# Patient Record
Sex: FEMALE | Race: OTHER | HISPANIC OR LATINO
[De-identification: names, ages, dates, MRNs, and addresses within clinical notes are randomized per-mention and may not be internally consistent; named-entity substitution may affect disease eponyms.]

---

## 2020-01-23 ENCOUNTER — APPOINTMENT (OUTPATIENT)
Dept: NEUROLOGY | Facility: CLINIC | Age: 65
End: 2020-01-23

## 2020-04-14 ENCOUNTER — EMERGENCY (EMERGENCY)
Facility: HOSPITAL | Age: 65
LOS: 1 days | Discharge: HOME | End: 2020-04-14
Attending: STUDENT IN AN ORGANIZED HEALTH CARE EDUCATION/TRAINING PROGRAM | Admitting: EMERGENCY MEDICINE
Payer: MEDICARE

## 2020-04-14 VITALS
TEMPERATURE: 100 F | DIASTOLIC BLOOD PRESSURE: 62 MMHG | HEART RATE: 118 BPM | RESPIRATION RATE: 16 BRPM | SYSTOLIC BLOOD PRESSURE: 126 MMHG | OXYGEN SATURATION: 98 %

## 2020-04-14 VITALS
HEART RATE: 105 BPM | SYSTOLIC BLOOD PRESSURE: 122 MMHG | TEMPERATURE: 101 F | OXYGEN SATURATION: 98 % | RESPIRATION RATE: 18 BRPM | DIASTOLIC BLOOD PRESSURE: 56 MMHG

## 2020-04-14 LAB
ALBUMIN SERPL ELPH-MCNC: 5 G/DL — SIGNIFICANT CHANGE UP (ref 3.3–5)
ALP SERPL-CCNC: 73 U/L — SIGNIFICANT CHANGE UP (ref 40–120)
ALT FLD-CCNC: 26 U/L — SIGNIFICANT CHANGE UP (ref 4–33)
ANION GAP SERPL CALC-SCNC: 15 MMO/L — HIGH (ref 7–14)
APTT BLD: 30.7 SEC — SIGNIFICANT CHANGE UP (ref 27.5–36.3)
AST SERPL-CCNC: 26 U/L — SIGNIFICANT CHANGE UP (ref 4–32)
BASE EXCESS BLDV CALC-SCNC: 2.1 MMOL/L — SIGNIFICANT CHANGE UP
BASOPHILS # BLD AUTO: 0.05 K/UL — SIGNIFICANT CHANGE UP (ref 0–0.2)
BASOPHILS NFR BLD AUTO: 0.7 % — SIGNIFICANT CHANGE UP (ref 0–2)
BILIRUB SERPL-MCNC: 0.4 MG/DL — SIGNIFICANT CHANGE UP (ref 0.2–1.2)
BLOOD GAS VENOUS - CREATININE: 0.63 MG/DL — SIGNIFICANT CHANGE UP (ref 0.5–1.3)
BUN SERPL-MCNC: 13 MG/DL — SIGNIFICANT CHANGE UP (ref 7–23)
CALCIUM SERPL-MCNC: 9.9 MG/DL — SIGNIFICANT CHANGE UP (ref 8.4–10.5)
CHLORIDE BLDV-SCNC: 103 MMOL/L — SIGNIFICANT CHANGE UP (ref 96–108)
CHLORIDE SERPL-SCNC: 101 MMOL/L — SIGNIFICANT CHANGE UP (ref 98–107)
CO2 SERPL-SCNC: 24 MMOL/L — SIGNIFICANT CHANGE UP (ref 22–31)
CREAT SERPL-MCNC: 0.57 MG/DL — SIGNIFICANT CHANGE UP (ref 0.5–1.3)
CRP SERPL-MCNC: < 4 MG/L — SIGNIFICANT CHANGE UP
D DIMER BLD IA.RAPID-MCNC: 321 NG/ML — SIGNIFICANT CHANGE UP
EOSINOPHIL # BLD AUTO: 0.12 K/UL — SIGNIFICANT CHANGE UP (ref 0–0.5)
EOSINOPHIL NFR BLD AUTO: 1.7 % — SIGNIFICANT CHANGE UP (ref 0–6)
ERYTHROCYTE [SEDIMENTATION RATE] IN BLOOD: 13 MM/HR — SIGNIFICANT CHANGE UP (ref 4–25)
FERRITIN SERPL-MCNC: 144.9 NG/ML — SIGNIFICANT CHANGE UP (ref 15–150)
GAS PNL BLDV: 141 MMOL/L — SIGNIFICANT CHANGE UP (ref 136–146)
GLUCOSE BLDV-MCNC: 104 MG/DL — HIGH (ref 70–99)
GLUCOSE SERPL-MCNC: 111 MG/DL — HIGH (ref 70–99)
HCO3 BLDV-SCNC: 25 MMOL/L — SIGNIFICANT CHANGE UP (ref 20–27)
HCT VFR BLD CALC: 38.2 % — SIGNIFICANT CHANGE UP (ref 34.5–45)
HCT VFR BLDV CALC: 39.9 % — SIGNIFICANT CHANGE UP (ref 34.5–45)
HGB BLD-MCNC: 12.6 G/DL — SIGNIFICANT CHANGE UP (ref 11.5–15.5)
HGB BLDV-MCNC: 13 G/DL — SIGNIFICANT CHANGE UP (ref 11.5–15.5)
IMM GRANULOCYTES NFR BLD AUTO: 0.3 % — SIGNIFICANT CHANGE UP (ref 0–1.5)
INR BLD: 1.03 — SIGNIFICANT CHANGE UP (ref 0.88–1.17)
LACTATE BLDV-MCNC: 2.5 MMOL/L — HIGH (ref 0.5–2)
LDH SERPL L TO P-CCNC: 235 U/L — HIGH (ref 135–225)
LYMPHOCYTES # BLD AUTO: 0.94 K/UL — LOW (ref 1–3.3)
LYMPHOCYTES # BLD AUTO: 13.2 % — SIGNIFICANT CHANGE UP (ref 13–44)
MCHC RBC-ENTMCNC: 29.9 PG — SIGNIFICANT CHANGE UP (ref 27–34)
MCHC RBC-ENTMCNC: 33 % — SIGNIFICANT CHANGE UP (ref 32–36)
MCV RBC AUTO: 90.5 FL — SIGNIFICANT CHANGE UP (ref 80–100)
MONOCYTES # BLD AUTO: 0.57 K/UL — SIGNIFICANT CHANGE UP (ref 0–0.9)
MONOCYTES NFR BLD AUTO: 8 % — SIGNIFICANT CHANGE UP (ref 2–14)
NEUTROPHILS # BLD AUTO: 5.43 K/UL — SIGNIFICANT CHANGE UP (ref 1.8–7.4)
NEUTROPHILS NFR BLD AUTO: 76.1 % — SIGNIFICANT CHANGE UP (ref 43–77)
NRBC # FLD: 0 K/UL — SIGNIFICANT CHANGE UP (ref 0–0)
PCO2 BLDV: 49 MMHG — SIGNIFICANT CHANGE UP (ref 41–51)
PH BLDV: 7.36 PH — SIGNIFICANT CHANGE UP (ref 7.32–7.43)
PLATELET # BLD AUTO: 164 K/UL — SIGNIFICANT CHANGE UP (ref 150–400)
PMV BLD: 11.7 FL — SIGNIFICANT CHANGE UP (ref 7–13)
PO2 BLDV: 30 MMHG — LOW (ref 35–40)
POTASSIUM BLDV-SCNC: 4.1 MMOL/L — SIGNIFICANT CHANGE UP (ref 3.4–4.5)
POTASSIUM SERPL-MCNC: 4 MMOL/L — SIGNIFICANT CHANGE UP (ref 3.5–5.3)
POTASSIUM SERPL-SCNC: 4 MMOL/L — SIGNIFICANT CHANGE UP (ref 3.5–5.3)
PROCALCITONIN SERPL-MCNC: 0.21 NG/ML — HIGH (ref 0.02–0.1)
PROT SERPL-MCNC: 7.8 G/DL — SIGNIFICANT CHANGE UP (ref 6–8.3)
PROTHROM AB SERPL-ACNC: 11.9 SEC — SIGNIFICANT CHANGE UP (ref 9.8–13.1)
RBC # BLD: 4.22 M/UL — SIGNIFICANT CHANGE UP (ref 3.8–5.2)
RBC # FLD: 13 % — SIGNIFICANT CHANGE UP (ref 10.3–14.5)
SAO2 % BLDV: 51.2 % — LOW (ref 60–85)
SODIUM SERPL-SCNC: 140 MMOL/L — SIGNIFICANT CHANGE UP (ref 135–145)
TROPONIN T, HIGH SENSITIVITY: < 6 NG/L — SIGNIFICANT CHANGE UP (ref ?–14)
WBC # BLD: 7.13 K/UL — SIGNIFICANT CHANGE UP (ref 3.8–10.5)
WBC # FLD AUTO: 7.13 K/UL — SIGNIFICANT CHANGE UP (ref 3.8–10.5)

## 2020-04-14 PROCEDURE — 70498 CT ANGIOGRAPHY NECK: CPT | Mod: 26

## 2020-04-14 PROCEDURE — 71045 X-RAY EXAM CHEST 1 VIEW: CPT | Mod: 26

## 2020-04-14 PROCEDURE — 93010 ELECTROCARDIOGRAM REPORT: CPT

## 2020-04-14 PROCEDURE — 70496 CT ANGIOGRAPHY HEAD: CPT | Mod: 26

## 2020-04-14 PROCEDURE — 99284 EMERGENCY DEPT VISIT MOD MDM: CPT | Mod: 25,GC

## 2020-04-14 RX ORDER — ACETAMINOPHEN 500 MG
650 TABLET ORAL ONCE
Refills: 0 | Status: COMPLETED | OUTPATIENT
Start: 2020-04-14 | End: 2020-04-14

## 2020-04-14 RX ORDER — VALACYCLOVIR 500 MG/1
1 TABLET, FILM COATED ORAL
Qty: 21 | Refills: 0
Start: 2020-04-14 | End: 2020-04-20

## 2020-04-14 RX ORDER — SODIUM CHLORIDE 9 MG/ML
500 INJECTION INTRAMUSCULAR; INTRAVENOUS; SUBCUTANEOUS ONCE
Refills: 0 | Status: COMPLETED | OUTPATIENT
Start: 2020-04-14 | End: 2020-04-14

## 2020-04-14 RX ORDER — VALACYCLOVIR 500 MG/1
1000 TABLET, FILM COATED ORAL ONCE
Refills: 0 | Status: COMPLETED | OUTPATIENT
Start: 2020-04-14 | End: 2020-04-14

## 2020-04-14 RX ADMIN — SODIUM CHLORIDE 500 MILLILITER(S): 9 INJECTION INTRAMUSCULAR; INTRAVENOUS; SUBCUTANEOUS at 16:00

## 2020-04-14 RX ADMIN — Medication 1 TABLET(S): at 16:00

## 2020-04-14 RX ADMIN — Medication 650 MILLIGRAM(S): at 14:03

## 2020-04-14 RX ADMIN — VALACYCLOVIR 1000 MILLIGRAM(S): 500 TABLET, FILM COATED ORAL at 18:54

## 2020-04-14 NOTE — ED PROVIDER NOTE - PROGRESS NOTE DETAILS
Patient seen by ophthalmology, believe sx to be zoster, recommend treating with valtrex and following up with ophtho clinic on Thursday. [facility] ED ATTENDING- accepted sign out on this patient from Dr. Nation. 64yo F h/o HTN, breast cancer, headaches, p/w R sided headache, facial rash/scabbing. No vision changes or dendritic lesions on fluorescein dye exam. ESR <15. CTA scans showing no acute findings. Concern for herpes zoster ophthalmicus per optho. will dc with optho f/u outpatient with valtrex rx and topical antibx

## 2020-04-14 NOTE — ED ADULT NURSE NOTE - CHIEF COMPLAINT QUOTE
Was seen at [facility] for right eye edema and was told she had blepharitis and was given ofloxacin and erythromycin drops with no relief.  Pt awoke this am with fever and increased edema to right eye and rash to nose

## 2020-04-14 NOTE — ED PROVIDER NOTE - OBJECTIVE STATEMENT
64 y/o female with hx of breast ca in remission, mild dementia, HTN, headaches at baseline presenting to the ED c/o right sided headache and eye pain x 5 days. Reports headache began 5 days ago and eye began to swell 3 days ago. Also reports some scabbing to the bridge of her nose at the same time period. Was seen at urgent care and given ofloxacin drops and erythromycin ointment and the eye began to worsen over the past few days, today could barely open it. Reports pain is constant in her right sided head and eye. Today began to spike fever. No nausea, vomiting, CP, SOB. Reports slight blurry vision in right eye.

## 2020-04-14 NOTE — ED PROVIDER NOTE - NSFOLLOWUPINSTRUCTIONS_ED_ALL_ED_FT
Take valtrex 1 gram three times a day. Use tylenol 650mg every 6 hours for pain. Return for worsening fever, headache, vision changes, change in mental status, nausea, vomiting, or other concerning symptoms.    Follow up in ophthalmology clinic on Thursday.

## 2020-04-14 NOTE — ED PROVIDER NOTE - NS ED ROS FT
CONST: + fevers, no chills  EYES: + pain, + vision changes  ENT: no sore throat, no ear pain, no change in hearing  CV: no chest pain, no leg swelling  RESP: no shortness of breath, no cough  ABD: no abdominal pain, no nausea, no vomiting, no diarrhea  : no dysuria, no flank pain, no hematuria  MSK: no back pain, no extremity pain  NEURO: + headache or additional neurologic complaints  HEME: no easy bleeding  SKIN:  no rash

## 2020-04-14 NOTE — ED PROVIDER NOTE - ATTENDING CONTRIBUTION TO CARE
Sherin Nation M.D: 65F hx breast ca,htn, headaches, dementia, arrives with daughter for complaints of right sided headaches since wednesday, different than her typicalal headaches, with progressive redness/swelling and pain to right eye/right nose. +scabs to nose. notes minimal blurry vision to right eye and watery drainage. spiked fever to 102 today. no cough no sob no uri symptoms. no jaw claudication. on exam pt in no resp distress, not hypoxic or tachypnic, is tachycardic and febrile at this time. +right temporal tendenress +erythema and edema to right eye and right nose without significant induration or tendenress. no pain with EOMs. PERRLA. fluoproscein stain without dendritic lesions. rash on nose does not seem c/w zoster. concern for possible prevs post septal cellulitis vs temporal arteritis vs cavernous sinus thrombosis in the setting of likely COVID (have seen covid with only conjunctival symptoms). plan for labs, covid swab, cta head and neck to assess for cavernous sinus thrombosis/temporal arteritis given inflam markers will likely be unreliable in the setting of covid. will discuss with ophtho and reassess

## 2020-04-14 NOTE — ED ADULT TRIAGE NOTE - CHIEF COMPLAINT QUOTE
Was seen at [facility] for right eye edema and was told she had blepharitis and was given ofloxacin and erythromycin drops with no relief.  Pt awoke this am with fever and increased edema to right eye and rash to nose

## 2020-04-14 NOTE — CONSULT NOTE ADULT - SUBJECTIVE AND OBJECTIVE BOX
[facility] Ophthalmology Consult Note    HPI: 66 y/o female with hx of breast ca in remission, mild dementia, HTN, headaches at baseline presenting to the ED c/o right sided headache and eye pain x 5 days. Reports headache began 5 days ago and eye began to swell 3 days ago. Also reports some scabbing to the bridge of her nose at the same time period. Was seen at urgent care and given ofloxacin drops and erythromycin ointment and the eye began to worsen over the past few days, today could barely open it. Reports pain is constant in her right sided head and eye. Today began to spike fever. No nausea, vomiting, CP, SOB. Reports slight blurry vision in right eye.      PMH: Breast Ca in remission, mild dementia, HTN, headaches   Meds: norvasc  POcHx (including surgeries/lasers/trauma):  Cataracts OU. Follows with an eye doctor, cannot remember name. Wears glasses. No surgeries.   Drops: Ofloxacin QID right eye, erythro ointment BID  FamHx: Negative for glaucoma or ARMD  Social Hx: Former smoker, quit 10 years ago. No ETOH use.   Allergies: NKDA    ROS:  General (neg), Vision (per HPI), Head and Neck (neg), Pulm (neg), CV (neg), GI (neg),  (neg), Musculoskeletal (neg), Skin/Integ (neg), Neuro (neg), Endocrine (neg), Heme (neg), All/Immuno (neg)    Mood and Affect Appropriate ( x ),  Oriented to Time, Place, and Person x 3 ( x )    Ophthalmology Exam    Visual acuity (cc - reading glasses): 20/30 OD, PH to 20/20, 20/40 OS, PH to 20/30  Pupils: PERRL OU, no APD  Ttono: 18, 19  Extraocular movements (EOMs): Full OU, no pain, no diplopia   Confrontational Visual Field (CVF):  Full OU  Color Plates: 3/12 OU    Pen Light Exam (PLE)  External:  Erythema   Lids/Lashes/Lacrimal Ducts: Flat OU    Sclera/Conjunctiva:  W+Q OU  Cornea: Cl OU  Anterior Chamber: D+F OU  Iris:  Flat OU  Lens:  Cl OU    Fundus Exam: dilated with 1% tropicamide and 2.5% phenylephrine  Approval obtained from primary team for dilation  Patient aware that pupils can remained dilated for at least 4-6 hours  Exam performed with 20D lens    Vitreous: wnl OU  Disc, cup/disc: sharp and pink, 0.4 OU  Macula:  wnl OU  Vessels:  wnl OU  Periphery: wnl OU    Diagnostic Testing:      Assessment:      Plan:        Follow-Up:  Patient should follow up his/her ophthalmologist or in the [facility] Ophthalmology Practice within 1 week of discharge.  [address] [place] [address].  [place], [place] [id number] [phone] [facility] Ophthalmology Consult Note    HPI: "64 y/o female with hx of breast ca in remission, mild dementia, HTN, headaches at baseline presenting to the ED c/o right sided headache and eye pain x 5 days. Reports headache began 5 days ago and eye began to swell 3 days ago. Also reports some scabbing to the bridge of her nose at the same time period. Was seen at urgent care and given ofloxacin drops and erythromycin ointment and the eye began to worsen over the past few days, today could barely open it. Reports pain is constant in her right sided head and eye. Today began to spike fever. No nausea, vomiting, CP, SOB. Reports slight blurry vision in right eye."    Ophthalmology consulted 2/2 right eye swelling. Patient denies changes in vision, diplopia, photophobia, trauma, flashes/floaters, recent sinus infection, and pain with eye movements. Has an eye doctor and wears reading glasses.      PMH: Breast Ca in remission, mild dementia, HTN, headaches   Meds: norvasc  POcHx (including surgeries/lasers/trauma):  Cataracts OU. Follows with an eye doctor, cannot remember name. Wears glasses. No surgeries.   Drops: Ofloxacin QID right eye, erythro ointment BID  FamHx: Negative for glaucoma or ARMD  Social Hx: Former smoker, quit 10 years ago. No ETOH use.   Allergies: NKDA    ROS:  General (neg), Vision (per HPI), Head and Neck (neg), Pulm (neg), CV (neg), GI (neg),  (neg), Musculoskeletal (neg), Skin/Integ (neg), Neuro (neg), Endocrine (neg), Heme (neg), All/Immuno (neg)    Mood and Affect Appropriate ( x ),  Oriented to Time, Place, and Person x 3 ( x )    Ophthalmology Exam    Visual acuity (cc - reading glasses): 20/30 OD, PH to 20/20, 20/40 OS, PH to 20/30  Pupils: PERRL OU, no APD  Ttono: 18, 19  Extraocular movements (EOMs): Full OU, no pain, no diplopia   Confrontational Visual Field (CVF):  Full OU  Color Plates: 3/12 OU    Pen Light Exam (PLE)  External:  No proptosis or resistance to retropulsion OU. Erythema and vesicles seen on bridge of nose.   Lids/Lashes/Lacrimal Ducts: 1+ right upper lid edema, trace right lower lid edema. WNL OS.  Sclera/Conjunctiva:  1+ bulbar injection OD, W + Q OS  Cornea: Cl OU. No dendritiform lesions seen OU  Anterior Chamber: D+F OU  Iris:  Flat OU  Lens:  NS OU    Fundus Exam: dilated with 1% tropicamide and 2.5% phenylephrine  Approval obtained from primary team for dilation  Patient aware that pupils can remained dilated for at least 4-6 hours  Exam performed with 20D lens    Vitreous: wnl OU  Disc, cup/disc: sharp and pink, 0.4 OU  Macula:  wnl OU  Vessels:  wnl OU  Periphery: wnl OU    Diagnostic Testing: CT head pending      Assessment: 64 y/o F, hx of breast CA in remission, presents with right eye swelling, redness, with facial rash x 5 days. BCVA 20/20 in affected eye, no APD, IOP WNL, EOMs full. Cornea clear, conjunctiva injected. DFE reveals unremarkable posterior segment. Clinical picture most consistent with herpes zoster ophthalmicus of the right side.      Plan:  - Valtrex 1 gram TID  - erythromycin ointment to right eye TID, and to periocular skin lesions TID  - cool compresses to affected area TID  - if discharged can follow up in [address] [place] [address] on Thursday.    d/w Dr. Murillo (attending)      Follow-Up:  Patient should follow up his/her ophthalmologist or in the [facility] Ophthalmology Practice within 3 days of discharge.  [address] [place] [address].  [place] [address]  [phone] [facility] Ophthalmology Consult Note    HPI: "64 y/o female with hx of breast ca in remission, mild dementia, HTN, headaches at baseline presenting to the ED c/o right sided headache and eye pain x 5 days. Reports headache began 5 days ago and eye began to swell 3 days ago. Also reports some scabbing to the bridge of her nose at the same time period. Was seen at urgent care and given ofloxacin drops and erythromycin ointment and the eye began to worsen over the past few days, today could barely open it. Reports pain is constant in her right sided head and eye. Today began to spike fever. No nausea, vomiting, CP, SOB. Reports slight blurry vision in right eye."    Ophthalmology consulted 2/2 right eye swelling. Patient denies changes in vision, diplopia, photophobia, trauma, flashes/floaters, recent sinus infection, and pain with eye movements. Has an eye doctor and wears reading glasses.      PMH: Breast Ca in remission, mild dementia, HTN, headaches   Meds: norvasc  POcHx (including surgeries/lasers/trauma):  Cataracts OU. Follows with an eye doctor, cannot remember name. Wears glasses. No surgeries.   Drops: Ofloxacin QID right eye, erythro ointment BID  FamHx: Negative for glaucoma or ARMD  Social Hx: Former smoker, quit 10 years ago. No ETOH use.   Allergies: NKDA    ROS:  General (neg), Vision (per HPI), Head and Neck (neg), Pulm (neg), CV (neg), GI (neg),  (neg), Musculoskeletal (neg), Skin/Integ (neg), Neuro (neg), Endocrine (neg), Heme (neg), All/Immuno (neg)    Mood and Affect Appropriate ( x ),  Oriented to Time, Place, and Person x 3 ( x )    Ophthalmology Exam    Visual acuity (cc - reading glasses): 20/30 OD, PH to 20/20, 20/40 OS, PH to 20/30  Pupils: PERRL OU, no APD  Ttono: 18, 19  Extraocular movements (EOMs): Full OU, no pain, no diplopia   Confrontational Visual Field (CVF):  Full OU  Color Plates: 3/12 OU    Pen Light Exam (PLE)  External:  No proptosis or resistance to retropulsion OU. Erythema and vesicles seen on bridge of nose.   Lids/Lashes/Lacrimal Ducts: 1+ right upper lid edema, trace right lower lid edema. WNL OS.  Sclera/Conjunctiva:  1+ bulbar injection OD, W + Q OS  Cornea: Cl OU. No dendritiform lesions seen OU  Anterior Chamber: D+F OU  Iris:  Flat OU  Lens:  NS OU    Fundus Exam: dilated with 1% tropicamide and 2.5% phenylephrine  Approval obtained from primary team for dilation  Patient aware that pupils can remained dilated for at least 4-6 hours  Exam performed with 20D lens    Vitreous: wnl OU  Disc, cup/disc: sharp and pink, 0.4 OU  Macula:  wnl OU  Vessels:  wnl OU  Periphery: wnl OU    Diagnostic Testing: CT head pending      Assessment: 64 y/o F, hx of breast CA in remission, presents with right eye swelling, redness, with facial rash x 5 days. BCVA 20/20 in affected eye, no APD, IOP WNL, EOMs full. Cornea clear, conjunctiva injected. DFE reveals unremarkable posterior segment. Clinical picture most consistent with herpes zoster ophthalmicus of the right side with conjunctivitis OD.      Plan:  - Valtrex 1 gram TID  - erythromycin ointment to right eye TID, and to periocular skin lesions TID  - cool compresses to affected area TID  - if discharged can follow up in [facility] clinic on Thursday, sooner if symptoms worsen or change    d/w Dr. Murillo (attending)      Follow-Up:  Patient should follow up his/her ophthalmologist or in the [facility] Ophthalmology Practice within 3 days of discharge, sooner if symptoms worsen or change.  [address] [place] [address].  [place] [address]  [phone]

## 2020-04-14 NOTE — ED ADULT NURSE NOTE - OBJECTIVE STATEMENT
pt coming with right eye lid and round the eye swelling, clusters like area with some scabs and erythema noted from the eye/nose bridge area rad. upper area of the right forehead.    pt with Hx. mild dementia, daughter at bed side, labs done, IV to left AC 20g angio cath. place.  Swab for COBID 19 sent, medicated as ordered.    Amber Ramirez RN (covering for BK)

## 2020-04-14 NOTE — CONSULT NOTE ADULT - ATTENDING COMMENTS
I have reviewed the residents note and photos including the history, exam, assessment, and plan.  I agree with the residents assessment and plan.    Delmi Murillo MD

## 2020-04-14 NOTE — ED PROVIDER NOTE - PATIENT PORTAL LINK FT
You can access the FollowMyHealth Patient Portal offered by [facility] by registering at the following website: [email or web address]. By joining [employer]’s FollowMyHealth portal, you will also be able to view your health information using other applications (apps) compatible with our system.

## 2020-04-14 NOTE — ED PROVIDER NOTE - PHYSICAL EXAMINATION
GENERAL: Awake, alert, NAD  HEENT: NC/AT, moist mucous membranes  EYE: PERRL, EOMI, right eye with conjunctival injection, blepharitis, pain to palpation around eye with erythema extending to base of nose, mild scabbing over bridge of nose with possible vesicles  LUNGS: CTAB, no wheezes or crackles   CARDIAC: RRR, no m/r/g  ABDOMEN: Soft, normal BS, non tender, non distended, no rebound, no guarding  BACK: No midline spinal tenderness, no CVA tenderness  EXT: No edema, no calf tenderness, 2+ DP pulses bilaterally, no deformities.  NEURO: A&Ox3. Moving all extremities.  SKIN: Warm and dry. No rash.  PSYCH: Normal affect.

## 2020-04-14 NOTE — ED PROVIDER NOTE - CLINICAL SUMMARY MEDICAL DECISION MAKING FREE TEXT BOX
64 y/o female with hx of breast ca in remission, mild dementia, HTN, headaches at baseline presenting to the ED c/o right sided headache and eye pain x 5 days. ?vesicles with scabbing but unclear, potential orbital cellulitis vs temporal arteritis given extent of pain and swelling involving right eye, also potential for underlying COVID infection. Will perform workup with CT head to r/o orbital cellulitis or signs of thrombosis, also check labs including ESR and CRP to evaluate for temporal arteritis, will send COVID swab, also stain eye and consult ophtho for further recs. Reassess, d/c vs admit pending workup.

## 2020-04-14 NOTE — ED PROVIDER NOTE - NSFOLLOWUPCLINICS_GEN_ALL_ED_FT
[facility] Ophthalmology  Ophthalmology  [address] [place] [address] [address]  [place] [address]  Phone: [phone]  Fax:   Follow Up Time: 1-3 Days

## 2020-04-15 LAB — SARS-COV-2 RNA SPEC QL NAA+PROBE: SIGNIFICANT CHANGE UP

## 2020-04-16 ENCOUNTER — NON-APPOINTMENT (OUTPATIENT)
Age: 65
End: 2020-04-16

## 2020-04-16 ENCOUNTER — APPOINTMENT (OUTPATIENT)
Dept: OPHTHALMOLOGY | Facility: CLINIC | Age: 65
End: 2020-04-16
Payer: MEDICARE

## 2020-04-16 PROCEDURE — 99202 OFFICE O/P NEW SF 15 MIN: CPT | Mod: 95

## 2020-04-19 LAB
CULTURE RESULTS: SIGNIFICANT CHANGE UP
CULTURE RESULTS: SIGNIFICANT CHANGE UP
SPECIMEN SOURCE: SIGNIFICANT CHANGE UP
SPECIMEN SOURCE: SIGNIFICANT CHANGE UP

## 2020-04-30 ENCOUNTER — APPOINTMENT (OUTPATIENT)
Dept: OPHTHALMOLOGY | Facility: CLINIC | Age: 65
End: 2020-04-30

## 2021-06-17 ENCOUNTER — NON-APPOINTMENT (OUTPATIENT)
Age: 66
End: 2021-06-17

## 2021-06-17 ENCOUNTER — APPOINTMENT (OUTPATIENT)
Dept: OPHTHALMOLOGY | Facility: CLINIC | Age: 66
End: 2021-06-17
Payer: MEDICARE

## 2021-06-17 PROCEDURE — 92014 COMPRE OPH EXAM EST PT 1/>: CPT

## 2021-07-28 ENCOUNTER — NON-APPOINTMENT (OUTPATIENT)
Age: 66
End: 2021-07-28

## 2021-07-28 ENCOUNTER — APPOINTMENT (OUTPATIENT)
Dept: OPHTHALMOLOGY | Facility: CLINIC | Age: 66
End: 2021-07-28
Payer: MEDICARE

## 2021-07-28 PROCEDURE — 76514 ECHO EXAM OF EYE THICKNESS: CPT

## 2021-07-28 PROCEDURE — 92012 INTRM OPH EXAM EST PATIENT: CPT

## 2021-07-28 PROCEDURE — 92083 EXTENDED VISUAL FIELD XM: CPT

## 2021-08-06 ENCOUNTER — NON-APPOINTMENT (OUTPATIENT)
Age: 66
End: 2021-08-06

## 2021-08-18 ENCOUNTER — APPOINTMENT (OUTPATIENT)
Dept: ORTHOPEDIC SURGERY | Facility: CLINIC | Age: 66
End: 2021-08-18
Payer: MEDICARE

## 2021-08-18 ENCOUNTER — NON-APPOINTMENT (OUTPATIENT)
Age: 66
End: 2021-08-18

## 2021-08-18 PROCEDURE — 29075 APPL CST ELBW FNGR SHORT ARM: CPT | Mod: LT

## 2021-08-18 PROCEDURE — 99204 OFFICE O/P NEW MOD 45 MIN: CPT | Mod: 25

## 2021-08-27 ENCOUNTER — APPOINTMENT (OUTPATIENT)
Dept: ORTHOPEDIC SURGERY | Facility: CLINIC | Age: 66
End: 2021-08-27
Payer: MEDICARE

## 2021-08-27 VITALS
OXYGEN SATURATION: 96 % | BODY MASS INDEX: 28.51 KG/M2 | WEIGHT: 167 LBS | SYSTOLIC BLOOD PRESSURE: 124 MMHG | DIASTOLIC BLOOD PRESSURE: 66 MMHG | HEIGHT: 64 IN | HEART RATE: 86 BPM

## 2021-08-27 PROCEDURE — 99214 OFFICE O/P EST MOD 30 MIN: CPT

## 2021-08-27 PROCEDURE — 73110 X-RAY EXAM OF WRIST: CPT | Mod: LT

## 2021-09-24 ENCOUNTER — APPOINTMENT (OUTPATIENT)
Dept: ORTHOPEDIC SURGERY | Facility: CLINIC | Age: 66
End: 2021-09-24
Payer: MEDICARE

## 2021-10-08 ENCOUNTER — APPOINTMENT (OUTPATIENT)
Dept: ORTHOPEDIC SURGERY | Facility: CLINIC | Age: 66
End: 2021-10-08
Payer: MEDICARE

## 2021-10-08 VITALS
WEIGHT: 167 LBS | OXYGEN SATURATION: 95 % | HEART RATE: 74 BPM | DIASTOLIC BLOOD PRESSURE: 71 MMHG | BODY MASS INDEX: 28.51 KG/M2 | SYSTOLIC BLOOD PRESSURE: 118 MMHG | HEIGHT: 64 IN

## 2021-10-08 DIAGNOSIS — S62.102A FRACTURE OF UNSPECIFIED CARPAL BONE, LEFT WRIST, INITIAL ENCOUNTER FOR CLOSED FRACTURE: ICD-10-CM

## 2021-10-08 PROCEDURE — 99214 OFFICE O/P EST MOD 30 MIN: CPT | Mod: 25

## 2021-10-08 PROCEDURE — 73110 X-RAY EXAM OF WRIST: CPT | Mod: LT

## 2021-12-10 ENCOUNTER — APPOINTMENT (OUTPATIENT)
Dept: ORTHOPEDIC SURGERY | Facility: CLINIC | Age: 66
End: 2021-12-10

## 2022-01-19 ENCOUNTER — APPOINTMENT (OUTPATIENT)
Dept: OPHTHALMOLOGY | Facility: CLINIC | Age: 67
End: 2022-01-19
Payer: MEDICARE

## 2022-01-19 ENCOUNTER — NON-APPOINTMENT (OUTPATIENT)
Age: 67
End: 2022-01-19

## 2022-01-19 PROCEDURE — 92285 EXTERNAL OCULAR PHOTOGRAPHY: CPT

## 2022-01-19 PROCEDURE — 92014 COMPRE OPH EXAM EST PT 1/>: CPT

## 2022-01-21 ENCOUNTER — NON-APPOINTMENT (OUTPATIENT)
Age: 67
End: 2022-01-21

## 2022-01-21 ENCOUNTER — APPOINTMENT (OUTPATIENT)
Dept: OPHTHALMOLOGY | Facility: CLINIC | Age: 67
End: 2022-01-21
Payer: MEDICARE

## 2022-01-21 PROCEDURE — 92012 INTRM OPH EXAM EST PATIENT: CPT

## 2022-02-04 ENCOUNTER — TRANSCRIPTION ENCOUNTER (OUTPATIENT)
Age: 67
End: 2022-02-04

## 2022-02-09 ENCOUNTER — APPOINTMENT (OUTPATIENT)
Dept: OPHTHALMOLOGY | Facility: CLINIC | Age: 67
End: 2022-02-09
Payer: MEDICARE

## 2022-02-09 ENCOUNTER — NON-APPOINTMENT (OUTPATIENT)
Age: 67
End: 2022-02-09

## 2022-02-09 PROCEDURE — 92012 INTRM OPH EXAM EST PATIENT: CPT

## 2022-02-17 ENCOUNTER — NON-APPOINTMENT (OUTPATIENT)
Age: 67
End: 2022-02-17

## 2022-02-17 ENCOUNTER — APPOINTMENT (OUTPATIENT)
Dept: OPHTHALMOLOGY | Facility: CLINIC | Age: 67
End: 2022-02-17
Payer: MEDICARE

## 2022-02-17 PROCEDURE — 92012 INTRM OPH EXAM EST PATIENT: CPT

## 2022-02-22 ENCOUNTER — APPOINTMENT (OUTPATIENT)
Dept: OPHTHALMOLOGY | Facility: CLINIC | Age: 67
End: 2022-02-22
Payer: MEDICARE

## 2022-02-22 ENCOUNTER — NON-APPOINTMENT (OUTPATIENT)
Age: 67
End: 2022-02-22

## 2022-02-22 PROCEDURE — 92012 INTRM OPH EXAM EST PATIENT: CPT

## 2022-03-03 ENCOUNTER — RX RENEWAL (OUTPATIENT)
Age: 67
End: 2022-03-03

## 2022-03-11 ENCOUNTER — NON-APPOINTMENT (OUTPATIENT)
Age: 67
End: 2022-03-11

## 2022-03-11 ENCOUNTER — APPOINTMENT (OUTPATIENT)
Dept: OPHTHALMOLOGY | Facility: CLINIC | Age: 67
End: 2022-03-11
Payer: MEDICARE

## 2022-03-11 PROCEDURE — 92012 INTRM OPH EXAM EST PATIENT: CPT

## 2022-05-13 ENCOUNTER — APPOINTMENT (OUTPATIENT)
Dept: OPHTHALMOLOGY | Facility: CLINIC | Age: 67
End: 2022-05-13
Payer: MEDICARE

## 2022-05-13 ENCOUNTER — NON-APPOINTMENT (OUTPATIENT)
Age: 67
End: 2022-05-13

## 2022-05-13 PROCEDURE — 92133 CPTRZD OPH DX IMG PST SGM ON: CPT

## 2022-05-13 PROCEDURE — 92012 INTRM OPH EXAM EST PATIENT: CPT

## 2022-07-18 ENCOUNTER — RX RENEWAL (OUTPATIENT)
Age: 67
End: 2022-07-18

## 2022-08-18 ENCOUNTER — APPOINTMENT (OUTPATIENT)
Dept: OPHTHALMOLOGY | Facility: CLINIC | Age: 67
End: 2022-08-18

## 2022-08-18 ENCOUNTER — NON-APPOINTMENT (OUTPATIENT)
Age: 67
End: 2022-08-18

## 2022-08-18 PROCEDURE — 92012 INTRM OPH EXAM EST PATIENT: CPT

## 2022-08-18 PROCEDURE — 92133 CPTRZD OPH DX IMG PST SGM ON: CPT

## 2022-09-29 ENCOUNTER — APPOINTMENT (OUTPATIENT)
Dept: OPHTHALMOLOGY | Facility: CLINIC | Age: 67
End: 2022-09-29

## 2022-09-29 ENCOUNTER — NON-APPOINTMENT (OUTPATIENT)
Age: 67
End: 2022-09-29

## 2022-09-29 PROCEDURE — 92014 COMPRE OPH EXAM EST PT 1/>: CPT

## 2022-09-29 PROCEDURE — 92025 CPTRIZED CORNEAL TOPOGRAPHY: CPT

## 2022-09-29 PROCEDURE — 92136 OPHTHALMIC BIOMETRY: CPT

## 2022-11-19 ENCOUNTER — APPOINTMENT (OUTPATIENT)
Dept: OPHTHALMOLOGY | Facility: CLINIC | Age: 67
End: 2022-11-19

## 2022-12-13 DIAGNOSIS — Z20.822 CONTACT WITH AND (SUSPECTED) EXPOSURE TO COVID-19: ICD-10-CM

## 2022-12-14 LAB — SARS-COV-2 N GENE NPH QL NAA+PROBE: NOT DETECTED

## 2022-12-16 ENCOUNTER — APPOINTMENT (OUTPATIENT)
Dept: OPHTHALMOLOGY | Facility: AMBULATORY MEDICAL SERVICES | Age: 67
End: 2022-12-16

## 2022-12-16 PROCEDURE — 6698F: CPT

## 2022-12-16 PROCEDURE — 66984 XCAPSL CTRC RMVL W/O ECP: CPT | Mod: LT

## 2022-12-17 ENCOUNTER — NON-APPOINTMENT (OUTPATIENT)
Age: 67
End: 2022-12-17

## 2022-12-17 ENCOUNTER — APPOINTMENT (OUTPATIENT)
Dept: OPHTHALMOLOGY | Facility: CLINIC | Age: 67
End: 2022-12-17

## 2022-12-17 PROCEDURE — 99024 POSTOP FOLLOW-UP VISIT: CPT

## 2022-12-23 ENCOUNTER — NON-APPOINTMENT (OUTPATIENT)
Age: 67
End: 2022-12-23

## 2022-12-23 ENCOUNTER — APPOINTMENT (OUTPATIENT)
Dept: OPHTHALMOLOGY | Facility: CLINIC | Age: 67
End: 2022-12-23

## 2022-12-23 PROCEDURE — 99024 POSTOP FOLLOW-UP VISIT: CPT

## 2023-01-06 ENCOUNTER — NON-APPOINTMENT (OUTPATIENT)
Age: 68
End: 2023-01-06

## 2023-01-06 ENCOUNTER — APPOINTMENT (OUTPATIENT)
Dept: OPHTHALMOLOGY | Facility: CLINIC | Age: 68
End: 2023-01-06
Payer: MEDICARE

## 2023-01-06 PROCEDURE — 99024 POSTOP FOLLOW-UP VISIT: CPT

## 2023-01-27 ENCOUNTER — APPOINTMENT (OUTPATIENT)
Dept: OPHTHALMOLOGY | Facility: CLINIC | Age: 68
End: 2023-01-27
Payer: MEDICARE

## 2023-01-27 ENCOUNTER — NON-APPOINTMENT (OUTPATIENT)
Age: 68
End: 2023-01-27

## 2023-01-27 PROCEDURE — 99024 POSTOP FOLLOW-UP VISIT: CPT

## 2023-01-27 PROCEDURE — 92133 CPTRZD OPH DX IMG PST SGM ON: CPT

## 2023-02-23 ENCOUNTER — APPOINTMENT (OUTPATIENT)
Dept: OPHTHALMOLOGY | Facility: CLINIC | Age: 68
End: 2023-02-23

## 2023-03-17 ENCOUNTER — APPOINTMENT (OUTPATIENT)
Dept: GERIATRICS | Facility: CLINIC | Age: 68
End: 2023-03-17
Payer: MEDICARE

## 2023-03-17 VITALS
HEIGHT: 64 IN | DIASTOLIC BLOOD PRESSURE: 76 MMHG | OXYGEN SATURATION: 97 % | TEMPERATURE: 97.1 F | BODY MASS INDEX: 24.65 KG/M2 | SYSTOLIC BLOOD PRESSURE: 143 MMHG | HEART RATE: 67 BPM | WEIGHT: 144.38 LBS | RESPIRATION RATE: 16 BRPM

## 2023-03-17 DIAGNOSIS — Z13.820 ENCOUNTER FOR SCREENING FOR OSTEOPOROSIS: ICD-10-CM

## 2023-03-17 DIAGNOSIS — Z78.9 OTHER SPECIFIED HEALTH STATUS: ICD-10-CM

## 2023-03-17 DIAGNOSIS — E78.5 HYPERLIPIDEMIA, UNSPECIFIED: ICD-10-CM

## 2023-03-17 DIAGNOSIS — Z12.39 ENCOUNTER FOR OTHER SCREENING FOR MALIGNANT NEOPLASM OF BREAST: ICD-10-CM

## 2023-03-17 DIAGNOSIS — Z12.83 ENCOUNTER FOR SCREENING FOR MALIGNANT NEOPLASM OF SKIN: ICD-10-CM

## 2023-03-17 DIAGNOSIS — Z87.891 PERSONAL HISTORY OF NICOTINE DEPENDENCE: ICD-10-CM

## 2023-03-17 DIAGNOSIS — Z13.29 ENCOUNTER FOR SCREENING FOR OTHER SUSPECTED ENDOCRINE DISORDER: ICD-10-CM

## 2023-03-17 DIAGNOSIS — E55.9 VITAMIN D DEFICIENCY, UNSPECIFIED: ICD-10-CM

## 2023-03-17 DIAGNOSIS — Z86.19 PERSONAL HISTORY OF OTHER INFECTIOUS AND PARASITIC DISEASES: ICD-10-CM

## 2023-03-17 DIAGNOSIS — Z85.3 PERSONAL HISTORY OF MALIGNANT NEOPLASM OF BREAST: ICD-10-CM

## 2023-03-17 DIAGNOSIS — Z13.21 ENCOUNTER FOR SCREENING FOR NUTRITIONAL DISORDER: ICD-10-CM

## 2023-03-17 DIAGNOSIS — Z12.12 ENCOUNTER FOR SCREENING FOR MALIGNANT NEOPLASM OF COLON: ICD-10-CM

## 2023-03-17 DIAGNOSIS — Z12.4 ENCOUNTER FOR SCREENING FOR MALIGNANT NEOPLASM OF CERVIX: ICD-10-CM

## 2023-03-17 DIAGNOSIS — Z63.4 DISAPPEARANCE AND DEATH OF FAMILY MEMBER: ICD-10-CM

## 2023-03-17 DIAGNOSIS — Z81.8 FAMILY HISTORY OF OTHER MENTAL AND BEHAVIORAL DISORDERS: ICD-10-CM

## 2023-03-17 DIAGNOSIS — Z11.59 ENCOUNTER FOR SCREENING FOR OTHER VIRAL DISEASES: ICD-10-CM

## 2023-03-17 DIAGNOSIS — Z12.11 ENCOUNTER FOR SCREENING FOR MALIGNANT NEOPLASM OF COLON: ICD-10-CM

## 2023-03-17 PROCEDURE — 99205 OFFICE O/P NEW HI 60 MIN: CPT | Mod: 25

## 2023-03-17 PROCEDURE — G0444 DEPRESSION SCREEN ANNUAL: CPT

## 2023-03-17 RX ORDER — MELOXICAM 15 MG/1
15 TABLET ORAL
Qty: 30 | Refills: 2 | Status: DISCONTINUED | COMMUNITY
Start: 2021-10-08 | End: 2023-03-17

## 2023-03-17 RX ORDER — CARBOXYMETHYLCELLULOSE SODIUM 2.5 MG/ML
0.25 SOLUTION/ DROPS OPHTHALMIC
Refills: 0 | Status: ACTIVE | COMMUNITY
Start: 2023-03-17

## 2023-03-17 RX ORDER — MELOXICAM 15 MG/1
15 TABLET ORAL
Qty: 30 | Refills: 2 | Status: DISCONTINUED | COMMUNITY
Start: 2021-08-27 | End: 2023-03-17

## 2023-03-17 RX ORDER — METFORMIN HYDROCHLORIDE 500 MG/1
500 TABLET, COATED ORAL
Qty: 180 | Refills: 1 | Status: ACTIVE | COMMUNITY
Start: 2023-03-17

## 2023-03-17 RX ORDER — IBUPROFEN 800 MG/1
800 TABLET, FILM COATED ORAL EVERY 8 HOURS
Qty: 42 | Refills: 0 | Status: DISCONTINUED | COMMUNITY
Start: 2021-08-18 | End: 2023-03-17

## 2023-03-17 RX ORDER — ESCITALOPRAM OXALATE 10 MG/1
10 TABLET ORAL DAILY
Qty: 30 | Refills: 3 | Status: ACTIVE | COMMUNITY
Start: 2023-03-17

## 2023-03-17 RX ORDER — ROSUVASTATIN CALCIUM 10 MG/1
10 TABLET, FILM COATED ORAL
Qty: 30 | Refills: 3 | Status: ACTIVE | COMMUNITY
Start: 2023-03-17 | End: 1900-01-01

## 2023-03-17 RX ORDER — RIVASTIGMINE TARTRATE 3 MG/1
3 CAPSULE ORAL AT BEDTIME
Refills: 0 | Status: ACTIVE | COMMUNITY
Start: 2023-03-17

## 2023-03-17 SDOH — SOCIAL STABILITY - SOCIAL INSECURITY: DISSAPEARANCE AND DEATH OF FAMILY MEMBER: Z63.4

## 2023-03-17 NOTE — ASSESSMENT
[FreeTextEntry1] : Dtr will request med records from PMD and neuro\par - last 2 visit notes, immuniz, brain imaging, last cognitive assessment, labs, ekg \par \par labs ordered today - f/u \par \par f/u HCP form - dtr to bring\par \par Plan for further cognitive/mood/behavior assessment at next visit\par \par ADC program discussed and reading material provided\par \par May benefit from formal help at home\par ?Day program\par \par f/u in 2-4wks, unless earlier PRN\par \par rest as per PMD \par

## 2023-03-17 NOTE — REASON FOR VISIT
[Initial Evaluation] : an initial evaluation [FreeTextEntry1] : dementia , mood swings [FreeTextEntry3] : jessica Garcia [FreeTextEntry2] : who assists with history due to patient with baseline cognitive impairment

## 2023-03-17 NOTE — REVIEW OF SYSTEMS
[As Noted in HPI] : as noted in HPI [Negative] : Heme/Lymph [de-identified] : eczema, picking skin for past 2-3 yrs

## 2023-03-17 NOTE — PHYSICAL EXAM
[EOMI] : extraocular movements were intact [Normal Gait] : abnormal gait [Normal] : normal skin color and pigmentation [Normal Insight/Judgment] : insight and judgment were not intact [de-identified] : shuffling gait  [de-identified] : confused, unsteady/shuffling gait  [de-identified] : calm, cooperative,confused

## 2023-03-17 NOTE — HISTORY OF PRESENT ILLNESS
[Two or more falls in past year] : Patient reported two or more falls in the past year [Independent] : transferring/mobility [Full assistance needed] : Assistance needed managing medications [] : Assistance needed managing finances. [Smoke Detector] : smoke detector [Carbon Monoxide Detector] : carbon monoxide detector [3] : 1) Little interest or pleasure doing things for nearly every day (3) [1] : 2) Feeling down, depressed, or hopeless for several days (1) [I have developed a follow-up plan documented below in the note.] : I have developed a follow-up plan documented below in the note. [BoneDensityDate] : 1/23  [TextBox_37] : Optho Dr. 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

## 2023-03-31 ENCOUNTER — APPOINTMENT (OUTPATIENT)
Dept: GERIATRICS | Facility: CLINIC | Age: 68
End: 2023-03-31
Payer: MEDICARE

## 2023-03-31 VITALS
WEIGHT: 144.13 LBS | OXYGEN SATURATION: 96 % | DIASTOLIC BLOOD PRESSURE: 62 MMHG | SYSTOLIC BLOOD PRESSURE: 118 MMHG | BODY MASS INDEX: 24.61 KG/M2 | HEART RATE: 87 BPM | TEMPERATURE: 97.2 F | RESPIRATION RATE: 16 BRPM | HEIGHT: 64 IN

## 2023-03-31 DIAGNOSIS — F42.4 EXCORIATION (SKIN-PICKING) DISORDER: ICD-10-CM

## 2023-03-31 DIAGNOSIS — E11.9 TYPE 2 DIABETES MELLITUS W/OUT COMPLICATIONS: ICD-10-CM

## 2023-03-31 PROCEDURE — 99215 OFFICE O/P EST HI 40 MIN: CPT

## 2023-04-06 PROBLEM — E11.9 T2DM (TYPE 2 DIABETES MELLITUS): Status: ACTIVE | Noted: 2023-03-17

## 2023-04-06 PROBLEM — F42.4 SKIN PICKING HABIT: Status: ACTIVE | Noted: 2023-04-06

## 2023-04-06 NOTE — HISTORY OF PRESENT ILLNESS
[Two or more falls in past year] : Patient reported two or more falls in the past year [Independent] : transferring/mobility [Full assistance needed] : Assistance needed managing medications [Smoke Detector] : smoke detector [Carbon Monoxide Detector] : carbon monoxide detector [FAST Score: ____] : Functional Assessment Scale (FAST) Score: [unfilled] [Other reason not done] : Other reason not done [Moderate] : Stage: Moderate [Stable] : Status: Stable [Memory Lapses Or Loss] : stable memory impairment [Patient Observed To Be Agitated] : stable agitation [Hostility Toward Caregivers] : stable aggression [Sleep Disturbances] : stable sleep disturbances [] : denies wandering [BoneDensityDate] : 1/23  [TextBox_37] : Optho Dr. 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

## 2023-04-06 NOTE — PHYSICAL EXAM
[EOMI] : extraocular movements were intact [Normal] : normal skin color and pigmentation [Normal Gait] : abnormal gait [Normal Insight/Judgment] : insight and judgment were not intact [de-identified] : shuffling gait  [de-identified] : confused, unsteady/shuffling gait  [de-identified] : calm, cooperative,confused [[employer]oca[employer]] : 2 [FreeTextEntry1] : 3/31/23

## 2023-04-06 NOTE — REASON FOR VISIT
[Follow-Up] : a follow-up visit [FreeTextEntry1] : dementia , mood swings [FreeTextEntry3] : jessica Garcia [FreeTextEntry2] : who assists with history due to patient with baseline cognitive impairment

## 2023-04-06 NOTE — REVIEW OF SYSTEMS
[As Noted in HPI] : as noted in HPI [Negative] : Heme/Lymph [de-identified] : eczema, picking skin for past 2-3 yrs

## 2023-04-06 NOTE — ASSESSMENT
[FreeTextEntry1] : Available med records from PMD Dr. Lewis reviewed. \par \par Available med records from neuro  reviewed: \par \par labs pending completion - f/u \par \par f/u HCP form - dtr to bring\par \par f/u for further mood/behavior assessment/discussion at next visit\par Will consider adjusting meds for mood/anxiety/aggression\par \par ADC program discussed and reading material previously provided\par -Referred to  for additional counseling, education, support, resources\par \par May benefit from formal help at home\par ?Day program\par Fall precautions\par \par f/u in 1 mo with me or NP Chantell if able, unless earlier PRN\par \par rest as per PMD \par

## 2023-04-14 ENCOUNTER — APPOINTMENT (OUTPATIENT)
Dept: GERIATRICS | Facility: CLINIC | Age: 68
End: 2023-04-14
Payer: MEDICARE

## 2023-04-14 VITALS
DIASTOLIC BLOOD PRESSURE: 62 MMHG | TEMPERATURE: 98.2 F | WEIGHT: 141 LBS | OXYGEN SATURATION: 96 % | SYSTOLIC BLOOD PRESSURE: 110 MMHG | RESPIRATION RATE: 20 BRPM | HEART RATE: 80 BPM | BODY MASS INDEX: 24.2 KG/M2

## 2023-04-14 DIAGNOSIS — Z74.1 NEED FOR ASSISTANCE WITH PERSONAL CARE: ICD-10-CM

## 2023-04-14 DIAGNOSIS — H54.7 UNSPECIFIED VISUAL LOSS: ICD-10-CM

## 2023-04-14 PROCEDURE — 99483 ASSMT & CARE PLN PT COG IMP: CPT

## 2023-04-21 ENCOUNTER — EMERGENCY (EMERGENCY)
Facility: HOSPITAL | Age: 68
LOS: 1 days | Discharge: HOME | End: 2023-04-21
Attending: PERSONAL EMERGENCY RESPONSE ATTENDANT | Admitting: PERSONAL EMERGENCY RESPONSE ATTENDANT
Payer: MEDICARE

## 2023-04-21 VITALS
DIASTOLIC BLOOD PRESSURE: 85 MMHG | TEMPERATURE: 99 F | SYSTOLIC BLOOD PRESSURE: 150 MMHG | RESPIRATION RATE: 17 BRPM | OXYGEN SATURATION: 100 % | HEART RATE: 82 BPM

## 2023-04-21 VITALS — RESPIRATION RATE: 19 BRPM | OXYGEN SATURATION: 100 %

## 2023-04-21 LAB
ALBUMIN SERPL ELPH-MCNC: 4.3 G/DL — SIGNIFICANT CHANGE UP (ref 3.3–5)
ALP SERPL-CCNC: 64 U/L — SIGNIFICANT CHANGE UP (ref 40–120)
ALT FLD-CCNC: 13 U/L — SIGNIFICANT CHANGE UP (ref 4–33)
ANION GAP SERPL CALC-SCNC: 14 MMOL/L — SIGNIFICANT CHANGE UP (ref 7–14)
AST SERPL-CCNC: 11 U/L — SIGNIFICANT CHANGE UP (ref 4–32)
B PERT DNA SPEC QL NAA+PROBE: SIGNIFICANT CHANGE UP
B PERT+PARAPERT DNA PNL SPEC NAA+PROBE: SIGNIFICANT CHANGE UP
BASOPHILS # BLD AUTO: 0.05 K/UL — SIGNIFICANT CHANGE UP (ref 0–0.2)
BASOPHILS NFR BLD AUTO: 0.6 % — SIGNIFICANT CHANGE UP (ref 0–2)
BILIRUB SERPL-MCNC: 0.3 MG/DL — SIGNIFICANT CHANGE UP (ref 0.2–1.2)
BORDETELLA PARAPERTUSSIS (RAPRVP): SIGNIFICANT CHANGE UP
BUN SERPL-MCNC: 13 MG/DL — SIGNIFICANT CHANGE UP (ref 7–23)
C PNEUM DNA SPEC QL NAA+PROBE: SIGNIFICANT CHANGE UP
CALCIUM SERPL-MCNC: 9.5 MG/DL — SIGNIFICANT CHANGE UP (ref 8.4–10.5)
CHLORIDE SERPL-SCNC: 104 MMOL/L — SIGNIFICANT CHANGE UP (ref 98–107)
CO2 SERPL-SCNC: 24 MMOL/L — SIGNIFICANT CHANGE UP (ref 22–31)
CREAT SERPL-MCNC: 0.51 MG/DL — SIGNIFICANT CHANGE UP (ref 0.5–1.3)
EGFR: 102 ML/MIN/1.73M2 — SIGNIFICANT CHANGE UP
EOSINOPHIL # BLD AUTO: 0.22 K/UL — SIGNIFICANT CHANGE UP (ref 0–0.5)
EOSINOPHIL NFR BLD AUTO: 2.5 % — SIGNIFICANT CHANGE UP (ref 0–6)
FLUAV SUBTYP SPEC NAA+PROBE: SIGNIFICANT CHANGE UP
FLUBV RNA SPEC QL NAA+PROBE: SIGNIFICANT CHANGE UP
GLUCOSE SERPL-MCNC: 81 MG/DL — SIGNIFICANT CHANGE UP (ref 70–99)
HADV DNA SPEC QL NAA+PROBE: SIGNIFICANT CHANGE UP
HCOV 229E RNA SPEC QL NAA+PROBE: SIGNIFICANT CHANGE UP
HCOV HKU1 RNA SPEC QL NAA+PROBE: SIGNIFICANT CHANGE UP
HCOV NL63 RNA SPEC QL NAA+PROBE: SIGNIFICANT CHANGE UP
HCOV OC43 RNA SPEC QL NAA+PROBE: SIGNIFICANT CHANGE UP
HCT VFR BLD CALC: 34.3 % — LOW (ref 34.5–45)
HGB BLD-MCNC: 11.6 G/DL — SIGNIFICANT CHANGE UP (ref 11.5–15.5)
HMPV RNA SPEC QL NAA+PROBE: SIGNIFICANT CHANGE UP
HPIV1 RNA SPEC QL NAA+PROBE: SIGNIFICANT CHANGE UP
HPIV2 RNA SPEC QL NAA+PROBE: SIGNIFICANT CHANGE UP
HPIV3 RNA SPEC QL NAA+PROBE: SIGNIFICANT CHANGE UP
HPIV4 RNA SPEC QL NAA+PROBE: SIGNIFICANT CHANGE UP
IANC: 5.57 K/UL — SIGNIFICANT CHANGE UP (ref 1.8–7.4)
IMM GRANULOCYTES NFR BLD AUTO: 0.2 % — SIGNIFICANT CHANGE UP (ref 0–0.9)
LYMPHOCYTES # BLD AUTO: 1.86 K/UL — SIGNIFICANT CHANGE UP (ref 1–3.3)
LYMPHOCYTES # BLD AUTO: 21.2 % — SIGNIFICANT CHANGE UP (ref 13–44)
M PNEUMO DNA SPEC QL NAA+PROBE: SIGNIFICANT CHANGE UP
MCHC RBC-ENTMCNC: 30.9 PG — SIGNIFICANT CHANGE UP (ref 27–34)
MCHC RBC-ENTMCNC: 33.8 GM/DL — SIGNIFICANT CHANGE UP (ref 32–36)
MCV RBC AUTO: 91.2 FL — SIGNIFICANT CHANGE UP (ref 80–100)
MONOCYTES # BLD AUTO: 1.07 K/UL — HIGH (ref 0–0.9)
MONOCYTES NFR BLD AUTO: 12.2 % — SIGNIFICANT CHANGE UP (ref 2–14)
NEUTROPHILS # BLD AUTO: 5.57 K/UL — SIGNIFICANT CHANGE UP (ref 1.8–7.4)
NEUTROPHILS NFR BLD AUTO: 63.3 % — SIGNIFICANT CHANGE UP (ref 43–77)
NRBC # BLD: 0 /100 WBCS — SIGNIFICANT CHANGE UP (ref 0–0)
NRBC # FLD: 0 K/UL — SIGNIFICANT CHANGE UP (ref 0–0)
PLATELET # BLD AUTO: 196 K/UL — SIGNIFICANT CHANGE UP (ref 150–400)
POTASSIUM SERPL-MCNC: 3.6 MMOL/L — SIGNIFICANT CHANGE UP (ref 3.5–5.3)
POTASSIUM SERPL-SCNC: 3.6 MMOL/L — SIGNIFICANT CHANGE UP (ref 3.5–5.3)
PROT SERPL-MCNC: 7 G/DL — SIGNIFICANT CHANGE UP (ref 6–8.3)
RAPID RVP RESULT: SIGNIFICANT CHANGE UP
RBC # BLD: 3.76 M/UL — LOW (ref 3.8–5.2)
RBC # FLD: 12.6 % — SIGNIFICANT CHANGE UP (ref 10.3–14.5)
RSV RNA SPEC QL NAA+PROBE: SIGNIFICANT CHANGE UP
RV+EV RNA SPEC QL NAA+PROBE: SIGNIFICANT CHANGE UP
SARS-COV-2 RNA SPEC QL NAA+PROBE: SIGNIFICANT CHANGE UP
SODIUM SERPL-SCNC: 142 MMOL/L — SIGNIFICANT CHANGE UP (ref 135–145)
TROPONIN T, HIGH SENSITIVITY RESULT: <6 NG/L — SIGNIFICANT CHANGE UP
WBC # BLD: 8.79 K/UL — SIGNIFICANT CHANGE UP (ref 3.8–10.5)
WBC # FLD AUTO: 8.79 K/UL — SIGNIFICANT CHANGE UP (ref 3.8–10.5)

## 2023-04-21 PROCEDURE — 99284 EMERGENCY DEPT VISIT MOD MDM: CPT | Mod: GC

## 2023-04-21 PROCEDURE — 93010 ELECTROCARDIOGRAM REPORT: CPT

## 2023-04-21 PROCEDURE — 71046 X-RAY EXAM CHEST 2 VIEWS: CPT | Mod: 26

## 2023-04-21 RX ORDER — ACETAMINOPHEN 500 MG
650 TABLET ORAL ONCE
Refills: 0 | Status: COMPLETED | OUTPATIENT
Start: 2023-04-21 | End: 2023-04-21

## 2023-04-21 RX ADMIN — Medication 100 MILLIGRAM(S): at 15:26

## 2023-04-21 RX ADMIN — Medication 650 MILLIGRAM(S): at 17:39

## 2023-04-21 NOTE — ED PROVIDER NOTE - PATIENT PORTAL LINK FT
You can access the FollowMyHealth Patient Portal offered by [facility] by registering at the following website: [email or web address]. By joining [employer]’s FollowMyHealth portal, you will also be able to view your health information using other applications (apps) compatible with our system.

## 2023-04-21 NOTE — ED PROVIDER NOTE - NSICDXPASTMEDICALHX_GEN_ALL_CORE_FT
PAST MEDICAL HISTORY:  Breast cancer s.p chemotherapy    HLD (hyperlipidemia)     Prediabetes     Zoster ophthalmicus

## 2023-04-21 NOTE — ED ADULT NURSE NOTE - OBJECTIVE STATEMENT
Break RN: Pt received to room 13 at baseline mental status. Pt is a 67 y/o F with PMH of breast ca, dementia presenting to ED for c/o non-productive cough. Pt daughter at bedside as historian. Historian daughter reports pt coughing since yesterday, worsened today. Denies SOB, fever, abdominal pain, nausea. Respirations even and unlabored, skin intact, VSS. 20g IV placed in left AC. Labs drawn and sent. Medicated per MAR.

## 2023-04-21 NOTE — ED PROVIDER NOTE - ATTENDING CONTRIBUTION TO CARE
Attending MD Kiran:  I performed a history and physical exam of the patient and discussed their management with the resident. I reviewed the resident's note and agree with the documented findings and plan of care. My medical decision making and observations are found above.

## 2023-04-21 NOTE — ED ADULT TRIAGE NOTE - CHIEF COMPLAINT QUOTE
Pt with hx of dementia brought in by family for weakness. cough, and eye discharge and watering starting Wednesday, As per family pt is at baseline mental status

## 2023-04-21 NOTE — ED PROVIDER NOTE - PROGRESS NOTE DETAILS
Patient remains well, has not been coughing since taking benzoate here in the emergency department.  Lab work is unremarkable with no significant anemia or electrolyte abnormalities.  Chest x-ray is clear and RVP is negative.  Patient is ambulatory sat is 99%. Discussed findings with patient's daughter who is the main caretaker, understands and will take her to see PCP next week.  Will discharge at this time

## 2023-04-21 NOTE — ED PROVIDER NOTE - CLINICAL SUMMARY MEDICAL DECISION MAKING FREE TEXT BOX
Generally well-appearing 68-year-old patient with advanced dementia history of breast cancer now with coughing over the past 2 to 3 days, noticed tachypneic however  not hypoxic.  Chest pain is only when coughing, otherwise has been in normal state of health, no lower extremity swelling or edema.  Physical exam otherwise unremarkable.  Concern for URI versus pneumonia.  Viral syndrome will explain the left thigh redness as well.  History of zoster ophthalmologist however no active lesions currently, left eye likely conjunctivitis secondary to viral illness.  Will reassess after imaging and labs Generally well-appearing 68-year-old patient with advanced dementia history of breast cancer now with coughing over the past 2 to 3 days, noticed tachypneic however  not hypoxic.  Chest pain is only when coughing, otherwise has been in normal state of health, no lower extremity swelling or edema.  Physical exam otherwise unremarkable.  Concern for URI versus pneumonia.  Viral syndrome will explain the left thigh redness as well.  History of zoster ophthalmologist however no active lesions currently, left eye likely conjunctivitis secondary to viral illness.  Will reassess after imaging and labs    Attending MD Kiran.  Agree with above.  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  No assoc fevers/chills, has been receiving theraflu at home.  Pt 99% ORA.  No tachypnea.  Well perfused distally.  Pt well appearing without acute distress or physical exam evidence of concern.  R eye with erythema, mild clear drainage.  No surrounding erythema.

## 2023-04-21 NOTE — ED PROVIDER NOTE - OBJECTIVE STATEMENT
67yo F pmh Breast ca (s/p chemotherapy), Zoster ophthalmicus R eye, HLD, advanced dementia, prediabetes - presenting to ED for several days of painful non-productive cough brought in by daughter for apparent discomfort. Pt unable to provide history 2/2 dementia. Daughter reports pt previously in normal health, has not complained of fever/sore throat/dysuria. ROS + for new L eye redness and tearing. R eye blindness 2/2 zoster ophthalmicus, currently at baseline.

## 2023-04-21 NOTE — ED PROVIDER NOTE - NSFOLLOWUPINSTRUCTIONS_ED_ALL_ED_FT
Your evaluated today for a painful cough likely caused by viral illness    Your work-up today included blood work EKG and chest x-ray, the results of which are included within this documentation    Please follow-up with your primary care doctor within 1 to 2 weeks and bring this documentation with you    A prescription for benzonatate was sent to your pharmacy to help with cough, please take as prescribed and only as needed    Please return to the Emergency Department should you experience any of the following:  - Chest pain, Palpitations, Painful breathing  - Worsening or persistent shortness of breath  - Fever, unexplained weight loss, night sweats  - Blood in stool or in urine  - New weakness, fatigue, or fainting  - Any new concerning symptoms

## 2023-05-18 ENCOUNTER — NON-APPOINTMENT (OUTPATIENT)
Age: 68
End: 2023-05-18

## 2023-05-23 PROBLEM — C50.919 MALIGNANT NEOPLASM OF UNSPECIFIED SITE OF UNSPECIFIED FEMALE BREAST: Chronic | Status: ACTIVE | Noted: 2023-04-21

## 2023-05-23 PROBLEM — R73.03 PREDIABETES: Chronic | Status: ACTIVE | Noted: 2023-04-21

## 2023-05-23 PROBLEM — B02.30 ZOSTER OCULAR DISEASE, UNSPECIFIED: Chronic | Status: ACTIVE | Noted: 2023-04-21

## 2023-05-23 PROBLEM — E78.5 HYPERLIPIDEMIA, UNSPECIFIED: Chronic | Status: ACTIVE | Noted: 2023-04-21

## 2023-07-03 ENCOUNTER — APPOINTMENT (OUTPATIENT)
Dept: GERIATRICS | Facility: CLINIC | Age: 68
End: 2023-07-03
Payer: MEDICARE

## 2023-07-03 ENCOUNTER — NON-APPOINTMENT (OUTPATIENT)
Age: 68
End: 2023-07-03

## 2023-07-03 VITALS
SYSTOLIC BLOOD PRESSURE: 129 MMHG | TEMPERATURE: 98 F | WEIGHT: 137 LBS | OXYGEN SATURATION: 97 % | DIASTOLIC BLOOD PRESSURE: 72 MMHG | RESPIRATION RATE: 16 BRPM | HEART RATE: 87 BPM | BODY MASS INDEX: 23.39 KG/M2 | HEIGHT: 64 IN

## 2023-07-03 DIAGNOSIS — Z23 ENCOUNTER FOR IMMUNIZATION: ICD-10-CM

## 2023-07-03 DIAGNOSIS — Z85.3 PERSONAL HISTORY OF MALIGNANT NEOPLASM OF BREAST: ICD-10-CM

## 2023-07-03 PROCEDURE — 99215 OFFICE O/P EST HI 40 MIN: CPT

## 2023-07-03 PROCEDURE — G2212 PROLONG OUTPT/OFFICE VIS: CPT

## 2023-07-03 PROCEDURE — 99497 ADVNCD CARE PLAN 30 MIN: CPT

## 2023-07-03 RX ORDER — LETROZOLE TABLETS 2.5 MG/1
2.5 TABLET, FILM COATED ORAL DAILY
Refills: 0 | Status: ACTIVE | COMMUNITY
Start: 2023-03-17

## 2023-07-03 RX ORDER — BENZONATATE 100 MG/1
100 CAPSULE ORAL
Qty: 7 | Refills: 0 | Status: COMPLETED | COMMUNITY
Start: 2023-04-21

## 2023-07-03 RX ORDER — PREDNISOLONE ACETATE 10 MG/ML
1 SUSPENSION/ DROPS OPHTHALMIC
Refills: 0 | Status: DISCONTINUED | COMMUNITY
Start: 2023-03-17 | End: 2023-07-03

## 2023-07-03 RX ORDER — AMMONIUM LACTATE 12 %
12 CREAM (GRAM) TOPICAL
Qty: 280 | Refills: 0 | Status: ACTIVE | COMMUNITY
Start: 2023-01-12

## 2023-07-05 ENCOUNTER — NON-APPOINTMENT (OUTPATIENT)
Age: 68
End: 2023-07-05

## 2023-07-05 ENCOUNTER — APPOINTMENT (OUTPATIENT)
Dept: OPHTHALMOLOGY | Facility: CLINIC | Age: 68
End: 2023-07-05
Payer: MEDICARE

## 2023-07-05 LAB
ALBUMIN SERPL ELPH-MCNC: 4.9 G/DL
ALP BLD-CCNC: 67 U/L
ALT SERPL-CCNC: 10 U/L
ANION GAP SERPL CALC-SCNC: 13 MMOL/L
AST SERPL-CCNC: 16 U/L
BILIRUB SERPL-MCNC: 0.3 MG/DL
BUN SERPL-MCNC: 15 MG/DL
CALCIUM SERPL-MCNC: 9.6 MG/DL
CHLORIDE SERPL-SCNC: 105 MMOL/L
CO2 SERPL-SCNC: 28 MMOL/L
CREAT SERPL-MCNC: 0.63 MG/DL
EGFR: 97 ML/MIN/1.73M2
FOLATE SERPL-MCNC: 8.6 NG/ML
GLUCOSE SERPL-MCNC: 59 MG/DL
POTASSIUM SERPL-SCNC: 4 MMOL/L
PROT SERPL-MCNC: 7.4 G/DL
SODIUM SERPL-SCNC: 145 MMOL/L
VIT B12 SERPL-MCNC: 424 PG/ML

## 2023-07-05 PROCEDURE — 92012 INTRM OPH EXAM EST PATIENT: CPT

## 2023-07-17 ENCOUNTER — NON-APPOINTMENT (OUTPATIENT)
Age: 68
End: 2023-07-17

## 2023-07-24 ENCOUNTER — APPOINTMENT (OUTPATIENT)
Dept: OPHTHALMOLOGY | Facility: CLINIC | Age: 68
End: 2023-07-24
Payer: MEDICARE

## 2023-07-24 ENCOUNTER — NON-APPOINTMENT (OUTPATIENT)
Age: 68
End: 2023-07-24

## 2023-07-24 PROCEDURE — 92012 INTRM OPH EXAM EST PATIENT: CPT

## 2023-08-15 ENCOUNTER — APPOINTMENT (OUTPATIENT)
Dept: GERIATRICS | Facility: CLINIC | Age: 68
End: 2023-08-15
Payer: MEDICARE

## 2023-08-15 ENCOUNTER — NON-APPOINTMENT (OUTPATIENT)
Age: 68
End: 2023-08-15

## 2023-08-15 DIAGNOSIS — Z78.9 OTHER REDUCED MOBILITY: ICD-10-CM

## 2023-08-15 DIAGNOSIS — Z74.09 OTHER REDUCED MOBILITY: ICD-10-CM

## 2023-08-15 PROCEDURE — 99215 OFFICE O/P EST HI 40 MIN: CPT | Mod: 95

## 2023-08-15 NOTE — ASSESSMENT
[FreeTextEntry1] : Social Care Plan 1. Respite:   -Provided hotline contact to Alzheimer's Association [phone]. -provided [facility] agency  -contact to MEHDI Galvan.   2. Caregiver Education: Sent e-mail to -  I wanted to share useful tools to help manage your family member's dementia behaviors. I have included caregiver training videos from [facility] Alzheimer's and Dementia Care Program to help guide you and caretakers, as well as a daily care plan from the Alzheimer's Association. Maintaining a schedule and a structured day helps patients with dementia. I am available if you need any further assistance or have any questions. See below: 1.Agitation & Anxiety:  https://[employer].Arcarios/hahvUXwTXE4 2. Paranoid Thoughts:  https://[employer].Arcarios/AwLsT07LZl9 3. Hallucinations: https://www.youtube.com/watch/wnI63MNmA4C 4. Alzheimer's Association Daily Care Plan:  https://www.alz.org/help-support/caregiving/daily-care/daily-care-plan 5. Educated family when behaviors occur to acknowledge patient's emotions and redirect behavior with distractions, address physical needs. Provided examples.  6.Educated family when agitation/hallucinations/ paranoid behaviors occur to acknowledge patient's emotions and redirect behavior with distractions, remain calm.   7. Counseled to log behaviors, include date an time. Explained the importance of logging behaviors would be possibly find a pattern that we can later determine if there is a triggers to pt.'s behaviors.  8. Provided Alzheimer's website. Educated family. about support groups in [place], including the moderate/severe caregiver support groups.  Look through books, picture albums, coloring books, painting, puzzles, play with ball/balloon, listen to music, stress ball, fold small items, planting, fake pet/doll.   3. Alzheimer's Association Community Resource Finder     www.alz.org/nca/helping you/community-resource-finder   4. Caregiver support: -SW contact  5. Safety:  - pt. is accompanied 24/7 and reports safety concerns are not an issue at the moment. Counseled spouse that pt. is a high fall risk. Continue will fall safety precautions.  -medical alert bracelet  6. Caregiver depression: -Explained to daughter .PHQ-9 score was neg depression.  Counseled on non-pharmacological interventions for depression which include meditation paula (Calm), daily physical activity.     7. [facility] education folder attached PFD.   8. Alz Association Support Groups PDF   9. Brain HQ exercises   10. MIND diet  11. Reminisce/gratitude journal   [facility] acuity YELLOW, caregiver stress.     SUSAN Parry-BC

## 2023-08-15 NOTE — REVIEW OF SYSTEMS
[Chest Pain] : no chest pain [Palpitations] : no palpitations [Cough] : no cough [SOB on Exertion] : no shortness of breath during exertion [Constipation] : no constipation [FreeTextEntry3] : hx of right eye shingles, blurry on right eye  [FreeTextEntry1] : limited ROSpt. w/ moderate/severe dementia

## 2023-08-15 NOTE — HISTORY OF PRESENT ILLNESS
[No falls in past year] : Patient reported no falls in the past year [Independent] : transferring/mobility [Full assistance needed] : Assistance needed managing medications [] : Assistance needed managing finances. [FAST Score: ____] : Functional Assessment Scale (FAST) Score: [unfilled] [Smoke Detector] : smoke detector [Carbon Monoxide Detector] : carbon monoxide detector [Night Light] : night light [I have developed a follow-up plan documented below in the note.] : I have developed a follow-up plan documented below in the note. [Moderate] : Stage: Moderate [Worse] : Status: Worse [Reviewed updated] : Reviewed, updated [I will adhere to the patient's wishes.] : I will adhere to the patient's wishes. [FreeTextEntry1] : ADC Program ID:[id number]   Ms. DANNY STEVEN is a 69yo M with PMHx of dementia, breast ca (s/p chemo and radiation 2016)was referred to the Alzheimer's and Dementia Program by MD Medina for comanagement of dementia-related issues and coordination of dementia care.  Pt. presents with daughter (Radha),who assisted in hx intake due to pt.'s dementia.  Stanislav (daughter), has 3 daughters.  -telehealth today  #dementia  -pt. denies any pain -reports she is eating and drinking better  -denies falls/hospitalization -haven't had to use seroquel PRN -visited sister for a week, reports improved behvaiors since then "kept her very busy"  -sleeping better, one day work up a few times but resolved since then -"thinking of adult day care " -granddaughter helps to care for pt. and family friend (pay out of pocket)  -neurologist appt today w/ Dr. Cruz  -deferred PT and OT today   #caregiver burden -some stress but doing OK - "pulling teeth to get family to help"   #HCM -Dr. Milli Wilson   Plan  -  MEHDI Kaye discuss medicaid with daughter; call after 2pm  -e-mail day programs  -needs medical alert bracelet    ADC acuity YELLOW, caregiver stress.    (7/3/23) #dementia  -appreciated Dr. Medina cognitive eval note 4/14/2023  MoCA 0/30, FAST 6B,CSDD 23 -MRI Brain 11/16/19;mild-mod involution change, small vessel ischemic change Right frontal developmental venous anomaly  -tried Namenda, SE dizziness -on lexapro and rivastigmine    Today -reports pt. was diagnosed with dementia ~dx 2018 -lives with Radha, daughter -granddaughter helps to care for her  -lexapro increased to 10mg ~2021 -anxiety behavior -if the great granndadaughter (baby) around, anxiety better, and dogs  -becomes verbally aggressive at times "it can be at any time -sometimes waking up during the night "if she hears the baby, she will wake" -reports weight loss  -refusal to take her medicine sometimes  -verbal agitation is what causes family most distress  -"she gets very emotional, want to cry all the time"  -not incontinent of urine, forgetful with flushing toilet  -reports wandering behavior when she gets agitated, about 1-2 weeks ago  -"report she says she wants to kill herself, never tries" says it occasionally when she is really upset  -we have to put the tooth paste on the tooth brush   #caregiver burden -significant    #HCM -Dr. Milli Wilson 4/2023  Eating and drinking OK. When she gets upset she won't eat. Denies pain. Denies acute visits/falls. Denies HA/dizziness, SOB/CP, abdominal pain, dysuria, reports daily BMs regular.    Goals  -HCP  please email  -needs increased supervision -agitation behavior, trial seroquel  -labs, weight loss   -PT and OT at f/u   ADC acuity RED behaviors, trial seroquel, caregiver burden, f/u in 1m.    Primary Care Physician:Dr. Emile Wilson  Previous Physicians: Neurologists:Dr. Cruz at [facility] f/u 8/2023   Suspect Medications (associated with mental status changes): no Recent hospitalization/ ED Visits/ Nursing Home Stays:   CAREGIVER 1:  Name/Relationship: Radha Holguinperto Involvement in care: daughter Mailing Address: Same as patient Phone Number: [phone] E-mail: [email or web address] Best time to reach this person: Anytime  CAREGIVER 2:  Name/Relationship: Stanislav Becky Involvement in care: daughter Mailing Address: [address] [place] [address]  Phone Number: [phone]  E-mail: [email or web address] Best time to reach this person: Anytime  Recent hospitalization/ ED Visits/ Nursing Home Stays: No  Social History Primary Language: English Marital Status:   Children: 3 children, 2 involved in care (one lives out of state) Grandchildren: 7, 1 great grandchild Education: High school  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 Daughter's expressed she says " I am going to kill myself", " I don't want to live". Only after dementia, not after  passing  -no intent or plan, pt. with moderate/severe stage dementia -very rate situations she has said these comment -daughters then attend to her needs, and pt. is redirectable   Living Situation Housing Type (stairs/levels): Apartment on 14th floor, takes elevator  How long have you lived there: 30 plus years  Who do you live with: Patient, daughter, 2 children and daughters granddaughter Caregivers (non-paid and paid): No, granddaughter stays with patient while daughter works. Does not have medicaid  Driving Are you still driving:no  Does your driving worry your loved one: Alternate transportation needs requested.  Driving safety concerns discussed with patient and family. Education on driving resources provided. Alternative driving options provided. ______  Wandering: Education on supervision and safety provided. Safe Return Program information through Alzheimer's Association and other safe return (REACH) program education and information provided. SW encouraged family to reach out with questions or concerns.   Falls:  Recent falls:no Fear of Falling: no Home modifications education provided and resources discussed, such as grab bars, throw rug safety, etc. SW offered rapid referral through [facility] for home safety assessment. Referral placed with permission of ______. SW encouraged family to reach out with questions or concerns.  Weapons:  Guns: No  Weapons in home: No SW educated patient and family on weapon safety in the home, concerns with any type of weapon. Family agreed to _____. SW encouraged family to reach out with any questions or concerns.  Any other safety concerns:  Financial  Financial Concerns:  Long Term Insurance: May have it, daughter will look into it  Financial Assistance Needs: Legal Advise/Elder Care : Will consider, resources provided  Medicaid/Other Insurance Assistance: Was told is not eligible for Medicaid due to SS and penitentiary income  Food Who shops for groceries and cooks: Eating/hydration concerns: Cooking safety education provided, education on nutrition/hydration provided, resources provided.  Food Insecurity concerns:  Food Insecurity Resources provided: Education on meal access resources such as [employer], Meals on Wheels, etc. provided, resources provided. Rapid referral to [facility]WD provided with permission of _____. Form started, task sent to ILYA Abdul to complete form. SW encouraged family to reach out with questions or concerns.   Other Durable Medical Equipment (Hearing aids, walkers): DME Needs:  [employer] Benefits:  Home Health Aides  Home Health Care (HHA): Hours:  CDPAP information and education provided:  Supervision discussed and encouraged. Education on home health aides, expectation setting and Information on resources provide. Encouraged family to reach out with any questions or concerns.  Day Programs Have you hear of an Adult Day Program: Interested in day programs:  Education on adult day programs provided, SW provided comprehensive list of adult day programs in ____[place]___ [place] ____ [employer] ___ [facility].  Caregiver Support Are you interested in support groups:  Information and education on support groups provided. Comprehensive list of support groups in surrounding area provided. Highlighted ___ group for family specifically. Encouraged family to reach out to  with any questions or concerns.  Therapist/Counseling: Interested in a therapist: SW provided education and resources for mental health services including_______. SW encouraged patient or family to reach out with any questions or concerns.   Recreation Information and education on alternative therapies provided, including art therapy, music therapy, etc.  Favorite Activities: What are some things you enjoy doing? What else should we know about you?  Advance Directives: HCP/Advance Directives/Living will:Radha, pending copy  HCP/Alternate Decision Maker: MOLST: Advance Care Planning VIdeos provided via ACPdecisions.org. Code provided: ________ Encouraged patient and family to discuss with NP/MD at next appointment, reach out to  with questions or concerns.  What matters most if you are unable to make decisions for yourself?Pt/ stated" I do not know"   -educated on MOLST form  [Guns at Home] : no guns at home [Grab Bars] : no grab bars [Driving Concerns] : not driving or driving without noted concerns [de-identified] : 3 [FreeTextEntry7] : Radha [de-identified] : 0 [FreeTextEntry] : 11 [de-identified] : 6 [NPI-QTotalScore] : 17 [de-identified] : anxiety, agitation  [AdvancecareDate] : 8/15/23 [FreeTextEntry4] : Advance Directives:\par  HCP/Advance Directives/Living will:Radha, pending copy \par  HCP/Alternate Decision Maker:\par  MOLST:\par  Advance Care Planning VIdeos provided via ACPdecisions.org. Code provided: ________\par  Encouraged patient and family to discuss with NP/MD at next appointment, reach out to SW with questions or concerns. \par  What matters most if you are unable to make decisions for yourself?Pt/ stated" I do not know" \par  \par  -educated on MOLST form

## 2023-08-15 NOTE — REASON FOR VISIT
[Follow-Up] : a follow-up visit [FreeTextEntry1] : was referred to the Alzheimer's and Dementia Program by MD Medina for comanagement of dementia-related issues and coordination of dementia care.\par  \par

## 2023-08-15 NOTE — PHYSICAL EXAM
[Alert] : alert [Normal Oral Mucosa] : normal oral mucosa [No Oral Pallor] : no oral pallor [No Oral Cyanosis] : no oral cyanosis [Normal Appearance] : the appearance of the neck was normal [No Respiratory Distress] : no respiratory distress [Edema] : edema was not present [Normal Color / Pigmentation] : normal skin color and pigmentation [Normal Turgor] : normal skin turgor [No Focal Deficits] : no focal deficits [No Acc Muscle Use] : no accessory muscle use [Respiration, Rhythm And Depth] : normal respiratory rhythm and effort [de-identified] : young  older adult female, poor comprehension, difficulty following commands. A&Ox0 [de-identified] : unable to assess  [de-identified] : unable to assess  [de-identified] : shuffled gait  [de-identified] : flat affect, occasional smile

## 2023-08-15 NOTE — SOCIAL HISTORY
[With family] : lives with family [Female] : Female [FreeTextEntry1] : Radha [FreeTextEntry4] : daughter  [CaregiverPHQ-9Score] : 3 [CaregiverMSCIScore] : 9 [CaregiverDBSScore] : 18

## 2023-08-23 ENCOUNTER — NON-APPOINTMENT (OUTPATIENT)
Age: 68
End: 2023-08-23

## 2023-09-06 ENCOUNTER — RX RENEWAL (OUTPATIENT)
Age: 68
End: 2023-09-06

## 2023-09-28 ENCOUNTER — NON-APPOINTMENT (OUTPATIENT)
Age: 68
End: 2023-09-28

## 2023-09-28 ENCOUNTER — APPOINTMENT (OUTPATIENT)
Dept: OPHTHALMOLOGY | Facility: CLINIC | Age: 68
End: 2023-09-28
Payer: MEDICARE

## 2023-09-28 PROCEDURE — 92012 INTRM OPH EXAM EST PATIENT: CPT

## 2023-10-16 ENCOUNTER — APPOINTMENT (OUTPATIENT)
Dept: GERIATRICS | Facility: CLINIC | Age: 68
End: 2023-10-16

## 2023-10-24 ENCOUNTER — RX RENEWAL (OUTPATIENT)
Age: 68
End: 2023-10-24

## 2023-10-31 ENCOUNTER — NON-APPOINTMENT (OUTPATIENT)
Age: 68
End: 2023-10-31

## 2023-11-06 ENCOUNTER — NON-APPOINTMENT (OUTPATIENT)
Age: 68
End: 2023-11-06

## 2023-11-06 ENCOUNTER — APPOINTMENT (OUTPATIENT)
Dept: GERIATRICS | Facility: CLINIC | Age: 68
End: 2023-11-06
Payer: MEDICARE

## 2023-11-06 VITALS
SYSTOLIC BLOOD PRESSURE: 132 MMHG | TEMPERATURE: 98.2 F | OXYGEN SATURATION: 97 % | BODY MASS INDEX: 24.03 KG/M2 | DIASTOLIC BLOOD PRESSURE: 76 MMHG | RESPIRATION RATE: 15 BRPM | HEART RATE: 72 BPM | WEIGHT: 140 LBS

## 2023-11-06 DIAGNOSIS — R26.9 UNSPECIFIED ABNORMALITIES OF GAIT AND MOBILITY: ICD-10-CM

## 2023-11-06 DIAGNOSIS — R63.4 ABNORMAL WEIGHT LOSS: ICD-10-CM

## 2023-11-06 DIAGNOSIS — Z23 ENCOUNTER FOR IMMUNIZATION: ICD-10-CM

## 2023-11-06 DIAGNOSIS — Z71.89 OTHER SPECIFIED COUNSELING: ICD-10-CM

## 2023-11-06 PROCEDURE — 99215 OFFICE O/P EST HI 40 MIN: CPT | Mod: 25

## 2023-11-06 PROCEDURE — G0008: CPT

## 2023-11-06 PROCEDURE — 90662 IIV NO PRSV INCREASED AG IM: CPT

## 2023-11-06 RX ORDER — AMLODIPINE BESYLATE AND BENAZEPRIL HYDROCHLORIDE 10; 40 MG/1; MG/1
10-40 CAPSULE ORAL
Qty: 90 | Refills: 0 | Status: ACTIVE | COMMUNITY
Start: 2023-10-27

## 2023-11-13 ENCOUNTER — NON-APPOINTMENT (OUTPATIENT)
Age: 68
End: 2023-11-13

## 2023-11-13 ENCOUNTER — APPOINTMENT (OUTPATIENT)
Dept: OPHTHALMOLOGY | Facility: CLINIC | Age: 68
End: 2023-11-13
Payer: MEDICARE

## 2023-11-13 PROCEDURE — 92014 COMPRE OPH EXAM EST PT 1/>: CPT

## 2024-01-11 ENCOUNTER — APPOINTMENT (OUTPATIENT)
Dept: OPHTHALMOLOGY | Facility: CLINIC | Age: 69
End: 2024-01-11

## 2024-01-29 ENCOUNTER — RX RENEWAL (OUTPATIENT)
Age: 69
End: 2024-01-29

## 2024-01-31 ENCOUNTER — APPOINTMENT (OUTPATIENT)
Dept: GERIATRICS | Facility: CLINIC | Age: 69
End: 2024-01-31
Payer: MEDICARE

## 2024-01-31 DIAGNOSIS — N39.0 URINARY TRACT INFECTION, SITE NOT SPECIFIED: ICD-10-CM

## 2024-01-31 PROCEDURE — 99443: CPT | Mod: 93

## 2024-02-02 ENCOUNTER — APPOINTMENT (OUTPATIENT)
Dept: GERIATRICS | Facility: ASSISTED LIVING FACILITY | Age: 69
End: 2024-02-02
Payer: MEDICARE

## 2024-02-02 PROCEDURE — 99448 NTRPROF PH1/NTRNET/EHR 21-30: CPT

## 2024-02-03 ENCOUNTER — LABORATORY RESULT (OUTPATIENT)
Age: 69
End: 2024-02-03

## 2024-02-05 LAB
ALBUMIN SERPL ELPH-MCNC: 4.6 G/DL
ALP BLD-CCNC: 62 U/L
ALT SERPL-CCNC: 9 U/L
ANION GAP SERPL CALC-SCNC: 14 MMOL/L
APPEARANCE: CLEAR
AST SERPL-CCNC: 14 U/L
BASOPHILS # BLD AUTO: 0.06 K/UL
BASOPHILS NFR BLD AUTO: 0.9 %
BILIRUB SERPL-MCNC: 0.3 MG/DL
BILIRUBIN URINE: NEGATIVE
BLOOD URINE: ABNORMAL
BUN SERPL-MCNC: 16 MG/DL
CALCIUM SERPL-MCNC: 9.2 MG/DL
CHLORIDE SERPL-SCNC: 107 MMOL/L
CO2 SERPL-SCNC: 24 MMOL/L
COLOR: YELLOW
CREAT SERPL-MCNC: 0.62 MG/DL
EGFR: 96 ML/MIN/1.73M2
EOSINOPHIL # BLD AUTO: 0.4 K/UL
EOSINOPHIL NFR BLD AUTO: 5.8 %
GLUCOSE QUALITATIVE U: NEGATIVE MG/DL
GLUCOSE SERPL-MCNC: 100 MG/DL
HCT VFR BLD CALC: 37.1 %
HGB BLD-MCNC: 12 G/DL
IMM GRANULOCYTES NFR BLD AUTO: 0.3 %
KETONES URINE: NEGATIVE MG/DL
LEUKOCYTE ESTERASE URINE: ABNORMAL
LYMPHOCYTES # BLD AUTO: 1.64 K/UL
LYMPHOCYTES NFR BLD AUTO: 23.8 %
MAN DIFF?: NORMAL
MCHC RBC-ENTMCNC: 31.1 PG
MCHC RBC-ENTMCNC: 32.3 GM/DL
MCV RBC AUTO: 96.1 FL
MONOCYTES # BLD AUTO: 0.45 K/UL
MONOCYTES NFR BLD AUTO: 6.5 %
NEUTROPHILS # BLD AUTO: 4.32 K/UL
NEUTROPHILS NFR BLD AUTO: 62.7 %
NITRITE URINE: NEGATIVE
PH URINE: 5.5
PLATELET # BLD AUTO: 196 K/UL
POTASSIUM SERPL-SCNC: 3.9 MMOL/L
PROT SERPL-MCNC: 7.1 G/DL
PROTEIN URINE: NEGATIVE MG/DL
RBC # BLD: 3.86 M/UL
RBC # FLD: 12.9 %
SODIUM SERPL-SCNC: 146 MMOL/L
SPECIFIC GRAVITY URINE: 1.02
UROBILINOGEN URINE: 0.2 MG/DL
WBC # FLD AUTO: 6.89 K/UL

## 2024-02-06 ENCOUNTER — NON-APPOINTMENT (OUTPATIENT)
Age: 69
End: 2024-02-06

## 2024-02-12 ENCOUNTER — INPATIENT (INPATIENT)
Facility: HOSPITAL | Age: 69
LOS: 3 days | Discharge: HOME | End: 2024-02-16
Attending: HOSPITALIST | Admitting: HOSPITALIST
Payer: MEDICARE

## 2024-02-12 VITALS
TEMPERATURE: 98 F | HEART RATE: 105 BPM | SYSTOLIC BLOOD PRESSURE: 168 MMHG | OXYGEN SATURATION: 100 % | RESPIRATION RATE: 18 BRPM | DIASTOLIC BLOOD PRESSURE: 97 MMHG

## 2024-02-12 PROCEDURE — 99285 EMERGENCY DEPT VISIT HI MDM: CPT | Mod: GC

## 2024-02-12 NOTE — ED ADULT TRIAGE NOTE - CHIEF COMPLAINT QUOTE
brought in by family for multiple episodes of "passing out" starting this afternoon. during triage, pt noted to have multiple episodes of "passing out." pt throws head forward, closes eyes and refuses to respond to verbal stimuli then returns to baseline. hx dementia, DM, HLD.

## 2024-02-13 DIAGNOSIS — R55 SYNCOPE AND COLLAPSE: ICD-10-CM

## 2024-02-13 LAB
ALBUMIN SERPL ELPH-MCNC: 4.2 G/DL — SIGNIFICANT CHANGE UP (ref 3.3–5)
ALP SERPL-CCNC: 57 U/L — SIGNIFICANT CHANGE UP (ref 40–120)
ALT FLD-CCNC: 12 U/L — SIGNIFICANT CHANGE UP (ref 4–33)
ANION GAP SERPL CALC-SCNC: 14 MMOL/L — SIGNIFICANT CHANGE UP (ref 7–14)
APPEARANCE UR: CLEAR — SIGNIFICANT CHANGE UP
APTT BLD: 34.3 SEC — SIGNIFICANT CHANGE UP (ref 24.5–35.6)
AST SERPL-CCNC: 22 U/L — SIGNIFICANT CHANGE UP (ref 4–32)
BACTERIA # UR AUTO: NEGATIVE /HPF — SIGNIFICANT CHANGE UP
BASE EXCESS BLDV CALC-SCNC: 3 MMOL/L — SIGNIFICANT CHANGE UP (ref -2–3)
BASOPHILS # BLD AUTO: 0.04 K/UL — SIGNIFICANT CHANGE UP (ref 0–0.2)
BASOPHILS NFR BLD AUTO: 0.5 % — SIGNIFICANT CHANGE UP (ref 0–2)
BILIRUB SERPL-MCNC: <0.2 MG/DL — SIGNIFICANT CHANGE UP (ref 0.2–1.2)
BILIRUB UR-MCNC: NEGATIVE — SIGNIFICANT CHANGE UP
BLOOD GAS VENOUS COMPREHENSIVE RESULT: SIGNIFICANT CHANGE UP
BUN SERPL-MCNC: 15 MG/DL — SIGNIFICANT CHANGE UP (ref 7–23)
CALCIUM SERPL-MCNC: 9.1 MG/DL — SIGNIFICANT CHANGE UP (ref 8.4–10.5)
CAST: 0 /LPF — SIGNIFICANT CHANGE UP (ref 0–4)
CHLORIDE BLDV-SCNC: 105 MMOL/L — SIGNIFICANT CHANGE UP (ref 96–108)
CHLORIDE SERPL-SCNC: 104 MMOL/L — SIGNIFICANT CHANGE UP (ref 98–107)
CO2 BLDV-SCNC: 31 MMOL/L — HIGH (ref 22–26)
CO2 SERPL-SCNC: 23 MMOL/L — SIGNIFICANT CHANGE UP (ref 22–31)
COLOR SPEC: SIGNIFICANT CHANGE UP
CREAT SERPL-MCNC: 0.61 MG/DL — SIGNIFICANT CHANGE UP (ref 0.5–1.3)
DIFF PNL FLD: NEGATIVE — SIGNIFICANT CHANGE UP
EGFR: 97 ML/MIN/1.73M2 — SIGNIFICANT CHANGE UP
EOSINOPHIL # BLD AUTO: 0.43 K/UL — SIGNIFICANT CHANGE UP (ref 0–0.5)
EOSINOPHIL NFR BLD AUTO: 5.5 % — SIGNIFICANT CHANGE UP (ref 0–6)
FLUAV AG NPH QL: SIGNIFICANT CHANGE UP
FLUBV AG NPH QL: SIGNIFICANT CHANGE UP
GAS PNL BLDV: 140 MMOL/L — SIGNIFICANT CHANGE UP (ref 136–145)
GAS PNL BLDV: SIGNIFICANT CHANGE UP
GLUCOSE BLDV-MCNC: 156 MG/DL — HIGH (ref 70–99)
GLUCOSE SERPL-MCNC: 160 MG/DL — HIGH (ref 70–99)
GLUCOSE UR QL: NEGATIVE MG/DL — SIGNIFICANT CHANGE UP
HCO3 BLDV-SCNC: 29 MMOL/L — SIGNIFICANT CHANGE UP (ref 22–29)
HCT VFR BLD CALC: 33.9 % — LOW (ref 34.5–45)
HCT VFR BLDA CALC: 36 % — SIGNIFICANT CHANGE UP (ref 34.5–46.5)
HGB BLD CALC-MCNC: 12.1 G/DL — SIGNIFICANT CHANGE UP (ref 11.7–16.1)
HGB BLD-MCNC: 11.5 G/DL — SIGNIFICANT CHANGE UP (ref 11.5–15.5)
IANC: 4.06 K/UL — SIGNIFICANT CHANGE UP (ref 1.8–7.4)
IMM GRANULOCYTES NFR BLD AUTO: 0.1 % — SIGNIFICANT CHANGE UP (ref 0–0.9)
INR BLD: 0.91 RATIO — SIGNIFICANT CHANGE UP (ref 0.85–1.18)
KETONES UR-MCNC: 40 MG/DL
LACTATE BLDV-MCNC: 1.7 MMOL/L — SIGNIFICANT CHANGE UP (ref 0.5–2)
LEUKOCYTE ESTERASE UR-ACNC: NEGATIVE — SIGNIFICANT CHANGE UP
LYMPHOCYTES # BLD AUTO: 2.74 K/UL — SIGNIFICANT CHANGE UP (ref 1–3.3)
LYMPHOCYTES # BLD AUTO: 35 % — SIGNIFICANT CHANGE UP (ref 13–44)
MCHC RBC-ENTMCNC: 31.6 PG — SIGNIFICANT CHANGE UP (ref 27–34)
MCHC RBC-ENTMCNC: 33.9 GM/DL — SIGNIFICANT CHANGE UP (ref 32–36)
MCV RBC AUTO: 93.1 FL — SIGNIFICANT CHANGE UP (ref 80–100)
MONOCYTES # BLD AUTO: 0.54 K/UL — SIGNIFICANT CHANGE UP (ref 0–0.9)
MONOCYTES NFR BLD AUTO: 6.9 % — SIGNIFICANT CHANGE UP (ref 2–14)
NEUTROPHILS # BLD AUTO: 4.06 K/UL — SIGNIFICANT CHANGE UP (ref 1.8–7.4)
NEUTROPHILS NFR BLD AUTO: 52 % — SIGNIFICANT CHANGE UP (ref 43–77)
NITRITE UR-MCNC: NEGATIVE — SIGNIFICANT CHANGE UP
NRBC # BLD: 0 /100 WBCS — SIGNIFICANT CHANGE UP (ref 0–0)
NRBC # FLD: 0 K/UL — SIGNIFICANT CHANGE UP (ref 0–0)
PCO2 BLDV: 52 MMHG — SIGNIFICANT CHANGE UP (ref 39–52)
PH BLDV: 7.36 — SIGNIFICANT CHANGE UP (ref 7.32–7.43)
PH UR: 6 — SIGNIFICANT CHANGE UP (ref 5–8)
PLATELET # BLD AUTO: 198 K/UL — SIGNIFICANT CHANGE UP (ref 150–400)
PO2 BLDV: 56 MMHG — HIGH (ref 25–45)
POTASSIUM BLDV-SCNC: 3.3 MMOL/L — LOW (ref 3.5–5.1)
POTASSIUM SERPL-MCNC: 4 MMOL/L — SIGNIFICANT CHANGE UP (ref 3.5–5.3)
POTASSIUM SERPL-SCNC: 4 MMOL/L — SIGNIFICANT CHANGE UP (ref 3.5–5.3)
PROT SERPL-MCNC: 7.2 G/DL — SIGNIFICANT CHANGE UP (ref 6–8.3)
PROT UR-MCNC: 30 MG/DL
PROTHROM AB SERPL-ACNC: 10.2 SEC — SIGNIFICANT CHANGE UP (ref 9.5–13)
RBC # BLD: 3.64 M/UL — LOW (ref 3.8–5.2)
RBC # FLD: 12.4 % — SIGNIFICANT CHANGE UP (ref 10.3–14.5)
RBC CASTS # UR COMP ASSIST: 1 /HPF — SIGNIFICANT CHANGE UP (ref 0–4)
RSV RNA NPH QL NAA+NON-PROBE: SIGNIFICANT CHANGE UP
SAO2 % BLDV: 89.2 % — HIGH (ref 67–88)
SARS-COV-2 RNA SPEC QL NAA+PROBE: SIGNIFICANT CHANGE UP
SODIUM SERPL-SCNC: 141 MMOL/L — SIGNIFICANT CHANGE UP (ref 135–145)
SP GR SPEC: 1.04 — HIGH (ref 1–1.03)
SQUAMOUS # UR AUTO: 0 /HPF — SIGNIFICANT CHANGE UP (ref 0–5)
UROBILINOGEN FLD QL: 1 MG/DL — SIGNIFICANT CHANGE UP (ref 0.2–1)
WBC # BLD: 7.82 K/UL — SIGNIFICANT CHANGE UP (ref 3.8–10.5)
WBC # FLD AUTO: 7.82 K/UL — SIGNIFICANT CHANGE UP (ref 3.8–10.5)
WBC UR QL: 0 /HPF — SIGNIFICANT CHANGE UP (ref 0–5)

## 2024-02-13 PROCEDURE — 71046 X-RAY EXAM CHEST 2 VIEWS: CPT | Mod: 26

## 2024-02-13 PROCEDURE — 70450 CT HEAD/BRAIN W/O DYE: CPT | Mod: 26,MA

## 2024-02-13 RX ORDER — DEXTROSE 50 % IN WATER 50 %
25 SYRINGE (ML) INTRAVENOUS ONCE
Refills: 0 | Status: DISCONTINUED | OUTPATIENT
Start: 2024-02-13 | End: 2024-02-16

## 2024-02-13 RX ORDER — AMLODIPINE BESYLATE 2.5 MG/1
10 TABLET ORAL DAILY
Refills: 0 | Status: DISCONTINUED | OUTPATIENT
Start: 2024-02-13 | End: 2024-02-16

## 2024-02-13 RX ORDER — GLUCAGON INJECTION, SOLUTION 0.5 MG/.1ML
1 INJECTION, SOLUTION SUBCUTANEOUS ONCE
Refills: 0 | Status: DISCONTINUED | OUTPATIENT
Start: 2024-02-13 | End: 2024-02-16

## 2024-02-13 RX ORDER — INSULIN LISPRO 100/ML
VIAL (ML) SUBCUTANEOUS
Refills: 0 | Status: DISCONTINUED | OUTPATIENT
Start: 2024-02-13 | End: 2024-02-16

## 2024-02-13 RX ORDER — HEPARIN SODIUM 5000 [USP'U]/ML
5000 INJECTION INTRAVENOUS; SUBCUTANEOUS EVERY 8 HOURS
Refills: 0 | Status: DISCONTINUED | OUTPATIENT
Start: 2024-02-13 | End: 2024-02-16

## 2024-02-13 RX ORDER — LETROZOLE 2.5 MG/1
2.5 TABLET, FILM COATED ORAL DAILY
Refills: 0 | Status: DISCONTINUED | OUTPATIENT
Start: 2024-02-13 | End: 2024-02-16

## 2024-02-13 RX ORDER — RIVASTIGMINE 4.6 MG/24H
3 PATCH, EXTENDED RELEASE TRANSDERMAL
Refills: 0 | Status: DISCONTINUED | OUTPATIENT
Start: 2024-02-13 | End: 2024-02-16

## 2024-02-13 RX ORDER — SODIUM CHLORIDE 9 MG/ML
1000 INJECTION, SOLUTION INTRAVENOUS
Refills: 0 | Status: DISCONTINUED | OUTPATIENT
Start: 2024-02-13 | End: 2024-02-16

## 2024-02-13 RX ORDER — DEXTROSE 50 % IN WATER 50 %
15 SYRINGE (ML) INTRAVENOUS ONCE
Refills: 0 | Status: DISCONTINUED | OUTPATIENT
Start: 2024-02-13 | End: 2024-02-16

## 2024-02-13 RX ORDER — LISINOPRIL 2.5 MG/1
40 TABLET ORAL DAILY
Refills: 0 | Status: DISCONTINUED | OUTPATIENT
Start: 2024-02-13 | End: 2024-02-16

## 2024-02-13 RX ORDER — ESCITALOPRAM OXALATE 10 MG/1
10 TABLET, FILM COATED ORAL DAILY
Refills: 0 | Status: DISCONTINUED | OUTPATIENT
Start: 2024-02-13 | End: 2024-02-16

## 2024-02-13 RX ORDER — DEXTROSE 50 % IN WATER 50 %
12.5 SYRINGE (ML) INTRAVENOUS ONCE
Refills: 0 | Status: DISCONTINUED | OUTPATIENT
Start: 2024-02-13 | End: 2024-02-16

## 2024-02-13 RX ORDER — TRAZODONE HCL 50 MG
25 TABLET ORAL AT BEDTIME
Refills: 0 | Status: DISCONTINUED | OUTPATIENT
Start: 2024-02-13 | End: 2024-02-16

## 2024-02-13 RX ORDER — ASPIRIN/CALCIUM CARB/MAGNESIUM 324 MG
81 TABLET ORAL DAILY
Refills: 0 | Status: DISCONTINUED | OUTPATIENT
Start: 2024-02-13 | End: 2024-02-16

## 2024-02-13 RX ORDER — INSULIN LISPRO 100/ML
VIAL (ML) SUBCUTANEOUS AT BEDTIME
Refills: 0 | Status: DISCONTINUED | OUTPATIENT
Start: 2024-02-13 | End: 2024-02-16

## 2024-02-13 RX ORDER — ATORVASTATIN CALCIUM 80 MG/1
40 TABLET, FILM COATED ORAL AT BEDTIME
Refills: 0 | Status: DISCONTINUED | OUTPATIENT
Start: 2024-02-13 | End: 2024-02-16

## 2024-02-13 RX ORDER — SODIUM CHLORIDE 9 MG/ML
1000 INJECTION INTRAMUSCULAR; INTRAVENOUS; SUBCUTANEOUS ONCE
Refills: 0 | Status: COMPLETED | OUTPATIENT
Start: 2024-02-13 | End: 2024-02-13

## 2024-02-13 RX ORDER — PREDNISOLONE SODIUM PHOSPHATE 1 %
1 DROPS OPHTHALMIC (EYE)
Refills: 0 | Status: DISCONTINUED | OUTPATIENT
Start: 2024-02-13 | End: 2024-02-16

## 2024-02-13 RX ORDER — OLANZAPINE 15 MG/1
2.5 TABLET, FILM COATED ORAL EVERY 6 HOURS
Refills: 0 | Status: DISCONTINUED | OUTPATIENT
Start: 2024-02-13 | End: 2024-02-14

## 2024-02-13 RX ORDER — QUETIAPINE FUMARATE 200 MG/1
50 TABLET, FILM COATED ORAL
Refills: 0 | Status: DISCONTINUED | OUTPATIENT
Start: 2024-02-13 | End: 2024-02-16

## 2024-02-13 RX ADMIN — ATORVASTATIN CALCIUM 40 MILLIGRAM(S): 80 TABLET, FILM COATED ORAL at 22:25

## 2024-02-13 RX ADMIN — LETROZOLE 2.5 MILLIGRAM(S): 2.5 TABLET, FILM COATED ORAL at 11:06

## 2024-02-13 RX ADMIN — RIVASTIGMINE 3 MILLIGRAM(S): 4.6 PATCH, EXTENDED RELEASE TRANSDERMAL at 18:54

## 2024-02-13 RX ADMIN — LISINOPRIL 40 MILLIGRAM(S): 2.5 TABLET ORAL at 10:06

## 2024-02-13 RX ADMIN — QUETIAPINE FUMARATE 50 MILLIGRAM(S): 200 TABLET, FILM COATED ORAL at 18:54

## 2024-02-13 RX ADMIN — OLANZAPINE 2.5 MILLIGRAM(S): 15 TABLET, FILM COATED ORAL at 17:53

## 2024-02-13 RX ADMIN — SODIUM CHLORIDE 1000 MILLILITER(S): 9 INJECTION INTRAMUSCULAR; INTRAVENOUS; SUBCUTANEOUS at 01:13

## 2024-02-13 RX ADMIN — HEPARIN SODIUM 5000 UNIT(S): 5000 INJECTION INTRAVENOUS; SUBCUTANEOUS at 13:52

## 2024-02-13 RX ADMIN — ESCITALOPRAM OXALATE 10 MILLIGRAM(S): 10 TABLET, FILM COATED ORAL at 11:07

## 2024-02-13 RX ADMIN — Medication 1 DROP(S): at 18:55

## 2024-02-13 RX ADMIN — AMLODIPINE BESYLATE 10 MILLIGRAM(S): 2.5 TABLET ORAL at 10:06

## 2024-02-13 RX ADMIN — Medication 25 MILLIGRAM(S): at 22:24

## 2024-02-13 RX ADMIN — Medication 1 DROP(S): at 11:06

## 2024-02-13 RX ADMIN — HEPARIN SODIUM 5000 UNIT(S): 5000 INJECTION INTRAVENOUS; SUBCUTANEOUS at 22:25

## 2024-02-13 RX ADMIN — QUETIAPINE FUMARATE 50 MILLIGRAM(S): 200 TABLET, FILM COATED ORAL at 11:07

## 2024-02-13 RX ADMIN — Medication 81 MILLIGRAM(S): at 11:07

## 2024-02-13 NOTE — ED PROVIDER NOTE - WET READ LAUNCH FT
Pt called back with Surgeon name Number.   Dr Eduardo with [facility] [phone].   I will fax clearance letter to Dr Eduardo.   Copy of letter and fax confirmation will be scanned into pt's chart.   Michael THAPA    There are no Wet Read(s) to document.

## 2024-02-13 NOTE — ED ADULT NURSE REASSESSMENT NOTE - NS ED NURSE REASSESS COMMENT FT1
patient laying in semi fowlers position on the stretcher. patient denies shortness of breath, chest pain, nausea, vomiting, chill, fever. Patient normal sinus on the monitor. Respirations equal and adequate. Patients IV patent, no signs of infiltration. Safety measures in place, call bell within reach. Patient stable upon assessment.

## 2024-02-13 NOTE — ED ADULT NURSE NOTE - OBJECTIVE STATEMENT
69 year old female brought to room 7A. Patient alert and oriented times two at baseline. brought in by family for multiple episodes of "passing out" starting this afternoon. during triage, pt noted to have multiple episodes of "passing out." pt throws head forward, closes eyes and refuses to respond to verbal stimuli then returns to baseline. hx dementia, DM, HLD.   patient laying in semi fowlers position on the stretcher. patient alert and oriented times four. patient denies shortness of breath, chest pain, nausea, vomiting, chill, fever. Patient normal sinus on the monitor. Respirations equal and adequate. Patients IV patent, no signs of infiltration. Safety measures in place, call bell within reach. Patient stable upon assessment

## 2024-02-13 NOTE — ED ADULT NURSE REASSESSMENT NOTE - NS ED NURSE REASSESS COMMENT FT1
PT is resting in stretcher, easily arousable to verbal stimuli. no apparent distress noted at this time.  pt requesting to not be catheterized and wants to go to the bathroom.  hand off will be given to primary nurse when return to area. PT is resting in stretcher, easily arousable to verbal stimuli. no apparent distress noted at this time.  pt requesting to not be catheterized and wants to go to the bathroom MD Griffin banuelos .  hand off will be given to primary nurse when return to area.

## 2024-02-13 NOTE — ED PROVIDER NOTE - PHYSICAL EXAMINATION
GENERAL: NAD, lying in bed comfortably  HEAD:  Atraumatic, normocephalic  EYES: EOMI, PERRLA, conjunctiva and sclera clear  NECK: Supple, trachea midline, no JVD  HEART: Regular rate and rhythm, no murmurs, rubs, or gallops  LUNGS: Unlabored respirations.  Clear to auscultation bilaterally, no crackles, wheezing, or rhonchi  ABDOMEN: Soft, nontender, nondistended, +BS  EXTREMITIES: 2+ peripheral pulses bilaterally. No clubbing, cyanosis, or edema  NERVOUS SYSTEM:  A&Ox1, moving all extremities, no focal deficits   SKIN: No rashes or lesions

## 2024-02-13 NOTE — H&P ADULT - NSHPREVIEWOFSYSTEMS_GEN_ALL_CORE
REVIEW OF SYSTEMS:    CONSTITUTIONAL: (+) dizziness (-) weakness (-) fevers (-) chills (-) weight loss (-) weight gain (-) sweats (-) poor appetite (+) falls (+) syncope  HEENT: (-) visual changes (-) HA (-) vertigo (-) rhinorrhea (-) epistaxis (-) swelling (-) hearing changes (-) sore throat (-) hoarseness  NECK: (-) stiffness (-) pain  RESPIRATORY: (-) cough (-) SOB (-) MEJIA (-) hemoptysis   CARDIOVASCULAR: (-) chest pain (-) palpitations (-) PND (-) orthopnea  GASTROINTESTINAL: (-) abd pain (-) nausea (-) vomiting (-) diarrhea (-) constipation (-) hematemesis (-) melena (-) BRBPR (-) dysphagia (-) odynophagia (-) incontinence (-) bloatedness  GENITOURINARY: (-) dysuria  (-) frequency (-) hematuria (-) incontinence (-) abnormal discharge (-) incomplete emptying (-) flank pain  NEUROLOGICAL: (-) confusion (-) slurred speech (-) focal weakness (-) tingling/numbness (-) difficulty walking (-) tremors (+)memory loss   MUSCULOSKELETAL: (-) back pain (-) joint pain (-) joint swelling (-) reduced ROM (-) extremity pain (-) extremity swelling  PSYCH: (-) anxious (-) depressed (-)agitation (-) aural hallucinations (-) visual hallucinations  SKIN: (-) itching (-) burning (-) rashes (-) swelling (-) bruising (-) pain  All other review of systems is negative unless indicated above.

## 2024-02-13 NOTE — ED ADULT NURSE REASSESSMENT NOTE - NS ED NURSE REASSESS COMMENT FT1
PT is resting in stretcher, easily arousable to verbal stimuli. no apparent distress noted at this time.

## 2024-02-13 NOTE — ED PROVIDER NOTE - CLINICAL SUMMARY MEDICAL DECISION MAKING FREE TEXT BOX
Patient concerning for infectious processes stroke metabolic dysfunction adverse effects to medications.  Will obtain CT of the head, chest x-ray, UA UC, CBC CMP, VBG, blood cultures.  Likely disposition will be inpatient stay.

## 2024-02-13 NOTE — ED PROVIDER NOTE - ATTENDING CONTRIBUTION TO CARE
I have personally performed a face to face medical and diagnostic evaluation of the patient. I have discussed with and reviewed the Resident's note and agree with the History, ROS, Physical Exam and MDM unless otherwise indicated. A brief summary of my personal evaluation and impression can be found below.    Griffin CASTILLO: 69-year-old female history of dementia hyperlipidemia diabetes zoster ophthalmicus, presents with a chief complaint of multiple episodes of "unresponsiveness "earlier today, per family at bedside patient had multiple episodes of possible syncopal without head strike no LOC but patient is episodes where she briefly is no longer responsive.  With her eyes closed is returned multiple times today while eating also while in car and while in triage/waiting room area in the ER.  Daughter reports that he has never had episode like this before, no urinary or bowel incontinence during episodes, no history of seizures, no fever no nausea no vomiting no chest pain no trouble breathing though daughter reports patient's is limited historian secondary to dementia complaints.  There is no trauma or falls.  Of note daughter reports that she recently had her medicines changed increasing increasing dose of her Seroquel as well as trazodone.  Patient is a limited historian is unable to provide full history.    All other ROS negative, except as above and as per HPI and ROS section.  VITALS: Initial triage and subsequent vitals have been reviewed by me.  GEN APPEARANCE: Alert, non-toxic, well-appearing, NAD.  HEAD: Atraumatic.  EYES: PERRLa, EOMI, vision grossly intact.   NECK: Supple  CV: RRR, S1S2, no c/r/m/g. Cap refill <2 seconds. No bruits.   LUNGS: CTAB. No abnormal breath sounds.  ABDOMEN: Soft, NTND. No guarding or rebound.   MSK/EXT: No spinal or extremity point tenderness. No CVA ttp. Pelvis stable. No peripheral edema.  NEURO: Alert, follows commands. Speech normal. Sensation and motor normal x4 extremities.   SKIN: Warm, dry and intact. No rash.  PSYCH: dementia     Plan/MDM: Exam vitals are stable nontoxic-appearing physical exam as above, DDx concern for possible medication oversedation versus toxic metabolic etiology presentation not appear consistent with that of seizures, would also consider possible cardiogenic issues, patient is limited story is unable to endorse like chest pain or trouble breathing.  Will check basic labs EKG cardiac enzymes will get CT head, will give meds as needed and reassess anticipate admission for further evaluation will place on monitor on telemetry for possible cardiogenic episodes.

## 2024-02-13 NOTE — ED PROVIDER NOTE - OBJECTIVE STATEMENT
69-year-old female [facility] dementia diabetes presenting with LOC  Patient with advanced dementia history obtained from daughter at bedside.  Daughter states the patient has had multiple falls since today starting at 2 PM which last 1 to 2 minutes patient is unresponsive during that time and unarousable to pain or to auditory stimulation.  Family denies any recent trauma any recent illness.  The patient's medication has been recently changed patient was recently put on quetiapine which has been titrated up.  The original dose was 3 weeks ago at 12.5 mg and 2 weeks ago was increased to 25 and then last week was increased to 50 twice daily.  The patient was placed in his medications due to increased agitation and was also placed on trazodone 50 mg 2 weeks ago.  The family has not noticed any signs of infection cough wheezing or shortness of breath foul-smelling urine diarrhea.  Patient unreliable but denies chest pain shortness of breath abdominal pain nausea vomiting headache vision changes.

## 2024-02-13 NOTE — H&P ADULT - ASSESSMENT
69-year-old female PMHx of advanced dementia, HTN, DM2, hx of invasive ductal carcinoma of rt breast, who presents with LOC.    #Loss of consciousness  - DDx includes orthostatic hypotension (dehydration vs autonomic dysfunction), neurocardiogenic syncope, medication side effects, seizure  - CTH in ED without any acute findings  - telemetry  - check orthostatic vital signs  - sleep/wake EEG  - TTE    #DM2 with unspecified complications  - accuchecks qachs  - consistent carbohydrate diet  - hold metformin while in-hospital  - moderate ISS  - hold off basal/pre-prandial insulin for now  - HgbA1c -->    #HTN  - cont amlodipine  - cont     #Hyperlipidemia  - cont    #Hx of dementia with behavioral disturbance  - cont seroquel  - cont rivastigmine    #Major depressive disorder, recurrent, moderate  - cont escitalopram  - cont trazodone    #Need for prophylaxis  - heparin 5000 units SC tid    #Discharge planning  - PT consult 69-year-old female PMHx of advanced dementia, HTN, DM2, hx of invasive ductal carcinoma of rt breast, who presents with LOC.    #Loss of consciousness  - DDx includes orthostatic hypotension (dehydration vs autonomic dysfunction), neurocardiogenic syncope, medication side effects, seizure  - CTH in ED without any acute findings  - telemetry  - check orthostatic vital signs  - continuous EEG  - TTE    #DM2 with unspecified complications  - accuchecks qachs  - consistent carbohydrate diet  - hold metformin while in-hospital  - moderate ISS  - hold off basal/pre-prandial insulin for now  - HgbA1c -->    #HTN  - cont amlodipine  - cont     #Hyperlipidemia  - cont    #Hx of dementia with behavioral disturbance  - cont seroquel  - cont rivastigmine    #Major depressive disorder, recurrent, moderate  - cont escitalopram  - cont trazodone    #Need for prophylaxis  - heparin 5000 units SC tid    #Discharge planning  - PT consult 69-year-old female PMHx of advanced dementia, hx of chronic lt BG lacunar infarct, HTN, DM2, hx of invasive ductal carcinoma of rt breast, who presents with LOC.    #Loss of consciousness  - DDx includes orthostatic hypotension (dehydration vs autonomic dysfunction), neurocardiogenic syncope, medication side effects, seizure  - CTH in ED without any acute findings  - telemetry  - check orthostatic vital signs  - continuous EEG  - TTE    #DM2 with unspecified complications  - accuchecks qachs  - consistent carbohydrate diet  - hold metformin while in-hospital  - moderate ISS  - hold off basal/pre-prandial insulin for now  - HgbA1c -->    #HTN  - cont amlodipine  - cont lisinopril substitution    #Hyperlipidemia  - cont atorvastatin substitution    #Hx of dementia with behavioral disturbance  - cont seroquel  - cont rivastigmine  - cont trazodone qhs prn insomnia    #Major depressive disorder, recurrent, moderate  - cont escitalopram    #Hx of chronic lt BG lacunar infarct  - initiated baby ASA    #Need for prophylaxis  - heparin 5000 units SC tid    #Discharge planning  - PT consult

## 2024-02-13 NOTE — H&P ADULT - NSHPPHYSICALEXAM_GEN_ALL_CORE
Vital Signs Last 24 Hrs  T(C): 36.6 (13 Feb 2024 06:11), Max: 37.1 (12 Feb 2024 23:42)  T(F): 97.9 (13 Feb 2024 06:11), Max: 98.8 (12 Feb 2024 23:42)  HR: 81 (13 Feb 2024 06:11) (80 - 105)  BP: 148/81 (13 Feb 2024 06:11) (148/80 - 168/97)  BP(mean): --  RR: 18 (13 Feb 2024 01:10) (18 - 18)  SpO2: 98% (13 Feb 2024 01:10) (98% - 100%)    I&O's Summary    GENERAL: NAD  HEAD:  Atraumatic, Normocephalic  EYES: EOMI, PERRLA, conjunctiva and sclera clear  MOUTH/THROAT: No swelling, no erythema, no lesions, no exudates  NECK: Supple, No JVD, No LAD, No carotid bruits, No thyromegaly  CHEST/LUNG: Clear to auscultation bilaterally; Normal respiratory effort w/o intercostal retractions  HEART: Regular rate and rhythm; nl S1/S2; No murmurs, rubs, or gallops  ABDOMEN: Soft, Nontender, Nondistended; Bowel sounds present; No hepatosplenomegaly  EXTREMITIES:  2+ Peripheral Pulses; No clubbing, cyanosis, or edema b/l; Nl ROM  SKIN: No rashes or lesions; No jaundice; Normal turgor, texture  NEURO: A+O x 1; nonfocal CN/motor/sensory/reflexes; speech + comprehension are intact  PSYCH: Nl affect; no delirium or agitation; no suicidal/homicidal ideation

## 2024-02-13 NOTE — H&P ADULT - TIME BILLING
No
Reviewing past chart, lab data, images, placing orders, arranging consultations, updating PCP, signout out to PA

## 2024-02-13 NOTE — H&P ADULT - NSHPSOCIALHISTORY_GEN_ALL_CORE
splits her time between living with two daughters  no home aide; grandniece assists with taking care of her during daytime

## 2024-02-13 NOTE — H&P ADULT - NSHPLABSRESULTS_GEN_ALL_CORE
LABS:                        11.5   7.82  )-----------( 198      ( 2024 00:25 )             33.9     02-13    141  |  104  |  15  ----------------------------<  160<H>  4.0   |  23  |  0.61    Ca    9.1      2024 00:25    TPro  7.2  /  Alb  4.2  /  TBili  <0.2  /  DBili  x   /  AST  22  /  ALT  12  /  AlkPhos  57  02-13    PT/INR - ( 2024 00:25 )   PT: 10.2 sec;   INR: 0.91 ratio         PTT - ( 2024 00:25 )  PTT:34.3 sec  CAPILLARY BLOOD GLUCOSE      POCT Blood Glucose.: 106 mg/dL (2024 21:01)        Urinalysis Basic - ( 2024 04:00 )    Color: Dark Yellow / Appearance: Clear / S.037 / pH: x  Gluc: x / Ketone: 40 mg/dL  / Bili: Negative / Urobili: 1.0 mg/dL   Blood: x / Protein: 30 mg/dL / Nitrite: Negative   Leuk Esterase: Negative / RBC: 1 /HPF / WBC 0 /HPF   Sq Epi: x / Non Sq Epi: 0 /HPF / Bacteria: Negative /HPF        RADIOLOGY & ADDITIONAL TESTS:    Imaging Personally Reviewed:  [x] YES  [ ] NO    Case discussed with NPP:  [X] YES  [ ] NO

## 2024-02-13 NOTE — H&P ADULT - HISTORY OF PRESENT ILLNESS
69-year-old female PMHx of advanced dementia, HTN, DM2, hx of chronic lt BG lacunar infarct, hx of invasive ductal carcinoma of rt breast, who presents with LOC.  Patient with advanced dementia history obtained from daughter at bedside.  Daughter states the patient has had multiple falls since 2/12/24 starting at 2 PM.  Pt's episodes of LOC last 1 to 2 minutes during which patient is unresponsive during that time and unarousable to pain or to auditory stimulation.  Family denies any recent trauma any recent illness.  The patient's medication has been recently changed; patient was recently put on quetiapine which has been titrated up.  The original dose was 3 weeks ago at 12.5 mg and 2 weeks ago was increased to 25 and then last week was increased to 50 twice daily.  The patient was placed in his medications due to increased agitation and was also placed on trazodone 50 mg 2 weeks ago.  The family has not noticed any signs of infection cough wheezing or shortness of breath foul-smelling urine diarrhea.  Patient unreliable but denies chest pain shortness of breath abdominal pain nausea vomiting headache vision changes.    The patient's family reports worsening memory loss for approximately the last 3 to 4 years.  She will frequently misplace objects, and she will quickly forget recent events.  She will also rapidly forget conversations and ask the same questions several times a short timeframe.  She can help with household chores to limited extent, such as cleaning her room, but she is unable to cook, manage medications, or performed finances.  She does not drive.  The patient is able to bathe and dress without assistance.  The patient her family denies hallucinations, gait changes, or tremor.  MRI brain in March 2021 showed small-vessel ischemic and involutional changes as well as a right frontal developmental venous anomaly, stable compared to MRI in 2019.  The patient also has a history of dizzy spells, described as a feeling of dizziness when she stands up that last for 5 to 10 minutes.  She had tried memantine in the past, which had worsened her dizziness.     Since the patient's last appointment, the patient's family reports a continued gradual decline.  They report that her mood and sleep have been stable.  They denies significant recent headaches.  They deny any other new symptoms or concerns including falls or other safety concerns.  Repeat MRI brain in August 2023 was stable compared to prior.   69-year-old female PMHx of advanced dementia, HTN, DM2, hx of chronic lt BG lacunar infarct, hx of invasive ductal carcinoma of rt breast, who presents with LOC.  Patient with advanced dementia history obtained from daughter at bedside.  Daughter states the patient has had multiple falls since 2/12/24 starting at 2 PM.  Pt's episodes of LOC last 1 to 2 minutes during which patient is unresponsive during that time and unarousable to pain or to auditory stimulation.  Family denies any recent trauma any recent illness.  The patient's medication has been recently changed; patient was recently put on quetiapine which has been titrated up.  The original dose was 3 weeks ago at 12.5 mg and 2 weeks ago was increased to 25 and then last week was increased to 50 twice daily.  The patient was placed in his medications due to increased agitation and was also placed on trazodone 50 mg 2 weeks ago.  The family has not noticed any signs of infection cough wheezing or shortness of breath foul-smelling urine diarrhea.  Patient unreliable but denies chest pain shortness of breath abdominal pain nausea vomiting headache vision changes.    The patient's family reports worsening memory loss for approximately the last 3 to 4 years.  She will frequently misplace objects, and she will quickly forget recent events.  She will also rapidly forget conversations and ask the same questions several times a short timeframe.  She can help with household chores to limited extent, such as cleaning her room, but she is unable to cook, manage medications, or performed finances.  She does not drive.  The patient is able to bathe and dress without assistance.  The patient her family denies hallucinations, gait changes, or tremor.  MRI brain in March 2021 showed small-vessel ischemic and involutional changes as well as a right frontal developmental venous anomaly, stable compared to MRI in 2019.  The patient also has a history of dizzy spells, described as a feeling of dizziness when she stands up that last for 5 to 10 minutes.  She had tried memantine in the past, which had worsened her dizziness.     The patient's family reports a continued gradual decline.  They report that her mood and sleep have been stable.  They denies significant recent headaches.  They deny any other new symptoms or concerns including falls or other safety concerns.  Repeat MRI brain in August 2023 was stable compared to prior.

## 2024-02-14 ENCOUNTER — RESULT REVIEW (OUTPATIENT)
Age: 69
End: 2024-02-14

## 2024-02-14 LAB
A1C WITH ESTIMATED AVERAGE GLUCOSE RESULT: 5.6 % — SIGNIFICANT CHANGE UP (ref 4–5.6)
ANION GAP SERPL CALC-SCNC: 12 MMOL/L — SIGNIFICANT CHANGE UP (ref 7–14)
BASOPHILS # BLD AUTO: 0.05 K/UL — SIGNIFICANT CHANGE UP (ref 0–0.2)
BASOPHILS NFR BLD AUTO: 0.7 % — SIGNIFICANT CHANGE UP (ref 0–2)
BUN SERPL-MCNC: 18 MG/DL — SIGNIFICANT CHANGE UP (ref 7–23)
CALCIUM SERPL-MCNC: 9.5 MG/DL — SIGNIFICANT CHANGE UP (ref 8.4–10.5)
CHLORIDE SERPL-SCNC: 105 MMOL/L — SIGNIFICANT CHANGE UP (ref 98–107)
CO2 SERPL-SCNC: 26 MMOL/L — SIGNIFICANT CHANGE UP (ref 22–31)
CREAT SERPL-MCNC: 0.63 MG/DL — SIGNIFICANT CHANGE UP (ref 0.5–1.3)
CULTURE RESULTS: SIGNIFICANT CHANGE UP
EGFR: 96 ML/MIN/1.73M2 — SIGNIFICANT CHANGE UP
EOSINOPHIL # BLD AUTO: 0.35 K/UL — SIGNIFICANT CHANGE UP (ref 0–0.5)
EOSINOPHIL NFR BLD AUTO: 5.1 % — SIGNIFICANT CHANGE UP (ref 0–6)
ESTIMATED AVERAGE GLUCOSE: 114 — SIGNIFICANT CHANGE UP
GLUCOSE SERPL-MCNC: 102 MG/DL — HIGH (ref 70–99)
HCT VFR BLD CALC: 35.4 % — SIGNIFICANT CHANGE UP (ref 34.5–45)
HCV AB S/CO SERPL IA: 0.08 S/CO — SIGNIFICANT CHANGE UP (ref 0–0.99)
HCV AB SERPL-IMP: SIGNIFICANT CHANGE UP
HGB BLD-MCNC: 12 G/DL — SIGNIFICANT CHANGE UP (ref 11.5–15.5)
IANC: 3.96 K/UL — SIGNIFICANT CHANGE UP (ref 1.8–7.4)
IMM GRANULOCYTES NFR BLD AUTO: 0.3 % — SIGNIFICANT CHANGE UP (ref 0–0.9)
LYMPHOCYTES # BLD AUTO: 1.99 K/UL — SIGNIFICANT CHANGE UP (ref 1–3.3)
LYMPHOCYTES # BLD AUTO: 29.2 % — SIGNIFICANT CHANGE UP (ref 13–44)
MCHC RBC-ENTMCNC: 31.5 PG — SIGNIFICANT CHANGE UP (ref 27–34)
MCHC RBC-ENTMCNC: 33.9 GM/DL — SIGNIFICANT CHANGE UP (ref 32–36)
MCV RBC AUTO: 92.9 FL — SIGNIFICANT CHANGE UP (ref 80–100)
MONOCYTES # BLD AUTO: 0.44 K/UL — SIGNIFICANT CHANGE UP (ref 0–0.9)
MONOCYTES NFR BLD AUTO: 6.5 % — SIGNIFICANT CHANGE UP (ref 2–14)
NEUTROPHILS # BLD AUTO: 3.96 K/UL — SIGNIFICANT CHANGE UP (ref 1.8–7.4)
NEUTROPHILS NFR BLD AUTO: 58.2 % — SIGNIFICANT CHANGE UP (ref 43–77)
NRBC # BLD: 0 /100 WBCS — SIGNIFICANT CHANGE UP (ref 0–0)
NRBC # FLD: 0 K/UL — SIGNIFICANT CHANGE UP (ref 0–0)
PLATELET # BLD AUTO: 208 K/UL — SIGNIFICANT CHANGE UP (ref 150–400)
POTASSIUM SERPL-MCNC: 3.7 MMOL/L — SIGNIFICANT CHANGE UP (ref 3.5–5.3)
POTASSIUM SERPL-SCNC: 3.7 MMOL/L — SIGNIFICANT CHANGE UP (ref 3.5–5.3)
RBC # BLD: 3.81 M/UL — SIGNIFICANT CHANGE UP (ref 3.8–5.2)
RBC # FLD: 12.6 % — SIGNIFICANT CHANGE UP (ref 10.3–14.5)
SODIUM SERPL-SCNC: 143 MMOL/L — SIGNIFICANT CHANGE UP (ref 135–145)
SPECIMEN SOURCE: SIGNIFICANT CHANGE UP
WBC # BLD: 6.81 K/UL — SIGNIFICANT CHANGE UP (ref 3.8–10.5)
WBC # FLD AUTO: 6.81 K/UL — SIGNIFICANT CHANGE UP (ref 3.8–10.5)

## 2024-02-14 PROCEDURE — 93306 TTE W/DOPPLER COMPLETE: CPT | Mod: 26

## 2024-02-14 RX ORDER — OLANZAPINE 15 MG/1
1.25 TABLET, FILM COATED ORAL EVERY 6 HOURS
Refills: 0 | Status: DISCONTINUED | OUTPATIENT
Start: 2024-02-14 | End: 2024-02-16

## 2024-02-14 RX ADMIN — ESCITALOPRAM OXALATE 10 MILLIGRAM(S): 10 TABLET, FILM COATED ORAL at 11:39

## 2024-02-14 RX ADMIN — HEPARIN SODIUM 5000 UNIT(S): 5000 INJECTION INTRAVENOUS; SUBCUTANEOUS at 06:09

## 2024-02-14 RX ADMIN — QUETIAPINE FUMARATE 50 MILLIGRAM(S): 200 TABLET, FILM COATED ORAL at 19:02

## 2024-02-14 RX ADMIN — QUETIAPINE FUMARATE 50 MILLIGRAM(S): 200 TABLET, FILM COATED ORAL at 06:09

## 2024-02-14 RX ADMIN — Medication 1 DROP(S): at 18:07

## 2024-02-14 RX ADMIN — ATORVASTATIN CALCIUM 40 MILLIGRAM(S): 80 TABLET, FILM COATED ORAL at 21:58

## 2024-02-14 RX ADMIN — RIVASTIGMINE 3 MILLIGRAM(S): 4.6 PATCH, EXTENDED RELEASE TRANSDERMAL at 18:59

## 2024-02-14 RX ADMIN — HEPARIN SODIUM 5000 UNIT(S): 5000 INJECTION INTRAVENOUS; SUBCUTANEOUS at 21:57

## 2024-02-14 RX ADMIN — LISINOPRIL 40 MILLIGRAM(S): 2.5 TABLET ORAL at 06:10

## 2024-02-14 RX ADMIN — Medication 2: at 17:38

## 2024-02-14 RX ADMIN — Medication 81 MILLIGRAM(S): at 11:38

## 2024-02-14 RX ADMIN — Medication 1 DROP(S): at 00:16

## 2024-02-14 RX ADMIN — Medication 1 DROP(S): at 06:09

## 2024-02-14 RX ADMIN — AMLODIPINE BESYLATE 10 MILLIGRAM(S): 2.5 TABLET ORAL at 06:10

## 2024-02-14 RX ADMIN — RIVASTIGMINE 3 MILLIGRAM(S): 4.6 PATCH, EXTENDED RELEASE TRANSDERMAL at 06:09

## 2024-02-14 RX ADMIN — Medication 1 DROP(S): at 11:41

## 2024-02-14 RX ADMIN — LETROZOLE 2.5 MILLIGRAM(S): 2.5 TABLET, FILM COATED ORAL at 11:41

## 2024-02-14 NOTE — CONSULT NOTE ADULT - SUBJECTIVE AND OBJECTIVE BOX
Neurology Consult    Reason for Consult: Patient is a 69y old  Female who presents with a chief complaint of     HPI:  69-year-old female PMHx of advanced dementia, HTN, DM2, hx of chronic lt BG lacunar infarct, hx of invasive ductal carcinoma of rt breast, who presents with LOC.  Patient with advanced dementia history obtained from daughter at bedside.  Daughter states the patient has had multiple falls since 2/12/24 starting at 2 PM.  Pt's episodes of LOC last 1 to 2 minutes during which patient is unresponsive during that time and unarousable to pain or to auditory stimulation.  Family denies any recent trauma any recent illness.  The patient's medication has been recently changed; patient was recently put on quetiapine which has been titrated up.  The original dose was 3 weeks ago at 12.5 mg and 2 weeks ago was increased to 25 and then last week was increased to 50 twice daily.  The patient was placed in his medications due to increased agitation and was also placed on trazodone 50 mg 2 weeks ago.  The family has not noticed any signs of infection cough wheezing or shortness of breath foul-smelling urine diarrhea.  Patient unreliable but denies chest pain shortness of breath abdominal pain nausea vomiting headache vision changes.    The patient's family reports worsening memory loss for approximately the last 3 to 4 years.  She will frequently misplace objects, and she will quickly forget recent events.  She will also rapidly forget conversations and ask the same questions several times a short timeframe.  She can help with household chores to limited extent, such as cleaning her room, but she is unable to cook, manage medications, or performed finances.  She does not drive.  The patient is able to bathe and dress without assistance.  The patient her family denies hallucinations, gait changes, or tremor.  MRI brain in March 2021 showed small-vessel ischemic and involutional changes as well as a right frontal developmental venous anomaly, stable compared to MRI in 2019.  The patient also has a history of dizzy spells, described as a feeling of dizziness when she stands up that last for 5 to 10 minutes.  She had tried memantine in the past, which had worsened her dizziness.     The patient's family reports a continued gradual decline.  They report that her mood and sleep have been stable.  They denies significant recent headaches.  They deny any other new symptoms or concerns including falls or other safety concerns.  Repeat MRI brain in August 2023 was stable compared to prior.   (13 Feb 2024 09:27)       PAST MEDICAL & SURGICAL HISTORY:  Breast cancer  s.p chemotherapy      Zoster ophthalmicus      HLD (hyperlipidemia)      Prediabetes          Allergies: Allergies    No Known Allergies    Intolerances        Social History: Denies toxic habits including tobacco, ETOH or illicit drugs.    Family History: FAMILY HISTORY:  FH: dementia (Mother)    . No family history of strokes    Medications: MEDICATIONS  (STANDING):  amLODIPine   Tablet 10 milliGRAM(s) Oral daily  aspirin enteric coated 81 milliGRAM(s) Oral daily  atorvastatin 40 milliGRAM(s) Oral at bedtime  dextrose 5%. 1000 milliLiter(s) (100 mL/Hr) IV Continuous <Continuous>  dextrose 5%. 1000 milliLiter(s) (50 mL/Hr) IV Continuous <Continuous>  dextrose 50% Injectable 12.5 Gram(s) IV Push once  dextrose 50% Injectable 25 Gram(s) IV Push once  dextrose 50% Injectable 25 Gram(s) IV Push once  escitalopram 10 milliGRAM(s) Oral daily  glucagon  Injectable 1 milliGRAM(s) IntraMuscular once  heparin   Injectable 5000 Unit(s) SubCutaneous every 8 hours  insulin lispro (ADMELOG) corrective regimen sliding scale   SubCutaneous three times a day before meals  insulin lispro (ADMELOG) corrective regimen sliding scale   SubCutaneous at bedtime  letrozole 2.5 milliGRAM(s) Oral daily  lisinopril 40 milliGRAM(s) Oral daily  prednisoLONE acetate 1% Suspension 1 Drop(s) Right EYE four times a day  QUEtiapine 50 milliGRAM(s) Oral two times a day  rivastigmine 3 milliGRAM(s) Oral two times a day    MEDICATIONS  (PRN):  dextrose Oral Gel 15 Gram(s) Oral once PRN Blood Glucose LESS THAN 70 milliGRAM(s)/deciliter  OLANZapine Injectable 2.5 milliGRAM(s) IntraMuscular every 6 hours PRN for agitation  traZODone 25 milliGRAM(s) Oral at bedtime PRN insomnia      Review of Systems:  CONSTITUTIONAL:  No weight loss, fever, chills, weakness or fatigue.  HEENT:  Eyes:  No visual loss, blurred vision, double vision or yellow sclera. Ears, Nose, Throat:  No hearing loss, sneezing, congestion, runny nose or sore throat.  SKIN:  No rash or itching.  CARDIOVASCULAR:  No chest pain, chest pressure or chest discomfort. No palpitations or edema.  RESPIRATORY:  No shortness of breath, cough or sputum.  GASTROINTESTINAL:  No anorexia, nausea, vomiting or diarrhea. No abdominal pain or blood.  GENITOURINARY:  No burning on urination or incontinence   NEUROLOGICAL:  No headache, dizziness, syncope, paralysis, ataxia, numbness or tingling in the extremities. No change in bowel or bladder control. no limb weakness. no vision changes.   MUSCULOSKELETAL:  No muscle, back pain, joint pain or stiffness.  HEMATOLOGIC:  No anemia, bleeding or bruising.  LYMPHATICS:  No enlarged nodes. No history of splenectomy.  PSYCHIATRIC:  No history of depression or anxiety.  ENDOCRINOLOGIC:  No reports of sweating, cold or heat intolerance. No polyuria or polydipsia.      Vitals:  Vital Signs Last 24 Hrs  T(C): 36.9 (14 Feb 2024 06:01), Max: 37.3 (13 Feb 2024 11:49)  T(F): 98.5 (14 Feb 2024 06:01), Max: 99.1 (13 Feb 2024 11:49)  HR: 77 (14 Feb 2024 06:01) (75 - 82)  BP: 125/72 (14 Feb 2024 06:01) (125/72 - 141/75)  BP(mean): --  RR: 17 (14 Feb 2024 06:01) (16 - 18)  SpO2: 98% (14 Feb 2024 06:01) (98% - 100%)    Parameters below as of 14 Feb 2024 06:01  Patient On (Oxygen Delivery Method): room air        General Exam:   General Appearance: Appropriately dressed and in no acute distress       Head: Normocephalic, atraumatic and no dysmorphic features  Ear, Nose, and Throat: Moist mucous membranes  CVS: S1S2+  Resp: No SOB, no wheeze or rhonchi  GI: soft NT/ND  Extremities: No edema or cyanosis  Skin: No bruises or rashes     Neurological Exam:  Mental Status: Awake, alert and oriented x 3.  Able to follow simple and complex verbal commands. Able to name and repeat. fluent speech. No obvious aphasia or dysarthria noted.   Cranial Nerves: PERRL, EOMI, VFFC, sensation V1-V3 intact,  no obvious facial asymmetry, equal elevation of palate, scm/trap 5/5, tongue is midline on protrusion. no obvious papilledema on fundoscopic exam. hearing is grossly intact.   Motor: Normal bulk, tone and strength throughout. Fine finger movements were intact and symmetric. no tremors or drift noted.    Sensation: Intact to light touch and pinprick throughout. no right/left confusion. no extinction to tactile on DSS. Romberg was negative.   Reflexes: 1+ throughout at biceps, brachioradialis, triceps, patellars and ankles bilaterally and equal. No clonus. R toe and L toe were both downgoing.  Coordination: No dysmetria on FNF or HKS  Gait: Narrow based and steady. Able to tandem. no limitations in gait.     Data/Labs/Imaging which I personally reviewed.     Labs:     CBC Full  -  ( 14 Feb 2024 06:33 )  WBC Count : 6.81 K/uL  RBC Count : 3.81 M/uL  Hemoglobin : 12.0 g/dL  Hematocrit : 35.4 %  Platelet Count - Automated : 208 K/uL  Mean Cell Volume : 92.9 fL  Mean Cell Hemoglobin : 31.5 pg  Mean Cell Hemoglobin Concentration : 33.9 gm/dL  Auto Neutrophil # : 3.96 K/uL  Auto Lymphocyte # : 1.99 K/uL  Auto Monocyte # : 0.44 K/uL  Auto Eosinophil # : 0.35 K/uL  Auto Basophil # : 0.05 K/uL  Auto Neutrophil % : 58.2 %  Auto Lymphocyte % : 29.2 %  Auto Monocyte % : 6.5 %  Auto Eosinophil % : 5.1 %  Auto Basophil % : 0.7 %    02-14    143  |  105  |  18  ----------------------------<  102<H>  3.7   |  26  |  0.63    Ca    9.5      14 Feb 2024 06:33    TPro  7.2  /  Alb  4.2  /  TBili  <0.2  /  DBili  x   /  AST  22  /  ALT  12  /  AlkPhos  57  02-13    LIVER FUNCTIONS - ( 13 Feb 2024 00:25 )  Alb: 4.2 g/dL / Pro: 7.2 g/dL / ALK PHOS: 57 U/L / ALT: 12 U/L / AST: 22 U/L / GGT: x           PT/INR - ( 13 Feb 2024 00:25 )   PT: 10.2 sec;   INR: 0.91 ratio         PTT - ( 13 Feb 2024 00:25 )  PTT:34.3 sec  Urinalysis Basic - ( 14 Feb 2024 06:33 )    Color: x / Appearance: x / SG: x / pH: x  Gluc: 102 mg/dL / Ketone: x  / Bili: x / Urobili: x   Blood: x / Protein: x / Nitrite: x   Leuk Esterase: x / RBC: x / WBC x   Sq Epi: x / Non Sq Epi: x / Bacteria: x          < from: CT Head No Cont (02.13.24 @ 01:40) >    ACC: [id number] EXAM:  CT BRAIN   ORDERED BY: ARABELLA DRAKE     PROCEDURE DATE:  02/13/2024          INTERPRETATION:  CLINICAL INFORMATION: ams? syncope?    COMPARISON: CT head from 4/14/2020.    CONTRAST:  IV Contrast: None.  Complications: None.    TECHNIQUE:  Serial axial images were obtained from the skull base to the   vertex using multi-slice helical technique. Sagittal and coronal   reformats were obtained.    FINDINGS:    VENTRICLES AND SULCI: Age appropriate involutional changes.  INTRA-AXIAL: No intracranial mass, acute hemorrhage, or midline shift.    There are periventricular and subcortical white matter hypodensities,   consistent with microvascular type changes. Left basal ganglia chronic   lacunar infarct.. Focus of hyperattenuation in the anterior inferior   right basal ganglia, stable from 2020, consistent with previously seen   DVA.  EXTRA-AXIAL: No mass or fluid collection. Basal cisterns are normal in   appearance.    VISUALIZED SINUSES:  Pansinus mucosal thickening. Mild frothy secretions   in the left sphenoid sinus..  TYMPANOMASTOID CAVITIES:  Clear.  VISUALIZED ORBITS: Status post left lens replacement.  CALVARIUM: Intact.    MISCELLANEOUS: None.      IMPRESSION:  No acute hemorrhage, mass effect, or midline shift.    < end of copied text >   Neurology Consult    Reason for Consult: Patient is a 69y old  Female who presents with a chief complaint of     HPI:  69-year-old female PMHx of advanced dementia, HTN, DM2, hx of chronic lt BG lacunar infarct, hx of invasive ductal carcinoma of rt breast, who presents with LOC.  Patient with advanced dementia history obtained from daughter at bedside.  Daughter states the patient has had multiple falls since 2/12/24 starting at 2 PM.  Pt's episodes of LOC last 1 to 2 minutes during which patient is unresponsive during that time and unarousable to pain or to auditory stimulation.  Family denies any recent trauma any recent illness.  The patient's medication has been recently changed; patient was recently put on quetiapine which has been titrated up.  The original dose was 3 weeks ago at 12.5 mg and 2 weeks ago was increased to 25 and then last week was increased to 50 twice daily.  The patient was placed in his medications due to increased agitation and was also placed on trazodone 50 mg 2 weeks ago.  The family has not noticed any signs of infection cough wheezing or shortness of breath foul-smelling urine diarrhea.  Patient unreliable but denies chest pain shortness of breath abdominal pain nausea vomiting headache vision changes.    The patient's family reports worsening memory loss for approximately the last 3 to 4 years.  She will frequently misplace objects, and she will quickly forget recent events.  She will also rapidly forget conversations and ask the same questions several times a short timeframe.  She can help with household chores to limited extent, such as cleaning her room, but she is unable to cook, manage medications, or performed finances.  She does not drive.  The patient is able to bathe and dress without assistance.  The patient her family denies hallucinations, gait changes, or tremor.  MRI brain in March 2021 showed small-vessel ischemic and involutional changes as well as a right frontal developmental venous anomaly, stable compared to MRI in 2019.  The patient also has a history of dizzy spells, described as a feeling of dizziness when she stands up that last for 5 to 10 minutes.  She had tried memantine in the past, which had worsened her dizziness.     The patient's family reports a continued gradual decline.  They report that her mood and sleep have been stable.  They denies significant recent headaches.  They deny any other new symptoms or concerns including falls or other safety concerns.  Repeat MRI brain in August 2023 was stable compared to prior.   (13 Feb 2024 09:27)       PAST MEDICAL & SURGICAL HISTORY:  Breast cancer  s.p chemotherapy      Zoster ophthalmicus      HLD (hyperlipidemia)      Prediabetes          Allergies: Allergies    No Known Allergies    Intolerances        Social History: Denies toxic habits including tobacco, ETOH or illicit drugs.    Family History: FAMILY HISTORY:  FH: dementia (Mother)    . No family history of strokes    Medications: MEDICATIONS  (STANDING):  amLODIPine   Tablet 10 milliGRAM(s) Oral daily  aspirin enteric coated 81 milliGRAM(s) Oral daily  atorvastatin 40 milliGRAM(s) Oral at bedtime  dextrose 5%. 1000 milliLiter(s) (100 mL/Hr) IV Continuous <Continuous>  dextrose 5%. 1000 milliLiter(s) (50 mL/Hr) IV Continuous <Continuous>  dextrose 50% Injectable 12.5 Gram(s) IV Push once  dextrose 50% Injectable 25 Gram(s) IV Push once  dextrose 50% Injectable 25 Gram(s) IV Push once  escitalopram 10 milliGRAM(s) Oral daily  glucagon  Injectable 1 milliGRAM(s) IntraMuscular once  heparin   Injectable 5000 Unit(s) SubCutaneous every 8 hours  insulin lispro (ADMELOG) corrective regimen sliding scale   SubCutaneous three times a day before meals  insulin lispro (ADMELOG) corrective regimen sliding scale   SubCutaneous at bedtime  letrozole 2.5 milliGRAM(s) Oral daily  lisinopril 40 milliGRAM(s) Oral daily  prednisoLONE acetate 1% Suspension 1 Drop(s) Right EYE four times a day  QUEtiapine 50 milliGRAM(s) Oral two times a day  rivastigmine 3 milliGRAM(s) Oral two times a day    MEDICATIONS  (PRN):  dextrose Oral Gel 15 Gram(s) Oral once PRN Blood Glucose LESS THAN 70 milliGRAM(s)/deciliter  OLANZapine Injectable 2.5 milliGRAM(s) IntraMuscular every 6 hours PRN for agitation  traZODone 25 milliGRAM(s) Oral at bedtime PRN insomnia      Review of Systems:  Unable      Vitals:  Vital Signs Last 24 Hrs  T(C): 36.9 (14 Feb 2024 06:01), Max: 37.3 (13 Feb 2024 11:49)  T(F): 98.5 (14 Feb 2024 06:01), Max: 99.1 (13 Feb 2024 11:49)  HR: 77 (14 Feb 2024 06:01) (75 - 82)  BP: 125/72 (14 Feb 2024 06:01) (125/72 - 141/75)  BP(mean): --  RR: 17 (14 Feb 2024 06:01) (16 - 18)  SpO2: 98% (14 Feb 2024 06:01) (98% - 100%)    Parameters below as of 14 Feb 2024 06:01  Patient On (Oxygen Delivery Method): room air        General Exam:   General Appearance: Appropriately dressed and in no acute distress       Head: Normocephalic, atraumatic and no dysmorphic features  Ear, Nose, and Throat: Moist mucous membranes  CVS: S1S2+  Resp: No SOB, no wheeze or rhonchi  GI: soft NT/ND  Extremities: No edema or cyanosis  Skin: No bruises or rashes     Neurological Exam:  Mental Status: Lethargic difficulty maintaining eyes open, alert and oriented x 1.  Not following commands. Minimal verbal output  Cranial Nerves: PERRL, BTT bilaterally, sensation V1-V3 intact,  no obvious facial asymmetry, equal elevation of palate, scm/trap 5/5, tongue is midline on protrusion.hearing is grossly intact.   Motor: JOHNSON Uppers > Lowers  Sensation: Intact to light touch and pinprick throughout.   Reflexes: 1+ throughout at biceps, brachioradialis, triceps, patellars and ankles bilaterally and equal. No clonus. R toe and L toe were both downgoing.  Coordination: unable  Gait: deferred    Data/Labs/Imaging which I personally reviewed.     Labs:     CBC Full  -  ( 14 Feb 2024 06:33 )  WBC Count : 6.81 K/uL  RBC Count : 3.81 M/uL  Hemoglobin : 12.0 g/dL  Hematocrit : 35.4 %  Platelet Count - Automated : 208 K/uL  Mean Cell Volume : 92.9 fL  Mean Cell Hemoglobin : 31.5 pg  Mean Cell Hemoglobin Concentration : 33.9 gm/dL  Auto Neutrophil # : 3.96 K/uL  Auto Lymphocyte # : 1.99 K/uL  Auto Monocyte # : 0.44 K/uL  Auto Eosinophil # : 0.35 K/uL  Auto Basophil # : 0.05 K/uL  Auto Neutrophil % : 58.2 %  Auto Lymphocyte % : 29.2 %  Auto Monocyte % : 6.5 %  Auto Eosinophil % : 5.1 %  Auto Basophil % : 0.7 %    02-14    143  |  105  |  18  ----------------------------<  102<H>  3.7   |  26  |  0.63    Ca    9.5      14 Feb 2024 06:33    TPro  7.2  /  Alb  4.2  /  TBili  <0.2  /  DBili  x   /  AST  22  /  ALT  12  /  AlkPhos  57  02-13    LIVER FUNCTIONS - ( 13 Feb 2024 00:25 )  Alb: 4.2 g/dL / Pro: 7.2 g/dL / ALK PHOS: 57 U/L / ALT: 12 U/L / AST: 22 U/L / GGT: x           PT/INR - ( 13 Feb 2024 00:25 )   PT: 10.2 sec;   INR: 0.91 ratio         PTT - ( 13 Feb 2024 00:25 )  PTT:34.3 sec  Urinalysis Basic - ( 14 Feb 2024 06:33 )    Color: x / Appearance: x / SG: x / pH: x  Gluc: 102 mg/dL / Ketone: x  / Bili: x / Urobili: x   Blood: x / Protein: x / Nitrite: x   Leuk Esterase: x / RBC: x / WBC x   Sq Epi: x / Non Sq Epi: x / Bacteria: x          < from: CT Head No Cont (02.13.24 @ 01:40) >    ACC: [id number] EXAM:  CT BRAIN   ORDERED BY: ARABELLA DRAKE     PROCEDURE DATE:  02/13/2024          INTERPRETATION:  CLINICAL INFORMATION: ams? syncope?    COMPARISON: CT head from 4/14/2020.    CONTRAST:  IV Contrast: None.  Complications: None.    TECHNIQUE:  Serial axial images were obtained from the skull base to the   vertex using multi-slice helical technique. Sagittal and coronal   reformats were obtained.    FINDINGS:    VENTRICLES AND SULCI: Age appropriate involutional changes.  INTRA-AXIAL: No intracranial mass, acute hemorrhage, or midline shift.    There are periventricular and subcortical white matter hypodensities,   consistent with microvascular type changes. Left basal ganglia chronic   lacunar infarct.. Focus of hyperattenuation in the anterior inferior   right basal ganglia, stable from 2020, consistent with previously seen   DVA.  EXTRA-AXIAL: No mass or fluid collection. Basal cisterns are normal in   appearance.    VISUALIZED SINUSES:  Pansinus mucosal thickening. Mild frothy secretions   in the left sphenoid sinus..  TYMPANOMASTOID CAVITIES:  Clear.  VISUALIZED ORBITS: Status post left lens replacement.  CALVARIUM: Intact.    MISCELLANEOUS: None.      IMPRESSION:  No acute hemorrhage, mass effect, or midline shift.    < end of copied text >

## 2024-02-14 NOTE — PROVIDER CONTACT NOTE (OTHER) - SITUATION
Patient's daughter concerned that patient is very sedated; has not gotten up to use the bathroom in hours. Would like a provider to come to the bedside to speak about POC & assess patient.
Patient became verbally and physically aggressive. Became disoriented to situation and place, trying to get out of stretcher.

## 2024-02-14 NOTE — CONSULT NOTE ADULT - ASSESSMENT
69F with dementia, HTN, DM2, prior stroke (L BG), breast cancer here with multiple falls w/ LOC. Seroquel was recently increased outpatient, trazadone was also recently added.  a1c 5.6, no leukocytosis or elevated lactate  CTH with chronic L BG infarct, R BG hyperattentuation likely consistent with DVA previously seen on MRI in 2019 - stable    Syncope with LOC - possibly medication related vs vasovagal, doubt seizure  Chronic stroke - appears small vessel  dementia     Plan:  - continue aspirin and statin for secondary stroke prevention  - check LDL  - orthostatic VS  - TTE pending  - telemetry  - routine eeg  - consider decreasing seroquel if leading to oversedation   - cont rivastigmine 3mg BID  - PT/OT  - DVT ppx    Elenita Ibarra DO  Vascular Neurology  Office [phone]  Available via [employer] Teams  69F with dementia, HTN, DM2, prior stroke (L BG), breast cancer here with multiple falls w/ LOC. Seroquel was recently increased outpatient, trazadone was also recently added.  a1c 5.6, no leukocytosis or elevated lactate  CTH with chronic L BG infarct, R BG hyperattentuation likely consistent with DVA previously seen on MRI in 2019 - stable  on exam is AAOx1, baseline but minimally verbal and lethargic- unclear if due to postictal state vs alt etiology     Syncope with LOC - possibly medication related vs vasovagal, doubt seizure  Chronic stroke - appears small vessel  dementia     Plan:  - continue aspirin and statin for secondary stroke prevention  - check LDL  - orthostatic VS  - TTE pending  - telemetry  - routine eeg  - consider decreasing seroquel if leading to oversedation   - cont rivastigmine 3mg BID  - PT/OT  - DVT ppx    Elenita Ibarra DO  Vascular Neurology  Office [phone]  Available via [employer] Teams

## 2024-02-15 LAB
ANION GAP SERPL CALC-SCNC: 11 MMOL/L — SIGNIFICANT CHANGE UP (ref 7–14)
BUN SERPL-MCNC: 19 MG/DL — SIGNIFICANT CHANGE UP (ref 7–23)
CALCIUM SERPL-MCNC: 9.6 MG/DL — SIGNIFICANT CHANGE UP (ref 8.4–10.5)
CHLORIDE SERPL-SCNC: 107 MMOL/L — SIGNIFICANT CHANGE UP (ref 98–107)
CHOLEST SERPL-MCNC: 151 MG/DL — SIGNIFICANT CHANGE UP
CO2 SERPL-SCNC: 28 MMOL/L — SIGNIFICANT CHANGE UP (ref 22–31)
CREAT SERPL-MCNC: 0.61 MG/DL — SIGNIFICANT CHANGE UP (ref 0.5–1.3)
EGFR: 97 ML/MIN/1.73M2 — SIGNIFICANT CHANGE UP
GLUCOSE SERPL-MCNC: 94 MG/DL — SIGNIFICANT CHANGE UP (ref 70–99)
HCT VFR BLD CALC: 35.9 % — SIGNIFICANT CHANGE UP (ref 34.5–45)
HDLC SERPL-MCNC: 56 MG/DL — SIGNIFICANT CHANGE UP
HGB BLD-MCNC: 12.1 G/DL — SIGNIFICANT CHANGE UP (ref 11.5–15.5)
LIPID PNL WITH DIRECT LDL SERPL: 62 MG/DL — SIGNIFICANT CHANGE UP
MAGNESIUM SERPL-MCNC: 2 MG/DL — SIGNIFICANT CHANGE UP (ref 1.6–2.6)
MCHC RBC-ENTMCNC: 31.4 PG — SIGNIFICANT CHANGE UP (ref 27–34)
MCHC RBC-ENTMCNC: 33.7 GM/DL — SIGNIFICANT CHANGE UP (ref 32–36)
MCV RBC AUTO: 93.2 FL — SIGNIFICANT CHANGE UP (ref 80–100)
NON HDL CHOLESTEROL: 95 MG/DL — SIGNIFICANT CHANGE UP
NRBC # BLD: 0 /100 WBCS — SIGNIFICANT CHANGE UP (ref 0–0)
NRBC # FLD: 0 K/UL — SIGNIFICANT CHANGE UP (ref 0–0)
PHOSPHATE SERPL-MCNC: 4.1 MG/DL — SIGNIFICANT CHANGE UP (ref 2.5–4.5)
PLATELET # BLD AUTO: 208 K/UL — SIGNIFICANT CHANGE UP (ref 150–400)
POTASSIUM SERPL-MCNC: 3.5 MMOL/L — SIGNIFICANT CHANGE UP (ref 3.5–5.3)
POTASSIUM SERPL-SCNC: 3.5 MMOL/L — SIGNIFICANT CHANGE UP (ref 3.5–5.3)
RBC # BLD: 3.85 M/UL — SIGNIFICANT CHANGE UP (ref 3.8–5.2)
RBC # FLD: 12.8 % — SIGNIFICANT CHANGE UP (ref 10.3–14.5)
SODIUM SERPL-SCNC: 146 MMOL/L — HIGH (ref 135–145)
TRIGL SERPL-MCNC: 163 MG/DL — HIGH
WBC # BLD: 7.15 K/UL — SIGNIFICANT CHANGE UP (ref 3.8–10.5)
WBC # FLD AUTO: 7.15 K/UL — SIGNIFICANT CHANGE UP (ref 3.8–10.5)

## 2024-02-15 RX ORDER — ACETAMINOPHEN 500 MG
650 TABLET ORAL EVERY 6 HOURS
Refills: 0 | Status: DISCONTINUED | OUTPATIENT
Start: 2024-02-15 | End: 2024-02-16

## 2024-02-15 RX ADMIN — QUETIAPINE FUMARATE 50 MILLIGRAM(S): 200 TABLET, FILM COATED ORAL at 17:20

## 2024-02-15 RX ADMIN — Medication 1 DROP(S): at 11:19

## 2024-02-15 RX ADMIN — ESCITALOPRAM OXALATE 10 MILLIGRAM(S): 10 TABLET, FILM COATED ORAL at 11:19

## 2024-02-15 RX ADMIN — HEPARIN SODIUM 5000 UNIT(S): 5000 INJECTION INTRAVENOUS; SUBCUTANEOUS at 22:21

## 2024-02-15 RX ADMIN — HEPARIN SODIUM 5000 UNIT(S): 5000 INJECTION INTRAVENOUS; SUBCUTANEOUS at 14:13

## 2024-02-15 RX ADMIN — Medication 25 MILLIGRAM(S): at 22:20

## 2024-02-15 RX ADMIN — Medication 650 MILLIGRAM(S): at 15:15

## 2024-02-15 RX ADMIN — Medication 81 MILLIGRAM(S): at 11:19

## 2024-02-15 RX ADMIN — Medication 650 MILLIGRAM(S): at 14:15

## 2024-02-15 RX ADMIN — HEPARIN SODIUM 5000 UNIT(S): 5000 INJECTION INTRAVENOUS; SUBCUTANEOUS at 06:00

## 2024-02-15 RX ADMIN — RIVASTIGMINE 3 MILLIGRAM(S): 4.6 PATCH, EXTENDED RELEASE TRANSDERMAL at 07:12

## 2024-02-15 RX ADMIN — Medication 1 DROP(S): at 23:37

## 2024-02-15 RX ADMIN — Medication 1 DROP(S): at 17:20

## 2024-02-15 RX ADMIN — Medication 1 DROP(S): at 07:12

## 2024-02-15 RX ADMIN — AMLODIPINE BESYLATE 10 MILLIGRAM(S): 2.5 TABLET ORAL at 06:17

## 2024-02-15 RX ADMIN — ATORVASTATIN CALCIUM 40 MILLIGRAM(S): 80 TABLET, FILM COATED ORAL at 22:20

## 2024-02-15 RX ADMIN — Medication 1 DROP(S): at 01:49

## 2024-02-15 RX ADMIN — RIVASTIGMINE 3 MILLIGRAM(S): 4.6 PATCH, EXTENDED RELEASE TRANSDERMAL at 17:19

## 2024-02-15 RX ADMIN — LETROZOLE 2.5 MILLIGRAM(S): 2.5 TABLET, FILM COATED ORAL at 11:20

## 2024-02-15 RX ADMIN — LISINOPRIL 40 MILLIGRAM(S): 2.5 TABLET ORAL at 06:18

## 2024-02-15 NOTE — PHYSICAL THERAPY INITIAL EVALUATION ADULT - PERTINENT HX OF CURRENT PROBLEM, REHAB EVAL
Patient is a 69 year old Female, PMH stated below, presents with loss of consciousness; CTH in ED without acute findings.

## 2024-02-15 NOTE — PHYSICAL THERAPY INITIAL EVALUATION ADULT - ACTIVE RANGE OF MOTION EXAMINATION, REHAB EVAL
lizz. upper extremity Active ROM was WNL (within normal limits)/bilateral lower extremity Active ROM was WNL (within normal limits)

## 2024-02-15 NOTE — PHYSICAL THERAPY INITIAL EVALUATION ADULT - GENERAL OBSERVATIONS, REHAB EVAL
Pt encountered in seated at edge of bed in NAD, all lines intact, a&ox1, SPO2 99%, and RN Umm banuelos. Pt encountered in seated at edge of bed in NAD, all lines intact, a&ox1, SPO2 99%, daughter at bedside, and RN Umm banuelos.

## 2024-02-15 NOTE — PHYSICAL THERAPY INITIAL EVALUATION ADULT - ADDITIONAL COMMENTS
Pt left semisupine in bed in NAD, all lines intact, call bell in reach, SPO2 99%, and RN Umm banuelos. Pt left semisupine in bed in NAD, all lines intact, call bell in reach, SPO2 99%, daughter at bedside, bed alarm on, and RN Umm banuelos.

## 2024-02-16 ENCOUNTER — TRANSCRIPTION ENCOUNTER (OUTPATIENT)
Age: 69
End: 2024-02-16

## 2024-02-16 VITALS
TEMPERATURE: 99 F | OXYGEN SATURATION: 97 % | RESPIRATION RATE: 17 BRPM | SYSTOLIC BLOOD PRESSURE: 141 MMHG | HEART RATE: 97 BPM | DIASTOLIC BLOOD PRESSURE: 68 MMHG

## 2024-02-16 DIAGNOSIS — F03.918 UNSPECIFIED DEMENTIA, UNSPECIFIED SEVERITY, WITH OTHER BEHAVIORAL DISTURBANCE: ICD-10-CM

## 2024-02-16 LAB
ANION GAP SERPL CALC-SCNC: 11 MMOL/L — SIGNIFICANT CHANGE UP (ref 7–14)
BUN SERPL-MCNC: 16 MG/DL — SIGNIFICANT CHANGE UP (ref 7–23)
CALCIUM SERPL-MCNC: 9.2 MG/DL — SIGNIFICANT CHANGE UP (ref 8.4–10.5)
CHLORIDE SERPL-SCNC: 106 MMOL/L — SIGNIFICANT CHANGE UP (ref 98–107)
CO2 SERPL-SCNC: 26 MMOL/L — SIGNIFICANT CHANGE UP (ref 22–31)
CREAT SERPL-MCNC: 0.58 MG/DL — SIGNIFICANT CHANGE UP (ref 0.5–1.3)
EGFR: 98 ML/MIN/1.73M2 — SIGNIFICANT CHANGE UP
GLUCOSE SERPL-MCNC: 101 MG/DL — HIGH (ref 70–99)
HCT VFR BLD CALC: 33.8 % — LOW (ref 34.5–45)
HGB BLD-MCNC: 11.4 G/DL — LOW (ref 11.5–15.5)
MAGNESIUM SERPL-MCNC: 2 MG/DL — SIGNIFICANT CHANGE UP (ref 1.6–2.6)
MCHC RBC-ENTMCNC: 31.3 PG — SIGNIFICANT CHANGE UP (ref 27–34)
MCHC RBC-ENTMCNC: 33.7 GM/DL — SIGNIFICANT CHANGE UP (ref 32–36)
MCV RBC AUTO: 92.9 FL — SIGNIFICANT CHANGE UP (ref 80–100)
NRBC # BLD: 0 /100 WBCS — SIGNIFICANT CHANGE UP (ref 0–0)
NRBC # FLD: 0 K/UL — SIGNIFICANT CHANGE UP (ref 0–0)
PHOSPHATE SERPL-MCNC: 4 MG/DL — SIGNIFICANT CHANGE UP (ref 2.5–4.5)
PLATELET # BLD AUTO: 194 K/UL — SIGNIFICANT CHANGE UP (ref 150–400)
POTASSIUM SERPL-MCNC: 3.9 MMOL/L — SIGNIFICANT CHANGE UP (ref 3.5–5.3)
POTASSIUM SERPL-SCNC: 3.9 MMOL/L — SIGNIFICANT CHANGE UP (ref 3.5–5.3)
RBC # BLD: 3.64 M/UL — LOW (ref 3.8–5.2)
RBC # FLD: 12.5 % — SIGNIFICANT CHANGE UP (ref 10.3–14.5)
SODIUM SERPL-SCNC: 143 MMOL/L — SIGNIFICANT CHANGE UP (ref 135–145)
TSH SERPL-MCNC: 3.16 UIU/ML — SIGNIFICANT CHANGE UP (ref 0.27–4.2)
WBC # BLD: 6.29 K/UL — SIGNIFICANT CHANGE UP (ref 3.8–10.5)
WBC # FLD AUTO: 6.29 K/UL — SIGNIFICANT CHANGE UP (ref 3.8–10.5)

## 2024-02-16 PROCEDURE — 90792 PSYCH DIAG EVAL W/MED SRVCS: CPT

## 2024-02-16 RX ORDER — ASPIRIN/CALCIUM CARB/MAGNESIUM 324 MG
1 TABLET ORAL
Qty: 30 | Refills: 0
Start: 2024-02-16 | End: 2024-03-16

## 2024-02-16 RX ORDER — QUETIAPINE FUMARATE 200 MG/1
1 TABLET, FILM COATED ORAL
Refills: 0 | DISCHARGE

## 2024-02-16 RX ORDER — QUETIAPINE FUMARATE 200 MG/1
1 TABLET, FILM COATED ORAL
Qty: 60 | Refills: 0
Start: 2024-02-16 | End: 2024-03-16

## 2024-02-16 RX ADMIN — HEPARIN SODIUM 5000 UNIT(S): 5000 INJECTION INTRAVENOUS; SUBCUTANEOUS at 06:10

## 2024-02-16 RX ADMIN — QUETIAPINE FUMARATE 50 MILLIGRAM(S): 200 TABLET, FILM COATED ORAL at 17:18

## 2024-02-16 RX ADMIN — ESCITALOPRAM OXALATE 10 MILLIGRAM(S): 10 TABLET, FILM COATED ORAL at 11:59

## 2024-02-16 RX ADMIN — Medication 1 DROP(S): at 12:00

## 2024-02-16 RX ADMIN — RIVASTIGMINE 3 MILLIGRAM(S): 4.6 PATCH, EXTENDED RELEASE TRANSDERMAL at 06:10

## 2024-02-16 RX ADMIN — Medication 1 DROP(S): at 06:10

## 2024-02-16 RX ADMIN — AMLODIPINE BESYLATE 10 MILLIGRAM(S): 2.5 TABLET ORAL at 06:10

## 2024-02-16 RX ADMIN — LISINOPRIL 40 MILLIGRAM(S): 2.5 TABLET ORAL at 06:11

## 2024-02-16 RX ADMIN — Medication 1 DROP(S): at 17:21

## 2024-02-16 RX ADMIN — RIVASTIGMINE 3 MILLIGRAM(S): 4.6 PATCH, EXTENDED RELEASE TRANSDERMAL at 17:18

## 2024-02-16 RX ADMIN — QUETIAPINE FUMARATE 50 MILLIGRAM(S): 200 TABLET, FILM COATED ORAL at 06:10

## 2024-02-16 RX ADMIN — Medication 81 MILLIGRAM(S): at 11:59

## 2024-02-16 RX ADMIN — LETROZOLE 2.5 MILLIGRAM(S): 2.5 TABLET, FILM COATED ORAL at 11:59

## 2024-02-16 RX ADMIN — HEPARIN SODIUM 5000 UNIT(S): 5000 INJECTION INTRAVENOUS; SUBCUTANEOUS at 13:44

## 2024-02-16 NOTE — BH CONSULTATION LIAISON ASSESSMENT NOTE - HPI (INCLUDE ILLNESS QUALITY, SEVERITY, DURATION, TIMING, CONTEXT, MODIFYING FACTORS, ASSOCIATED SIGNS AND SYMPTOMS)
Patient is a 69F  Patient is a 69F , domiciled with daughters, with a past psychiatric history of advanced dementia, no psychiatric hospitalizations, PMH of DM2, HTN, and invasive ductal carcinoma of the breast, presenting to [facility] following a fall with LOC at home. Admitted for workup of LOC. Psychiatry consulted for medication management.   Patient is a 69F , domiciled with daughters, with a past psychiatric history of advanced dementia, no psychiatric hospitalizations, PMH of DM2, HTN, and invasive ductal carcinoma of the breast, presenting to [facility] following a fall with LOC at home. Admitted for workup of LOC. Psychiatry consulted for medication management.    According to collateral provided by daughter, patient has had a number of episodes of "passing out" at home over the last few days. Daughter notes symptoms of dementia have been stable, such as worsened memory and mood lability. Patient is on lexapro 10mg PO qd, rivastigmine 3mg PO bid. Dose of seroquel was recently increased from 12.5mg PO bid to 25mg PO bid to 50mg PO bid, as patient was not responding to lower dose. Patient has no history of active SI or suicide attempts. Daughter notes history of passive SI as related to frustration with memory and mood symptoms, but notes her mother has never acted on passive SI. No family psychiatric history aside from dementia. Notes no current alcohol use, cigarette use, illicit drug use.    Patient evaluated at bedside, sedated with low volume speech, AOx1 (disoriented to place, time). No PRNs or overnight events. Patient notes mood is "alright." Denies SI/HI, AVH.  Patient is a 69F , domiciled with daughters, with a past psychiatric history of advanced dementia, no psychiatric hospitalizations, PMH of DM2, HTN, and invasive ductal carcinoma of the breast, presenting to [facility] following a fall with LOC at home. Admitted for workup of LOC. Psychiatry consulted for medication management.    According to collateral provided by daughter, patient has had a number of episodes of "passing out" at home over the last few days. Daughter notes symptoms of dementia have been stable, such as worsened memory and mood lability. Patient is on lexapro 10mg PO qd, rivastigmine 3mg PO bid. Dose of seroquel was recently increased from 12.5mg PO bid to 25mg PO bid to 50mg PO bid, as patient was not responding to lower dose. Patient has no history of active SI or suicide attempts. Daughter notes history of passive SI as related to frustration with memory and mood symptoms, but notes her mother has never acted on passive SI. No family psychiatric history aside from dementia. Notes no current alcohol use, cigarette use, illicit drug use.    Patient evaluated at bedside, sedated with low volume speech, AOx1 (disoriented to place, time). No PRNs or overnight events. Patient notes mood is "alright." Denies SI/HI, AVH.   update: when evaluated at 11:30am: patient was fully awake, alert, engaged in conversation. Confused disoriented, not agitated.

## 2024-02-16 NOTE — BH CONSULTATION LIAISON ASSESSMENT NOTE - NSBHATTESTCOMMENTATTENDFT_PSY_A_CORE
Met with the patient. Awake, alert, confused, disoriented but not aggressive or agitated.   Discussed with daughter at bedside. Feels that patient is much improved. Dementia education provided.   Discussed above plan at length with daughter, and medicine team

## 2024-02-16 NOTE — DISCHARGE NOTE NURSING/CASE MANAGEMENT/SOCIAL WORK - PATIENT PORTAL LINK FT
You can access the FollowMyHealth Patient Portal offered by [facility] by registering at the following website: [email or web address]. By joining [facility]’s FollowMyHealth portal, you will also be able to view your health information using other applications (apps) compatible with our system.

## 2024-02-16 NOTE — DISCHARGE NOTE PROVIDER - HOSPITAL COURSE
69-year-old female PMHx of advanced dementia, hx of chronic left basal ganglia lacunar infarct, HTN, DM2, hx of invasive ductal carcinoma of rt breast, who presented with LOC.    #Loss of consciousness  - CTH in ED without any acute findings  - telemetry so far without any significant events  - orthostatic vital signs supine to sitting (-) on 2/13/24, repeat neg 2/16   - TTE 2/14/24 --> technically difficult but LV systolic function + wall thickness + wall motion appear nl; there is trace MR  - continuous EEG to be pursued as outpatient (discussed with neurology)     #DM2  - hold metformin while in-hospital  - hold off basal/pre-prandial insulin for now  - HgbA1c --> 5.6    #Hx of dementia with behavioral disturbance  - cont seroquel  - cont rivastigmine  - cont trazodone qhs prn insomnia  - zyprexa 1.25 mg IV/IM q6h prn  - behavioral health consulted regarding need to downtitrate antipsychotic medication given repeated episodes of LOC @ home -- plan to discharge on current doses     #Hx of chronic lt BG lacunar infarct  - initiated baby ASA    PT - Outpatient PT  Medically cleared/stable for discharge as per Dr. ERVIN with close outpatient follow up within 1-2 weeks of discharge. Patient understands and agrees with plan of care. 69-year-old female PMHx of advanced dementia, hx of chronic left basal ganglia lacunar infarct, HTN, DM2, hx of invasive ductal carcinoma of rt breast, who presented with LOC.    #Loss of consciousness  - CTH in ED without any acute findings  - telemetry so far without any significant events  - orthostatic vital signs supine to sitting (-) on 2/13/24, repeat neg 2/16   - TTE 2/14/24 --> technically difficult but LV systolic function + wall thickness + wall motion appear nl; there is trace MR  - continuous EEG to be pursued as outpatient (discussed with neurology)     #DM2  - hold metformin while in-hospital  - hold off basal/pre-prandial insulin for now  - HgbA1c --> 5.6    #Hx of dementia with behavioral disturbance  - cont seroquel  - cont rivastigmine  - cont trazodone qhs prn insomnia  - zyprexa 1.25 mg IV/IM q6h prn  - behavioral health consulted regarding need to downtitrate antipsychotic medication given repeated episodes of LOC @ home -- plan to discharge on current doses     #Hx of chronic lt BG lacunar infarct  - initiated baby ASA    PT - Outpatient PT  Medically cleared/stable for discharge as per Dr. ERVIN with close outpatient follow up within 1-2 weeks of discharge. Patient understands and agrees with plan of care.     Attending Addendum:  Patient seen and examined by me on the discharge day. Medications reviewed.   Feeling much better. No cp, no sob. no abd pain. no n/v/d. no HA, no Dizziness.  All questions answered in details. Follow up plan explained. d/w NP/PA.  More than 30 mins were spent evaluating patient and coordinating care for discharge.  Discharge summary sent to pt's primary care physician at [facility].

## 2024-02-16 NOTE — PROGRESS NOTE ADULT - SUBJECTIVE AND OBJECTIVE BOX
Required one dose of zyprexa 2.5 mg IM x 1 last evening for agitation  Daughter @ bedside, stayed all night with pt     Vital Signs Last 24 Hrs  T(C): 36.9 (14 Feb 2024 06:01), Max: 37.3 (13 Feb 2024 11:49)  T(F): 98.5 (14 Feb 2024 06:01), Max: 99.1 (13 Feb 2024 11:49)  HR: 77 (14 Feb 2024 06:01) (75 - 82)  BP: 125/72 (14 Feb 2024 06:01) (125/72 - 141/75)  BP(mean): --  RR: 17 (14 Feb 2024 06:01) (16 - 18)  SpO2: 98% (14 Feb 2024 06:01) (98% - 100%)    I&O's Summary      GENERAL: NAD  HEAD:  HEENT, EOMI  NECK: Supple, No JVD  CHEST/LUNG: Clear to auscultation bilaterally; Normal respiratory effort w/o intercostal retractions  HEART: Regular rate and rhythm; nl S1/S2; No murmurs, rubs, or gallops  ABDOMEN: Soft, Nontender, Nondistended; Bowel sounds present; No hepatosplenomegaly  EXTREMITIES:  2+ Peripheral Pulses; No clubbing, cyanosis, or edema b/l; Nl ROM  NEURO: A+O x 1; nonfocal CN/motor/sensory/reflexes; speech + comprehension are intact  PSYCH: Flat affect; no delirium or agitation; no suicidal/homicidal ideation    LABS:                        12.0   6.81  )-----------( 208      ( 14 Feb 2024 06:33 )             35.4     02-14    143  |  105  |  18  ----------------------------<  102<H>  3.7   |  26  |  0.63    Ca    9.5      14 Feb 2024 06:33    TPro  7.2  /  Alb  4.2  /  TBili  <0.2  /  DBili  x   /  AST  22  /  ALT  12  /  AlkPhos  57  02-13    PT/INR - ( 13 Feb 2024 00:25 )   PT: 10.2 sec;   INR: 0.91 ratio         PTT - ( 13 Feb 2024 00:25 )  PTT:34.3 sec  CAPILLARY BLOOD GLUCOSE      POCT Blood Glucose.: 101 mg/dL (14 Feb 2024 09:08)  POCT Blood Glucose.: 100 mg/dL (13 Feb 2024 21:28)  POCT Blood Glucose.: 86 mg/dL (13 Feb 2024 18:27)  POCT Blood Glucose.: 118 mg/dL (13 Feb 2024 13:24)  POCT Blood Glucose.: 86 mg/dL (13 Feb 2024 09:51)        Urinalysis Basic - ( 14 Feb 2024 06:33 )    Color: x / Appearance: x / SG: x / pH: x  Gluc: 102 mg/dL / Ketone: x  / Bili: x / Urobili: x   Blood: x / Protein: x / Nitrite: x   Leuk Esterase: x / RBC: x / WBC x   Sq Epi: x / Non Sq Epi: x / Bacteria: x        RADIOLOGY & ADDITIONAL TESTS:    Imaging Personally Reviewed:  [x] YES  [ ] NO    Case discussed with NPP:  [X] YES  [ ] NO
SUBJECTIVE/ OVERNIGHT EVENTS:  feels well  no cp, no sob, no n/v/d. no abdominal pain.  no headache, no dizziness.   Pt denied SI/HI ideations, denied visual and auditory hallucinations.   the daughter Radha at bedside  remain calm.  EEG pending but not mandatory.  d/w neuro. low suspicion.   dc planning home      --------------------------------------------------------------------------------------------  LABS:                        11.4   6.29  )-----------( 194      ( 16 Feb 2024 05:55 )             33.8     02-16    143  |  106  |  16  ----------------------------<  101<H>  3.9   |  26  |  0.58    Ca    9.2      16 Feb 2024 05:55  Phos  4.0     02-16  Mg     2.00     02-16        CAPILLARY BLOOD GLUCOSE      POCT Blood Glucose.: 120 mg/dL (16 Feb 2024 11:52)  POCT Blood Glucose.: 105 mg/dL (16 Feb 2024 07:23)  POCT Blood Glucose.: 124 mg/dL (15 Feb 2024 22:26)  POCT Blood Glucose.: 102 mg/dL (15 Feb 2024 17:09)        Urinalysis Basic - ( 16 Feb 2024 05:55 )    Color: x / Appearance: x / SG: x / pH: x  Gluc: 101 mg/dL / Ketone: x  / Bili: x / Urobili: x   Blood: x / Protein: x / Nitrite: x   Leuk Esterase: x / RBC: x / WBC x   Sq Epi: x / Non Sq Epi: x / Bacteria: x        RADIOLOGY & ADDITIONAL TESTS:    Imaging Personally Reviewed:  [x] YES  [ ] NO    Consultant(s) Notes Reviewed:  [x] YES  [ ] NO    MEDICATIONS  (STANDING):  amLODIPine   Tablet 10 milliGRAM(s) Oral daily  aspirin enteric coated 81 milliGRAM(s) Oral daily  atorvastatin 40 milliGRAM(s) Oral at bedtime  dextrose 5%. 1000 milliLiter(s) (50 mL/Hr) IV Continuous <Continuous>  dextrose 5%. 1000 milliLiter(s) (100 mL/Hr) IV Continuous <Continuous>  dextrose 50% Injectable 25 Gram(s) IV Push once  dextrose 50% Injectable 12.5 Gram(s) IV Push once  dextrose 50% Injectable 25 Gram(s) IV Push once  escitalopram 10 milliGRAM(s) Oral daily  glucagon  Injectable 1 milliGRAM(s) IntraMuscular once  heparin   Injectable 5000 Unit(s) SubCutaneous every 8 hours  insulin lispro (ADMELOG) corrective regimen sliding scale   SubCutaneous three times a day before meals  insulin lispro (ADMELOG) corrective regimen sliding scale   SubCutaneous at bedtime  letrozole 2.5 milliGRAM(s) Oral daily  lisinopril 40 milliGRAM(s) Oral daily  prednisoLONE acetate 1% Suspension 1 Drop(s) Right EYE four times a day  QUEtiapine 50 milliGRAM(s) Oral two times a day  rivastigmine 3 milliGRAM(s) Oral two times a day    MEDICATIONS  (PRN):  acetaminophen     Tablet .. 650 milliGRAM(s) Oral every 6 hours PRN Temp greater or equal to 38C (100.4F), Mild Pain (1 - 3), Moderate Pain (4 - 6), Severe Pain (7 - 10)  dextrose Oral Gel 15 Gram(s) Oral once PRN Blood Glucose LESS THAN 70 milliGRAM(s)/deciliter  OLANZapine Injectable 1.25 milliGRAM(s) IntraMuscular every 6 hours PRN agitation  traZODone 25 milliGRAM(s) Oral at bedtime PRN insomnia      Care Discussed with Consultants/Other Providers [x] YES  [ ] NO    Vital Signs Last 24 Hrs  T(C): 37.2 (16 Feb 2024 06:11), Max: 37.3 (15 Feb 2024 22:15)  T(F): 99 (16 Feb 2024 06:11), Max: 99.1 (15 Feb 2024 22:15)  HR: 70 (16 Feb 2024 06:11) (70 - 88)  BP: 115/63 (16 Feb 2024 06:11) (109/56 - 117/58)  BP(mean): --  RR: 18 (16 Feb 2024 06:11) (17 - 18)  SpO2: 95% (16 Feb 2024 06:11) (95% - 99%)    Parameters below as of 16 Feb 2024 06:11  Patient On (Oxygen Delivery Method): room air      I&O's Summary      PHYSICAL EXAM:  GENERAL: NAD, thin-elderly, comfortable on room air  HEAD:  Atraumatic, Normocephalic  EYES: EOMI, PERRLA, conjunctiva and sclera clear  NECK: Supple, No JVD  CHEST/LUNG: mild decrease breath sounds bilaterally; No wheeze   HEART: Regular rate and rhythm; No murmurs, rubs, or gallops  ABDOMEN: Soft, Nontender, Nondistended; Bowel sounds present  Neuro: AAOx1, forgetful, no focal weakness   EXTREMITIES:  2+ Peripheral Pulses, No clubbing, cyanosis, or edema  SKIN: No rashes or lesions 
As per daughter, pt awoke yesterday around 3:30 PM  Awake this morning  Daughter also expresses consternation that pt was initially admitted to [facility] without any notification given to her    Vital Signs Last 24 Hrs  T(C): 37.2 (15 Feb 2024 06:00), Max: 37.2 (15 Feb 2024 06:00)  T(F): 98.9 (15 Feb 2024 06:00), Max: 98.9 (15 Feb 2024 06:00)  HR: 71 (15 Feb 2024 06:00) (71 - 88)  BP: 118/51 (15 Feb 2024 06:00) (104/50 - 124/62)  BP(mean): --  RR: 18 (15 Feb 2024 06:00) (17 - 18)  SpO2: 99% (15 Feb 2024 06:00) (99% - 100%)    I&O's Summary      GENERAL: NAD  HEAD:  HEENT, EOMI  NECK: Supple, No JVD  CHEST/LUNG: Clear to auscultation bilaterally; Normal respiratory effort w/o intercostal retractions  HEART: Regular rate and rhythm; nl S1/S2; No murmurs, rubs, or gallops  ABDOMEN: Soft, Nontender, Nondistended; Bowel sounds present; No hepatosplenomegaly  EXTREMITIES:  2+ Peripheral Pulses; No clubbing, cyanosis, or edema b/l; Nl ROM  NEURO: A+O x 1; nonfocal CN/motor/sensory/reflexes; speech + comprehension are intact  PSYCH: Flat affect; no delirium or agitation; no suicidal/homicidal ideation    LABS:                        12.1   7.15  )-----------( 208      ( 15 Feb 2024 06:30 )             35.9     02-15    146<H>  |  107  |  19  ----------------------------<  94  3.5   |  28  |  0.61    Ca    9.6      15 Feb 2024 06:30  Phos  4.1     02-15  Mg     2.00     02-15        CAPILLARY BLOOD GLUCOSE      POCT Blood Glucose.: 88 mg/dL (15 Feb 2024 07:30)  POCT Blood Glucose.: 109 mg/dL (14 Feb 2024 21:12)  POCT Blood Glucose.: 168 mg/dL (14 Feb 2024 16:40)  POCT Blood Glucose.: 94 mg/dL (14 Feb 2024 12:18)        Urinalysis Basic - ( 15 Feb 2024 06:30 )    Color: x / Appearance: x / SG: x / pH: x  Gluc: 94 mg/dL / Ketone: x  / Bili: x / Urobili: x   Blood: x / Protein: x / Nitrite: x   Leuk Esterase: x / RBC: x / WBC x   Sq Epi: x / Non Sq Epi: x / Bacteria: x        RADIOLOGY & ADDITIONAL TESTS:    Imaging Personally Reviewed:  [x] YES  [ ] NO    Case discussed with NPP:  [X] YES  [ ] NO
Neurology Progress Note    S: Patient seen and examined. No new events overnight. More awake today    Medications: MEDICATIONS  (STANDING):  amLODIPine   Tablet 10 milliGRAM(s) Oral daily  aspirin enteric coated 81 milliGRAM(s) Oral daily  atorvastatin 40 milliGRAM(s) Oral at bedtime  dextrose 5%. 1000 milliLiter(s) (100 mL/Hr) IV Continuous <Continuous>  dextrose 5%. 1000 milliLiter(s) (50 mL/Hr) IV Continuous <Continuous>  dextrose 50% Injectable 12.5 Gram(s) IV Push once  dextrose 50% Injectable 25 Gram(s) IV Push once  dextrose 50% Injectable 25 Gram(s) IV Push once  escitalopram 10 milliGRAM(s) Oral daily  glucagon  Injectable 1 milliGRAM(s) IntraMuscular once  heparin   Injectable 5000 Unit(s) SubCutaneous every 8 hours  insulin lispro (ADMELOG) corrective regimen sliding scale   SubCutaneous three times a day before meals  insulin lispro (ADMELOG) corrective regimen sliding scale   SubCutaneous at bedtime  letrozole 2.5 milliGRAM(s) Oral daily  lisinopril 40 milliGRAM(s) Oral daily  prednisoLONE acetate 1% Suspension 1 Drop(s) Right EYE four times a day  QUEtiapine 50 milliGRAM(s) Oral two times a day  rivastigmine 3 milliGRAM(s) Oral two times a day    MEDICATIONS  (PRN):  acetaminophen     Tablet .. 650 milliGRAM(s) Oral every 6 hours PRN Temp greater or equal to 38C (100.4F), Mild Pain (1 - 3), Moderate Pain (4 - 6), Severe Pain (7 - 10)  dextrose Oral Gel 15 Gram(s) Oral once PRN Blood Glucose LESS THAN 70 milliGRAM(s)/deciliter  OLANZapine Injectable 1.25 milliGRAM(s) IntraMuscular every 6 hours PRN agitation  traZODone 25 milliGRAM(s) Oral at bedtime PRN insomnia       Vitals:  Vital Signs Last 24 Hrs  T(C): 37.5 (16 Feb 2024 15:16), Max: 37.5 (16 Feb 2024 15:16)  T(F): 99.5 (16 Feb 2024 15:16), Max: 99.5 (16 Feb 2024 15:16)  HR: 95 (16 Feb 2024 15:16) (70 - 95)  BP: 105/48 (16 Feb 2024 15:16) (105/48 - 117/58)  BP(mean): --  RR: 18 (16 Feb 2024 15:16) (17 - 18)  SpO2: 94% (16 Feb 2024 15:16) (94% - 99%)    Parameters below as of 16 Feb 2024 15:16  Patient On (Oxygen Delivery Method): room air            General Exam:   General Appearance: Appropriately dressed and in no acute distress       Head: Normocephalic, atraumatic and no dysmorphic features  Ear, Nose, and Throat: Moist mucous membranes  CVS: S1S2+  Resp: No SOB, no wheeze or rhonchi  Abd: soft NTND  Extremities: No edema, no cyanosis  Skin: No bruises, no rashes    Neurological Exam:  Mental Status: Awake, alert, and oriented x 1.  Follows simple commands Minimal verbal output  Cranial Nerves: PERRL, BTT bilaterally, sensation V1-V3 intact,  no obvious facial asymmetry, equal elevation of palate, scm/trap 5/5, tongue is midline on protrusion.hearing is grossly intact.   Motor: JOHNSON Uppers > Lowers  Sensation: Intact to light touch and pinprick throughout.   Reflexes: 1+ throughout at biceps, brachioradialis, triceps, patellars and ankles bilaterally and equal. No clonus. R toe and L toe were both downgoing.  Coordination: unable  Gait: deferred      I personally reviewed the below data/images/labs:      LABS:                          11.4   6.29  )-----------( 194      ( 16 Feb 2024 05:55 )             33.8     02-16    143  |  106  |  16  ----------------------------<  101<H>  3.9   |  26  |  0.58    Ca    9.2      16 Feb 2024 05:55  Phos  4.0     02-16  Mg     2.00     02-16          Urinalysis Basic - ( 16 Feb 2024 05:55 )    Color: x / Appearance: x / SG: x / pH: x  Gluc: 101 mg/dL / Ketone: x  / Bili: x / Urobili: x   Blood: x / Protein: x / Nitrite: x   Leuk Esterase: x / RBC: x / WBC x   Sq Epi: x / Non Sq Epi: x / Bacteria: x        < from: CT Head No Cont (02.13.24 @ 01:40) >    ACC: [id number] EXAM:  CT BRAIN   ORDERED BY: ARABELLA DRAKE     PROCEDURE DATE:  02/13/2024          INTERPRETATION:  CLINICAL INFORMATION: ams? syncope?    COMPARISON: CT head from 4/14/2020.    CONTRAST:  IV Contrast: None.  Complications: None.    TECHNIQUE:  Serial axial images were obtained from the skull base to the   vertex using multi-slice helical technique. Sagittal and coronal   reformats were obtained.    FINDINGS:    VENTRICLES AND SULCI: Age appropriate involutional changes.  INTRA-AXIAL: No intracranial mass, acute hemorrhage, or midline shift.    There are periventricular and subcortical white matter hypodensities,   consistent with microvascular type changes. Left basal ganglia chronic   lacunar infarct.. Focus of hyperattenuation in the anterior inferior   right basal ganglia, stable from 2020, consistent with previously seen   DVA.  EXTRA-AXIAL: No mass or fluid collection. Basal cisterns are normal in   appearance.    VISUALIZED SINUSES:  Pansinus mucosal thickening. Mild frothy secretions   in the left sphenoid sinus..  TYMPANOMASTOID CAVITIES:  Clear.  VISUALIZED ORBITS: Status post left lens replacement.  CALVARIUM: Intact.    MISCELLANEOUS: None.      IMPRESSION:  No acute hemorrhage, mass effect, or midline shift.    < end of copied text >        
Neurology Progress Note    S: Patient seen and examined. No new events overnight. More awake today    Medication:  acetaminophen     Tablet .. 650 milliGRAM(s) Oral every 6 hours PRN  amLODIPine   Tablet 10 milliGRAM(s) Oral daily  aspirin enteric coated 81 milliGRAM(s) Oral daily  atorvastatin 40 milliGRAM(s) Oral at bedtime  dextrose 5%. 1000 milliLiter(s) IV Continuous <Continuous>  dextrose 5%. 1000 milliLiter(s) IV Continuous <Continuous>  dextrose 50% Injectable 25 Gram(s) IV Push once  dextrose 50% Injectable 12.5 Gram(s) IV Push once  dextrose 50% Injectable 25 Gram(s) IV Push once  dextrose Oral Gel 15 Gram(s) Oral once PRN  escitalopram 10 milliGRAM(s) Oral daily  glucagon  Injectable 1 milliGRAM(s) IntraMuscular once  heparin   Injectable 5000 Unit(s) SubCutaneous every 8 hours  insulin lispro (ADMELOG) corrective regimen sliding scale   SubCutaneous three times a day before meals  insulin lispro (ADMELOG) corrective regimen sliding scale   SubCutaneous at bedtime  letrozole 2.5 milliGRAM(s) Oral daily  lisinopril 40 milliGRAM(s) Oral daily  OLANZapine Injectable 1.25 milliGRAM(s) IntraMuscular every 6 hours PRN  prednisoLONE acetate 1% Suspension 1 Drop(s) Right EYE four times a day  QUEtiapine 50 milliGRAM(s) Oral two times a day  rivastigmine 3 milliGRAM(s) Oral two times a day  traZODone 25 milliGRAM(s) Oral at bedtime PRN      Vitals:  Vital Signs Last 24 Hrs  T(C): 37.2 (15 Feb 2024 17:15), Max: 37.3 (15 Feb 2024 11:15)  T(F): 99 (15 Feb 2024 17:15), Max: 99.2 (15 Feb 2024 11:15)  HR: 83 (15 Feb 2024 17:15) (71 - 83)  BP: 117/58 (15 Feb 2024 17:15) (104/50 - 118/51)  BP(mean): --  RR: 18 (15 Feb 2024 17:15) (18 - 18)  SpO2: 99% (15 Feb 2024 17:15) (96% - 100%)    Parameters below as of 15 Feb 2024 17:15  Patient On (Oxygen Delivery Method): room air        General Exam:   General Appearance: Appropriately dressed and in no acute distress       Head: Normocephalic, atraumatic and no dysmorphic features  Ear, Nose, and Throat: Moist mucous membranes  CVS: S1S2+  Resp: No SOB, no wheeze or rhonchi  Abd: soft NTND  Extremities: No edema, no cyanosis  Skin: No bruises, no rashes    Neurological Exam:  Mental Status: Awake, alert, and oriented x 1.  Follows simple commands Minimal verbal output  Cranial Nerves: PERRL, BTT bilaterally, sensation V1-V3 intact,  no obvious facial asymmetry, equal elevation of palate, scm/trap 5/5, tongue is midline on protrusion.hearing is grossly intact.   Motor: JOHNSON Uppers > Lowers  Sensation: Intact to light touch and pinprick throughout.   Reflexes: 1+ throughout at biceps, brachioradialis, triceps, patellars and ankles bilaterally and equal. No clonus. R toe and L toe were both downgoing.  Coordination: unable  Gait: deferred      I personally reviewed the below data/images/labs:      CBC Full  -  ( 15 Feb 2024 06:30 )  WBC Count : 7.15 K/uL  RBC Count : 3.85 M/uL  Hemoglobin : 12.1 g/dL  Hematocrit : 35.9 %  Platelet Count - Automated : 208 K/uL  Mean Cell Volume : 93.2 fL  Mean Cell Hemoglobin : 31.4 pg  Mean Cell Hemoglobin Concentration : 33.7 gm/dL  Auto Neutrophil # : x  Auto Lymphocyte # : x  Auto Monocyte # : x  Auto Eosinophil # : x  Auto Basophil # : x  Auto Neutrophil % : x  Auto Lymphocyte % : x  Auto Monocyte % : x  Auto Eosinophil % : x  Auto Basophil % : x  n  02-15    146<H>  |  107  |  19  ----------------------------<  94  3.5   |  28  |  0.61    Ca    9.6      15 Feb 2024 06:30  Phos  4.1     02-15  Mg     2.00     02-15          Urinalysis Basic - ( 15 Feb 2024 06:30 )    Color: x / Appearance: x / SG: x / pH: x  Gluc: 94 mg/dL / Ketone: x  / Bili: x / Urobili: x   Blood: x / Protein: x / Nitrite: x   Leuk Esterase: x / RBC: x / WBC x   Sq Epi: x / Non Sq Epi: x / Bacteria: x      < from: CT Head No Cont (02.13.24 @ 01:40) >    ACC: [id number] EXAM:  CT BRAIN   ORDERED BY: ARABELLA DRAKE     PROCEDURE DATE:  02/13/2024          INTERPRETATION:  CLINICAL INFORMATION: ams? syncope?    COMPARISON: CT head from 4/14/2020.    CONTRAST:  IV Contrast: None.  Complications: None.    TECHNIQUE:  Serial axial images were obtained from the skull base to the   vertex using multi-slice helical technique. Sagittal and coronal   reformats were obtained.    FINDINGS:    VENTRICLES AND SULCI: Age appropriate involutional changes.  INTRA-AXIAL: No intracranial mass, acute hemorrhage, or midline shift.    There are periventricular and subcortical white matter hypodensities,   consistent with microvascular type changes. Left basal ganglia chronic   lacunar infarct.. Focus of hyperattenuation in the anterior inferior   right basal ganglia, stable from 2020, consistent with previously seen   DVA.  EXTRA-AXIAL: No mass or fluid collection. Basal cisterns are normal in   appearance.    VISUALIZED SINUSES:  Pansinus mucosal thickening. Mild frothy secretions   in the left sphenoid sinus..  TYMPANOMASTOID CAVITIES:  Clear.  VISUALIZED ORBITS: Status post left lens replacement.  CALVARIUM: Intact.    MISCELLANEOUS: None.      IMPRESSION:  No acute hemorrhage, mass effect, or midline shift.    < end of copied text >

## 2024-02-16 NOTE — BH CONSULTATION LIAISON ASSESSMENT NOTE - SUMMARY
Patient is a 69F , domiciled with daughters, with a past psychiatric history of advanced dementia, no psychiatric hospitalizations, PMH of DM2, HTN, and invasive ductal carcinoma of the breast, presenting to [facility] following a fall with LOC at home. Admitted for workup of LOC. Psychiatry consulted for medication management.     2/16: Patient seen at bedside. AOx1 (disoriented to place, time). TSH wnl.    Recommendations:  - No need for psychiatric 1:1 CO given absence of SI/HI  - Order B12, folate  - Continue with home rivastigmine 3mg PO bid, lexapro 10mg PO qd   Patient is a 69F , domiciled with daughters, with a past psychiatric history of advanced dementia, no psychiatric hospitalizations, PMH of DM2, HTN, and invasive ductal carcinoma of the breast, presenting to [facility] following a fall with LOC at home. Admitted for workup of LOC. Psychiatry consulted for medication management.     2/16: Patient evaluated at bedside, sedated with low volume speech, AOx1 (disoriented to place, time). No PRNs or overnight events. Patient notes mood is "alright." Denies SI/HI, AVH. TSH wnl. Daughter Radha called for collateral.    Recommendations:  - No need for psychiatric 1:1 CO given absence of SI/HI  - Order B12, folate  - Continue with home rivastigmine 3mg PO bid, lexapro 10mg PO qd     Patient is a 69F , domiciled with daughters, with a past psychiatric history of advanced dementia, no psychiatric hospitalizations, PMH of DM2, HTN, and invasive ductal carcinoma of the breast, presenting to [facility] following a fall with LOC at home. Admitted for workup of LOC. Psychiatry consulted for medication management.     Based on evaluation done today and collateral obtained from daughter, patient has been declining cognitive for the last several years, has intermittent agitation at home which is mostly managed with Seroquel and family support+ supervision. Patient was not sedated during this evaluation, and has been on Seroquel since admission with no change in dosing and no further LOC. Please note that it is likely that patient is on a lower dose at home (25mg bid po) but daughter feels that patient is much calmer on the current dose. LOC described by daughter, appears to be episodes that are discreet with patient coming back to baseline between episodes. Less likely is Seroquel contributory, but essential to check orthostatics.     Recommendations:  - No need for psychiatric 1:1 CO given absence of SI/HI  - Order B12, folate  - Frequent orientation  - Avoid bzd, anticholinergics, antihistamines  - Continue with home rivastigmine 3mg PO bid, lexapro 10mg PO qd  - Check orthostatics.   - Continue Seroquel 50mg BID PO---7am+ 5pm.   - PRN FOR AGGRESSION===Zyprexa 1.25mg q 8hrs PRN IM/PO

## 2024-02-16 NOTE — DISCHARGE NOTE PROVIDER - NSDCMRMEDTOKEN_GEN_ALL_CORE_FT
amlodipine-benazepril 10 mg-40 mg oral capsule: 1 cap(s) orally once a day  aspirin 81 mg oral delayed release tablet: 1 tab(s) orally once a day  escitalopram 10 mg oral tablet: 1 tab(s) orally once a day  letrozole 2.5 mg oral tablet: 1 tab(s) orally once a day  metFORMIN 500 mg oral tablet: 1 tab(s) orally twice a day with breakfast and dinner  prednisoLONE acetate 1% ophthalmic suspension: 1 drop(s) in each affected eye 4 times a day right eye  QUEtiapine 50 mg oral tablet: 1 tab(s) orally 2 times a day  rivastigmine 3 mg oral capsule: 1 cap(s) orally 2 times a day  rosuvastatin 10 mg oral tablet: 1 tab(s) orally once a day  traZODone 50 mg oral tablet: 0.5 tablet orally once a day (at bedtime) as needed for  insomnia   amlodipine-benazepril 10 mg-40 mg oral capsule: 1 cap(s) orally once a day  aspirin 81 mg oral delayed release tablet: 1 tab(s) orally once a day  escitalopram 10 mg oral tablet: 1 tab(s) orally once a day  letrozole 2.5 mg oral tablet: 1 tab(s) orally once a day  metFORMIN 500 mg oral tablet: 1 tab(s) orally twice a day with breakfast and dinner  Outpatient PT 2-3x/week: for balance training; bed mobility training; gait training; postural re-education; ROM; strengthening; transfer training  prednisoLONE acetate 1% ophthalmic suspension: 1 drop(s) in each affected eye 4 times a day right eye  QUEtiapine 50 mg oral tablet: 1 tab(s) orally 2 times a day  rivastigmine 3 mg oral capsule: 1 cap(s) orally 2 times a day  rosuvastatin 10 mg oral tablet: 1 tab(s) orally once a day  traZODone 50 mg oral tablet: 0.5 tablet orally once a day (at bedtime) as needed for  insomnia

## 2024-02-16 NOTE — BH CONSULTATION LIAISON ASSESSMENT NOTE - CURRENT MEDICATION
MEDICATIONS  (STANDING):  amLODIPine   Tablet 10 milliGRAM(s) Oral daily  aspirin enteric coated 81 milliGRAM(s) Oral daily  atorvastatin 40 milliGRAM(s) Oral at bedtime  dextrose 5%. 1000 milliLiter(s) (100 mL/Hr) IV Continuous <Continuous>  dextrose 5%. 1000 milliLiter(s) (50 mL/Hr) IV Continuous <Continuous>  dextrose 50% Injectable 25 Gram(s) IV Push once  dextrose 50% Injectable 12.5 Gram(s) IV Push once  dextrose 50% Injectable 25 Gram(s) IV Push once  escitalopram 10 milliGRAM(s) Oral daily  glucagon  Injectable 1 milliGRAM(s) IntraMuscular once  heparin   Injectable 5000 Unit(s) SubCutaneous every 8 hours  insulin lispro (ADMELOG) corrective regimen sliding scale   SubCutaneous three times a day before meals  insulin lispro (ADMELOG) corrective regimen sliding scale   SubCutaneous at bedtime  letrozole 2.5 milliGRAM(s) Oral daily  lisinopril 40 milliGRAM(s) Oral daily  prednisoLONE acetate 1% Suspension 1 Drop(s) Right EYE four times a day  QUEtiapine 50 milliGRAM(s) Oral two times a day  rivastigmine 3 milliGRAM(s) Oral two times a day    MEDICATIONS  (PRN):  acetaminophen     Tablet .. 650 milliGRAM(s) Oral every 6 hours PRN Temp greater or equal to 38C (100.4F), Mild Pain (1 - 3), Moderate Pain (4 - 6), Severe Pain (7 - 10)  dextrose Oral Gel 15 Gram(s) Oral once PRN Blood Glucose LESS THAN 70 milliGRAM(s)/deciliter  OLANZapine Injectable 1.25 milliGRAM(s) IntraMuscular every 6 hours PRN agitation  traZODone 25 milliGRAM(s) Oral at bedtime PRN insomnia

## 2024-02-16 NOTE — DISCHARGE NOTE PROVIDER - CARE PROVIDER_API CALL
[facility]   Phone: (   )    -  Fax: (   )    -  Follow Up Time:

## 2024-02-16 NOTE — BH CONSULTATION LIAISON ASSESSMENT NOTE - NSBHCHARTREVIEWVS_PSY_A_CORE FT
Vital Signs Last 24 Hrs  T(C): 37.2 (16 Feb 2024 06:11), Max: 37.3 (15 Feb 2024 11:15)  T(F): 99 (16 Feb 2024 06:11), Max: 99.2 (15 Feb 2024 11:15)  HR: 70 (16 Feb 2024 06:11) (70 - 88)  BP: 115/63 (16 Feb 2024 06:11) (109/56 - 117/58)  BP(mean): --  RR: 18 (16 Feb 2024 06:11) (17 - 18)  SpO2: 95% (16 Feb 2024 06:11) (95% - 99%)    Parameters below as of 16 Feb 2024 06:11  Patient On (Oxygen Delivery Method): room air

## 2024-02-16 NOTE — PROGRESS NOTE ADULT - NSPROGADDITIONALINFOA_GEN_ALL_CORE
d/w pt and the daughter Radha at bedside.  Remain calm. does not need vEEG, only spot EEG, still pending but not mandatory. d/w neuro. low suspicion for seizures.   her episodes at home likely due to recent increased on psyc meds.   now improved.     dc planning home.  psyc f/u for final medication regimen.     - Dr. JACKSON Htet ([facility])  - [phone]

## 2024-02-16 NOTE — BH CONSULTATION LIAISON ASSESSMENT NOTE - RISK ASSESSMENT
Patient is at low risk of harm to herself or others based on absence of SI/HI, agitation, aggression, history of SI/HI. Patient is domiciled, supported by daughters.

## 2024-02-16 NOTE — BH CONSULTATION LIAISON ASSESSMENT NOTE - NSBHCONSULTFOLLOWAFTERCARE_PSY_A_CORE FT
F/U with geriatric medicine provider.   If daughter is interested she can call [phone], ext [phone] to establish care with [facility] geriatric psychiatry

## 2024-02-16 NOTE — DISCHARGE NOTE PROVIDER - NSDCFUADDAPPT_GEN_ALL_CORE_FT
Follow up with PCP within 1-2 weeks of discharge. If you are in need of a general medicine physician and post-discharge medical follow-up for further care/recommendations you may contact the [facility] for an appointment at [phone].     Follow up with neurology within 1-2 weeks of discharge. You may follow up at [address] [place] [address]. Please call [phone] for an appointment.     Follow up with psychiatry within 1-2 weeks of discharge.

## 2024-02-16 NOTE — DISCHARGE NOTE PROVIDER - NSDCFUSCHEDAPPT_GEN_ALL_CORE_FT
[facility] Physician [facility]  GERIATRICS [address] [place] [address]   Scheduled Appointment: 03/05/2024     [facility] [address] [place] [address]   Scheduled Appointment: 02/26/2024    [address]   Scheduled Appointment: 03/05/2024

## 2024-02-16 NOTE — PROGRESS NOTE ADULT - ASSESSMENT
69-year-old female PMHx of advanced dementia, hx of chronic lt BG lacunar infarct, HTN, DM2, hx of invasive ductal carcinoma of rt breast, who presents with LOC.    #Loss of consciousness  - DDx includes orthostatic hypotension (dehydration vs autonomic dysfunction), neurocardiogenic syncope, medication side effects, seizure  - CTH in ED without any acute findings  - telemetry  - orthostatic vital signs supine to sitting (-)  - continuous EEG -->  - TTE -->    #DM2 with unspecified complications  - accuchecks qachs  - consistent carbohydrate diet  - hold metformin while in-hospital  - moderate ISS  - hold off basal/pre-prandial insulin for now  - HgbA1c --> 5.6    #HTN  - cont amlodipine  - cont lisinopril substitution    #Hyperlipidemia  - cont atorvastatin substitution    #Hx of dementia with behavioral disturbance  - cont seroquel  - cont rivastigmine  - cont trazodone qhs prn insomnia  - zyprexa 2.5 mg IV/IM q6h prn  - consider behavioral health consultation if refractory to current regimen    #Major depressive disorder, recurrent, moderate  - cont escitalopram    #Hx of chronic lt BG lacunar infarct  - initiated baby ASA    #Need for prophylaxis  - heparin 5000 units SC tid    #Discharge planning  - PT consult
69F with dementia, HTN, DM2, prior stroke (L BG), breast cancer here with multiple falls w/ LOC. Seroquel was recently increased outpatient, trazadone was also recently added.  a1c 5.6, no leukocytosis or elevated lactate  CTH with chronic L BG infarct, R BG hyperattentuation likely consistent with DVA previously seen on MRI in 2019 - stable  on exam is AAOx1, baseline but minimally verbal and lethargic- unclear if due to postictal state vs alt etiology -> mental status better today, back to baseline per daughter    Syncope with LOC - possibly medication related vs vasovagal, doubt seizure  Chronic stroke - appears small vessel  dementia     Plan:  - continue aspirin and statin for secondary stroke prevention  - check LDL  - orthostatic VS  - TTE pending  - telemetry  - routine eeg  - consider decreasing seroquel if leading to oversedation   - cont rivastigmine 3mg BID  - PT/OT  - DVT ppx    Elenita Ibarra DO  Vascular Neurology  Office [phone]  Available via Microsoft Teams       
69-year-old female PMHx of advanced dementia, hx of chronic lt BG lacunar infarct, HTN, DM2, hx of invasive ductal carcinoma of rt breast, who presents with LOC.    #Loss of consciousness  - DDx includes orthostatic hypotension (dehydration vs autonomic dysfunction), neurocardiogenic syncope, medication side effects, seizure  - CTH in ED without any acute findings  - telemetry so far without any significant events  - orthostatic vital signs supine to sitting (-) on 2/13/24  - TTE 2/14/24 --> technically difficult but LV systolic function + wall thickness + wall motion appear nl; there is trace MR  - continuous EEG -->    #DM2 with unspecified complications  - accuchecks qachs  - consistent carbohydrate diet  - hold metformin while in-hospital  - moderate ISS  - hold off basal/pre-prandial insulin for now  - HgbA1c --> 5.6    #HTN  - cont amlodipine  - cont lisinopril substitution    #Hyperlipidemia  - cont atorvastatin substitution    #Hx of dementia with behavioral disturbance  - cont seroquel  - cont rivastigmine  - cont trazodone qhs prn insomnia  - zyprexa 1.25 mg IV/IM q6h prn  - behavioral health consulted regarding need to downtitrate antipsychotic medication given repeated episodes of LOC @ home    #Major depressive disorder, recurrent, moderate  - cont escitalopram    #Hx of chronic lt BG lacunar infarct  - initiated baby ASA    #Need for prophylaxis  - heparin 5000 units SC tid    #Discharge planning  - PT consult
69-year-old female PMHx of advanced dementia, hx of chronic lt BG lacunar infarct, HTN, DM2, hx of invasive ductal carcinoma of rt breast, who presents with LOC.    #Loss of consciousness  - DDx includes orthostatic hypotension (dehydration vs autonomic dysfunction), neurocardiogenic syncope, medication side effects, seizure  - CTH in ED without any acute findings  - telemetry so far without any significant events  - orthostatic vital signs supine to sitting (-) on 2/13/24  - TTE 2/14/24 --> technically difficult but LV systolic function + wall thickness + wall motion appear nl; there is trace MR  - continuous EEG --> remain calm. EEG pending but not mandatory. d/w neuro. low suspicion for seizures.     #DM2 with unspecified complications  - accuchecks qachs  - consistent carbohydrate diet  - hold metformin while in-hospital  - moderate ISS  - hold off basal/pre-prandial insulin for now  - HgbA1c --> 5.6    #HTN  - cont amlodipine  - cont lisinopril substitution    #Hyperlipidemia  - cont atorvastatin substitution    #Hx of dementia with behavioral disturbance  - cont seroquel  - cont rivastigmine  - cont trazodone qhs prn insomnia  - zyprexa 1.25 mg IV/IM q6h prn  - behavioral health consulted regarding need to downtitrate antipsychotic medication given repeated episodes of LOC @ home    #Major depressive disorder, recurrent, moderate  - cont escitalopram    #Hx of chronic lt BG lacunar infarct  - initiated baby ASA    #Need for prophylaxis  - heparin 5000 units SC tid    #Discharge planning  - PT consult
69F with dementia, HTN, DM2, prior stroke (L BG), breast cancer here with multiple falls w/ LOC. Seroquel was recently increased outpatient, trazadone was also recently added.  a1c 5.6, no leukocytosis or elevated lactate  CTH with chronic L BG infarct, R BG hyperattentuation likely consistent with DVA previously seen on MRI in 2019 - stable  on exam is AAOx1, baseline but minimally verbal and lethargic- unclear if due to postictal state vs alt etiology -> mental status better today, back to baseline per daughter    Syncope with LOC - possibly medication related vs vasovagal, doubt seizure  Chronic stroke - appears small vessel  dementia     Plan:  - continue aspirin and statin for secondary stroke prevention  - check LDL  - orthostatic VS  - TTE pending  - telemetry  - routine eeg- ca be done as outpatient and should not hold up discharge  - consider decreasing seroquel if leading to oversedation   - cont rivastigmine 3mg BID  - PT/OT  - DVT ppx    d/w dr rebecca Ibarra, DO  Vascular Neurology  Office [phone]  Available via Microsoft Teams

## 2024-02-16 NOTE — DISCHARGE NOTE NURSING/CASE MANAGEMENT/SOCIAL WORK - NSDCFUADDAPPT_GEN_ALL_CORE_FT
Follow up with PCP within 1-2 weeks of discharge. If you are in need of a general medicine physician and post-discharge medical follow-up for further care/recommendations you may contact the [facility] for an appointment at [phone].     Follow up with neurology within 1-2 weeks of discharge. You may follow up at [address] [place] [address]. Please call [phone] for an appointment.     Follow up with psychiatry within 1-2 weeks of discharge.

## 2024-02-16 NOTE — DISCHARGE NOTE PROVIDER - NSDCCPCAREPLAN_GEN_ALL_CORE_FT
PRINCIPAL DISCHARGE DIAGNOSIS  Diagnosis: Consciousness loss, transient  Assessment and Plan of Treatment: You presented with loss of consciousness. Your CT head was negative. Your echocardiogram showed good heart pumping function. You are to pursue an outpatient EEG. You are to continue your medications as prescribed. Continue to follow with geriatric psych. We started you on baby aspirin for your stroke history.

## 2024-02-19 ENCOUNTER — INPATIENT (INPATIENT)
Facility: HOSPITAL | Age: 69
LOS: 8 days | Discharge: HOME | End: 2024-02-28
Attending: HOSPITALIST | Admitting: HOSPITALIST
Payer: MEDICARE

## 2024-02-19 VITALS
DIASTOLIC BLOOD PRESSURE: 68 MMHG | OXYGEN SATURATION: 97 % | TEMPERATURE: 99 F | HEIGHT: 72 IN | HEART RATE: 101 BPM | RESPIRATION RATE: 16 BRPM | SYSTOLIC BLOOD PRESSURE: 118 MMHG

## 2024-02-19 PROCEDURE — 99285 EMERGENCY DEPT VISIT HI MDM: CPT | Mod: FS

## 2024-02-19 NOTE — ED ADULT NURSE NOTE - OBJECTIVE STATEMENT
pt received to rm 24  , a&ox4 , ambulatory , phx of dementia and Dm2  , p/w worsening hallucinations  . Pt breathing even and unlabored on room air.  IV placed. Labs collected and sent. pt educated on fall precautions and confirms understanding via teach back method. Stretcher locked in lowest position with siderails up x2. Call bell and personal items with daughter

## 2024-02-19 NOTE — ED PROVIDER NOTE - CLINICAL SUMMARY MEDICAL DECISION MAKING FREE TEXT BOX
Patient is a 70 y/o female with a pmhx of advanced dementia, DM2, HLD is presenting to the ED with insomnia x 2 days. Patient is alert and oriented x1 only to person. Daughter is the historian. Patient was admitted to [facility] last week for 5 days due to what the daughter says was "syncopal episodes". Daughter says the Patient was discharged home on a baby aspirin after CT found " a small stroke". Daughter states the patient is making suicidal threats as well as homicidal threats towards the daughter and granddaughter. Daughter states that this has never happened before.    Ordered CBC, CMP, UA and urine culture, urine toxicology to evaluate UTI, Electrolyte imbalances, anemia, and infection.

## 2024-02-19 NOTE — ED PROVIDER NOTE - OBJECTIVE STATEMENT
Patient is a 68 y/o female with a pmhx of advanced dementia, DM2, HLD is presenting to the ED with insomnia x 2 days. Patient is alert and oriented x1 only to person.  Patient was admitted to [facility] last week for 5 days due to what the daughter says was "syncopal episodes". Daughter says the Patient was discharged home on a baby aspirin after CT found " a small stroke". Daughter states the patient is making suicidal threats as well as homicidal threats towards the daughter and granddaughter. Daughter states that this has never happened before. Patient is a 68 y/o female with a pmhx of advanced dementia, DM2, HLD is presenting to the ED with insomnia x 2 days. Patient is alert and oriented x1 only to person. Daughter is the historian. Patient was admitted to [facility] last week for 5 days due to what the daughter says was "syncopal episodes". Daughter says the Patient was discharged home on a baby aspirin after CT found " a small stroke". Daughter states the patient is making suicidal threats as well as homicidal threats towards the daughter and granddaughter. Daughter states that this has never happened before. Patient is a 70 y/o female with a pmhx of advanced dementia, DM2, HLD is presenting to the ED with insomnia x 2 days. Patient is alert and oriented x1 only to person. Daughter is the historian. Patient was admitted to [facility] last week for 5 days due to what the daughter says was "syncopal episodes". Daughter says the Patient was discharged home on a baby aspirin after CT found " a small stroke". Daughter states the patient is making suicidal threats as well as homicidal threats towards the daughter and granddaughter. Daughter states that this has never happened before. Family is interested in nursing home placement, states pt has not slept for the past 2 days and state they are having difficulty caring for her at home.

## 2024-02-19 NOTE — ED PROVIDER NOTE - ATTENDING APP SHARED VISIT CONTRIBUTION OF CARE
70 y/o F with h/o advanced dementia, chronic left basal ganglia lacunar infarct, HTN, DM, recent hospitalization 2/13-16 for episodes of LOC (normal syncope w/u, pursuing outpatient seizure eval with neuro) BIB daughter for agitation.  Hx obtained from daughter at bedside - pt is A&Ox1 (self) at baseline and completely dependent on ADLs.  Seroquel dose increased from 25mg BID to 50mg BID after this recent hospitalization.  Pt with increasing agitaiton at home, not sleeping, combative and threatening towards family.  No recent fever, chills, apparent pain, n/v/d.  Pt is otherwise in usual state of health, normal PO intake, taking meds as prescribed.    Well appearing, lying comfortably in stretcher, awake and alert, nontoxic.  AF/VSS.  NCAT.  Lungs cta bl.  Cards nl S1/S2, RRR, no MRG.  Abd soft ntnd.  No pedal edema or calf tenderness.    Sxs likely due to known dementia, will eval for underlying provoking infection, metabolic disturbance, primary cns lesion.  Plan for labs, ekg, ct head, ua, reassess.  Will plan to further discuss dispo with family, as they are now interested in increased care and poss placement as they are finding themselves unable to care for patient at home.

## 2024-02-19 NOTE — ED ADULT TRIAGE NOTE - CHIEF COMPLAINT QUOTE
pt coming in with daughter c/o insomnia x2 days. Daughter states pt has endorsing suicidal thoughts and worsening hallucinations. Hx. Advance Dementia. DM2. pt at baseline at this time. pt offers no complaints at this time.

## 2024-02-20 ENCOUNTER — NON-APPOINTMENT (OUTPATIENT)
Age: 69
End: 2024-02-20

## 2024-02-20 DIAGNOSIS — F03.918 UNSPECIFIED DEMENTIA, UNSPECIFIED SEVERITY, WITH OTHER BEHAVIORAL DISTURBANCE: ICD-10-CM

## 2024-02-20 LAB
ALBUMIN SERPL ELPH-MCNC: 4.3 G/DL — SIGNIFICANT CHANGE UP (ref 3.3–5)
ALP SERPL-CCNC: 67 U/L — SIGNIFICANT CHANGE UP (ref 40–120)
ALT FLD-CCNC: 20 U/L — SIGNIFICANT CHANGE UP (ref 4–33)
ANION GAP SERPL CALC-SCNC: 15 MMOL/L — HIGH (ref 7–14)
APPEARANCE UR: CLEAR — SIGNIFICANT CHANGE UP
AST SERPL-CCNC: 27 U/L — SIGNIFICANT CHANGE UP (ref 4–32)
BACTERIA # UR AUTO: NEGATIVE /HPF — SIGNIFICANT CHANGE UP
BASOPHILS # BLD AUTO: 0.05 K/UL — SIGNIFICANT CHANGE UP (ref 0–0.2)
BASOPHILS NFR BLD AUTO: 0.6 % — SIGNIFICANT CHANGE UP (ref 0–2)
BILIRUB SERPL-MCNC: <0.2 MG/DL — SIGNIFICANT CHANGE UP (ref 0.2–1.2)
BILIRUB UR-MCNC: NEGATIVE — SIGNIFICANT CHANGE UP
BUN SERPL-MCNC: 17 MG/DL — SIGNIFICANT CHANGE UP (ref 7–23)
CALCIUM SERPL-MCNC: 9.4 MG/DL — SIGNIFICANT CHANGE UP (ref 8.4–10.5)
CAST: 0 /LPF — SIGNIFICANT CHANGE UP (ref 0–4)
CHLORIDE SERPL-SCNC: 105 MMOL/L — SIGNIFICANT CHANGE UP (ref 98–107)
CO2 SERPL-SCNC: 20 MMOL/L — LOW (ref 22–31)
COLOR SPEC: YELLOW — SIGNIFICANT CHANGE UP
CREAT SERPL-MCNC: 0.55 MG/DL — SIGNIFICANT CHANGE UP (ref 0.5–1.3)
DIFF PNL FLD: ABNORMAL
EGFR: 99 ML/MIN/1.73M2 — SIGNIFICANT CHANGE UP
EOSINOPHIL # BLD AUTO: 0.32 K/UL — SIGNIFICANT CHANGE UP (ref 0–0.5)
EOSINOPHIL NFR BLD AUTO: 3.9 % — SIGNIFICANT CHANGE UP (ref 0–6)
GLUCOSE SERPL-MCNC: 94 MG/DL — SIGNIFICANT CHANGE UP (ref 70–99)
GLUCOSE UR QL: NEGATIVE MG/DL — SIGNIFICANT CHANGE UP
HCT VFR BLD CALC: 35.1 % — SIGNIFICANT CHANGE UP (ref 34.5–45)
HGB BLD-MCNC: 11.5 G/DL — SIGNIFICANT CHANGE UP (ref 11.5–15.5)
IANC: 4.55 K/UL — SIGNIFICANT CHANGE UP (ref 1.8–7.4)
IMM GRANULOCYTES NFR BLD AUTO: 0.2 % — SIGNIFICANT CHANGE UP (ref 0–0.9)
KETONES UR-MCNC: NEGATIVE MG/DL — SIGNIFICANT CHANGE UP
LEUKOCYTE ESTERASE UR-ACNC: ABNORMAL
LYMPHOCYTES # BLD AUTO: 2.2 K/UL — SIGNIFICANT CHANGE UP (ref 1–3.3)
LYMPHOCYTES # BLD AUTO: 27 % — SIGNIFICANT CHANGE UP (ref 13–44)
MCHC RBC-ENTMCNC: 31.9 PG — SIGNIFICANT CHANGE UP (ref 27–34)
MCHC RBC-ENTMCNC: 32.8 GM/DL — SIGNIFICANT CHANGE UP (ref 32–36)
MCV RBC AUTO: 97.5 FL — SIGNIFICANT CHANGE UP (ref 80–100)
MONOCYTES # BLD AUTO: 1.02 K/UL — HIGH (ref 0–0.9)
MONOCYTES NFR BLD AUTO: 12.5 % — SIGNIFICANT CHANGE UP (ref 2–14)
NEUTROPHILS # BLD AUTO: 4.55 K/UL — SIGNIFICANT CHANGE UP (ref 1.8–7.4)
NEUTROPHILS NFR BLD AUTO: 55.8 % — SIGNIFICANT CHANGE UP (ref 43–77)
NITRITE UR-MCNC: NEGATIVE — SIGNIFICANT CHANGE UP
NRBC # BLD: 0 /100 WBCS — SIGNIFICANT CHANGE UP (ref 0–0)
NRBC # FLD: 0 K/UL — SIGNIFICANT CHANGE UP (ref 0–0)
PCP SPEC-MCNC: SIGNIFICANT CHANGE UP
PH UR: 7 — SIGNIFICANT CHANGE UP (ref 5–8)
PLATELET # BLD AUTO: 211 K/UL — SIGNIFICANT CHANGE UP (ref 150–400)
POTASSIUM SERPL-MCNC: 4.4 MMOL/L — SIGNIFICANT CHANGE UP (ref 3.5–5.3)
POTASSIUM SERPL-SCNC: 4.4 MMOL/L — SIGNIFICANT CHANGE UP (ref 3.5–5.3)
PROT SERPL-MCNC: 7.1 G/DL — SIGNIFICANT CHANGE UP (ref 6–8.3)
PROT UR-MCNC: NEGATIVE MG/DL — SIGNIFICANT CHANGE UP
RBC # BLD: 3.6 M/UL — LOW (ref 3.8–5.2)
RBC # FLD: 13 % — SIGNIFICANT CHANGE UP (ref 10.3–14.5)
RBC CASTS # UR COMP ASSIST: 3 /HPF — SIGNIFICANT CHANGE UP (ref 0–4)
SODIUM SERPL-SCNC: 140 MMOL/L — SIGNIFICANT CHANGE UP (ref 135–145)
SP GR SPEC: 1.01 — SIGNIFICANT CHANGE UP (ref 1–1.03)
SQUAMOUS # UR AUTO: 0 /HPF — SIGNIFICANT CHANGE UP (ref 0–5)
TOXICOLOGY SCREEN, DRUGS OF ABUSE, SERUM RESULT: SIGNIFICANT CHANGE UP
TSH SERPL-MCNC: 2.75 UIU/ML — SIGNIFICANT CHANGE UP (ref 0.27–4.2)
UROBILINOGEN FLD QL: 1 MG/DL — SIGNIFICANT CHANGE UP (ref 0.2–1)
WBC # BLD: 8.16 K/UL — SIGNIFICANT CHANGE UP (ref 3.8–10.5)
WBC # FLD AUTO: 8.16 K/UL — SIGNIFICANT CHANGE UP (ref 3.8–10.5)
WBC UR QL: 3 /HPF — SIGNIFICANT CHANGE UP (ref 0–5)

## 2024-02-20 PROCEDURE — 70450 CT HEAD/BRAIN W/O DYE: CPT | Mod: 26,MA

## 2024-02-20 PROCEDURE — 93010 ELECTROCARDIOGRAM REPORT: CPT

## 2024-02-20 PROCEDURE — 90792 PSYCH DIAG EVAL W/MED SRVCS: CPT

## 2024-02-20 RX ORDER — QUETIAPINE FUMARATE 200 MG/1
50 TABLET, FILM COATED ORAL
Refills: 0 | Status: DISCONTINUED | OUTPATIENT
Start: 2024-02-20 | End: 2024-02-23

## 2024-02-20 RX ORDER — QUETIAPINE FUMARATE 200 MG/1
50 TABLET, FILM COATED ORAL
Refills: 0 | Status: DISCONTINUED | OUTPATIENT
Start: 2024-02-20 | End: 2024-02-20

## 2024-02-20 RX ORDER — PREDNISOLONE SODIUM PHOSPHATE 1 %
1 DROPS OPHTHALMIC (EYE)
Refills: 0 | Status: DISCONTINUED | OUTPATIENT
Start: 2024-02-20 | End: 2024-02-20

## 2024-02-20 RX ORDER — TRAZODONE HCL 50 MG
25 TABLET ORAL AT BEDTIME
Refills: 0 | Status: DISCONTINUED | OUTPATIENT
Start: 2024-02-20 | End: 2024-02-28

## 2024-02-20 RX ORDER — SODIUM CHLORIDE 9 MG/ML
1000 INJECTION, SOLUTION INTRAVENOUS
Refills: 0 | Status: DISCONTINUED | OUTPATIENT
Start: 2024-02-20 | End: 2024-02-20

## 2024-02-20 RX ORDER — ACETAMINOPHEN 500 MG
650 TABLET ORAL EVERY 6 HOURS
Refills: 0 | Status: DISCONTINUED | OUTPATIENT
Start: 2024-02-20 | End: 2024-02-28

## 2024-02-20 RX ORDER — HEPARIN SODIUM 5000 [USP'U]/ML
5000 INJECTION INTRAVENOUS; SUBCUTANEOUS EVERY 8 HOURS
Refills: 0 | Status: DISCONTINUED | OUTPATIENT
Start: 2024-02-20 | End: 2024-02-28

## 2024-02-20 RX ORDER — ASPIRIN/CALCIUM CARB/MAGNESIUM 324 MG
81 TABLET ORAL DAILY
Refills: 0 | Status: DISCONTINUED | OUTPATIENT
Start: 2024-02-20 | End: 2024-02-28

## 2024-02-20 RX ORDER — RIVASTIGMINE 4.6 MG/24H
3 PATCH, EXTENDED RELEASE TRANSDERMAL
Refills: 0 | Status: DISCONTINUED | OUTPATIENT
Start: 2024-02-20 | End: 2024-02-28

## 2024-02-20 RX ORDER — ATORVASTATIN CALCIUM 80 MG/1
20 TABLET, FILM COATED ORAL AT BEDTIME
Refills: 0 | Status: DISCONTINUED | OUTPATIENT
Start: 2024-02-20 | End: 2024-02-28

## 2024-02-20 RX ORDER — ONDANSETRON 8 MG/1
4 TABLET, FILM COATED ORAL EVERY 8 HOURS
Refills: 0 | Status: DISCONTINUED | OUTPATIENT
Start: 2024-02-20 | End: 2024-02-28

## 2024-02-20 RX ORDER — INFLUENZA VIRUS VACCINE 15; 15; 15; 15 UG/.5ML; UG/.5ML; UG/.5ML; UG/.5ML
0.7 SUSPENSION INTRAMUSCULAR ONCE
Refills: 0 | Status: DISCONTINUED | OUTPATIENT
Start: 2024-02-20 | End: 2024-02-28

## 2024-02-20 RX ORDER — SODIUM CHLORIDE 9 MG/ML
1000 INJECTION, SOLUTION INTRAVENOUS
Refills: 0 | Status: COMPLETED | OUTPATIENT
Start: 2024-02-20 | End: 2024-02-20

## 2024-02-20 RX ORDER — PREDNISOLONE SODIUM PHOSPHATE 1 %
1 DROPS OPHTHALMIC (EYE)
Refills: 0 | Status: DISCONTINUED | OUTPATIENT
Start: 2024-02-20 | End: 2024-02-28

## 2024-02-20 RX ORDER — OLANZAPINE 15 MG/1
1.25 TABLET, FILM COATED ORAL ONCE
Refills: 0 | Status: COMPLETED | OUTPATIENT
Start: 2024-02-20 | End: 2024-02-20

## 2024-02-20 RX ORDER — QUETIAPINE FUMARATE 200 MG/1
75 TABLET, FILM COATED ORAL
Refills: 0 | Status: DISCONTINUED | OUTPATIENT
Start: 2024-02-20 | End: 2024-02-20

## 2024-02-20 RX ORDER — AMLODIPINE BESYLATE 2.5 MG/1
10 TABLET ORAL DAILY
Refills: 0 | Status: DISCONTINUED | OUTPATIENT
Start: 2024-02-20 | End: 2024-02-28

## 2024-02-20 RX ORDER — ESCITALOPRAM OXALATE 10 MG/1
10 TABLET, FILM COATED ORAL DAILY
Refills: 0 | Status: DISCONTINUED | OUTPATIENT
Start: 2024-02-20 | End: 2024-02-28

## 2024-02-20 RX ORDER — LETROZOLE 2.5 MG/1
2.5 TABLET, FILM COATED ORAL DAILY
Refills: 0 | Status: DISCONTINUED | OUTPATIENT
Start: 2024-02-20 | End: 2024-02-28

## 2024-02-20 RX ORDER — LANOLIN ALCOHOL/MO/W.PET/CERES
3 CREAM (GRAM) TOPICAL AT BEDTIME
Refills: 0 | Status: DISCONTINUED | OUTPATIENT
Start: 2024-02-20 | End: 2024-02-28

## 2024-02-20 RX ORDER — QUETIAPINE FUMARATE 200 MG/1
75 TABLET, FILM COATED ORAL
Refills: 0 | Status: DISCONTINUED | OUTPATIENT
Start: 2024-02-20 | End: 2024-02-23

## 2024-02-20 RX ORDER — OLANZAPINE 15 MG/1
1.25 TABLET, FILM COATED ORAL EVERY 6 HOURS
Refills: 0 | Status: DISCONTINUED | OUTPATIENT
Start: 2024-02-20 | End: 2024-02-28

## 2024-02-20 RX ADMIN — Medication 3 MILLIGRAM(S): at 22:18

## 2024-02-20 RX ADMIN — ESCITALOPRAM OXALATE 10 MILLIGRAM(S): 10 TABLET, FILM COATED ORAL at 13:27

## 2024-02-20 RX ADMIN — OLANZAPINE 1.25 MILLIGRAM(S): 15 TABLET, FILM COATED ORAL at 17:58

## 2024-02-20 RX ADMIN — HEPARIN SODIUM 5000 UNIT(S): 5000 INJECTION INTRAVENOUS; SUBCUTANEOUS at 13:27

## 2024-02-20 RX ADMIN — SODIUM CHLORIDE 500 MILLILITER(S): 9 INJECTION, SOLUTION INTRAVENOUS at 08:56

## 2024-02-20 RX ADMIN — LETROZOLE 2.5 MILLIGRAM(S): 2.5 TABLET, FILM COATED ORAL at 14:47

## 2024-02-20 RX ADMIN — OLANZAPINE 1.25 MILLIGRAM(S): 15 TABLET, FILM COATED ORAL at 19:57

## 2024-02-20 RX ADMIN — QUETIAPINE FUMARATE 50 MILLIGRAM(S): 200 TABLET, FILM COATED ORAL at 13:27

## 2024-02-20 RX ADMIN — ATORVASTATIN CALCIUM 20 MILLIGRAM(S): 80 TABLET, FILM COATED ORAL at 22:16

## 2024-02-20 RX ADMIN — Medication 25 MILLIGRAM(S): at 22:16

## 2024-02-20 RX ADMIN — SODIUM CHLORIDE 500 MILLILITER(S): 9 INJECTION, SOLUTION INTRAVENOUS at 07:18

## 2024-02-20 RX ADMIN — HEPARIN SODIUM 5000 UNIT(S): 5000 INJECTION INTRAVENOUS; SUBCUTANEOUS at 22:16

## 2024-02-20 RX ADMIN — Medication 81 MILLIGRAM(S): at 13:27

## 2024-02-20 NOTE — PATIENT PROFILE ADULT - FALL HARM RISK - FALLEN IN PAST
Pt called back and discussed and again reviewed instructions for IGOR and cath with pt   On warfarin please send to managing provider when scheduled   Hold metformin and lisinopril      pt prefers to have covid test done on fri and procedure mon - if this is not possible anything at beginning or end of week so he does not have to take a lot of time off of work   covid order in     Please call pt to arrange    No

## 2024-02-20 NOTE — BH CONSULTATION LIAISON ASSESSMENT NOTE - SUMMARY
Patient is a 70 y/o woman with a pmhx of dementia, DM2, HLD,  who was BIB daughter for worsening agitation.  Psychiatry consulted for agitation/medication management. Care coordinated with pt.'s daughter at bedside, pt. presenting with worsening agitation, endorsing suicidal thought and was trying to choke herself with her own hands, family is concerned about recent behaviors and open to medication titration/changes. Plan as below      PLAN:  - START CO 1:1 due to recent suicidal statements/ self injurious behavior/impulsivity, will reassess tomorrow  - Continue Lexapro 10mg PO qd.   - Continue Seroquel 50mg PO QAM (7AM) and INCREASE Seroquel 75mg at 5PM.   - PRN FOR AGGRESSION: Zyprexa 1.25mg q6h PRN IM/PO, may give additional Zyprexa 1.25mg for refractory agitation  - Hold antipsychotics if qtc >500  --Supportive treatment of delirium: 1) Minimize use of benzodiazepines, opioids, anticholinergics, and other deliriogenic agents whenever possible.  2) Maintain sleep wake cycle.  3) Provide frequent reorientation and redirection.  4) Family member at bedside if possible. 5) Provide corrective lenses/hearing aids if required    Case d/w Dr. Monreal in person

## 2024-02-20 NOTE — BH CONSULTATION LIAISON ASSESSMENT NOTE - NSBHCHARTREVIEWVS_PSY_A_CORE FT
Vital Signs Last 24 Hrs  T(C): 36.7 (20 Feb 2024 09:05), Max: 37.1 (19 Feb 2024 21:39)  T(F): 98 (20 Feb 2024 09:05), Max: 98.7 (19 Feb 2024 21:39)  HR: 75 (20 Feb 2024 09:05) (75 - 101)  BP: 131/82 (20 Feb 2024 09:05) (111/72 - 133/76)  BP(mean): 105 (20 Feb 2024 04:43) (105 - 105)  RR: 16 (20 Feb 2024 09:05) (16 - 17)  SpO2: 100% (20 Feb 2024 09:05) (97% - 100%)    Parameters below as of 20 Feb 2024 09:05  Patient On (Oxygen Delivery Method): room air

## 2024-02-20 NOTE — H&P ADULT - NSHPLABSRESULTS_GEN_ALL_CORE
LABS:                        11.5   8.16  )-----------( 211      ( 2024 23:36 )             35.1     -    140  |  105  |  17  ----------------------------<  94  4.4   |  20<L>  |  0.55    Ca    9.4      2024 23:36    TPro  7.1  /  Alb  4.3  /  TBili  <0.2  /  DBili  x   /  AST  27  /  ALT  20  /  AlkPhos  67        CAPILLARY BLOOD GLUCOSE      POCT Blood Glucose.: 96 mg/dL (2024 12:28)  POCT Blood Glucose.: 175 mg/dL (2024 21:38)        Urinalysis Basic - ( 2024 00:48 )    Color: Yellow / Appearance: Clear / S.015 / pH: x  Gluc: x / Ketone: Negative mg/dL  / Bili: Negative / Urobili: 1.0 mg/dL   Blood: x / Protein: Negative mg/dL / Nitrite: Negative   Leuk Esterase: Small / RBC: 3 /HPF / WBC 3 /HPF   Sq Epi: x / Non Sq Epi: 0 /HPF / Bacteria: Negative /HPF        RADIOLOGY & ADDITIONAL TESTS:    Imaging Personally Reviewed:  [x] YES  [ ] NO    Consultant(s) Notes Reviewed:  [x] YES  [ ] NO    Care Discussed with Consultants/Other Providers [x] YES  [ ] NO

## 2024-02-20 NOTE — H&P ADULT - HISTORY OF PRESENT ILLNESS
70 y/o female with a PMHx of advanced dementia, DM2, HLD is presenting to the ED with insomnia x 2 days. Patient is alert and oriented x1 only to person baseline. Daughter is the historian. Patient was admitted to [facility] last week for 5 days due to what the daughter says was "syncopal episodes". Daughter says the patient was discharged home on a baby aspirin. Daughter states the patient is making suicidal threats as well as homicidal threats towards the daughter and granddaughter. Daughter states that this has never happened before. Family is interested in nursing home placement, states pt has not slept for the past 2 days and state they are having difficulty caring for her at home. Neuro and infectious work up essentially negative in the ED. Psyc consulted. will adjust psyc meds.

## 2024-02-20 NOTE — H&P ADULT - NSHPREVIEWOFSYSTEMS_GEN_ALL_CORE
REVIEW OF SYSTEMS:  General: no weakness, no fever/chills, no weight loss/gain  Skin/Breast: no rash, no jaundice  Ophthalmologic: no vision changes, no dry eyes   Respiratory and Thorax: no cough, no wheezing, no hemoptysis, no dyspnea  Cardiovascular: no chest pain, no shortness of breath, no orthopnea  Gastrointestinal: no n/v/d, no abdominal pain, no dysphagia   Genitourinary: no dysuria, no frequency, no nocturia, no hematuria  Musculoskeletal: no trauma, no sprain/strain, no myalgias, no arthralgias, no fracture  Neurological: no HA, no dizziness, no weakness, no numbness  Psychiatric: no depression, + SI/HI, + agitations  Hematology/Lymphatics: no easy bruising  Endocrine: no heat or cold intolerance. no weight gain or loss  Allergic/Immunologic: no allergy or recent reaction

## 2024-02-20 NOTE — ED ADULT NURSE REASSESSMENT NOTE - NS ED NURSE REASSESS COMMENT FT1
report received from night RN Michael. pt awake and alert, A+Ox1, resting comfortably. respirations even and unlabored. pt appears comfortable and offers no complaint. VSS. skin warm, dry and intact. family at bedside. pt awaiting bed assignment. will monitor.

## 2024-02-20 NOTE — H&P ADULT - NSHPADDITIONALINFOADULT_GEN_ALL_CORE
d/w pt and the two daughters at bedside.  d/w psyc at bedside.    Tai Banks will be covering for the pt starting 2/21/24. He can be reached at  if needed.     - Dr. RENETTA Monreal ([facility])  - [phone]

## 2024-02-20 NOTE — H&P ADULT - ASSESSMENT
68 y/o female with a PMHx of advanced dementia, DM2, HLD is presenting to the ED with insomnia x 2 days. Patient is alert and oriented x1 only to person baseline. Daughter is the historian. Patient was admitted to [facility] last week for 5 days due to what the daughter says was "syncopal episodes". Daughter says the patient was discharged home on a baby aspirin. Daughter states the patient is making suicidal threats as well as homicidal threats towards the daughter and granddaughter.     #Agitations  - likely due to worsening dementia requiring medication adjustement.  - Neg work up for CVA/infection ,etc  - CT head neg, UA neg. labs/vitals stable.   - psyc consulted. c/w Seroquel 50 mg am, increased pm dose to 75 mg qhs.  - c/w Trazadone 25 mg. c/w Lexapro.  - zyprexa 1.25 mg IV/IM q6h prn  - 1:1 observation for 24 hrs per psyc given suicidal/ homicidal threats   - Physical therapy. Out of bed to chair with assistance.  - monitor for clinical improvement.     #Hx of dementia with behavioral disturbance  - cont rivastigmine  - cont trazodone qhs prn insomnia  - psyc meds as above    #DM2   - accuchecks qachs  - consistent carbohydrate diet  - hold metformin while in-hospital  - moderate ISS  - hold off basal/pre-prandial insulin for now  - HgbA1c --> 5.6    #HTN  - cont amlodipine  - cont lisinopril substitution    #Hyperlipidemia  - cont atorvastatin substitution    #Major depressive disorder, recurrent, moderate  - cont escitalopram    #Hx of chronic lt BG lacunar infarct  - initiated baby ASA    #Need for prophylaxis  - heparin 5000 units SC tid    SW/CM follow up for support.

## 2024-02-20 NOTE — BH CONSULTATION LIAISON ASSESSMENT NOTE - MEDICAL RECORD REVIEWED
How Severe Are Your Spot(S)?: mild
Have Your Spot(S) Been Treated In The Past?: has been treated
Hpi Title: Evaluation of Skin Lesions
When Was It Treated?: 08/2016
Yes

## 2024-02-20 NOTE — PATIENT PROFILE ADULT - FALL HARM RISK - HARM RISK INTERVENTIONS
Yes Assistance OOB with selected safe patient handling equipment/Communicate Risk of Fall with Harm to all staff/Monitor for mental status changes/Move patient closer to nurses' station/Reinforce activity limits and safety measures with patient and family/Reorient to person, place and time as needed/Tailored Fall Risk Interventions/Toileting schedule using arm’s reach rule for commode and bathroom/Use of alarms - bed, chair and/or voice tab/Visual Cue: Yellow wristband and red socks/Bed in lowest position, wheels locked, appropriate side rails in place/Call bell, personal items and telephone in reach/Instruct patient to call for assistance before getting out of bed or chair/Non-slip footwear when patient is out of bed/[place] to call system/Physically safe environment - no spills, clutter or unnecessary equipment/Purposeful Proactive Rounding/Room/bathroom lighting operational, light cord in reach

## 2024-02-20 NOTE — BH CONSULTATION LIAISON ASSESSMENT NOTE - HPI (INCLUDE ILLNESS QUALITY, SEVERITY, DURATION, TIMING, CONTEXT, MODIFYING FACTORS, ASSOCIATED SIGNS AND SYMPTOMS)
Patient is a 70 y/o woman with a pmhx of dementia, DM2, HLD,  who was BIB daughter for worsening agitation.  Psychiatry consulted for agitation/medication management.     Chart reviewed, pt. was seen by [facility] psychiatry on 2/16/24,  case d/w with previous psychiatrist Dr. Samson.  Patient seen, was sleeping on exam, did not wake up the patient to prevent agitation and to let patient rest as she has not been sleeping at home.  Care coordinated with pt.'s daughters Radha and Stanislav, they reports that patient has been compliant with her medications at home (Lexapro 10mg and  Seroquel 50mg BID), however since last few days patient has been increasingly agitated, hallucinating, not sleeping, verbally and at times physically aggressive towards family, also on Sunday night/Monday morning, pt. became extremely agitated/aggressive, was endorsing suicidal thoughts and tried to choke herself. Family is very concerned as this has never happened before. Discussed about increasing the night time Seroquel dose to improve sleep, family is in agreement and is open to medication titration vs changing Seroquel to other antipsychotic if it's not effective

## 2024-02-21 LAB
ANION GAP SERPL CALC-SCNC: 10 MMOL/L — SIGNIFICANT CHANGE UP (ref 7–14)
BUN SERPL-MCNC: 16 MG/DL — SIGNIFICANT CHANGE UP (ref 7–23)
CALCIUM SERPL-MCNC: 9.6 MG/DL — SIGNIFICANT CHANGE UP (ref 8.4–10.5)
CHLORIDE SERPL-SCNC: 106 MMOL/L — SIGNIFICANT CHANGE UP (ref 98–107)
CO2 SERPL-SCNC: 27 MMOL/L — SIGNIFICANT CHANGE UP (ref 22–31)
CREAT SERPL-MCNC: 0.56 MG/DL — SIGNIFICANT CHANGE UP (ref 0.5–1.3)
CULTURE RESULTS: SIGNIFICANT CHANGE UP
EGFR: 99 ML/MIN/1.73M2 — SIGNIFICANT CHANGE UP
GLUCOSE SERPL-MCNC: 99 MG/DL — SIGNIFICANT CHANGE UP (ref 70–99)
HCT VFR BLD CALC: 35.3 % — SIGNIFICANT CHANGE UP (ref 34.5–45)
HGB BLD-MCNC: 11.7 G/DL — SIGNIFICANT CHANGE UP (ref 11.5–15.5)
MAGNESIUM SERPL-MCNC: 1.9 MG/DL — SIGNIFICANT CHANGE UP (ref 1.6–2.6)
MCHC RBC-ENTMCNC: 31.3 PG — SIGNIFICANT CHANGE UP (ref 27–34)
MCHC RBC-ENTMCNC: 33.1 GM/DL — SIGNIFICANT CHANGE UP (ref 32–36)
MCV RBC AUTO: 94.4 FL — SIGNIFICANT CHANGE UP (ref 80–100)
NRBC # BLD: 0 /100 WBCS — SIGNIFICANT CHANGE UP (ref 0–0)
NRBC # FLD: 0 K/UL — SIGNIFICANT CHANGE UP (ref 0–0)
PHOSPHATE SERPL-MCNC: 5 MG/DL — HIGH (ref 2.5–4.5)
PLATELET # BLD AUTO: 196 K/UL — SIGNIFICANT CHANGE UP (ref 150–400)
POTASSIUM SERPL-MCNC: 3.9 MMOL/L — SIGNIFICANT CHANGE UP (ref 3.5–5.3)
POTASSIUM SERPL-SCNC: 3.9 MMOL/L — SIGNIFICANT CHANGE UP (ref 3.5–5.3)
RBC # BLD: 3.74 M/UL — LOW (ref 3.8–5.2)
RBC # FLD: 12.8 % — SIGNIFICANT CHANGE UP (ref 10.3–14.5)
SODIUM SERPL-SCNC: 143 MMOL/L — SIGNIFICANT CHANGE UP (ref 135–145)
SPECIMEN SOURCE: SIGNIFICANT CHANGE UP
WBC # BLD: 6.59 K/UL — SIGNIFICANT CHANGE UP (ref 3.8–10.5)
WBC # FLD AUTO: 6.59 K/UL — SIGNIFICANT CHANGE UP (ref 3.8–10.5)

## 2024-02-21 PROCEDURE — 99232 SBSQ HOSP IP/OBS MODERATE 35: CPT

## 2024-02-21 RX ORDER — OLANZAPINE 15 MG/1
1.25 TABLET, FILM COATED ORAL ONCE
Refills: 0 | Status: DISCONTINUED | OUTPATIENT
Start: 2024-02-21 | End: 2024-02-21

## 2024-02-21 RX ADMIN — QUETIAPINE FUMARATE 75 MILLIGRAM(S): 200 TABLET, FILM COATED ORAL at 20:00

## 2024-02-21 RX ADMIN — OLANZAPINE 1.25 MILLIGRAM(S): 15 TABLET, FILM COATED ORAL at 23:57

## 2024-02-21 RX ADMIN — HEPARIN SODIUM 5000 UNIT(S): 5000 INJECTION INTRAVENOUS; SUBCUTANEOUS at 21:08

## 2024-02-21 RX ADMIN — Medication 1 DROP(S): at 17:43

## 2024-02-21 RX ADMIN — RIVASTIGMINE 3 MILLIGRAM(S): 4.6 PATCH, EXTENDED RELEASE TRANSDERMAL at 17:43

## 2024-02-21 RX ADMIN — Medication 25 MILLIGRAM(S): at 21:08

## 2024-02-21 RX ADMIN — Medication 81 MILLIGRAM(S): at 12:08

## 2024-02-21 RX ADMIN — QUETIAPINE FUMARATE 50 MILLIGRAM(S): 200 TABLET, FILM COATED ORAL at 07:14

## 2024-02-21 RX ADMIN — ESCITALOPRAM OXALATE 10 MILLIGRAM(S): 10 TABLET, FILM COATED ORAL at 12:09

## 2024-02-21 RX ADMIN — RIVASTIGMINE 3 MILLIGRAM(S): 4.6 PATCH, EXTENDED RELEASE TRANSDERMAL at 05:21

## 2024-02-21 RX ADMIN — Medication 1 DROP(S): at 05:20

## 2024-02-21 RX ADMIN — HEPARIN SODIUM 5000 UNIT(S): 5000 INJECTION INTRAVENOUS; SUBCUTANEOUS at 05:20

## 2024-02-21 RX ADMIN — LETROZOLE 2.5 MILLIGRAM(S): 2.5 TABLET, FILM COATED ORAL at 12:09

## 2024-02-21 RX ADMIN — AMLODIPINE BESYLATE 10 MILLIGRAM(S): 2.5 TABLET ORAL at 05:21

## 2024-02-21 RX ADMIN — HEPARIN SODIUM 5000 UNIT(S): 5000 INJECTION INTRAVENOUS; SUBCUTANEOUS at 13:08

## 2024-02-21 RX ADMIN — ATORVASTATIN CALCIUM 20 MILLIGRAM(S): 80 TABLET, FILM COATED ORAL at 21:08

## 2024-02-21 NOTE — PHYSICAL THERAPY INITIAL EVALUATION ADULT - ADDITIONAL COMMENTS
Pt A&Ox1, unreliable historian. As per chart review pt lives with her daughter in an apartment with elevator access. Prior to admission pt was independent in ADLs and ambulation without device.     Following evaluation, pt was left semireclined in bed in no distress, call bell in reach.

## 2024-02-21 NOTE — BH CONSULTATION LIAISON PROGRESS NOTE - NSBHFUPINTERVALHXFT_PSY_A_CORE
Chart reviewed, received Zyprexa PRN x2, pt. seen/evaluated, oriented to self only, thinks she is at home, calm/cooperative, pleasantly confused/inattentive, reports mood is fine, firmly denies SIIP, denies HIIP. Interview overall limited due to cognitive limitations.  Chart reviewed, received Zyprexa PRN x2, pt. seen/evaluated, oriented to self only, thinks she is at home, calm/cooperative, pleasantly confused/inattentive, reports mood is fine, firmly denies SIIP, denies HIIP. Pt. does not remember making any suicidal statements at home. Interview overall limited due to cognitive limitations.

## 2024-02-21 NOTE — PHYSICAL THERAPY INITIAL EVALUATION ADULT - PREDICTED DURATION OF THERAPY (DAYS/WKS), PT EVAL
CARDIOLOGY PROGRESS NOTE         SUBJECTIVE   Omar Sabillon is a 43 year old male who has a history of a history of acute congestive heart failure, likely systolic, and non-Q wave myocardial infarction who presents to the office for follow up.  He was admitted to the hospital from 09/08/2017 to 09/15/2017 with shortness of breath and elevated troponin and underwent a cardiac catheterization on 09/11/2017.  Since then, the patient has no chest pain or shortness of breath.  He has had no palpitations.  He states he does have swelling in his feet.    Cardiac catheterization 09/11/2017:  Key Findings/Plan     Assessment and Plan     1) NICMP, NSTEMI  43 year old male admitted with decompensated heart failure and non-ST elevation myocardial infraction.  Cardiac catheterization revealed normal left dominant coronary anatomy. No obstructive CAD. LV gram showed severely reduced LV systolic function, LVEF 15%. LVEDP 31 mmHg. Right heart cath was not fully completed due to small arm vein and lack of pushability. However, we were able to obtain RA and RV pressure RA 20/20/70 mmHg, RV 49/4/22 mmHg.     Continue medical therapy with aggressive diuresis, beta blocker, Lasix, Aldactone.  Observation in ICU.   Coronary Findings     Dominance: Left   Left Main   The vessel is free of disease.   Left Anterior Descending   The vessel is free of disease.   Left Circumflex   The vessel is free of disease.   Right Coronary Artery   The vessel is free of disease.     Echocardiogram 09/09/2017:  Moderate tricuspid valve regurgitation.  Right ventricular systolic pressure 32 mmHg.  Mild-to-moderate mitral valve regurgitation.  Severely decreased left ventricular systolic function.    CT angiogram chest 09/08/2017:  IMPRESSION:  1.  Negative for acute PE.  2.  Cardiac enlargement and pulmonary vascular congestion.  Bibasilar  opacities which could be due to atelectasis, or given fever, inflammatory  infiltrate..  Right middle lobe pulmonary  2-4 weeks nodule.  Trace bilateral pleural  effusions.  3.  Ill-defined stranding within the extreme superior right perirenal space  which is not completely imaged on this exam.  Could potentially be due to  renal pathology.  Further evaluation with CT the abdomen and pelvis is  recommended.       PAST MEDICAL HISTORY      Past Medical History:   Diagnosis Date   • Pneumonia          SOCIAL  HISTORY      Social History     Social History   • Marital status: Single     Spouse name: N/A   • Number of children: N/A   • Years of education: N/A     Occupational History   •       [employer]     Social History Main Topics   • Smoking status: Never Smoker   • Smokeless tobacco: Never Used   • Alcohol use No   • Drug use: No   • Sexual activity: Not on file     Other Topics Concern   • Not on file     Social History Narrative   • No narrative on file         FAMILY   HISTORY      Family History   Problem Relation Age of Onset   • Diabetes Mother    • High blood pressure Mother    • High blood pressure Father          MEDICATIONS / INFUSIONS     Current Outpatient Prescriptions   Medication Sig   • ondansetron (ZOFRAN ODT) 4 MG disintegrating tablet Take 1 tablet by mouth every 8 hours as needed for Nausea.   • furosemide (LASIX) 40 MG tablet Take 1 tablet by mouth daily.   • aspirin 81 MG chewable tablet Chew 1 tablet by mouth daily.   • carvedilol (COREG) 3.125 MG tablet Take 1 tablet by mouth 2 times daily (with meals).   • spironolactone (ALDACTONE) 25 MG tablet Take 1 tablet by mouth daily.   • levofloxacin (LEVAQUIN) 500 MG tablet Take 1 tablet by mouth daily for 10 days.   • traMADol (ULTRAM) 50 MG tablet Take 1-2 tablets every 6-8 hours as needed for pain, not to exceed 6 tablets a day   • lisinopril (ZESTRIL) 5 MG tablet Take 1 tablet by mouth daily.   • potassium chloride 10 MEQ CR tablet Take 1 tablet by mouth daily. (Patient taking differently: Take 10 mEq by mouth as needed. )     No current  facility-administered medications for this visit.            ALLERGIES   ALLERGIES:  No Known Allergies      PHYSICAL EXAM     Vitals:    09/20/17 1552   BP: 94/68   Pulse: 92   Resp: 16   Weight: 82.9 kg   Height: 5' 10\" (1.778 m)     General: NAD (no acute distress), pleasant, alert and oriented X 3  Neck: No carotid bruits, no JVD (jugular venous distension). Normal carotid upstroke  Pulmonary: No retractions or increased work of breathing, clear to auscultation bilaterally. No crackles or wheezing  Cardiovascular: PMI in 5th intercostal place. RRR (regular rate and rhythm), normal S1 S2, no S3 or S4 appreciated. There are no murmurs.,  Abdomen:  Bowel sounds normoactive, soft, nontender, nondistended, no hepatosplenomegaly  Extremities:  Trace edema up to the ankles bilaterally  ASSESSMENT     1.  Acute systolic congestive heart failure   2.  Non-Q wave myocardial infarction, status post cardiac catheterization 09/11/2017.   3.  Pneumonia.    PLAN   Omar Sabillon is a 43 year old male who has a history of acute congestive heart failure, likely systolic, and non-Q wave myocardial infarction, status post cardiac catheterization 09/11/2017.  Will refer the patient to Dr. Herberth Gerardo [facility].  The patient's lungs were clear today.  He was instructed to watch his salt intake   He was also told that he can wear compression stockings for his mild lower extremity edema..  The patient requested a return to work letter for Monday, 09/25/2017, which was completed.  The patient will follow up with me in 3 months.  He is to weigh himself every day and if he gains more than 3 pounds in 1 day or 5 pounds in 1 week he is to contact me sooner. The patient will need an echocardiogram in 3 months. If his ejection fraction continues to be less than 35% he will need an AICD. The patient will continue with the LifeVest for now. Thank you for allowing me to participate in the care of this patient.    On  9/20/2017, IShaina scribed the services personally performed by Hever Hull MD     The documentation recorded by the scribe accurately and completely reflects the service(s) I personally performed and the decisions made by me.        Sincerely,      Hever Hull M.D.-Ph.D. [facility]

## 2024-02-21 NOTE — BH CONSULTATION LIAISON PROGRESS NOTE - NSBHASSESSMENTFT_PSY_ALL_CORE
Patient is a 70 y/o woman with a pmhx of dementia, DM2, HLD,  who was BIB daughter for worsening agitation.  Psychiatry consulted for agitation/medication management. Care coordinated with pt.'s daughter at bedside, pt. presenting with worsening agitation, endorsing suicidal thought and was trying to choke herself with her own hands, family is concerned about recent behaviors and open to medication titration/changes. Plan as below    2/21: Receiving PRNs for intermittent agitation, pleasantly confused/inattentive, oriented to self, firmly denies si and hi.       PLAN:  - May discontinue CO 1:1 from psychiatric perspective, defer observation status to primary team for agitation due to dementia, will benefit from enhanced supervision  - Continue Lexapro 10mg PO qd.   - Continue Seroquel 50mg PO QAM  and Seroquel 75mg at 5PM. (dose increased yesterday)  - PRN FOR AGGRESSION: Zyprexa 1.25mg q6h PRN IM/PO, may give additional Zyprexa 1.25mg for refractory agitation  - Hold antipsychotics if qtc >500  --Supportive treatment of delirium: 1) Minimize use of benzodiazepines, opioids, anticholinergics, and other deliriogenic agents whenever possible.  2) Maintain sleep wake cycle.  3) Provide frequent reorientation and redirection.  4) Family member at bedside if possible. 5) Provide corrective lenses/hearing aids if required Patient is a 70 y/o woman with a pmhx of dementia, DM2, HLD,  who was BIB daughter for worsening agitation.  Psychiatry consulted for agitation/medication management. Care coordinated with pt.'s daughter at bedside, pt. presenting with worsening agitation, endorsing suicidal thought and was trying to choke herself with her own hands, family is concerned about recent behaviors and open to medication titration/changes. Plan as below    2/21: Receiving PRNs for intermittent agitation, pleasantly confused/inattentive, oriented to self, firmly denies si and hi.       PLAN:  - May discontinue CO 1:1 from psychiatric perspective, defer observation status to primary team for agitation due to dementia, will benefit from enhanced supervision, have low threshold to restart 1:1 if needed  - Continue Lexapro 10mg PO qd.   - Continue Seroquel 50mg PO QAM  and Seroquel 75mg at 5PM. (dose increased yesterday)  - PRN FOR AGGRESSION: Zyprexa 1.25mg q6h PRN IM/PO, may give additional Zyprexa 1.25mg for refractory agitation  - Hold antipsychotics if qtc >500  --Supportive treatment of delirium: 1) Minimize use of benzodiazepines, opioids, anticholinergics, and other deliriogenic agents whenever possible.  2) Maintain sleep wake cycle.  3) Provide frequent reorientation and redirection.  4) Family member at bedside if possible. 5) Provide corrective lenses/hearing aids if required    Case d/w Dr. Guerin   Patient is a 70 y/o woman with a pmhx of dementia, DM2, HLD,  who was BIB daughter for worsening agitation.  Psychiatry consulted for agitation/medication management. Care coordinated with pt.'s daughter at bedside, pt. presenting with worsening agitation, endorsing suicidal thought and was trying to choke herself with her own hands, family is concerned about recent behaviors and open to medication titration/changes. Plan as below    2/21: Receiving PRNs for intermittent agitation, pleasantly confused/inattentive, oriented to self, firmly denies si and hi.       PLAN:  - May discontinue CO 1:1 from psychiatric perspective, defer observation status to primary team for agitation due to dementia, will benefit from enhanced supervision, have low threshold to restart 1:1 if needed  - Continue Lexapro 10mg PO qd.   - Continue Seroquel 50mg PO QAM  and Seroquel 75mg at 5PM. (dose increased yesterday)  - PRN FOR AGGRESSION: Zyprexa 1.25mg q6h PRN IM/PO, may give additional Zyprexa 1.25mg for refractory agitation  - Hold antipsychotics if qtc >500  --Supportive treatment of delirium: 1) Minimize use of benzodiazepines, opioids, anticholinergics, and other deliriogenic agents whenever possible.  2) Maintain sleep wake cycle.  3) Provide frequent reorientation and redirection.  4) Family member at bedside if possible. 5) Provide corrective lenses/hearing aids if required    Case d/w Dr. Guerin and ACP Kristin

## 2024-02-21 NOTE — PHYSICAL THERAPY INITIAL EVALUATION ADULT - PATIENT PROFILE REVIEW, REHAB EVAL
Activity - Ambulate as tolerated. Pt cleared for PT evaluation prior to session by DEVON Darden./yes

## 2024-02-21 NOTE — PHYSICAL THERAPY INITIAL EVALUATION ADULT - PERTINENT HX OF CURRENT PROBLEM, REHAB EVAL
69 year old female presents with agitation and insomnia. CT Head - No intracranial hemorrhage, mass effect, or midline shift.

## 2024-02-22 LAB
ANION GAP SERPL CALC-SCNC: 16 MMOL/L — HIGH (ref 7–14)
BASOPHILS # BLD AUTO: 0.06 K/UL — SIGNIFICANT CHANGE UP (ref 0–0.2)
BASOPHILS NFR BLD AUTO: 0.7 % — SIGNIFICANT CHANGE UP (ref 0–2)
BUN SERPL-MCNC: 24 MG/DL — HIGH (ref 7–23)
CALCIUM SERPL-MCNC: 10.1 MG/DL — SIGNIFICANT CHANGE UP (ref 8.4–10.5)
CHLORIDE SERPL-SCNC: 102 MMOL/L — SIGNIFICANT CHANGE UP (ref 98–107)
CO2 SERPL-SCNC: 25 MMOL/L — SIGNIFICANT CHANGE UP (ref 22–31)
CREAT SERPL-MCNC: 0.59 MG/DL — SIGNIFICANT CHANGE UP (ref 0.5–1.3)
EGFR: 98 ML/MIN/1.73M2 — SIGNIFICANT CHANGE UP
EOSINOPHIL # BLD AUTO: 0.41 K/UL — SIGNIFICANT CHANGE UP (ref 0–0.5)
EOSINOPHIL NFR BLD AUTO: 4.8 % — SIGNIFICANT CHANGE UP (ref 0–6)
GLUCOSE SERPL-MCNC: 103 MG/DL — HIGH (ref 70–99)
HCT VFR BLD CALC: 36.7 % — SIGNIFICANT CHANGE UP (ref 34.5–45)
HGB BLD-MCNC: 12.6 G/DL — SIGNIFICANT CHANGE UP (ref 11.5–15.5)
IANC: 4.88 K/UL — SIGNIFICANT CHANGE UP (ref 1.8–7.4)
IMM GRANULOCYTES NFR BLD AUTO: 0.2 % — SIGNIFICANT CHANGE UP (ref 0–0.9)
LYMPHOCYTES # BLD AUTO: 2.31 K/UL — SIGNIFICANT CHANGE UP (ref 1–3.3)
LYMPHOCYTES # BLD AUTO: 27.2 % — SIGNIFICANT CHANGE UP (ref 13–44)
MAGNESIUM SERPL-MCNC: 2.1 MG/DL — SIGNIFICANT CHANGE UP (ref 1.6–2.6)
MCHC RBC-ENTMCNC: 32.3 PG — SIGNIFICANT CHANGE UP (ref 27–34)
MCHC RBC-ENTMCNC: 34.3 GM/DL — SIGNIFICANT CHANGE UP (ref 32–36)
MCV RBC AUTO: 94.1 FL — SIGNIFICANT CHANGE UP (ref 80–100)
MONOCYTES # BLD AUTO: 0.8 K/UL — SIGNIFICANT CHANGE UP (ref 0–0.9)
MONOCYTES NFR BLD AUTO: 9.4 % — SIGNIFICANT CHANGE UP (ref 2–14)
NEUTROPHILS # BLD AUTO: 4.88 K/UL — SIGNIFICANT CHANGE UP (ref 1.8–7.4)
NEUTROPHILS NFR BLD AUTO: 57.7 % — SIGNIFICANT CHANGE UP (ref 43–77)
NRBC # BLD: 0 /100 WBCS — SIGNIFICANT CHANGE UP (ref 0–0)
NRBC # FLD: 0 K/UL — SIGNIFICANT CHANGE UP (ref 0–0)
PHOSPHATE SERPL-MCNC: 4.7 MG/DL — HIGH (ref 2.5–4.5)
PLATELET # BLD AUTO: 228 K/UL — SIGNIFICANT CHANGE UP (ref 150–400)
POTASSIUM SERPL-MCNC: 3.8 MMOL/L — SIGNIFICANT CHANGE UP (ref 3.5–5.3)
POTASSIUM SERPL-SCNC: 3.8 MMOL/L — SIGNIFICANT CHANGE UP (ref 3.5–5.3)
RBC # BLD: 3.9 M/UL — SIGNIFICANT CHANGE UP (ref 3.8–5.2)
RBC # FLD: 12.8 % — SIGNIFICANT CHANGE UP (ref 10.3–14.5)
SODIUM SERPL-SCNC: 143 MMOL/L — SIGNIFICANT CHANGE UP (ref 135–145)
WBC # BLD: 8.48 K/UL — SIGNIFICANT CHANGE UP (ref 3.8–10.5)
WBC # FLD AUTO: 8.48 K/UL — SIGNIFICANT CHANGE UP (ref 3.8–10.5)

## 2024-02-22 PROCEDURE — 99233 SBSQ HOSP IP/OBS HIGH 50: CPT

## 2024-02-22 RX ORDER — QUETIAPINE FUMARATE 200 MG/1
12.5 TABLET, FILM COATED ORAL
Refills: 0 | Status: DISCONTINUED | OUTPATIENT
Start: 2024-02-22 | End: 2024-02-23

## 2024-02-22 RX ORDER — OLANZAPINE 15 MG/1
1.25 TABLET, FILM COATED ORAL ONCE
Refills: 0 | Status: COMPLETED | OUTPATIENT
Start: 2024-02-22 | End: 2024-02-22

## 2024-02-22 RX ORDER — QUETIAPINE FUMARATE 200 MG/1
12.5 TABLET, FILM COATED ORAL
Refills: 0 | Status: DISCONTINUED | OUTPATIENT
Start: 2024-02-22 | End: 2024-02-22

## 2024-02-22 RX ADMIN — Medication 81 MILLIGRAM(S): at 13:40

## 2024-02-22 RX ADMIN — HEPARIN SODIUM 5000 UNIT(S): 5000 INJECTION INTRAVENOUS; SUBCUTANEOUS at 14:10

## 2024-02-22 RX ADMIN — HEPARIN SODIUM 5000 UNIT(S): 5000 INJECTION INTRAVENOUS; SUBCUTANEOUS at 06:15

## 2024-02-22 RX ADMIN — ESCITALOPRAM OXALATE 10 MILLIGRAM(S): 10 TABLET, FILM COATED ORAL at 13:40

## 2024-02-22 RX ADMIN — RIVASTIGMINE 3 MILLIGRAM(S): 4.6 PATCH, EXTENDED RELEASE TRANSDERMAL at 06:16

## 2024-02-22 RX ADMIN — LETROZOLE 2.5 MILLIGRAM(S): 2.5 TABLET, FILM COATED ORAL at 13:42

## 2024-02-22 RX ADMIN — QUETIAPINE FUMARATE 12.5 MILLIGRAM(S): 200 TABLET, FILM COATED ORAL at 16:26

## 2024-02-22 RX ADMIN — OLANZAPINE 1.25 MILLIGRAM(S): 15 TABLET, FILM COATED ORAL at 22:00

## 2024-02-22 RX ADMIN — OLANZAPINE 1.25 MILLIGRAM(S): 15 TABLET, FILM COATED ORAL at 16:42

## 2024-02-22 RX ADMIN — AMLODIPINE BESYLATE 10 MILLIGRAM(S): 2.5 TABLET ORAL at 06:15

## 2024-02-22 RX ADMIN — OLANZAPINE 1.25 MILLIGRAM(S): 15 TABLET, FILM COATED ORAL at 15:09

## 2024-02-22 RX ADMIN — Medication 1 DROP(S): at 06:16

## 2024-02-22 NOTE — BH CONSULTATION LIAISON PROGRESS NOTE - NSBHASSESSMENTFT_PSY_ALL_CORE
Patient is a 68 y/o woman with a pmhx of dementia, DM2, HLD,  who was BIB daughter for worsening agitation.  Psychiatry consulted for agitation/medication management. Care coordinated with pt.'s daughter at bedside, pt. presenting with worsening agitation, endorsing suicidal thought and was trying to choke herself with her own hands, family is concerned about recent behaviors and open to medication titration/changes. Plan as below    2/21: Receiving PRNs for intermittent agitation, pleasantly confused/inattentive, oriented to self, firmly denies si and hi.     2/22: Patient oriented to self only, remains confused/inattentive, no si or hi. Required one PRN yesterday.     PLAN:  - May discontinue CO 1:1 from psychiatric perspective, defer observation status to primary team for agitation due to dementia, will benefit from enhanced supervision, have low threshold to restart 1:1 if needed  - Continue Lexapro 10mg PO qd.   - Continue Seroquel 50mg PO QAM  and Seroquel 75mg qhs. START Seroquel 12.5mg po at 4PM. (HOLD for sedation)  - PRN FOR AGGRESSION: Zyprexa 1.25mg q6h PRN IM/PO, may give additional Zyprexa 1.25mg for refractory agitation  - Hold antipsychotics if qtc >500  --Supportive treatment of delirium: 1) Minimize use of benzodiazepines, opioids, anticholinergics, and other deliriogenic agents whenever possible.  2) Maintain sleep wake cycle.  3) Provide frequent reorientation and redirection.  4) Family member at bedside if possible. 5) Provide corrective lenses/hearing aids if required    Case d/w patient's both daughters today Radha and Stanislav, they are in agreement with the above plan.     Care coordinated with Dr. Guerin

## 2024-02-22 NOTE — BH CONSULTATION LIAISON PROGRESS NOTE - NSBHFUPINTERVALHXFT_PSY_A_CORE
Chart reviewed, received Zyprexa PRN x1 last night,  pt. seen/evaluated, oriented to self only, somewhat drowsy but woke up briefly to answer some questions, not agitated, but inattentive, denies si and hi.

## 2024-02-23 LAB
ANION GAP SERPL CALC-SCNC: 14 MMOL/L — SIGNIFICANT CHANGE UP (ref 7–14)
BASOPHILS # BLD AUTO: 0.05 K/UL — SIGNIFICANT CHANGE UP (ref 0–0.2)
BASOPHILS NFR BLD AUTO: 0.7 % — SIGNIFICANT CHANGE UP (ref 0–2)
BUN SERPL-MCNC: 24 MG/DL — HIGH (ref 7–23)
CALCIUM SERPL-MCNC: 10 MG/DL — SIGNIFICANT CHANGE UP (ref 8.4–10.5)
CHLORIDE SERPL-SCNC: 103 MMOL/L — SIGNIFICANT CHANGE UP (ref 98–107)
CO2 SERPL-SCNC: 26 MMOL/L — SIGNIFICANT CHANGE UP (ref 22–31)
CREAT SERPL-MCNC: 0.62 MG/DL — SIGNIFICANT CHANGE UP (ref 0.5–1.3)
EGFR: 96 ML/MIN/1.73M2 — SIGNIFICANT CHANGE UP
EOSINOPHIL # BLD AUTO: 0.23 K/UL — SIGNIFICANT CHANGE UP (ref 0–0.5)
EOSINOPHIL NFR BLD AUTO: 3.1 % — SIGNIFICANT CHANGE UP (ref 0–6)
GLUCOSE SERPL-MCNC: 91 MG/DL — SIGNIFICANT CHANGE UP (ref 70–99)
HCT VFR BLD CALC: 39 % — SIGNIFICANT CHANGE UP (ref 34.5–45)
HGB BLD-MCNC: 13.3 G/DL — SIGNIFICANT CHANGE UP (ref 11.5–15.5)
IANC: 4.78 K/UL — SIGNIFICANT CHANGE UP (ref 1.8–7.4)
IMM GRANULOCYTES NFR BLD AUTO: 0.5 % — SIGNIFICANT CHANGE UP (ref 0–0.9)
LYMPHOCYTES # BLD AUTO: 1.61 K/UL — SIGNIFICANT CHANGE UP (ref 1–3.3)
LYMPHOCYTES # BLD AUTO: 21.6 % — SIGNIFICANT CHANGE UP (ref 13–44)
MAGNESIUM SERPL-MCNC: 2.3 MG/DL — SIGNIFICANT CHANGE UP (ref 1.6–2.6)
MCHC RBC-ENTMCNC: 32.2 PG — SIGNIFICANT CHANGE UP (ref 27–34)
MCHC RBC-ENTMCNC: 34.1 GM/DL — SIGNIFICANT CHANGE UP (ref 32–36)
MCV RBC AUTO: 94.4 FL — SIGNIFICANT CHANGE UP (ref 80–100)
MONOCYTES # BLD AUTO: 0.75 K/UL — SIGNIFICANT CHANGE UP (ref 0–0.9)
MONOCYTES NFR BLD AUTO: 10.1 % — SIGNIFICANT CHANGE UP (ref 2–14)
NEUTROPHILS # BLD AUTO: 4.78 K/UL — SIGNIFICANT CHANGE UP (ref 1.8–7.4)
NEUTROPHILS NFR BLD AUTO: 64 % — SIGNIFICANT CHANGE UP (ref 43–77)
NRBC # BLD: 0 /100 WBCS — SIGNIFICANT CHANGE UP (ref 0–0)
NRBC # FLD: 0 K/UL — SIGNIFICANT CHANGE UP (ref 0–0)
PHOSPHATE SERPL-MCNC: 4.5 MG/DL — SIGNIFICANT CHANGE UP (ref 2.5–4.5)
PLATELET # BLD AUTO: 217 K/UL — SIGNIFICANT CHANGE UP (ref 150–400)
POTASSIUM SERPL-MCNC: 3.9 MMOL/L — SIGNIFICANT CHANGE UP (ref 3.5–5.3)
POTASSIUM SERPL-SCNC: 3.9 MMOL/L — SIGNIFICANT CHANGE UP (ref 3.5–5.3)
RBC # BLD: 4.13 M/UL — SIGNIFICANT CHANGE UP (ref 3.8–5.2)
RBC # FLD: 12.6 % — SIGNIFICANT CHANGE UP (ref 10.3–14.5)
SODIUM SERPL-SCNC: 143 MMOL/L — SIGNIFICANT CHANGE UP (ref 135–145)
WBC # BLD: 7.46 K/UL — SIGNIFICANT CHANGE UP (ref 3.8–10.5)
WBC # FLD AUTO: 7.46 K/UL — SIGNIFICANT CHANGE UP (ref 3.8–10.5)

## 2024-02-23 PROCEDURE — 99231 SBSQ HOSP IP/OBS SF/LOW 25: CPT

## 2024-02-23 RX ORDER — LANOLIN ALCOHOL/MO/W.PET/CERES
3 CREAM (GRAM) TOPICAL ONCE
Refills: 0 | Status: COMPLETED | OUTPATIENT
Start: 2024-02-23 | End: 2024-02-23

## 2024-02-23 RX ORDER — OLANZAPINE 15 MG/1
1.12 TABLET, FILM COATED ORAL ONCE
Refills: 0 | Status: DISCONTINUED | OUTPATIENT
Start: 2024-02-23 | End: 2024-02-23

## 2024-02-23 RX ORDER — QUETIAPINE FUMARATE 200 MG/1
75 TABLET, FILM COATED ORAL
Refills: 0 | Status: COMPLETED | OUTPATIENT
Start: 2024-02-23 | End: 2024-02-23

## 2024-02-23 RX ORDER — QUETIAPINE FUMARATE 200 MG/1
12.5 TABLET, FILM COATED ORAL ONCE
Refills: 0 | Status: DISCONTINUED | OUTPATIENT
Start: 2024-02-23 | End: 2024-02-23

## 2024-02-23 RX ORDER — OLANZAPINE 15 MG/1
1.25 TABLET, FILM COATED ORAL ONCE
Refills: 0 | Status: COMPLETED | OUTPATIENT
Start: 2024-02-23 | End: 2024-02-23

## 2024-02-23 RX ORDER — QUETIAPINE FUMARATE 200 MG/1
25 TABLET, FILM COATED ORAL
Refills: 0 | Status: DISCONTINUED | OUTPATIENT
Start: 2024-02-23 | End: 2024-02-23

## 2024-02-23 RX ORDER — RISPERIDONE 4 MG/1
0.5 TABLET ORAL
Refills: 0 | Status: DISCONTINUED | OUTPATIENT
Start: 2024-02-24 | End: 2024-02-28

## 2024-02-23 RX ORDER — RISPERIDONE 4 MG/1
0.5 TABLET ORAL
Refills: 0 | Status: DISCONTINUED | OUTPATIENT
Start: 2024-02-23 | End: 2024-02-23

## 2024-02-23 RX ORDER — QUETIAPINE FUMARATE 200 MG/1
75 TABLET, FILM COATED ORAL
Refills: 0 | Status: DISCONTINUED | OUTPATIENT
Start: 2024-02-23 | End: 2024-02-23

## 2024-02-23 RX ADMIN — QUETIAPINE FUMARATE 25 MILLIGRAM(S): 200 TABLET, FILM COATED ORAL at 16:00

## 2024-02-23 RX ADMIN — HEPARIN SODIUM 5000 UNIT(S): 5000 INJECTION INTRAVENOUS; SUBCUTANEOUS at 00:28

## 2024-02-23 RX ADMIN — Medication 25 MILLIGRAM(S): at 20:01

## 2024-02-23 RX ADMIN — HEPARIN SODIUM 5000 UNIT(S): 5000 INJECTION INTRAVENOUS; SUBCUTANEOUS at 13:00

## 2024-02-23 RX ADMIN — RIVASTIGMINE 3 MILLIGRAM(S): 4.6 PATCH, EXTENDED RELEASE TRANSDERMAL at 17:58

## 2024-02-23 RX ADMIN — Medication 3 MILLIGRAM(S): at 19:32

## 2024-02-23 RX ADMIN — OLANZAPINE 1.25 MILLIGRAM(S): 15 TABLET, FILM COATED ORAL at 16:37

## 2024-02-23 RX ADMIN — QUETIAPINE FUMARATE 50 MILLIGRAM(S): 200 TABLET, FILM COATED ORAL at 10:21

## 2024-02-23 RX ADMIN — QUETIAPINE FUMARATE 75 MILLIGRAM(S): 200 TABLET, FILM COATED ORAL at 00:32

## 2024-02-23 RX ADMIN — OLANZAPINE 1.25 MILLIGRAM(S): 15 TABLET, FILM COATED ORAL at 14:25

## 2024-02-23 RX ADMIN — OLANZAPINE 1.25 MILLIGRAM(S): 15 TABLET, FILM COATED ORAL at 23:43

## 2024-02-23 RX ADMIN — Medication 81 MILLIGRAM(S): at 13:00

## 2024-02-23 RX ADMIN — RIVASTIGMINE 3 MILLIGRAM(S): 4.6 PATCH, EXTENDED RELEASE TRANSDERMAL at 00:27

## 2024-02-23 RX ADMIN — ATORVASTATIN CALCIUM 20 MILLIGRAM(S): 80 TABLET, FILM COATED ORAL at 20:00

## 2024-02-23 RX ADMIN — QUETIAPINE FUMARATE 75 MILLIGRAM(S): 200 TABLET, FILM COATED ORAL at 22:21

## 2024-02-23 RX ADMIN — Medication 1 DROP(S): at 07:18

## 2024-02-23 RX ADMIN — ATORVASTATIN CALCIUM 20 MILLIGRAM(S): 80 TABLET, FILM COATED ORAL at 00:28

## 2024-02-23 RX ADMIN — ESCITALOPRAM OXALATE 10 MILLIGRAM(S): 10 TABLET, FILM COATED ORAL at 13:00

## 2024-02-23 RX ADMIN — HEPARIN SODIUM 5000 UNIT(S): 5000 INJECTION INTRAVENOUS; SUBCUTANEOUS at 20:00

## 2024-02-23 RX ADMIN — Medication 25 MILLIGRAM(S): at 00:28

## 2024-02-23 RX ADMIN — OLANZAPINE 1.25 MILLIGRAM(S): 15 TABLET, FILM COATED ORAL at 15:50

## 2024-02-23 RX ADMIN — RIVASTIGMINE 3 MILLIGRAM(S): 4.6 PATCH, EXTENDED RELEASE TRANSDERMAL at 10:20

## 2024-02-23 RX ADMIN — AMLODIPINE BESYLATE 10 MILLIGRAM(S): 2.5 TABLET ORAL at 06:26

## 2024-02-23 RX ADMIN — LETROZOLE 2.5 MILLIGRAM(S): 2.5 TABLET, FILM COATED ORAL at 13:00

## 2024-02-23 RX ADMIN — Medication 3 MILLIGRAM(S): at 00:28

## 2024-02-23 RX ADMIN — HEPARIN SODIUM 5000 UNIT(S): 5000 INJECTION INTRAVENOUS; SUBCUTANEOUS at 06:26

## 2024-02-23 NOTE — CHART NOTE - NSCHARTNOTEFT_GEN_A_CORE
[facility] called for patient attempting to leave the floor, and aggression. Pt escorted back to room and PRN administer. Discussed w/ Psych, will increase pt standing 4pm Seroquel tonight and monitor over weekend. Dr. Guerin made aware.    Deandre Griffin, PA-C  pager [phone] [facility] called for patient attempting to leave the floor, and aggression. Pt escorted back to room and PRN administer. Discussed w/ Psych, will increase pt standing 4pm Seroquel tonight and monitor over weekend. Dr. Guerin made aware.    Addendum 2/23/23 4:42pm  - Additional [facility] called for agitation against self and staff outside of room yelling in the hallway, Pt redirected to room, 1.25mg Zyprexa ordered.     TIBURCIO HarringtonC  pager [phone] [facility] called for patient attempting to leave the floor, and aggression. Pt escorted back to room and PRN administer. Discussed w/ Psych, will increase pt standing 4pm Seroquel tonight and monitor over weekend. Dr. Guerin made aware.    Addendum 2/23/23 4:42pm  - Additional [facility] called for agitation against self and staff outside of room yelling in the hallway, Pt redirected to room, 1.25mg Zyprexa ordered for refractory agitation.    TIBURCIO HarringtonC  pager [phone] [facility] called for patient attempting to leave the floor, and aggression. Pt escorted back to room and PRN administer. Discussed w/ Psych, will increase pt standing 4pm Seroquel tonight and monitor over weekend. Dr. Guerin made aware.    Addendum 2/23/23 4:42pm  - Additional [facility] called for agitation against self and staff outside of room yelling in the hallway, Pt redirected to room, 1.25mg Zyprexa ordered for refractory agitation. Discussed w/ psych, will DC Seroquel 2/2 ineffectiveness and begin Risperidone 0.5mg 7am and 5pm    Deandre Griffin PA-C  pager [phone]

## 2024-02-23 NOTE — BH CONSULTATION LIAISON PROGRESS NOTE - NSBHASSESSMENTFT_PSY_ALL_CORE
Patient is a 68 y/o woman with a pmhx of dementia, DM2, HLD,  who was BIB daughter for worsening agitation.  Psychiatry consulted for agitation/medication management. Care coordinated with pt.'s daughter at bedside, pt. presenting with worsening agitation, endorsing suicidal thought and was trying to choke herself with her own hands, family is concerned about recent behaviors and open to medication titration/changes. Plan as below    2/21: Receiving PRNs for intermittent agitation, pleasantly confused/inattentive, oriented to self, firmly denies si and hi.     2/22: Patient oriented to self only, remains confused/inattentive, no si or hi. Required one PRN yesterday.   2/23: asleep, unable to tolerate exam at this time. Frequent PRNs, will adjust medication to try and decrease use of PRN medications.     PLAN:  - May discontinue CO 1:1 from psychiatric perspective, defer observation status to primary team for agitation due to dementia, will benefit from enhanced supervision, have low threshold to restart 1:1 if needed  - Continue Lexapro 10mg PO qd.   - INCREASE Seroquel 50mg PO QAM  and Seroquel 75mg qhs. *Seroquel 25mg po at 4PM. (HOLD for sedation)  - PRN FOR AGGRESSION: Zyprexa 1.25mg q6h PRN IM/PO, may give additional Zyprexa 1.25mg for refractory agitation  - Hold antipsychotics if qtc >500  --Supportive treatment of delirium: 1) Minimize use of benzodiazepines, opioids, anticholinergics, and other deliriogenic agents whenever possible.  2) Maintain sleep wake cycle.  3) Provide frequent reorientation and redirection.  4) Family member at bedside if possible. 5) Provide corrective lenses/hearing aids if required         Patient is a 68 y/o woman with a pmhx of dementia, DM2, HLD,  who was BIB daughter for worsening agitation.  Psychiatry consulted for agitation/medication management. Care coordinated with pt.'s daughter at bedside, pt. presenting with worsening agitation, endorsing suicidal thought and was trying to choke herself with her own hands, family is concerned about recent behaviors and open to medication titration/changes. Plan as below    2/21: Receiving PRNs for intermittent agitation, pleasantly confused/inattentive, oriented to self, firmly denies si and hi.     2/22: Patient oriented to self only, remains confused/inattentive, no si or hi. Required one PRN yesterday.   2/23: asleep, unable to tolerate exam at this time. Frequent PRNs, will adjust medication to try and decrease use of PRN medications.     PLAN:  - May discontinue CO 1:1 from psychiatric perspective, defer observation status to primary team for agitation due to dementia, will benefit from enhanced supervision, have low threshold to restart 1:1 if needed  - Continue Lexapro 10mg PO qd.   - changed seroquel to risperidone 0.5mg 7am and 5pm ( see append)  - PRN FOR AGGRESSION: Zyprexa 1.25mg q6h PRN IM/PO, may give additional Zyprexa 1.25mg for refractory agitation  - Hold antipsychotics if qtc >500  --Supportive treatment of delirium: 1) Minimize use of benzodiazepines, opioids, anticholinergics, and other deliriogenic agents whenever possible.  2) Maintain sleep wake cycle.  3) Provide frequent reorientation and redirection.  4) Family member at bedside if possible. 5) Provide corrective lenses/hearing aids if required

## 2024-02-23 NOTE — BH CONSULTATION LIAISON PROGRESS NOTE - NSBHFUPINTERVALHXFT_PSY_A_CORE
Patient was seen and assessed at bedside. Patient is asleep, unable to maintain awakening to have discussion or evaluation.  Zyprexa PRN x 3 given  Melatonin given

## 2024-02-24 LAB
ANION GAP SERPL CALC-SCNC: 11 MMOL/L — SIGNIFICANT CHANGE UP (ref 7–14)
BUN SERPL-MCNC: 28 MG/DL — HIGH (ref 7–23)
CALCIUM SERPL-MCNC: 9.4 MG/DL — SIGNIFICANT CHANGE UP (ref 8.4–10.5)
CHLORIDE SERPL-SCNC: 104 MMOL/L — SIGNIFICANT CHANGE UP (ref 98–107)
CO2 SERPL-SCNC: 26 MMOL/L — SIGNIFICANT CHANGE UP (ref 22–31)
CREAT SERPL-MCNC: 0.69 MG/DL — SIGNIFICANT CHANGE UP (ref 0.5–1.3)
EGFR: 94 ML/MIN/1.73M2 — SIGNIFICANT CHANGE UP
GLUCOSE SERPL-MCNC: 105 MG/DL — HIGH (ref 70–99)
HCT VFR BLD CALC: 35.4 % — SIGNIFICANT CHANGE UP (ref 34.5–45)
HGB BLD-MCNC: 11.7 G/DL — SIGNIFICANT CHANGE UP (ref 11.5–15.5)
MAGNESIUM SERPL-MCNC: 2.1 MG/DL — SIGNIFICANT CHANGE UP (ref 1.6–2.6)
MCHC RBC-ENTMCNC: 30.9 PG — SIGNIFICANT CHANGE UP (ref 27–34)
MCHC RBC-ENTMCNC: 33.1 GM/DL — SIGNIFICANT CHANGE UP (ref 32–36)
MCV RBC AUTO: 93.4 FL — SIGNIFICANT CHANGE UP (ref 80–100)
NRBC # BLD: 0 /100 WBCS — SIGNIFICANT CHANGE UP (ref 0–0)
NRBC # FLD: 0 K/UL — SIGNIFICANT CHANGE UP (ref 0–0)
PHOSPHATE SERPL-MCNC: 4.7 MG/DL — HIGH (ref 2.5–4.5)
PLATELET # BLD AUTO: 204 K/UL — SIGNIFICANT CHANGE UP (ref 150–400)
POTASSIUM SERPL-MCNC: 3.7 MMOL/L — SIGNIFICANT CHANGE UP (ref 3.5–5.3)
POTASSIUM SERPL-SCNC: 3.7 MMOL/L — SIGNIFICANT CHANGE UP (ref 3.5–5.3)
RBC # BLD: 3.79 M/UL — LOW (ref 3.8–5.2)
RBC # FLD: 12.7 % — SIGNIFICANT CHANGE UP (ref 10.3–14.5)
SODIUM SERPL-SCNC: 141 MMOL/L — SIGNIFICANT CHANGE UP (ref 135–145)
WBC # BLD: 7.46 K/UL — SIGNIFICANT CHANGE UP (ref 3.8–10.5)
WBC # FLD AUTO: 7.46 K/UL — SIGNIFICANT CHANGE UP (ref 3.8–10.5)

## 2024-02-24 RX ADMIN — Medication 25 MILLIGRAM(S): at 21:06

## 2024-02-24 RX ADMIN — HEPARIN SODIUM 5000 UNIT(S): 5000 INJECTION INTRAVENOUS; SUBCUTANEOUS at 21:06

## 2024-02-24 RX ADMIN — RIVASTIGMINE 3 MILLIGRAM(S): 4.6 PATCH, EXTENDED RELEASE TRANSDERMAL at 16:35

## 2024-02-24 RX ADMIN — RIVASTIGMINE 3 MILLIGRAM(S): 4.6 PATCH, EXTENDED RELEASE TRANSDERMAL at 06:04

## 2024-02-24 RX ADMIN — ATORVASTATIN CALCIUM 20 MILLIGRAM(S): 80 TABLET, FILM COATED ORAL at 21:06

## 2024-02-24 RX ADMIN — HEPARIN SODIUM 5000 UNIT(S): 5000 INJECTION INTRAVENOUS; SUBCUTANEOUS at 06:03

## 2024-02-24 RX ADMIN — AMLODIPINE BESYLATE 10 MILLIGRAM(S): 2.5 TABLET ORAL at 06:04

## 2024-02-24 RX ADMIN — RISPERIDONE 0.5 MILLIGRAM(S): 4 TABLET ORAL at 16:35

## 2024-02-24 RX ADMIN — HEPARIN SODIUM 5000 UNIT(S): 5000 INJECTION INTRAVENOUS; SUBCUTANEOUS at 12:29

## 2024-02-24 RX ADMIN — Medication 1 DROP(S): at 06:05

## 2024-02-24 RX ADMIN — ESCITALOPRAM OXALATE 10 MILLIGRAM(S): 10 TABLET, FILM COATED ORAL at 12:30

## 2024-02-24 RX ADMIN — RISPERIDONE 0.5 MILLIGRAM(S): 4 TABLET ORAL at 06:04

## 2024-02-24 RX ADMIN — Medication 81 MILLIGRAM(S): at 12:30

## 2024-02-24 RX ADMIN — Medication 1 DROP(S): at 16:35

## 2024-02-24 RX ADMIN — LETROZOLE 2.5 MILLIGRAM(S): 2.5 TABLET, FILM COATED ORAL at 12:30

## 2024-02-25 LAB
ANION GAP SERPL CALC-SCNC: 17 MMOL/L — HIGH (ref 7–14)
BUN SERPL-MCNC: 17 MG/DL — SIGNIFICANT CHANGE UP (ref 7–23)
CALCIUM SERPL-MCNC: 9.9 MG/DL — SIGNIFICANT CHANGE UP (ref 8.4–10.5)
CHLORIDE SERPL-SCNC: 102 MMOL/L — SIGNIFICANT CHANGE UP (ref 98–107)
CO2 SERPL-SCNC: 20 MMOL/L — LOW (ref 22–31)
CREAT SERPL-MCNC: 0.55 MG/DL — SIGNIFICANT CHANGE UP (ref 0.5–1.3)
EGFR: 99 ML/MIN/1.73M2 — SIGNIFICANT CHANGE UP
GLUCOSE SERPL-MCNC: 124 MG/DL — HIGH (ref 70–99)
HCT VFR BLD CALC: 40.2 % — SIGNIFICANT CHANGE UP (ref 34.5–45)
HGB BLD-MCNC: 13.5 G/DL — SIGNIFICANT CHANGE UP (ref 11.5–15.5)
MAGNESIUM SERPL-MCNC: 2.1 MG/DL — SIGNIFICANT CHANGE UP (ref 1.6–2.6)
MCHC RBC-ENTMCNC: 31.5 PG — SIGNIFICANT CHANGE UP (ref 27–34)
MCHC RBC-ENTMCNC: 33.6 GM/DL — SIGNIFICANT CHANGE UP (ref 32–36)
MCV RBC AUTO: 93.9 FL — SIGNIFICANT CHANGE UP (ref 80–100)
NRBC # BLD: 0 /100 WBCS — SIGNIFICANT CHANGE UP (ref 0–0)
NRBC # FLD: 0 K/UL — SIGNIFICANT CHANGE UP (ref 0–0)
PHOSPHATE SERPL-MCNC: 3.8 MG/DL — SIGNIFICANT CHANGE UP (ref 2.5–4.5)
PLATELET # BLD AUTO: 218 K/UL — SIGNIFICANT CHANGE UP (ref 150–400)
POTASSIUM SERPL-MCNC: 4.2 MMOL/L — SIGNIFICANT CHANGE UP (ref 3.5–5.3)
POTASSIUM SERPL-SCNC: 4.2 MMOL/L — SIGNIFICANT CHANGE UP (ref 3.5–5.3)
RBC # BLD: 4.28 M/UL — SIGNIFICANT CHANGE UP (ref 3.8–5.2)
RBC # FLD: 12.5 % — SIGNIFICANT CHANGE UP (ref 10.3–14.5)
SODIUM SERPL-SCNC: 139 MMOL/L — SIGNIFICANT CHANGE UP (ref 135–145)
WBC # BLD: 9.17 K/UL — SIGNIFICANT CHANGE UP (ref 3.8–10.5)
WBC # FLD AUTO: 9.17 K/UL — SIGNIFICANT CHANGE UP (ref 3.8–10.5)

## 2024-02-25 RX ADMIN — HEPARIN SODIUM 5000 UNIT(S): 5000 INJECTION INTRAVENOUS; SUBCUTANEOUS at 15:44

## 2024-02-25 RX ADMIN — Medication 1 DROP(S): at 16:48

## 2024-02-25 RX ADMIN — RISPERIDONE 0.5 MILLIGRAM(S): 4 TABLET ORAL at 16:16

## 2024-02-25 RX ADMIN — ATORVASTATIN CALCIUM 20 MILLIGRAM(S): 80 TABLET, FILM COATED ORAL at 21:52

## 2024-02-25 RX ADMIN — Medication 25 MILLIGRAM(S): at 21:53

## 2024-02-25 RX ADMIN — AMLODIPINE BESYLATE 10 MILLIGRAM(S): 2.5 TABLET ORAL at 05:38

## 2024-02-25 RX ADMIN — Medication 81 MILLIGRAM(S): at 15:44

## 2024-02-25 RX ADMIN — RIVASTIGMINE 3 MILLIGRAM(S): 4.6 PATCH, EXTENDED RELEASE TRANSDERMAL at 16:16

## 2024-02-25 RX ADMIN — ESCITALOPRAM OXALATE 10 MILLIGRAM(S): 10 TABLET, FILM COATED ORAL at 15:44

## 2024-02-25 RX ADMIN — Medication 3 MILLIGRAM(S): at 00:42

## 2024-02-25 RX ADMIN — HEPARIN SODIUM 5000 UNIT(S): 5000 INJECTION INTRAVENOUS; SUBCUTANEOUS at 05:38

## 2024-02-25 RX ADMIN — RIVASTIGMINE 3 MILLIGRAM(S): 4.6 PATCH, EXTENDED RELEASE TRANSDERMAL at 05:38

## 2024-02-25 RX ADMIN — RISPERIDONE 0.5 MILLIGRAM(S): 4 TABLET ORAL at 05:58

## 2024-02-25 RX ADMIN — HEPARIN SODIUM 5000 UNIT(S): 5000 INJECTION INTRAVENOUS; SUBCUTANEOUS at 21:53

## 2024-02-25 RX ADMIN — Medication 1 DROP(S): at 05:39

## 2024-02-25 RX ADMIN — LETROZOLE 2.5 MILLIGRAM(S): 2.5 TABLET, FILM COATED ORAL at 15:44

## 2024-02-25 NOTE — BH CONSULTATION LIAISON PROGRESS NOTE - ATTENDING COMMENTS
Chart reviewed. Pt discussed with resident. Agree with above assessment/recs. Will continue to follow

## 2024-02-25 NOTE — BH CONSULTATION LIAISON PROGRESS NOTE - NSBHASSESSMENTFT_PSY_ALL_CORE
Patient is a 68 y/o woman with a pmhx of dementia, DM2, HLD,  who was BIB daughter for worsening agitation.  Psychiatry consulted for agitation/medication management. Care coordinated with pt.'s daughter at bedside, pt. presenting with worsening agitation, endorsing suicidal thought and was trying to choke herself with her own hands, family is concerned about recent behaviors and open to medication titration/changes. Plan as below    2/21: Receiving PRNs for intermittent agitation, pleasantly confused/inattentive, oriented to self, firmly denies si and hi.     2/22: Patient oriented to self only, remains confused/inattentive, no si or hi. Required one PRN yesterday.   2/23: asleep, unable to tolerate exam at this time. Frequent PRNs, will adjust medication to try and decrease use of PRN medications.   2/25: pt not receiving PRN medications, noted to be calm, pleasant, cooperative. c/w current plan     PLAN:  - May discontinue CO 1:1 from psychiatric perspective, defer observation status to primary team for agitation due to dementia, will benefit from enhanced supervision, have low threshold to restart 1:1 if needed  - Continue Lexapro 10mg PO qd.   - continue risperidone 0.5mg 7am and 5pm ( see append)  - PRN FOR AGGRESSION: Zyprexa 1.25mg q6h PRN IM/PO, may give additional Zyprexa 1.25mg for refractory agitation  - Hold antipsychotics if qtc >500  --Supportive treatment of delirium: 1) Minimize use of benzodiazepines, opioids, anticholinergics, and other deliriogenic agents whenever possible.  2) Maintain sleep wake cycle.  3) Provide frequent reorientation and redirection.  4) Family member at bedside if possible. 5) Provide corrective lenses/hearing aids if required

## 2024-02-25 NOTE — BH CONSULTATION LIAISON PROGRESS NOTE - NSBHFUPINTERVALHXFT_PSY_A_CORE
Patient was seen and assessed at bedside. No PRN medications overnight. Calm and cooperative on approach, sitting on a chair. A&O to self. Does not know where she is. Thinks that it is 1958, laughs when told it is 2024 and says "time goes by quickly doesn't it?" Does not know why she is here. Says her appetite is "always good." Denies any acute concerns. Denies AVH/SI.

## 2024-02-25 NOTE — BH CONSULTATION LIAISON PROGRESS NOTE - NSBHMSESPEECH_PSY_A_CORE
Normal volume, rate, productivity, spontaneity and articulation
Abnormal as indicated, otherwise normal...
Non-verbal: unable to assess speech further
Abnormal as indicated, otherwise normal...

## 2024-02-26 ENCOUNTER — APPOINTMENT (OUTPATIENT)
Dept: GERIATRICS | Facility: CLINIC | Age: 69
End: 2024-02-26

## 2024-02-26 LAB
ANION GAP SERPL CALC-SCNC: 14 MMOL/L — SIGNIFICANT CHANGE UP (ref 7–14)
BUN SERPL-MCNC: 21 MG/DL — SIGNIFICANT CHANGE UP (ref 7–23)
CALCIUM SERPL-MCNC: 9.7 MG/DL — SIGNIFICANT CHANGE UP (ref 8.4–10.5)
CHLORIDE SERPL-SCNC: 104 MMOL/L — SIGNIFICANT CHANGE UP (ref 98–107)
CO2 SERPL-SCNC: 24 MMOL/L — SIGNIFICANT CHANGE UP (ref 22–31)
CREAT SERPL-MCNC: 0.56 MG/DL — SIGNIFICANT CHANGE UP (ref 0.5–1.3)
EGFR: 99 ML/MIN/1.73M2 — SIGNIFICANT CHANGE UP
GLUCOSE SERPL-MCNC: 93 MG/DL — SIGNIFICANT CHANGE UP (ref 70–99)
HCT VFR BLD CALC: 37.4 % — SIGNIFICANT CHANGE UP (ref 34.5–45)
HGB BLD-MCNC: 12.3 G/DL — SIGNIFICANT CHANGE UP (ref 11.5–15.5)
MAGNESIUM SERPL-MCNC: 2.3 MG/DL — SIGNIFICANT CHANGE UP (ref 1.6–2.6)
MCHC RBC-ENTMCNC: 30.9 PG — SIGNIFICANT CHANGE UP (ref 27–34)
MCHC RBC-ENTMCNC: 32.9 GM/DL — SIGNIFICANT CHANGE UP (ref 32–36)
MCV RBC AUTO: 94 FL — SIGNIFICANT CHANGE UP (ref 80–100)
NRBC # BLD: 0 /100 WBCS — SIGNIFICANT CHANGE UP (ref 0–0)
NRBC # FLD: 0 K/UL — SIGNIFICANT CHANGE UP (ref 0–0)
PHOSPHATE SERPL-MCNC: 4.3 MG/DL — SIGNIFICANT CHANGE UP (ref 2.5–4.5)
PLATELET # BLD AUTO: 240 K/UL — SIGNIFICANT CHANGE UP (ref 150–400)
POTASSIUM SERPL-MCNC: 3.6 MMOL/L — SIGNIFICANT CHANGE UP (ref 3.5–5.3)
POTASSIUM SERPL-SCNC: 3.6 MMOL/L — SIGNIFICANT CHANGE UP (ref 3.5–5.3)
RBC # BLD: 3.98 M/UL — SIGNIFICANT CHANGE UP (ref 3.8–5.2)
RBC # FLD: 12.8 % — SIGNIFICANT CHANGE UP (ref 10.3–14.5)
SODIUM SERPL-SCNC: 142 MMOL/L — SIGNIFICANT CHANGE UP (ref 135–145)
WBC # BLD: 8.29 K/UL — SIGNIFICANT CHANGE UP (ref 3.8–10.5)
WBC # FLD AUTO: 8.29 K/UL — SIGNIFICANT CHANGE UP (ref 3.8–10.5)

## 2024-02-26 PROCEDURE — 99232 SBSQ HOSP IP/OBS MODERATE 35: CPT

## 2024-02-26 RX ADMIN — HEPARIN SODIUM 5000 UNIT(S): 5000 INJECTION INTRAVENOUS; SUBCUTANEOUS at 12:10

## 2024-02-26 RX ADMIN — HEPARIN SODIUM 5000 UNIT(S): 5000 INJECTION INTRAVENOUS; SUBCUTANEOUS at 05:16

## 2024-02-26 RX ADMIN — AMLODIPINE BESYLATE 10 MILLIGRAM(S): 2.5 TABLET ORAL at 05:16

## 2024-02-26 RX ADMIN — RIVASTIGMINE 3 MILLIGRAM(S): 4.6 PATCH, EXTENDED RELEASE TRANSDERMAL at 17:28

## 2024-02-26 RX ADMIN — RIVASTIGMINE 3 MILLIGRAM(S): 4.6 PATCH, EXTENDED RELEASE TRANSDERMAL at 05:16

## 2024-02-26 RX ADMIN — RISPERIDONE 0.5 MILLIGRAM(S): 4 TABLET ORAL at 05:57

## 2024-02-26 RX ADMIN — Medication 25 MILLIGRAM(S): at 21:45

## 2024-02-26 RX ADMIN — HEPARIN SODIUM 5000 UNIT(S): 5000 INJECTION INTRAVENOUS; SUBCUTANEOUS at 21:44

## 2024-02-26 RX ADMIN — Medication 1 DROP(S): at 17:28

## 2024-02-26 RX ADMIN — Medication 1 DROP(S): at 05:58

## 2024-02-26 RX ADMIN — RISPERIDONE 0.5 MILLIGRAM(S): 4 TABLET ORAL at 17:29

## 2024-02-26 RX ADMIN — LETROZOLE 2.5 MILLIGRAM(S): 2.5 TABLET, FILM COATED ORAL at 12:10

## 2024-02-26 RX ADMIN — Medication 81 MILLIGRAM(S): at 12:10

## 2024-02-26 RX ADMIN — ESCITALOPRAM OXALATE 10 MILLIGRAM(S): 10 TABLET, FILM COATED ORAL at 12:10

## 2024-02-26 RX ADMIN — ATORVASTATIN CALCIUM 20 MILLIGRAM(S): 80 TABLET, FILM COATED ORAL at 21:45

## 2024-02-26 NOTE — BH CONSULTATION LIAISON PROGRESS NOTE - NSBHCHARTREVIEWLAB_PSY_A_CORE FT
12.3   8.29  )-----------( 240      ( 26 Feb 2024 06:00 )             37.4     02-26    142  |  104  |  21  ----------------------------<  93  3.6   |  24  |  0.56    Ca    9.7      26 Feb 2024 06:00  Phos  4.3     02-26  Mg     2.30     02-26

## 2024-02-26 NOTE — BH CONSULTATION LIAISON PROGRESS NOTE - NSBHASSESSMENTFT_PSY_ALL_CORE
Patient is a 70 y/o woman with a pmhx of dementia, DM2, HLD,  who was BIB daughter for worsening agitation.  Psychiatry consulted for agitation/medication management. Care coordinated with pt.'s daughter at bedside, pt. presenting with worsening agitation, endorsing suicidal thought and was trying to choke herself with her own hands, family is concerned about recent behaviors and open to medication titration/changes. Plan as below    2/21: Receiving PRNs for intermittent agitation, pleasantly confused/inattentive, oriented to self, firmly denies si and hi.   2/22: Patient oriented to self only, remains confused/inattentive, no si or hi. Required one PRN yesterday.   2/23: asleep, unable to tolerate exam at this time. Frequent PRNs, will adjust medication to try and decrease use of PRN medications.   2/25: pt not receiving PRN medications, noted to be calm, pleasant, cooperative. c/w current plan     2/26: seems to be improving. will not change mediction plan at this time.     PLAN:  - May discontinue CO 1:1 from psychiatric perspective, defer observation status to primary team for agitation due to dementia, will benefit from enhanced supervision, have low threshold to restart 1:1 if needed  - Continue Lexapro 10mg PO qd.   - continue risperidone 0.5mg 7am and 5pm ( see append)  - PRN FOR AGGRESSION: Zyprexa 1.25mg q6h PRN IM/PO, may give additional Zyprexa 1.25mg for refractory agitation  - Hold antipsychotics if qtc >500  --Supportive treatment of delirium: 1) Minimize use of benzodiazepines, opioids, anticholinergics, and other deliriogenic agents whenever possible.  2) Maintain sleep wake cycle.  3) Provide frequent reorientation and redirection.  4) Family member at bedside if possible. 5) Provide corrective lenses/hearing aids if required        Dispo- unlikely will need or meet criteria for inpatient psychiatry

## 2024-02-26 NOTE — BH CONSULTATION LIAISON PROGRESS NOTE - NSBHFUPINTERVALHXFT_PSY_A_CORE
Discussed during IDR rounds. No PRNs in 2 days.  Seen this AM. She is sitting in hallway calmly in a silvestre-chair playing with a towel. She does not seem to know where she is. Denies SI. No overt signs of paranoia. Believes that people are taking care of her. No overt AH/VH.    I spoke with daughter Radha who does believe that patient is starting to improve from an agitation standpoint. She is curious about possible assisted living options. I discussed that if patient continues to do well, I personally do not see benefit of inpatient psychiatric admission. She is in agreement.

## 2024-02-27 ENCOUNTER — NON-APPOINTMENT (OUTPATIENT)
Age: 69
End: 2024-02-27

## 2024-02-27 LAB
ANION GAP SERPL CALC-SCNC: 11 MMOL/L — SIGNIFICANT CHANGE UP (ref 7–14)
BUN SERPL-MCNC: 24 MG/DL — HIGH (ref 7–23)
CALCIUM SERPL-MCNC: 9.1 MG/DL — SIGNIFICANT CHANGE UP (ref 8.4–10.5)
CHLORIDE SERPL-SCNC: 108 MMOL/L — HIGH (ref 98–107)
CO2 SERPL-SCNC: 25 MMOL/L — SIGNIFICANT CHANGE UP (ref 22–31)
CREAT SERPL-MCNC: 0.61 MG/DL — SIGNIFICANT CHANGE UP (ref 0.5–1.3)
EGFR: 97 ML/MIN/1.73M2 — SIGNIFICANT CHANGE UP
GLUCOSE SERPL-MCNC: 105 MG/DL — HIGH (ref 70–99)
HCT VFR BLD CALC: 35.9 % — SIGNIFICANT CHANGE UP (ref 34.5–45)
HGB BLD-MCNC: 11.9 G/DL — SIGNIFICANT CHANGE UP (ref 11.5–15.5)
MAGNESIUM SERPL-MCNC: 2 MG/DL — SIGNIFICANT CHANGE UP (ref 1.6–2.6)
MCHC RBC-ENTMCNC: 31.3 PG — SIGNIFICANT CHANGE UP (ref 27–34)
MCHC RBC-ENTMCNC: 33.1 GM/DL — SIGNIFICANT CHANGE UP (ref 32–36)
MCV RBC AUTO: 94.5 FL — SIGNIFICANT CHANGE UP (ref 80–100)
NRBC # BLD: 0 /100 WBCS — SIGNIFICANT CHANGE UP (ref 0–0)
NRBC # FLD: 0 K/UL — SIGNIFICANT CHANGE UP (ref 0–0)
PHOSPHATE SERPL-MCNC: 4 MG/DL — SIGNIFICANT CHANGE UP (ref 2.5–4.5)
PLATELET # BLD AUTO: 200 K/UL — SIGNIFICANT CHANGE UP (ref 150–400)
POTASSIUM SERPL-MCNC: 4 MMOL/L — SIGNIFICANT CHANGE UP (ref 3.5–5.3)
POTASSIUM SERPL-SCNC: 4 MMOL/L — SIGNIFICANT CHANGE UP (ref 3.5–5.3)
RBC # BLD: 3.8 M/UL — SIGNIFICANT CHANGE UP (ref 3.8–5.2)
RBC # FLD: 12.7 % — SIGNIFICANT CHANGE UP (ref 10.3–14.5)
SODIUM SERPL-SCNC: 144 MMOL/L — SIGNIFICANT CHANGE UP (ref 135–145)
WBC # BLD: 9.6 K/UL — SIGNIFICANT CHANGE UP (ref 3.8–10.5)
WBC # FLD AUTO: 9.6 K/UL — SIGNIFICANT CHANGE UP (ref 3.8–10.5)

## 2024-02-27 PROCEDURE — 99232 SBSQ HOSP IP/OBS MODERATE 35: CPT

## 2024-02-27 RX ADMIN — Medication 1 DROP(S): at 17:34

## 2024-02-27 RX ADMIN — AMLODIPINE BESYLATE 10 MILLIGRAM(S): 2.5 TABLET ORAL at 05:33

## 2024-02-27 RX ADMIN — Medication 3 MILLIGRAM(S): at 21:17

## 2024-02-27 RX ADMIN — HEPARIN SODIUM 5000 UNIT(S): 5000 INJECTION INTRAVENOUS; SUBCUTANEOUS at 14:03

## 2024-02-27 RX ADMIN — RISPERIDONE 0.5 MILLIGRAM(S): 4 TABLET ORAL at 17:35

## 2024-02-27 RX ADMIN — HEPARIN SODIUM 5000 UNIT(S): 5000 INJECTION INTRAVENOUS; SUBCUTANEOUS at 21:17

## 2024-02-27 RX ADMIN — ATORVASTATIN CALCIUM 20 MILLIGRAM(S): 80 TABLET, FILM COATED ORAL at 21:17

## 2024-02-27 RX ADMIN — LETROZOLE 2.5 MILLIGRAM(S): 2.5 TABLET, FILM COATED ORAL at 14:03

## 2024-02-27 RX ADMIN — RISPERIDONE 0.5 MILLIGRAM(S): 4 TABLET ORAL at 06:09

## 2024-02-27 RX ADMIN — ESCITALOPRAM OXALATE 10 MILLIGRAM(S): 10 TABLET, FILM COATED ORAL at 14:03

## 2024-02-27 RX ADMIN — Medication 1 DROP(S): at 05:34

## 2024-02-27 RX ADMIN — RIVASTIGMINE 3 MILLIGRAM(S): 4.6 PATCH, EXTENDED RELEASE TRANSDERMAL at 17:35

## 2024-02-27 RX ADMIN — HEPARIN SODIUM 5000 UNIT(S): 5000 INJECTION INTRAVENOUS; SUBCUTANEOUS at 05:33

## 2024-02-27 RX ADMIN — RIVASTIGMINE 3 MILLIGRAM(S): 4.6 PATCH, EXTENDED RELEASE TRANSDERMAL at 05:33

## 2024-02-27 RX ADMIN — Medication 25 MILLIGRAM(S): at 21:16

## 2024-02-27 RX ADMIN — Medication 81 MILLIGRAM(S): at 14:03

## 2024-02-27 NOTE — PROGRESS NOTE ADULT - SUBJECTIVE AND OBJECTIVE BOX
KERA yesterday x 2  Seroquel d/belle  Risperidone 3x a day started    Vital Signs Last 24 Hrs  T(C): 36.9 (24 Feb 2024 04:45), Max: 36.9 (24 Feb 2024 04:45)  T(F): 98.4 (24 Feb 2024 04:45), Max: 98.4 (24 Feb 2024 04:45)  HR: 76 (24 Feb 2024 04:45) (76 - 100)  BP: 120/65 (24 Feb 2024 04:45) (120/65 - 135/89)  BP(mean): --  RR: 17 (24 Feb 2024 04:45) (17 - 18)  SpO2: 98% (24 Feb 2024 04:45) (98% - 100%)    I&O's Summary    GENERAL: NAD, thin-elderly, comfortable on room air  HEAD:  Atraumatic, Normocephalic  EYES: EOMI, PERRLA, conjunctiva and sclera clear  NECK: Supple, No JVD  CHEST/LUNG: mild decrease breath sounds bilaterally; No wheeze   HEART: Regular rate and rhythm; No murmurs, rubs, or gallops  ABDOMEN: Soft, Nontender, Nondistended; Bowel sounds present  Neuro: AAOx1, forgetful, no focal weakness   EXTREMITIES:  2+ Peripheral Pulses, No clubbing, cyanosis, or edema      LABS:                        11.7   7.46  )-----------( 204      ( 24 Feb 2024 06:30 )             35.4     02-24    141  |  104  |  28<H>  ----------------------------<  105<H>  3.7   |  26  |  0.69    Ca    9.4      24 Feb 2024 06:30  Phos  4.7     02-24  Mg     2.10     02-24        CAPILLARY BLOOD GLUCOSE            Urinalysis Basic - ( 24 Feb 2024 06:30 )    Color: x / Appearance: x / SG: x / pH: x  Gluc: 105 mg/dL / Ketone: x  / Bili: x / Urobili: x   Blood: x / Protein: x / Nitrite: x   Leuk Esterase: x / RBC: x / WBC x   Sq Epi: x / Non Sq Epi: x / Bacteria: x        RADIOLOGY & ADDITIONAL TESTS:    Imaging Personally Reviewed:  [x] YES  [ ] NO    Case discussed with NPP:  [X] YES  [ ] NO          
Received olanzapine during afternoon and evening yesterday  Sleeping this morning  1:1 @ bedside    Vital Signs Last 24 Hrs  T(C): 36.7 (23 Feb 2024 06:25), Max: 37.1 (22 Feb 2024 14:58)  T(F): 98 (23 Feb 2024 06:25), Max: 98.8 (22 Feb 2024 14:58)  HR: 83 (23 Feb 2024 06:25) (83 - 102)  BP: 117/52 (23 Feb 2024 06:25) (116/51 - 128/78)  BP(mean): --  RR: 17 (23 Feb 2024 06:25) (17 - 18)  SpO2: 100% (23 Feb 2024 06:25) (99% - 100%)    I&O's Summary      GENERAL: NAD, thin-elderly, comfortable on room air  HEAD:  Atraumatic, Normocephalic  EYES: EOMI, PERRLA, conjunctiva and sclera clear  NECK: Supple, No JVD  CHEST/LUNG: mild decrease breath sounds bilaterally; No wheeze   HEART: Regular rate and rhythm; No murmurs, rubs, or gallops  ABDOMEN: Soft, Nontender, Nondistended; Bowel sounds present  Neuro: AAOx1, forgetful, no focal weakness   EXTREMITIES:  2+ Peripheral Pulses, No clubbing, cyanosis, or edema      LABS:                        13.3   7.46  )-----------( 217      ( 23 Feb 2024 06:37 )             39.0     02-23    143  |  103  |  24<H>  ----------------------------<  91  3.9   |  26  |  0.62    Ca    10.0      23 Feb 2024 06:37  Phos  4.5     02-23  Mg     2.30     02-23        CAPILLARY BLOOD GLUCOSE            Urinalysis Basic - ( 23 Feb 2024 06:37 )    Color: x / Appearance: x / SG: x / pH: x  Gluc: 91 mg/dL / Ketone: x  / Bili: x / Urobili: x   Blood: x / Protein: x / Nitrite: x   Leuk Esterase: x / RBC: x / WBC x   Sq Epi: x / Non Sq Epi: x / Bacteria: x        RADIOLOGY & ADDITIONAL TESTS:    Imaging Personally Reviewed:  [x] YES  [ ] NO    Case discussed with NPP:  [X] YES  [ ] NO    
Sleeping this morning  Received olanzapine and melatonin last evening    Vital Signs Last 24 Hrs  T(C): 36.6 (21 Feb 2024 02:00), Max: 36.7 (20 Feb 2024 15:01)  T(F): 97.8 (21 Feb 2024 02:00), Max: 98.1 (20 Feb 2024 15:01)  HR: 82 (21 Feb 2024 05:20) (72 - 100)  BP: 109/58 (21 Feb 2024 05:20) (109/58 - 161/81)  BP(mean): --  RR: 16 (21 Feb 2024 02:00) (16 - 18)  SpO2: 99% (21 Feb 2024 02:00) (96% - 100%)    I&O's Summary    02-20-24 @ 07:01  -  02-21-24 @ 07:00  --------------------------------------------------------  IN: 120 mL / OUT: 0 mL / NET: 120 mL        GENERAL: NAD, thin-elderly, comfortable on room air, calm.   HEAD:  Atraumatic, Normocephalic  EYES: EOMI, PERRLA, conjunctiva and sclera clear  NECK: Supple, No JVD  CHEST/LUNG: mild decrease breath sounds bilaterally; No wheeze   HEART: Regular rate and rhythm; No murmurs, rubs, or gallops  ABDOMEN: Soft, Nontender, Nondistended; Bowel sounds present  Neuro: AAOx1, forgetful, no focal weakness   EXTREMITIES:  2+ Peripheral Pulses, No clubbing, cyanosis, or edema    LABS:                        11.7   6.59  )-----------( 196      ( 21 Feb 2024 07:12 )             35.3     02-21    143  |  106  |  16  ----------------------------<  99  3.9   |  27  |  0.56    Ca    9.6      21 Feb 2024 07:12  Phos  5.0     02-21  Mg     1.90     02-21    TPro  7.1  /  Alb  4.3  /  TBili  <0.2  /  DBili  x   /  AST  27  /  ALT  20  /  AlkPhos  67  02-19      CAPILLARY BLOOD GLUCOSE      POCT Blood Glucose.: 96 mg/dL (20 Feb 2024 12:28)        Urinalysis Basic - ( 21 Feb 2024 07:12 )    Color: x / Appearance: x / SG: x / pH: x  Gluc: 99 mg/dL / Ketone: x  / Bili: x / Urobili: x   Blood: x / Protein: x / Nitrite: x   Leuk Esterase: x / RBC: x / WBC x   Sq Epi: x / Non Sq Epi: x / Bacteria: x        RADIOLOGY & ADDITIONAL TESTS:    Imaging Personally Reviewed:  [x] YES  [ ] NO    Case discussed with NPP:  [X] YES  [ ] NO  
Sleeping this morning  Received olanzapine just before midnight last night    Vital Signs Last 24 Hrs  T(C): 36.7 (22 Feb 2024 06:00), Max: 37 (21 Feb 2024 14:26)  T(F): 98 (22 Feb 2024 06:00), Max: 98.6 (21 Feb 2024 14:26)  HR: 78 (22 Feb 2024 06:00) (78 - 94)  BP: 124/60 (22 Feb 2024 06:00) (112/60 - 125/65)  BP(mean): --  RR: 18 (22 Feb 2024 06:00) (16 - 18)  SpO2: 99% (22 Feb 2024 06:00) (96% - 99%)    I&O's Summary    02-21-24 @ 07:01  -  02-22-24 @ 07:00  --------------------------------------------------------  IN: 895 mL / OUT: 0 mL / NET: 895 mL        GENERAL: NAD, thin-elderly, comfortable on room air  HEAD:  Atraumatic, Normocephalic  EYES: EOMI, PERRLA, conjunctiva and sclera clear  NECK: Supple, No JVD  CHEST/LUNG: mild decrease breath sounds bilaterally; No wheeze   HEART: Regular rate and rhythm; No murmurs, rubs, or gallops  ABDOMEN: Soft, Nontender, Nondistended; Bowel sounds present  Neuro: AAOx1, forgetful, no focal weakness   EXTREMITIES:  2+ Peripheral Pulses, No clubbing, cyanosis, or edema    LABS:                        12.6   8.48  )-----------( 228      ( 22 Feb 2024 06:02 )             36.7     02-22    143  |  102  |  24<H>  ----------------------------<  103<H>  3.8   |  25  |  0.59    Ca    10.1      22 Feb 2024 06:02  Phos  4.7     02-22  Mg     2.10     02-22        CAPILLARY BLOOD GLUCOSE            Urinalysis Basic - ( 22 Feb 2024 06:02 )    Color: x / Appearance: x / SG: x / pH: x  Gluc: 103 mg/dL / Ketone: x  / Bili: x / Urobili: x   Blood: x / Protein: x / Nitrite: x   Leuk Esterase: x / RBC: x / WBC x   Sq Epi: x / Non Sq Epi: x / Bacteria: x        RADIOLOGY & ADDITIONAL TESTS:    Imaging Personally Reviewed:  [x] YES  [ ] NO    Case discussed with NPP:  [X] YES  [ ] NO    
Sleeping this morning    Vital Signs Last 24 Hrs  T(C): 36.8 (25 Feb 2024 04:55), Max: 36.9 (24 Feb 2024 19:33)  T(F): 98.2 (25 Feb 2024 04:55), Max: 98.4 (24 Feb 2024 19:33)  HR: 81 (25 Feb 2024 04:55) (81 - 116)  BP: 133/67 (25 Feb 2024 04:55) (112/65 - 136/91)  BP(mean): --  RR: 18 (25 Feb 2024 04:55) (17 - 18)  SpO2: 100% (25 Feb 2024 04:55) (99% - 100%)    I&O's Summary      GENERAL: NAD, thin-elderly, comfortable on room air  HEAD:  Atraumatic, Normocephalic  EYES: EOMI, PERRLA, conjunctiva and sclera clear  NECK: Supple, No JVD  CHEST/LUNG: mild decrease breath sounds bilaterally; No wheeze   HEART: Regular rate and rhythm; No murmurs, rubs, or gallops  ABDOMEN: Soft, Nontender, Nondistended; Bowel sounds present  Neuro: AAOx1, forgetful, no focal weakness   EXTREMITIES:  2+ Peripheral Pulses, No clubbing, cyanosis, or edema    LABS:                        13.5   9.17  )-----------( 218      ( 25 Feb 2024 07:00 )             40.2     02-25    139  |  102  |  17  ----------------------------<  124<H>  4.2   |  20<L>  |  0.55    Ca    9.9      25 Feb 2024 07:00  Phos  3.8     02-25  Mg     2.10     02-25        CAPILLARY BLOOD GLUCOSE            Urinalysis Basic - ( 25 Feb 2024 07:00 )    Color: x / Appearance: x / SG: x / pH: x  Gluc: 124 mg/dL / Ketone: x  / Bili: x / Urobili: x   Blood: x / Protein: x / Nitrite: x   Leuk Esterase: x / RBC: x / WBC x   Sq Epi: x / Non Sq Epi: x / Bacteria: x        RADIOLOGY & ADDITIONAL TESTS:    Imaging Personally Reviewed:  [x] YES  [ ] NO    Case discussed with NPP:  [X] YES  [ ] NO      
Calm  Off 1:1  Did not receive olanzapine or melatonin last night    Vital Signs Last 24 Hrs  T(C): 36.8 (26 Feb 2024 12:11), Max: 36.8 (25 Feb 2024 22:14)  T(F): 98.2 (26 Feb 2024 12:11), Max: 98.3 (25 Feb 2024 22:14)  HR: 100 (26 Feb 2024 12:11) (100 - 113)  BP: 126/60 (26 Feb 2024 12:11) (120/67 - 127/72)  BP(mean): --  RR: 17 (26 Feb 2024 12:11) (17 - 18)  SpO2: 100% (26 Feb 2024 12:11) (100% - 100%)    I&O's Summary      GENERAL: NAD, thin-elderly, comfortable on room air  HEAD:  Atraumatic, Normocephalic  EYES: EOMI, PERRLA, conjunctiva and sclera clear  NECK: Supple, No JVD  CHEST/LUNG: mild decrease breath sounds bilaterally; No wheeze   HEART: Regular rate and rhythm; No murmurs, rubs, or gallops  ABDOMEN: Soft, Nontender, Nondistended; Bowel sounds present  Neuro: AAOx1, forgetful, no focal weakness   EXTREMITIES:  2+ Peripheral Pulses, No clubbing, cyanosis, or edema      LABS:                        12.3   8.29  )-----------( 240      ( 26 Feb 2024 06:00 )             37.4     02-26    142  |  104  |  21  ----------------------------<  93  3.6   |  24  |  0.56    Ca    9.7      26 Feb 2024 06:00  Phos  4.3     02-26  Mg     2.30     02-26        CAPILLARY BLOOD GLUCOSE            Urinalysis Basic - ( 26 Feb 2024 06:00 )    Color: x / Appearance: x / SG: x / pH: x  Gluc: 93 mg/dL / Ketone: x  / Bili: x / Urobili: x   Blood: x / Protein: x / Nitrite: x   Leuk Esterase: x / RBC: x / WBC x   Sq Epi: x / Non Sq Epi: x / Bacteria: x        RADIOLOGY & ADDITIONAL TESTS:    Imaging Personally Reviewed:  [x] YES  [ ] NO    Case discussed with NPP:  [X] YES  [ ] NO
Calm  Off 1:1  Remains confused with poor insight    Vital Signs Last 24 Hrs  T(C): 36.6 (27 Feb 2024 11:56), Max: 37.2 (27 Feb 2024 04:44)  T(F): 97.9 (27 Feb 2024 11:56), Max: 98.9 (27 Feb 2024 04:44)  HR: 100 (27 Feb 2024 11:56) (91 - 100)  BP: 129/65 (27 Feb 2024 11:56) (112/64 - 129/65)  BP(mean): --  RR: 18 (27 Feb 2024 11:56) (18 - 18)  SpO2: 96% (27 Feb 2024 11:56) (96% - 100%)    I&O's Summary      GENERAL: NAD, thin-elderly, comfortable on room air  HEAD:  Atraumatic, Normocephalic  EYES: EOMI, PERRLA, conjunctiva and sclera clear  NECK: Supple, No JVD  CHEST/LUNG: mild decrease breath sounds bilaterally; No wheeze   HEART: Regular rate and rhythm; No murmurs, rubs, or gallops  ABDOMEN: Soft, Nontender, Nondistended; Bowel sounds present  Neuro: AAOx1, forgetful, no focal weakness   EXTREMITIES:  2+ Peripheral Pulses, No clubbing, cyanosis, or edema    LABS:                        11.9   9.60  )-----------( 200      ( 27 Feb 2024 06:00 )             35.9     02-27    144  |  108<H>  |  24<H>  ----------------------------<  105<H>  4.0   |  25  |  0.61    Ca    9.1      27 Feb 2024 06:00  Phos  4.0     02-27  Mg     2.00     02-27        CAPILLARY BLOOD GLUCOSE            Urinalysis Basic - ( 27 Feb 2024 06:00 )    Color: x / Appearance: x / SG: x / pH: x  Gluc: 105 mg/dL / Ketone: x  / Bili: x / Urobili: x   Blood: x / Protein: x / Nitrite: x   Leuk Esterase: x / RBC: x / WBC x   Sq Epi: x / Non Sq Epi: x / Bacteria: x        RADIOLOGY & ADDITIONAL TESTS:    Imaging Personally Reviewed:  [x] YES  [ ] NO    Case discussed with NPP:  [X] YES  [ ] NO

## 2024-02-27 NOTE — DIETITIAN INITIAL EVALUATION ADULT - NS FNS DIET ORDER
Diet, Consistent Carbohydrate/No Snacks:   DASH/TLC {Sodium & Cholesterol Restricted} (DASH) (02-20-24 @ 12:18) [Active]

## 2024-02-27 NOTE — BH CONSULTATION LIAISON PROGRESS NOTE - NSBHASSESSMENTFT_PSY_ALL_CORE
Patient is a 68 y/o woman with a pmhx of dementia, DM2, HLD,  who was BIB daughter for worsening agitation.  Psychiatry consulted for agitation/medication management. Care coordinated with pt.'s daughter at bedside, pt. presenting with worsening agitation, endorsing suicidal thought and was trying to choke herself with her own hands, family is concerned about recent behaviors and open to medication titration/changes. Plan as below    2/21: Receiving PRNs for intermittent agitation, pleasantly confused/inattentive, oriented to self, firmly denies si and hi.   2/22: Patient oriented to self only, remains confused/inattentive, no si or hi. Required one PRN yesterday.   2/23: asleep, unable to tolerate exam at this time. Frequent PRNs, will adjust medication to try and decrease use of PRN medications.   2/25: pt not receiving PRN medications, noted to be calm, pleasant, cooperative. c/w current plan   2/26-27: seems to be improving. will not change mediction plan at this time.     PLAN:  - May discontinue CO 1:1 from psychiatric perspective, defer observation status to primary team for agitation due to dementia, will benefit from enhanced supervision, have low threshold to restart 1:1 if needed  - Continue Lexapro 10mg PO qd.   - continue risperidone 0.5mg 7am and 5pm ( see append)  - PRN FOR AGGRESSION: Zyprexa 1.25mg q6h PRN IM/PO, may give additional Zyprexa 1.25mg for refractory agitation  - Hold antipsychotics if qtc >500  --Supportive treatment of delirium: 1) Minimize use of benzodiazepines, opioids, anticholinergics, and other deliriogenic agents whenever possible.  2) Maintain sleep wake cycle.  3) Provide frequent reorientation and redirection.  4) Family member at bedside if possible. 5) Provide corrective lenses/hearing aids if required    Dispo- unlikely will need or meet criteria for inpatient psychiatry; awaiting confirmation of outpt [facility] appointment (if/when obtained can be discharged safely)

## 2024-02-27 NOTE — DIETITIAN INITIAL EVALUATION ADULT - PERTINENT MEDS FT
MEDICATIONS  (STANDING):  amLODIPine   Tablet 10 milliGRAM(s) Oral daily  aspirin enteric coated 81 milliGRAM(s) Oral daily  atorvastatin 20 milliGRAM(s) Oral at bedtime  escitalopram 10 milliGRAM(s) Oral daily  heparin   Injectable 5000 Unit(s) SubCutaneous every 8 hours  influenza  Vaccine (HIGH DOSE) 0.7 milliLiter(s) IntraMuscular once  letrozole 2.5 milliGRAM(s) Oral daily  prednisoLONE acetate 1% Suspension 1 Drop(s) Right EYE two times a day  risperiDONE   Tablet 0.5 milliGRAM(s) Oral <User Schedule>  rivastigmine 3 milliGRAM(s) Oral two times a day  traZODone 25 milliGRAM(s) Oral at bedtime    MEDICATIONS  (PRN):  acetaminophen     Tablet .. 650 milliGRAM(s) Oral every 6 hours PRN Temp greater or equal to 38C (100.4F), Mild Pain (1 - 3)  aluminum hydroxide/magnesium hydroxide/simethicone Suspension 30 milliLiter(s) Oral every 4 hours PRN Dyspepsia  melatonin 3 milliGRAM(s) Oral at bedtime PRN Insomnia  OLANZapine Injectable 1.25 milliGRAM(s) IntraMuscular every 6 hours PRN agitation  ondansetron Injectable 4 milliGRAM(s) IV Push every 8 hours PRN Nausea and/or Vomiting

## 2024-02-27 NOTE — BH CONSULTATION LIAISON PROGRESS NOTE - NSBHFUPINTERVALHXFT_PSY_A_CORE
Chart reviewed. Discussed in IDRs. Mildly confused/disorganized but pleasant. Says she is in hospital but doesn't know why. Denies concerns, says she is hungry. Screened for SI, HI, depressed mood/anxiety and she denied. Insight poor, rather concrete. No focal neuro deficits appreciated.

## 2024-02-27 NOTE — DIETITIAN INITIAL EVALUATION ADULT - PERTINENT LABORATORY DATA
02-27    144  |  108<H>  |  24<H>  ----------------------------<  105<H>  4.0   |  25  |  0.61    Ca    9.1      27 Feb 2024 06:00  Phos  4.0     02-27  Mg     2.00     02-27    A1C with Estimated Average Glucose Result: 5.6 % (02-14-24 @ 06:33)

## 2024-02-27 NOTE — PROGRESS NOTE ADULT - ASSESSMENT
68 y/o female with a PMHx of advanced dementia, DM2, HLD is presenting to the ED with insomnia x 2 days. Patient is alert and oriented x1 only to person baseline. Daughter is the historian. Patient was admitted to [facility] last week for 5 days due to what the daughter says was "syncopal episodes". Daughter says the patient was discharged home on a baby aspirin. Daughter states the patient is making suicidal threats as well as homicidal threats towards the daughter and granddaughter.     #Agitation  - likely due to worsening dementia requiring medication adjustment  - neg work up for CVA/infection ,etc  - CT head neg, UA neg. labs/vitals stable.   - initially on seroquel 50 mg am, increased pm dose to 75 mg qhs with addition of seroquel 12.5 mg 4 PM starting 2/22/24  - c/w trazadone 25 mg  - c/w lexapro  - zyprexa 1.25 mg IV/IM q6h prn  - 2/23/24 --> KERA x 2; zyprexa given, seroquel orders stopped, risperidone 3x a day initiated after discussion with behavioral health  - 1:1 observation if she becomes combative/agitated  - physical therapy. Out of bed to chair with assistance  - monitor for clinical improvement  - appreciate behavioral health    #Hx of dementia with behavioral disturbance  - cont rivastigmine  - cont trazodone qhs prn insomnia  - mood-stabilizing meds as above    #DM2   - accuchecks qachs  - consistent carbohydrate diet  - hold metformin while in-hospital  - moderate ISS  - hold off basal/pre-prandial insulin for now  - HgbA1c --> 5.6    #HTN  - cont amlodipine  - cont lisinopril substitution    #Hyperlipidemia  - cont atorvastatin substitution    #Major depressive disorder, recurrent, moderate  - cont escitalopram    #Hx of chronic lt BG lacunar infarct  - initiated baby ASA    #Need for prophylaxis  - heparin 5000 units SC tid    #Disposition  - does not need [facility] admission  - await confirmation of [facility] geripsychiatric appointment prior to d/c   - daughters would like to take pt home if she remains relatively stable in terms of her behavior/mood  
70 y/o female with a PMHx of advanced dementia, DM2, HLD is presenting to the ED with insomnia x 2 days. Patient is alert and oriented x1 only to person baseline. Daughter is the historian. Patient was admitted to [facility] last week for 5 days due to what the daughter says was "syncopal episodes". Daughter says the patient was discharged home on a baby aspirin. Daughter states the patient is making suicidal threats as well as homicidal threats towards the daughter and granddaughter.     #Agitation  - likely due to worsening dementia requiring medication adjustment  - neg work up for CVA/infection ,etc  - CT head neg, UA neg. labs/vitals stable.   - initially on seroquel 50 mg am, increased pm dose to 75 mg qhs with addition of seroquel 12.5 mg 4 PM starting 2/22/24  - c/w Trazadone 25 mg. c/w Lexapro.  - zyprexa 1.25 mg IV/IM q6h prn  - 2/23/24 --> KERA x 2; zyprexa given, seroquel orders stopped, risperidone 3x a day initiated after discussion with behavioral health  - 1:1 observation   - physical therapy. Out of bed to chair with assistance.  - monitor for clinical improvement.   - appreciate behavioral health    #Hx of dementia with behavioral disturbance  - cont rivastigmine  - cont trazodone qhs prn insomnia  - mood-stabilizing meds as above    #DM2   - accuchecks qachs  - consistent carbohydrate diet  - hold metformin while in-hospital  - moderate ISS  - hold off basal/pre-prandial insulin for now  - HgbA1c --> 5.6    #HTN  - cont amlodipine  - cont lisinopril substitution    #Hyperlipidemia  - cont atorvastatin substitution    #Major depressive disorder, recurrent, moderate  - cont escitalopram    #Hx of chronic lt BG lacunar infarct  - initiated baby ASA    #Need for prophylaxis  - heparin 5000 units SC tid    #SW/CM follow up for support.     
68 y/o female with a PMHx of advanced dementia, DM2, HLD is presenting to the ED with insomnia x 2 days. Patient is alert and oriented x1 only to person baseline. Daughter is the historian. Patient was admitted to [facility] last week for 5 days due to what the daughter says was "syncopal episodes". Daughter says the patient was discharged home on a baby aspirin. Daughter states the patient is making suicidal threats as well as homicidal threats towards the daughter and granddaughter.     #Agitations  - likely due to worsening dementia requiring medication adjustement.  - Neg work up for CVA/infection ,etc  - CT head neg, UA neg. labs/vitals stable.   - c/w Seroquel 50 mg am, increased pm dose to 75 mg qhs.  - c/w Trazadone 25 mg. c/w Lexapro.  - zyprexa 1.25 mg IV/IM q6h prn  - 1:1 observation as per psych given suicidal/ homicidal threats   - Physical therapy. Out of bed to chair with assistance.  - monitor for clinical improvement.   - appreciate behavioral health    #Hx of dementia with behavioral disturbance  - cont rivastigmine  - cont trazodone qhs prn insomnia  - mood-stabilizing meds as above    #DM2   - accuchecks qachs  - consistent carbohydrate diet  - hold metformin while in-hospital  - moderate ISS  - hold off basal/pre-prandial insulin for now  - HgbA1c --> 5.6    #HTN  - cont amlodipine  - cont lisinopril substitution    #Hyperlipidemia  - cont atorvastatin substitution    #Major depressive disorder, recurrent, moderate  - cont escitalopram    #Hx of chronic lt BG lacunar infarct  - initiated baby ASA    #Need for prophylaxis  - heparin 5000 units SC tid    #SW/CM follow up for support.     
68 y/o female with a PMHx of advanced dementia, DM2, HLD is presenting to the ED with insomnia x 2 days. Patient is alert and oriented x1 only to person baseline. Daughter is the historian. Patient was admitted to [facility] last week for 5 days due to what the daughter says was "syncopal episodes". Daughter says the patient was discharged home on a baby aspirin. Daughter states the patient is making suicidal threats as well as homicidal threats towards the daughter and granddaughter.     #Agitation  - likely due to worsening dementia requiring medication adjustment  - neg work up for CVA/infection ,etc  - CT head neg, UA neg. labs/vitals stable.   - c/w Seroquel 50 mg am, increased pm dose to 75 mg qhs.  - added seroquel 12.5 mg 4 PM starting 2/22/24  - c/w Trazadone 25 mg. c/w Lexapro.  - zyprexa 1.25 mg IV/IM q6h prn  - 1:1 observation as per psych given suicidal/ homicidal threats   - physical therapy. Out of bed to chair with assistance.  - monitor for clinical improvement.   - appreciate behavioral health    #Hx of dementia with behavioral disturbance  - cont rivastigmine  - cont trazodone qhs prn insomnia  - mood-stabilizing meds as above    #DM2   - accuchecks qachs  - consistent carbohydrate diet  - hold metformin while in-hospital  - moderate ISS  - hold off basal/pre-prandial insulin for now  - HgbA1c --> 5.6    #HTN  - cont amlodipine  - cont lisinopril substitution    #Hyperlipidemia  - cont atorvastatin substitution    #Major depressive disorder, recurrent, moderate  - cont escitalopram    #Hx of chronic lt BG lacunar infarct  - initiated baby ASA    #Need for prophylaxis  - heparin 5000 units SC tid    #SW/CM follow up for support.     
70 y/o female with a PMHx of advanced dementia, DM2, HLD is presenting to the ED with insomnia x 2 days. Patient is alert and oriented x1 only to person baseline. Daughter is the historian. Patient was admitted to [facility] last week for 5 days due to what the daughter says was "syncopal episodes". Daughter says the patient was discharged home on a baby aspirin. Daughter states the patient is making suicidal threats as well as homicidal threats towards the daughter and granddaughter.     #Agitation  - likely due to worsening dementia requiring medication adjustment  - neg work up for CVA/infection ,etc  - CT head neg, UA neg. labs/vitals stable.   - initially on seroquel 50 mg am, increased pm dose to 75 mg qhs with addition of seroquel 12.5 mg 4 PM starting 2/22/24  - c/w Trazadone 25 mg. c/w Lexapro.  - zyprexa 1.25 mg IV/IM q6h prn  - 2/23/24 --> KERA x 2; zyprexa given, seroquel orders stopped, risperidone 3x a day initiated after discussion with behavioral health  - 1:1 observation if she becomes combative/agitated  - physical therapy. Out of bed to chair with assistance.  - monitor for clinical improvement.   - appreciate behavioral health    #Hx of dementia with behavioral disturbance  - cont rivastigmine  - cont trazodone qhs prn insomnia  - mood-stabilizing meds as above    #DM2   - accuchecks qachs  - consistent carbohydrate diet  - hold metformin while in-hospital  - moderate ISS  - hold off basal/pre-prandial insulin for now  - HgbA1c --> 5.6    #HTN  - cont amlodipine  - cont lisinopril substitution    #Hyperlipidemia  - cont atorvastatin substitution    #Major depressive disorder, recurrent, moderate  - cont escitalopram    #Hx of chronic lt BG lacunar infarct  - initiated baby ASA    #Need for prophylaxis  - heparin 5000 units SC tid    #SW/CM follow up for support.     
70 y/o female with a PMHx of advanced dementia, DM2, HLD is presenting to the ED with insomnia x 2 days. Patient is alert and oriented x1 only to person baseline. Daughter is the historian. Patient was admitted to [facility] last week for 5 days due to what the daughter says was "syncopal episodes". Daughter says the patient was discharged home on a baby aspirin. Daughter states the patient is making suicidal threats as well as homicidal threats towards the daughter and granddaughter.     #Agitations  - likely due to worsening dementia requiring medication adjustement.  - Neg work up for CVA/infection ,etc  - CT head neg, UA neg. labs/vitals stable.   - c/w Seroquel 50 mg am, increased pm dose to 75 mg qhs.  - c/w Trazadone 25 mg. c/w Lexapro.  - zyprexa 1.25 mg IV/IM q6h prn  - 1:1 observation for 24 hrs per psyc given suicidal/ homicidal threats   - Physical therapy. Out of bed to chair with assistance.  - monitor for clinical improvement.   - appreciate behavioral health    #Hx of dementia with behavioral disturbance  - cont rivastigmine  - cont trazodone qhs prn insomnia  - mood-stabilizing meds as above    #DM2   - accuchecks qachs  - consistent carbohydrate diet  - hold metformin while in-hospital  - moderate ISS  - hold off basal/pre-prandial insulin for now  - HgbA1c --> 5.6    #HTN  - cont amlodipine  - cont lisinopril substitution    #Hyperlipidemia  - cont atorvastatin substitution    #Major depressive disorder, recurrent, moderate  - cont escitalopram    #Hx of chronic lt BG lacunar infarct  - initiated baby ASA    #Need for prophylaxis  - heparin 5000 units SC tid    #SW/CM follow up for support.     
70 y/o female with a PMHx of advanced dementia, DM2, HLD is presenting to the ED with insomnia x 2 days. Patient is alert and oriented x1 only to person baseline. Daughter is the historian. Patient was admitted to [facility] last week for 5 days due to what the daughter says was "syncopal episodes". Daughter says the patient was discharged home on a baby aspirin. Daughter states the patient is making suicidal threats as well as homicidal threats towards the daughter and granddaughter.     #Agitation  - likely due to worsening dementia requiring medication adjustment  - neg work up for CVA/infection ,etc  - CT head neg, UA neg. labs/vitals stable.   - initially on seroquel 50 mg am, increased pm dose to 75 mg qhs with addition of seroquel 12.5 mg 4 PM starting 2/22/24  - c/w Trazadone 25 mg. c/w Lexapro.  - zyprexa 1.25 mg IV/IM q6h prn  - 2/23/24 --> KERA x 2; zyprexa given, seroquel orders stopped, risperidone 3x a day initiated after discussion with behavioral health  - 1:1 observation   - physical therapy. Out of bed to chair with assistance.  - monitor for clinical improvement.   - appreciate behavioral health    #Hx of dementia with behavioral disturbance  - cont rivastigmine  - cont trazodone qhs prn insomnia  - mood-stabilizing meds as above    #DM2   - accuchecks qachs  - consistent carbohydrate diet  - hold metformin while in-hospital  - moderate ISS  - hold off basal/pre-prandial insulin for now  - HgbA1c --> 5.6    #HTN  - cont amlodipine  - cont lisinopril substitution    #Hyperlipidemia  - cont atorvastatin substitution    #Major depressive disorder, recurrent, moderate  - cont escitalopram    #Hx of chronic lt BG lacunar infarct  - initiated baby ASA    #Need for prophylaxis  - heparin 5000 units SC tid    #SW/CM follow up for support.

## 2024-02-27 NOTE — DIETITIAN INITIAL EVALUATION ADULT - OTHER INFO
68 y/o female with a PMHx of advanced dementia, DM2, HLD is presenting to the ED with insomnia x 2 days. Patient is alert and oriented x1 only to person baseline. Daughter is the historian. Patient was admitted to [facility] last week for 5 days due to what the daughter says was "syncopal episodes". Daughter says the patient was discharged home on a baby aspirin. Daughter states the patient is making suicidal threats as well as homicidal threats towards the daughter and granddaughter.     Pt seen and reports 50-75% intake of meals with No GI distress (nausea/vomiting/diarrhea/constipation.) at present. Pt noted with skin ecchymosis, no edema per nursing flow sheet. Labs reviewed A1c- 5.6% wnl.

## 2024-02-28 ENCOUNTER — TRANSCRIPTION ENCOUNTER (OUTPATIENT)
Age: 69
End: 2024-02-28

## 2024-02-28 VITALS
OXYGEN SATURATION: 95 % | SYSTOLIC BLOOD PRESSURE: 115 MMHG | RESPIRATION RATE: 18 BRPM | DIASTOLIC BLOOD PRESSURE: 56 MMHG | HEART RATE: 106 BPM | TEMPERATURE: 99 F

## 2024-02-28 PROCEDURE — 99232 SBSQ HOSP IP/OBS MODERATE 35: CPT

## 2024-02-28 PROCEDURE — 93010 ELECTROCARDIOGRAM REPORT: CPT

## 2024-02-28 RX ORDER — AMLODIPINE BESYLATE 2.5 MG/1
1 TABLET ORAL
Qty: 30 | Refills: 0
Start: 2024-02-28 | End: 2024-03-28

## 2024-02-28 RX ORDER — RISPERIDONE 4 MG/1
1 TABLET ORAL
Qty: 60 | Refills: 0
Start: 2024-02-28 | End: 2024-03-28

## 2024-02-28 RX ORDER — LANOLIN ALCOHOL/MO/W.PET/CERES
1 CREAM (GRAM) TOPICAL
Qty: 30 | Refills: 0
Start: 2024-02-28 | End: 2024-03-28

## 2024-02-28 RX ORDER — AMLODIPINE BESYLATE AND BENAZEPRIL HYDROCHLORIDE 10; 20 MG/1; MG/1
1 CAPSULE ORAL
Refills: 0 | DISCHARGE

## 2024-02-28 RX ADMIN — HEPARIN SODIUM 5000 UNIT(S): 5000 INJECTION INTRAVENOUS; SUBCUTANEOUS at 11:34

## 2024-02-28 RX ADMIN — Medication 81 MILLIGRAM(S): at 11:32

## 2024-02-28 RX ADMIN — LETROZOLE 2.5 MILLIGRAM(S): 2.5 TABLET, FILM COATED ORAL at 11:32

## 2024-02-28 RX ADMIN — RIVASTIGMINE 3 MILLIGRAM(S): 4.6 PATCH, EXTENDED RELEASE TRANSDERMAL at 05:01

## 2024-02-28 RX ADMIN — HEPARIN SODIUM 5000 UNIT(S): 5000 INJECTION INTRAVENOUS; SUBCUTANEOUS at 05:00

## 2024-02-28 RX ADMIN — AMLODIPINE BESYLATE 10 MILLIGRAM(S): 2.5 TABLET ORAL at 05:00

## 2024-02-28 RX ADMIN — ESCITALOPRAM OXALATE 10 MILLIGRAM(S): 10 TABLET, FILM COATED ORAL at 11:32

## 2024-02-28 RX ADMIN — RISPERIDONE 0.5 MILLIGRAM(S): 4 TABLET ORAL at 06:12

## 2024-02-28 RX ADMIN — Medication 1 DROP(S): at 05:01

## 2024-02-28 NOTE — DISCHARGE NOTE PROVIDER - HOSPITAL COURSE
70 y/o female with a PMHx of advanced dementia, DM2, HLD is presenting to the ED with insomnia x 2 days. Patient is alert and oriented x1 only to person baseline. Daughter is the historian. Patient was admitted to [facility] last week for 5 days due to what the daughter says was "syncopal episodes". Daughter says the patient was discharged home on a baby aspirin. Daughter states the patient is making suicidal threats as well as homicidal threats towards the daughter and granddaughter.     Agitations  - likely due to worsening dementia requiring medication adjustment  - Neg work up for CVA/infection ,etc  - CT head neg, UA neg. labs/vitals stable.   - psyc consulted, initially on seroquel 50 mg am, increased pm dose to 75 mg qhs with addition of seroquel 12.5 mg 4 PM starting 2/22/24  - Had two behavioral emergency response, given zyprexa PRN, Seroquel discontinued, now Risperidone 0.5mg PO BID per Psych  - c/w Trazadone 25 mg QHS, Lexapro 10mg QD    Hx of dementia with behavioral disturbance  - cont rivastigmine  - cont trazodone qhs insomnia  - psyc meds as above    DM2   - accuchecks qachs  - consistent carbohydrate diet  - hold metformin while in-hospital  - moderate ISS  - hold off basal/pre-prandial insulin for now  - HgbA1c --> 5.6    HTN  - cont amlodipine  - cont lisinopril substitution    Hyperlipidemia  - cont atorvastatin substitution    Major depressive disorder, recurrent, moderate  - cont escitalopram    Hx of chronic lt BG lacunar infarct  - initiated baby ASA    On 2/28/24, case was discussed with , patient is medically cleared and optimized for discharge today. All medications were reviewed with attending, and sent to mutually agreed upon pharmacy. 70 y/o female with a PMHx of advanced dementia, DM2, HLD is presenting to the ED with insomnia x 2 days. Patient is alert and oriented x1 only to person baseline. Daughter is the historian. Patient was admitted to [facility] last week for 5 days due to what the daughter says was "syncopal episodes". Daughter says the patient was discharged home on a baby aspirin. Daughter states the patient is making suicidal threats as well as homicidal threats towards the daughter and granddaughter.     Agitations  - likely due to worsening dementia requiring medication adjustment  - Neg work up for CVA/infection ,etc  - CT head neg, UA neg. labs/vitals stable.   - psyc consulted, initially on seroquel 50 mg am, increased pm dose to 75 mg qhs with addition of seroquel 12.5 mg 4 PM starting 2/22/24  - Had two behavioral emergency response, given zyprexa PRN, Seroquel discontinued, now Risperidone 0.5mg PO BID per Psych  - c/w Trazadone 25 mg QHS, Lexapro 10mg QD    Hx of dementia with behavioral disturbance  - cont rivastigmine  - cont trazodone qhs insomnia  - psyc meds as above    DM2   - accuchecks qachs  - consistent carbohydrate diet  - hold metformin while in-hospital  - moderate ISS  - hold off basal/pre-prandial insulin for now  - HgbA1c --> 5.6    HTN  - cont amlodipine  - cont lisinopril substitution    Hyperlipidemia  - cont atorvastatin substitution    Major depressive disorder, recurrent, moderate  - cont escitalopram    Hx of chronic lt BG lacunar infarct  - initiated baby ASA    On 2/28/24, case was discussed with , patient is medically cleared and optimized for discharge today. All medications were reviewed with attending, and sent to mutually agreed upon pharmacy.     Attending Addendum:  Patient seen and examined by me on the discharge day. Medications reviewed.   Feeling much better. No cp, no sob. no abd pain. no n/v/d. no HA, no Dizziness.  All questions answered in details. Follow up plan explained. d/w NP/PA.  More than 30 mins were spent evaluating patient and coordinating care for discharge.  Discharge summary sent to pt's primary care physician at [facility].

## 2024-02-28 NOTE — BH CONSULTATION LIAISON PROGRESS NOTE - MSE OPTIONS
Unstructured MSE
Structured MSE
Unstructured MSE
Structured MSE
Structured MSE
Unstructured MSE
Structured MSE

## 2024-02-28 NOTE — DISCHARGE NOTE PROVIDER - NSDCFUADDAPPT_GEN_ALL_CORE_FT
Continue medications as prescribed. Follow up with your primary care doctor and psychiatrist for further evaluation and management. Please call to make an appointment within 1-2 weeks of discharge.     [facility]iatry Appointment  3/20 at 1:30 with Dr. Richards  [phone] [place] [address], [address], [address] Floor    [facility]  [phone] [place] [address]  [phone]

## 2024-02-28 NOTE — BH CONSULTATION LIAISON PROGRESS NOTE - MSE UNSTRUCTURED FT
Mental Status Exam:  Juan José: well groomed, fair hygiene     Behavior: calm, cooperative, no psychomotor retardation/agitation  Motor: no tremors, EPS, or rigidity  Gait: did not assess, pt in bed  Speech: normal rate, rhythm, prosody and volume   Mood: "okay"  Affect: euthymic, congruent  Thought process: impoverished, somewhat confabulating  Thought Content: denies paranoia, delusions   Perception: denies AH/VH  SI: denies  HI: denies  Insight: limited  Judgment: fair    Cognitive Exam:  Orientation: AOx1  Recall: impaired  Attention: fair  
Mildly confused/disorganized but pleasant. Says she is in hospital but doesn't know why. Denies concerns, says she is hungry. Screened for SI, HI, depressed mood/anxiety and she denied. Insight poor, rather concrete. No focal neuro deficits appreciated. 
Mildly confused/disorganized but pleasant. Slight mumbling at times. Doesn't know where she is. Denies SI/HI. Insight poor, rather concrete. No focal catalepsy, no EPS, no rigidity

## 2024-02-28 NOTE — BH CONSULTATION LIAISON PROGRESS NOTE - NSBHCONSULTFOLLOWAFTERCARE_PSY_A_CORE FT
as above
[facility] Appointment  3/20 at 1:30 with Dr. Richards  [phone] [place] [address], [address], [address]    [facility]  [phone] [place] [address]  [phone]

## 2024-02-28 NOTE — BH CONSULTATION LIAISON PROGRESS NOTE - NSICDXBHPRIMARYDX_PSY_ALL_CORE
Dementia with behavioral disturbance   F03.912  
Dementia with behavioral disturbance   F03.911  
Dementia with behavioral disturbance   F03.914  
Dementia with behavioral disturbance   F03.915  
Dementia with behavioral disturbance   F03.911  
Dementia with behavioral disturbance   F03.916  
Dementia with behavioral disturbance   F03.915

## 2024-02-28 NOTE — DISCHARGE NOTE NURSING/CASE MANAGEMENT/SOCIAL WORK - NSDCPEFALRISK_GEN_ALL_CORE
For information on Fall & Injury Prevention, visit: [email or web address] OR  [email or web address] OR  https://www.cdc.gov/steadi/patient.html

## 2024-02-28 NOTE — DISCHARGE NOTE PROVIDER - NSDCFUSCHEDAPPT_GEN_ALL_CORE_FT
[facility]  GERIATRICS [address] [place] [address]   Scheduled Appointment: 03/05/2024

## 2024-02-28 NOTE — BH CONSULTATION LIAISON PROGRESS NOTE - CURRENT MEDICATION
MEDICATIONS  (STANDING):  amLODIPine   Tablet 10 milliGRAM(s) Oral daily  aspirin enteric coated 81 milliGRAM(s) Oral daily  atorvastatin 20 milliGRAM(s) Oral at bedtime  escitalopram 10 milliGRAM(s) Oral daily  heparin   Injectable 5000 Unit(s) SubCutaneous every 8 hours  influenza  Vaccine (HIGH DOSE) 0.7 milliLiter(s) IntraMuscular once  letrozole 2.5 milliGRAM(s) Oral daily  prednisoLONE acetate 1% Suspension 1 Drop(s) Right EYE two times a day  risperiDONE   Tablet 0.5 milliGRAM(s) Oral <User Schedule>  rivastigmine 3 milliGRAM(s) Oral two times a day  traZODone 25 milliGRAM(s) Oral at bedtime    MEDICATIONS  (PRN):  acetaminophen     Tablet .. 650 milliGRAM(s) Oral every 6 hours PRN Temp greater or equal to 38C (100.4F), Mild Pain (1 - 3)  aluminum hydroxide/magnesium hydroxide/simethicone Suspension 30 milliLiter(s) Oral every 4 hours PRN Dyspepsia  melatonin 3 milliGRAM(s) Oral at bedtime PRN Insomnia  OLANZapine Injectable 1.25 milliGRAM(s) IntraMuscular every 6 hours PRN agitation  ondansetron Injectable 4 milliGRAM(s) IV Push every 8 hours PRN Nausea and/or Vomiting  
MEDICATIONS  (STANDING):  amLODIPine   Tablet 10 milliGRAM(s) Oral daily  aspirin enteric coated 81 milliGRAM(s) Oral daily  atorvastatin 20 milliGRAM(s) Oral at bedtime  escitalopram 10 milliGRAM(s) Oral daily  heparin   Injectable 5000 Unit(s) SubCutaneous every 8 hours  influenza  Vaccine (HIGH DOSE) 0.7 milliLiter(s) IntraMuscular once  letrozole 2.5 milliGRAM(s) Oral daily  prednisoLONE acetate 1% Suspension 1 Drop(s) Right EYE two times a day  risperiDONE   Tablet 0.5 milliGRAM(s) Oral <User Schedule>  rivastigmine 3 milliGRAM(s) Oral two times a day  traZODone 25 milliGRAM(s) Oral at bedtime    MEDICATIONS  (PRN):  acetaminophen     Tablet .. 650 milliGRAM(s) Oral every 6 hours PRN Temp greater or equal to 38C (100.4F), Mild Pain (1 - 3)  aluminum hydroxide/magnesium hydroxide/simethicone Suspension 30 milliLiter(s) Oral every 4 hours PRN Dyspepsia  melatonin 3 milliGRAM(s) Oral at bedtime PRN Insomnia  OLANZapine Injectable 1.25 milliGRAM(s) IntraMuscular every 6 hours PRN agitation  ondansetron Injectable 4 milliGRAM(s) IV Push every 8 hours PRN Nausea and/or Vomiting  
MEDICATIONS  (STANDING):  amLODIPine   Tablet 10 milliGRAM(s) Oral daily  aspirin enteric coated 81 milliGRAM(s) Oral daily  atorvastatin 20 milliGRAM(s) Oral at bedtime  escitalopram 10 milliGRAM(s) Oral daily  heparin   Injectable 5000 Unit(s) SubCutaneous every 8 hours  influenza  Vaccine (HIGH DOSE) 0.7 milliLiter(s) IntraMuscular once  letrozole 2.5 milliGRAM(s) Oral daily  prednisoLONE acetate 1% Suspension 1 Drop(s) Right EYE two times a day  QUEtiapine 12.5 milliGRAM(s) Oral <User Schedule>  QUEtiapine 50 milliGRAM(s) Oral <User Schedule>  QUEtiapine 75 milliGRAM(s) Oral <User Schedule>  rivastigmine 3 milliGRAM(s) Oral two times a day  traZODone 25 milliGRAM(s) Oral at bedtime    MEDICATIONS  (PRN):  acetaminophen     Tablet .. 650 milliGRAM(s) Oral every 6 hours PRN Temp greater or equal to 38C (100.4F), Mild Pain (1 - 3)  aluminum hydroxide/magnesium hydroxide/simethicone Suspension 30 milliLiter(s) Oral every 4 hours PRN Dyspepsia  melatonin 3 milliGRAM(s) Oral at bedtime PRN Insomnia  OLANZapine Injectable 1.25 milliGRAM(s) IntraMuscular every 6 hours PRN agitation  ondansetron Injectable 4 milliGRAM(s) IV Push every 8 hours PRN Nausea and/or Vomiting  
MEDICATIONS  (STANDING):  amLODIPine   Tablet 10 milliGRAM(s) Oral daily  aspirin enteric coated 81 milliGRAM(s) Oral daily  atorvastatin 20 milliGRAM(s) Oral at bedtime  escitalopram 10 milliGRAM(s) Oral daily  heparin   Injectable 5000 Unit(s) SubCutaneous every 8 hours  influenza  Vaccine (HIGH DOSE) 0.7 milliLiter(s) IntraMuscular once  letrozole 2.5 milliGRAM(s) Oral daily  prednisoLONE acetate 1% Suspension 1 Drop(s) Right EYE two times a day  risperiDONE   Tablet 0.5 milliGRAM(s) Oral <User Schedule>  rivastigmine 3 milliGRAM(s) Oral two times a day  traZODone 25 milliGRAM(s) Oral at bedtime    MEDICATIONS  (PRN):  acetaminophen     Tablet .. 650 milliGRAM(s) Oral every 6 hours PRN Temp greater or equal to 38C (100.4F), Mild Pain (1 - 3)  aluminum hydroxide/magnesium hydroxide/simethicone Suspension 30 milliLiter(s) Oral every 4 hours PRN Dyspepsia  melatonin 3 milliGRAM(s) Oral at bedtime PRN Insomnia  OLANZapine Injectable 1.25 milliGRAM(s) IntraMuscular every 6 hours PRN agitation  ondansetron Injectable 4 milliGRAM(s) IV Push every 8 hours PRN Nausea and/or Vomiting  
MEDICATIONS  (STANDING):  amLODIPine   Tablet 10 milliGRAM(s) Oral daily  aspirin enteric coated 81 milliGRAM(s) Oral daily  atorvastatin 20 milliGRAM(s) Oral at bedtime  escitalopram 10 milliGRAM(s) Oral daily  heparin   Injectable 5000 Unit(s) SubCutaneous every 8 hours  influenza  Vaccine (HIGH DOSE) 0.7 milliLiter(s) IntraMuscular once  letrozole 2.5 milliGRAM(s) Oral daily  prednisoLONE acetate 1% Suspension 1 Drop(s) Right EYE two times a day  risperiDONE   Tablet 0.5 milliGRAM(s) Oral <User Schedule>  rivastigmine 3 milliGRAM(s) Oral two times a day  traZODone 25 milliGRAM(s) Oral at bedtime    MEDICATIONS  (PRN):  acetaminophen     Tablet .. 650 milliGRAM(s) Oral every 6 hours PRN Temp greater or equal to 38C (100.4F), Mild Pain (1 - 3)  aluminum hydroxide/magnesium hydroxide/simethicone Suspension 30 milliLiter(s) Oral every 4 hours PRN Dyspepsia  melatonin 3 milliGRAM(s) Oral at bedtime PRN Insomnia  OLANZapine Injectable 1.25 milliGRAM(s) IntraMuscular every 6 hours PRN agitation  ondansetron Injectable 4 milliGRAM(s) IV Push every 8 hours PRN Nausea and/or Vomiting  
MEDICATIONS  (STANDING):  amLODIPine   Tablet 10 milliGRAM(s) Oral daily  aspirin enteric coated 81 milliGRAM(s) Oral daily  atorvastatin 20 milliGRAM(s) Oral at bedtime  escitalopram 10 milliGRAM(s) Oral daily  heparin   Injectable 5000 Unit(s) SubCutaneous every 8 hours  influenza  Vaccine (HIGH DOSE) 0.7 milliLiter(s) IntraMuscular once  letrozole 2.5 milliGRAM(s) Oral daily  prednisoLONE acetate 1% Suspension 1 Drop(s) Right EYE two times a day  QUEtiapine 50 milliGRAM(s) Oral <User Schedule>  QUEtiapine 75 milliGRAM(s) Oral <User Schedule>  rivastigmine 3 milliGRAM(s) Oral two times a day  traZODone 25 milliGRAM(s) Oral at bedtime    MEDICATIONS  (PRN):  acetaminophen     Tablet .. 650 milliGRAM(s) Oral every 6 hours PRN Temp greater or equal to 38C (100.4F), Mild Pain (1 - 3)  aluminum hydroxide/magnesium hydroxide/simethicone Suspension 30 milliLiter(s) Oral every 4 hours PRN Dyspepsia  melatonin 3 milliGRAM(s) Oral at bedtime PRN Insomnia  OLANZapine Injectable 1.25 milliGRAM(s) IntraMuscular every 6 hours PRN agitation  ondansetron Injectable 4 milliGRAM(s) IV Push every 8 hours PRN Nausea and/or Vomiting  
MEDICATIONS  (STANDING):  amLODIPine   Tablet 10 milliGRAM(s) Oral daily  aspirin enteric coated 81 milliGRAM(s) Oral daily  atorvastatin 20 milliGRAM(s) Oral at bedtime  escitalopram 10 milliGRAM(s) Oral daily  heparin   Injectable 5000 Unit(s) SubCutaneous every 8 hours  influenza  Vaccine (HIGH DOSE) 0.7 milliLiter(s) IntraMuscular once  letrozole 2.5 milliGRAM(s) Oral daily  prednisoLONE acetate 1% Suspension 1 Drop(s) Right EYE two times a day  QUEtiapine 50 milliGRAM(s) Oral <User Schedule>  QUEtiapine 75 milliGRAM(s) Oral <User Schedule>  rivastigmine 3 milliGRAM(s) Oral two times a day  traZODone 25 milliGRAM(s) Oral at bedtime    MEDICATIONS  (PRN):  acetaminophen     Tablet .. 650 milliGRAM(s) Oral every 6 hours PRN Temp greater or equal to 38C (100.4F), Mild Pain (1 - 3)  aluminum hydroxide/magnesium hydroxide/simethicone Suspension 30 milliLiter(s) Oral every 4 hours PRN Dyspepsia  melatonin 3 milliGRAM(s) Oral at bedtime PRN Insomnia  OLANZapine Injectable 1.25 milliGRAM(s) IntraMuscular every 6 hours PRN agitation  ondansetron Injectable 4 milliGRAM(s) IV Push every 8 hours PRN Nausea and/or Vomiting

## 2024-02-28 NOTE — BH CONSULTATION LIAISON PROGRESS NOTE - NSBHCHARTREVIEWVS_PSY_A_CORE FT
Vital Signs Last 24 Hrs  T(C): 36.8 (26 Feb 2024 12:11), Max: 36.8 (25 Feb 2024 22:14)  T(F): 98.2 (26 Feb 2024 12:11), Max: 98.3 (25 Feb 2024 22:14)  HR: 100 (26 Feb 2024 12:11) (100 - 113)  BP: 126/60 (26 Feb 2024 12:11) (120/67 - 127/72)  BP(mean): --  RR: 17 (26 Feb 2024 12:11) (17 - 18)  SpO2: 100% (26 Feb 2024 12:11) (100% - 100%)    Parameters below as of 26 Feb 2024 12:11  Patient On (Oxygen Delivery Method): room air    
Vital Signs Last 24 Hrs  T(C): 36.9 (28 Feb 2024 11:12), Max: 37.1 (28 Feb 2024 04:55)  T(F): 98.4 (28 Feb 2024 11:12), Max: 98.7 (28 Feb 2024 04:55)  HR: 100 (28 Feb 2024 11:12) (90 - 101)  BP: 112/68 (28 Feb 2024 11:12) (101/65 - 133/69)  BP(mean): --  RR: 18 (28 Feb 2024 11:12) (18 - 18)  SpO2: 98% (28 Feb 2024 11:12) (96% - 99%)    Parameters below as of 28 Feb 2024 11:12  Patient On (Oxygen Delivery Method): room air    
Vital Signs Last 24 Hrs  T(C): 36.7 (23 Feb 2024 06:25), Max: 37.1 (22 Feb 2024 14:58)  T(F): 98 (23 Feb 2024 06:25), Max: 98.8 (22 Feb 2024 14:58)  HR: 83 (23 Feb 2024 06:25) (83 - 102)  BP: 117/52 (23 Feb 2024 06:25) (116/51 - 128/78)  BP(mean): --  RR: 17 (23 Feb 2024 06:25) (17 - 18)  SpO2: 100% (23 Feb 2024 06:25) (99% - 100%)    Parameters below as of 23 Feb 2024 06:25  Patient On (Oxygen Delivery Method): room air    
Vital Signs Last 24 Hrs  T(C): 36.6 (27 Feb 2024 11:56), Max: 37.2 (27 Feb 2024 04:44)  T(F): 97.9 (27 Feb 2024 11:56), Max: 98.9 (27 Feb 2024 04:44)  HR: 100 (27 Feb 2024 11:56) (91 - 100)  BP: 129/65 (27 Feb 2024 11:56) (112/64 - 129/65)  BP(mean): --  RR: 18 (27 Feb 2024 11:56) (18 - 18)  SpO2: 96% (27 Feb 2024 11:56) (96% - 100%)    Parameters below as of 27 Feb 2024 11:56  Patient On (Oxygen Delivery Method): room air    
Vital Signs Last 24 Hrs  T(C): 36.6 (21 Feb 2024 02:00), Max: 36.7 (20 Feb 2024 15:01)  T(F): 97.8 (21 Feb 2024 02:00), Max: 98.1 (20 Feb 2024 15:01)  HR: 82 (21 Feb 2024 05:20) (72 - 100)  BP: 109/58 (21 Feb 2024 05:20) (109/58 - 161/81)  BP(mean): --  RR: 16 (21 Feb 2024 02:00) (16 - 18)  SpO2: 99% (21 Feb 2024 02:00) (96% - 100%)    Parameters below as of 21 Feb 2024 02:00  Patient On (Oxygen Delivery Method): room air    
Vital Signs Last 24 Hrs  T(C): 36.7 (22 Feb 2024 06:00), Max: 37 (21 Feb 2024 14:26)  T(F): 98 (22 Feb 2024 06:00), Max: 98.6 (21 Feb 2024 14:26)  HR: 78 (22 Feb 2024 06:00) (78 - 94)  BP: 124/60 (22 Feb 2024 06:00) (112/60 - 125/65)  BP(mean): --  RR: 18 (22 Feb 2024 06:00) (16 - 18)  SpO2: 99% (22 Feb 2024 06:00) (96% - 99%)    Parameters below as of 22 Feb 2024 06:00  Patient On (Oxygen Delivery Method): room air    
Vital Signs Last 24 Hrs  T(C): 36.7 (25 Feb 2024 11:20), Max: 36.9 (24 Feb 2024 19:33)  T(F): 98 (25 Feb 2024 11:20), Max: 98.4 (24 Feb 2024 19:33)  HR: 100 (25 Feb 2024 11:20) (81 - 116)  BP: 127/65 (25 Feb 2024 11:20) (112/65 - 136/91)  BP(mean): --  RR: 18 (25 Feb 2024 11:20) (17 - 18)  SpO2: 98% (25 Feb 2024 11:20) (98% - 100%)    Parameters below as of 25 Feb 2024 11:20  Patient On (Oxygen Delivery Method): room air

## 2024-02-28 NOTE — DISCHARGE NOTE PROVIDER - CARE PROVIDER_API CALL
Emile Lewis  Internal Medicine  [address] [place] [address], [facility] [address]  [place] [address]  Phone: [phone]  Fax: [phone]  Follow Up Time:

## 2024-02-28 NOTE — BH CONSULTATION LIAISON PROGRESS NOTE - NSBHFUPREASONCONS_PSY_A_CORE
dementia
dementia
delirium/dementia/agitation/med management
dementia/agitation/med management
delirium/dementia/agitation/med management

## 2024-02-28 NOTE — BH CONSULTATION LIAISON PROGRESS NOTE - NSBHATTESTTYPEVISIT_PSY_A_CORE
Attending Only
Attending Only
AIRAM without on-site Attending supervision
Attending Only
Resident/Fellow with telephonic supervision

## 2024-02-28 NOTE — DISCHARGE NOTE PROVIDER - NSDCCPCAREPLAN_GEN_ALL_CORE_FT
PRINCIPAL DISCHARGE DIAGNOSIS  Diagnosis: Dementia with behavioral problem  Assessment and Plan of Treatment: You came to the hospital for dementia with behavioral problems. Work up for stroke or infection was negative. You were seen by the psychiatry team and your medications were adjusted. Continue medications as prescribed. Follow up with your primary care doctor and psychiatrist for further evaluation and management. Please call to make an appointment within 1-2 weeks of discharge.      SECONDARY DISCHARGE DIAGNOSES  Diagnosis: Insomnia  Assessment and Plan of Treatment: Continue medications as prescribed. Follow up with your primary care doctor for further evaluation and management. Please call to make an appointment within 1-2 weeks of discharge.

## 2024-02-28 NOTE — BH CONSULTATION LIAISON PROGRESS NOTE - NSBHATTESTBILLING_PSY_A_CORE
36197-Orztolwecb OBS or IP - moderate complexity OR 35-49 mins
62391-Rrvdfarwme OBS or IP - moderate complexity OR 35-49 mins
14509-Alzcbfykgt OBS or IP - moderate complexity OR 35-49 mins
52430-Qdeyyattrt OBS or IP - high complexity OR 50-79 mins
70124-Vwrsxbqtdn OBS or IP - moderate complexity OR 35-49 mins
Billing in another system
Non-billable

## 2024-02-28 NOTE — BH CONSULTATION LIAISON PROGRESS NOTE - NSBHFUPINTERVALHXFT_PSY_A_CORE
Chart reviewed. Discussed in IDRs.   Seen this AM. Laying in her bed. Somewhat sleepy but answers questions. Does not know where she is. Denies SI/HI. No AH/VH elicited. Mumbling words. No catalepsy. No rigidity.  Per ACP, later had breakfast and able to ambulate with standby. Chart reviewed. Discussed in IDRs.   Seen this AM. Laying in her bed. Somewhat sleepy but answers questions. Does not know where she is. Denies SI/HI. No AH/VH elicited. Mumbling words. No catalepsy. No rigidity.  Per ACP, later had breakfast and able to ambulate with standby.    Patient seen again this afternoon. She is more alert. Denies feeling depressed. Thinks she is at her house. Can spontaneously move her arms. Possible mild cogwheel rigidity vs arthritis (seemed to extinguish in [place]).

## 2024-02-28 NOTE — DISCHARGE NOTE NURSING/CASE MANAGEMENT/SOCIAL WORK - NSDCFUADDAPPT_GEN_ALL_CORE_FT
Continue medications as prescribed. Follow up with your primary care doctor and psychiatrist for further evaluation and management. Please call to make an appointment within 1-2 weeks of discharge.     [facility] Appointment  3/20 at 1:30 with Dr. Richards  [phone] [place] [address], [address]    [facility]  [address] [place] [address]  [phone]
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
## 2024-02-28 NOTE — DISCHARGE NOTE NURSING/CASE MANAGEMENT/SOCIAL WORK - PATIENT PORTAL LINK FT
You can access the FollowMyHealth Patient Portal offered by [facility] by registering at the following website: [email or web address]. By joining [facility]’s FollowMyHealth portal, you will also be able to view your health information using other applications (apps) compatible with our system.

## 2024-02-28 NOTE — DISCHARGE NOTE PROVIDER - NSDCMRMEDTOKEN_GEN_ALL_CORE_FT
amLODIPine 10 mg oral tablet: 1 tab(s) orally once a day  aspirin 81 mg oral delayed release tablet: 1 tab(s) orally once a day  escitalopram 10 mg oral tablet: 1 tab(s) orally once a day  letrozole 2.5 mg oral tablet: 1 tab(s) orally once a day  melatonin 3 mg oral tablet: 1 tab(s) orally once a day (at bedtime) as needed for Insomnia  metFORMIN 500 mg oral tablet: 1 tab(s) orally twice a day with breakfast and dinner  Outpatient PT 2-3x/week: for balance training; bed mobility training; gait training; postural re-education; ROM; strengthening; transfer training  prednisoLONE acetate 1% ophthalmic suspension: 1 drop(s) in each affected eye 4 times a day right eye  risperiDONE 0.5 mg oral tablet: 1 tab(s) orally 2 times a day Please take this medication at 7:00AM and 5:00PM  rivastigmine 3 mg oral capsule: 1 cap(s) orally 2 times a day  rosuvastatin 10 mg oral tablet: 1 tab(s) orally once a day  traZODone 50 mg oral tablet: 0.5 tablet orally once a day (at bedtime) as needed for  insomnia

## 2024-02-28 NOTE — BH CONSULTATION LIAISON PROGRESS NOTE - NSBHPTASSESSDT_PSY_A_CORE
21-Feb-2024 09:52
23-Feb-2024 13:10
27-Feb-2024 12:17
28-Feb-2024 12:52
26-Feb-2024 14:29
22-Feb-2024 12:00
25-Feb-2024 12:07

## 2024-03-01 ENCOUNTER — NON-APPOINTMENT (OUTPATIENT)
Age: 69
End: 2024-03-01

## 2024-03-01 NOTE — H&P ADULT - NSHPPHYSICALEXAM_GEN_ALL_CORE
[TextBox_30] : See HPI
Vital Signs Last 24 Hrs  T(C): 36.7 (20 Feb 2024 09:05), Max: 37.1 (19 Feb 2024 21:39)  T(F): 98 (20 Feb 2024 09:05), Max: 98.7 (19 Feb 2024 21:39)  HR: 75 (20 Feb 2024 09:05) (75 - 101)  BP: 131/82 (20 Feb 2024 09:05) (111/72 - 133/76)  BP(mean): 105 (20 Feb 2024 04:43) (105 - 105)  RR: 16 (20 Feb 2024 09:05) (16 - 17)  SpO2: 100% (20 Feb 2024 09:05) (97% - 100%)    Parameters below as of 20 Feb 2024 09:05  Patient On (Oxygen Delivery Method): room air      PHYSICAL EXAM:  GENERAL: NAD, thin-elderly, comfortable on room air, calm.   HEAD:  Atraumatic, Normocephalic  EYES: EOMI, PERRLA, conjunctiva and sclera clear  NECK: Supple, No JVD  CHEST/LUNG: mild decrease breath sounds bilaterally; No wheeze   HEART: Regular rate and rhythm; No murmurs, rubs, or gallops  ABDOMEN: Soft, Nontender, Nondistended; Bowel sounds present  Neuro: AAOx1, forgetful, no focal weakness   EXTREMITIES:  2+ Peripheral Pulses, No clubbing, cyanosis, or edema  SKIN: No rashes or lesions

## 2024-03-05 ENCOUNTER — APPOINTMENT (OUTPATIENT)
Dept: GERIATRICS | Facility: CLINIC | Age: 69
End: 2024-03-05

## 2024-03-06 ENCOUNTER — NON-APPOINTMENT (OUTPATIENT)
Age: 69
End: 2024-03-06

## 2024-03-13 ENCOUNTER — APPOINTMENT (OUTPATIENT)
Dept: GERIATRICS | Facility: CLINIC | Age: 69
End: 2024-03-13
Payer: MEDICARE

## 2024-03-13 ENCOUNTER — NON-APPOINTMENT (OUTPATIENT)
Age: 69
End: 2024-03-13

## 2024-03-13 DIAGNOSIS — F03.918 UNSPECIFIED DEMENTIA, UNSPECIFIED SEVERITY, WITH OTHER BEHAVIORAL DISTURBANCE: ICD-10-CM

## 2024-03-13 PROCEDURE — 99443: CPT | Mod: 93

## 2024-03-13 RX ORDER — KRILL/OM-3/DHA/EPA/PHOSPHO/AST 1000-230MG
81 CAPSULE ORAL DAILY
Qty: 30 | Refills: 0 | Status: ACTIVE | COMMUNITY
Start: 2024-03-13

## 2024-03-13 RX ORDER — VALACYCLOVIR 1 G/1
1 TABLET, FILM COATED ORAL
Qty: 90 | Refills: 0 | Status: DISCONTINUED | COMMUNITY
Start: 2023-07-05 | End: 2024-03-13

## 2024-03-13 RX ORDER — QUETIAPINE FUMARATE 25 MG/1
25 TABLET ORAL
Qty: 90 | Refills: 0 | Status: DISCONTINUED | COMMUNITY
Start: 2023-07-03 | End: 2024-03-13

## 2024-03-15 ENCOUNTER — INPATIENT (INPATIENT)
Facility: HOSPITAL | Age: 69
LOS: 5 days | Discharge: HOME | End: 2024-03-21
Attending: HOSPITALIST | Admitting: HOSPITALIST
Payer: MEDICARE

## 2024-03-15 VITALS
TEMPERATURE: 98 F | RESPIRATION RATE: 16 BRPM | SYSTOLIC BLOOD PRESSURE: 115 MMHG | HEART RATE: 88 BPM | OXYGEN SATURATION: 97 % | DIASTOLIC BLOOD PRESSURE: 67 MMHG | HEIGHT: 72 IN

## 2024-03-15 DIAGNOSIS — R46.89 OTHER SYMPTOMS AND SIGNS INVOLVING APPEARANCE AND BEHAVIOR: ICD-10-CM

## 2024-03-15 LAB
ALBUMIN SERPL ELPH-MCNC: 4.7 G/DL — SIGNIFICANT CHANGE UP (ref 3.3–5)
ALP SERPL-CCNC: 66 U/L — SIGNIFICANT CHANGE UP (ref 40–120)
ALT FLD-CCNC: 16 U/L — SIGNIFICANT CHANGE UP (ref 4–33)
ANION GAP SERPL CALC-SCNC: 15 MMOL/L — HIGH (ref 7–14)
APPEARANCE UR: ABNORMAL
AST SERPL-CCNC: 25 U/L — SIGNIFICANT CHANGE UP (ref 4–32)
BASE EXCESS BLDV CALC-SCNC: 1 MMOL/L — SIGNIFICANT CHANGE UP (ref -2–3)
BASOPHILS # BLD AUTO: 0.05 K/UL — SIGNIFICANT CHANGE UP (ref 0–0.2)
BASOPHILS NFR BLD AUTO: 0.6 % — SIGNIFICANT CHANGE UP (ref 0–2)
BILIRUB SERPL-MCNC: 0.2 MG/DL — SIGNIFICANT CHANGE UP (ref 0.2–1.2)
BILIRUB UR-MCNC: NEGATIVE — SIGNIFICANT CHANGE UP
BLOOD GAS VENOUS COMPREHENSIVE RESULT: SIGNIFICANT CHANGE UP
BUN SERPL-MCNC: 15 MG/DL — SIGNIFICANT CHANGE UP (ref 7–23)
CALCIUM SERPL-MCNC: 9.5 MG/DL — SIGNIFICANT CHANGE UP (ref 8.4–10.5)
CHLORIDE BLDV-SCNC: 105 MMOL/L — SIGNIFICANT CHANGE UP (ref 96–108)
CHLORIDE SERPL-SCNC: 105 MMOL/L — SIGNIFICANT CHANGE UP (ref 98–107)
CO2 BLDV-SCNC: 28.6 MMOL/L — HIGH (ref 22–26)
CO2 SERPL-SCNC: 23 MMOL/L — SIGNIFICANT CHANGE UP (ref 22–31)
COLOR SPEC: YELLOW — SIGNIFICANT CHANGE UP
COMMENT - URINE: SIGNIFICANT CHANGE UP
CREAT SERPL-MCNC: 0.55 MG/DL — SIGNIFICANT CHANGE UP (ref 0.5–1.3)
DIFF PNL FLD: NEGATIVE — SIGNIFICANT CHANGE UP
EGFR: 99 ML/MIN/1.73M2 — SIGNIFICANT CHANGE UP
EOSINOPHIL # BLD AUTO: 0.13 K/UL — SIGNIFICANT CHANGE UP (ref 0–0.5)
EOSINOPHIL NFR BLD AUTO: 1.6 % — SIGNIFICANT CHANGE UP (ref 0–6)
EPI CELLS # UR: PRESENT
GAS PNL BLDV: 141 MMOL/L — SIGNIFICANT CHANGE UP (ref 136–145)
GLUCOSE BLDV-MCNC: 96 MG/DL — SIGNIFICANT CHANGE UP (ref 70–99)
GLUCOSE SERPL-MCNC: 96 MG/DL — SIGNIFICANT CHANGE UP (ref 70–99)
GLUCOSE UR QL: NEGATIVE MG/DL — SIGNIFICANT CHANGE UP
HCO3 BLDV-SCNC: 27 MMOL/L — SIGNIFICANT CHANGE UP (ref 22–29)
HCT VFR BLD CALC: 36.4 % — SIGNIFICANT CHANGE UP (ref 34.5–45)
HCT VFR BLDA CALC: 38 % — SIGNIFICANT CHANGE UP (ref 34.5–46.5)
HGB BLD CALC-MCNC: 12.7 G/DL — SIGNIFICANT CHANGE UP (ref 11.7–16.1)
HGB BLD-MCNC: 12.3 G/DL — SIGNIFICANT CHANGE UP (ref 11.5–15.5)
IANC: 5.55 K/UL — SIGNIFICANT CHANGE UP (ref 1.8–7.4)
IMM GRANULOCYTES NFR BLD AUTO: 0.4 % — SIGNIFICANT CHANGE UP (ref 0–0.9)
KETONES UR-MCNC: NEGATIVE MG/DL — SIGNIFICANT CHANGE UP
LACTATE BLDV-MCNC: 1.2 MMOL/L — SIGNIFICANT CHANGE UP (ref 0.5–2)
LEUKOCYTE ESTERASE UR-ACNC: ABNORMAL
LIDOCAIN IGE QN: 50 U/L — SIGNIFICANT CHANGE UP (ref 7–60)
LYMPHOCYTES # BLD AUTO: 1.71 K/UL — SIGNIFICANT CHANGE UP (ref 1–3.3)
LYMPHOCYTES # BLD AUTO: 21 % — SIGNIFICANT CHANGE UP (ref 13–44)
MAGNESIUM SERPL-MCNC: 2 MG/DL — SIGNIFICANT CHANGE UP (ref 1.6–2.6)
MCHC RBC-ENTMCNC: 30.8 PG — SIGNIFICANT CHANGE UP (ref 27–34)
MCHC RBC-ENTMCNC: 33.8 GM/DL — SIGNIFICANT CHANGE UP (ref 32–36)
MCV RBC AUTO: 91.2 FL — SIGNIFICANT CHANGE UP (ref 80–100)
MONOCYTES # BLD AUTO: 0.67 K/UL — SIGNIFICANT CHANGE UP (ref 0–0.9)
MONOCYTES NFR BLD AUTO: 8.2 % — SIGNIFICANT CHANGE UP (ref 2–14)
NEUTROPHILS # BLD AUTO: 5.55 K/UL — SIGNIFICANT CHANGE UP (ref 1.8–7.4)
NEUTROPHILS NFR BLD AUTO: 68.2 % — SIGNIFICANT CHANGE UP (ref 43–77)
NITRITE UR-MCNC: NEGATIVE — SIGNIFICANT CHANGE UP
NRBC # BLD: 0 /100 WBCS — SIGNIFICANT CHANGE UP (ref 0–0)
NRBC # FLD: 0 K/UL — SIGNIFICANT CHANGE UP (ref 0–0)
PCO2 BLDV: 48 MMHG — SIGNIFICANT CHANGE UP (ref 39–52)
PH BLDV: 7.36 — SIGNIFICANT CHANGE UP (ref 7.32–7.43)
PH UR: 6 — SIGNIFICANT CHANGE UP (ref 5–8)
PHOSPHATE SERPL-MCNC: 3.6 MG/DL — SIGNIFICANT CHANGE UP (ref 2.5–4.5)
PLATELET # BLD AUTO: 243 K/UL — SIGNIFICANT CHANGE UP (ref 150–400)
PO2 BLDV: 45 MMHG — SIGNIFICANT CHANGE UP (ref 25–45)
POTASSIUM BLDV-SCNC: 3.7 MMOL/L — SIGNIFICANT CHANGE UP (ref 3.5–5.1)
POTASSIUM SERPL-MCNC: 4.9 MMOL/L — SIGNIFICANT CHANGE UP (ref 3.5–5.3)
POTASSIUM SERPL-SCNC: 4.9 MMOL/L — SIGNIFICANT CHANGE UP (ref 3.5–5.3)
PROT SERPL-MCNC: 8.1 G/DL — SIGNIFICANT CHANGE UP (ref 6–8.3)
PROT UR-MCNC: NEGATIVE MG/DL — SIGNIFICANT CHANGE UP
RBC # BLD: 3.99 M/UL — SIGNIFICANT CHANGE UP (ref 3.8–5.2)
RBC # FLD: 12.5 % — SIGNIFICANT CHANGE UP (ref 10.3–14.5)
RBC CASTS # UR COMP ASSIST: SIGNIFICANT CHANGE UP /HPF (ref 0–4)
SAO2 % BLDV: 75.2 % — SIGNIFICANT CHANGE UP (ref 67–88)
SODIUM SERPL-SCNC: 143 MMOL/L — SIGNIFICANT CHANGE UP (ref 135–145)
SP GR SPEC: 1.02 — SIGNIFICANT CHANGE UP (ref 1–1.03)
TSH SERPL-MCNC: 1.44 UIU/ML — SIGNIFICANT CHANGE UP (ref 0.27–4.2)
UROBILINOGEN FLD QL: 1 MG/DL — SIGNIFICANT CHANGE UP (ref 0.2–1)
WBC # BLD: 8.14 K/UL — SIGNIFICANT CHANGE UP (ref 3.8–10.5)
WBC # FLD AUTO: 8.14 K/UL — SIGNIFICANT CHANGE UP (ref 3.8–10.5)
WBC UR QL: SIGNIFICANT CHANGE UP /HPF (ref 0–5)

## 2024-03-15 PROCEDURE — 71046 X-RAY EXAM CHEST 2 VIEWS: CPT | Mod: 26

## 2024-03-15 PROCEDURE — 99223 1ST HOSP IP/OBS HIGH 75: CPT

## 2024-03-15 PROCEDURE — 99285 EMERGENCY DEPT VISIT HI MDM: CPT | Mod: GC

## 2024-03-15 RX ORDER — HALOPERIDOL DECANOATE 100 MG/ML
5 INJECTION INTRAMUSCULAR ONCE
Refills: 0 | Status: COMPLETED | OUTPATIENT
Start: 2024-03-15 | End: 2024-03-15

## 2024-03-15 RX ADMIN — HALOPERIDOL DECANOATE 5 MILLIGRAM(S): 100 INJECTION INTRAMUSCULAR at 18:10

## 2024-03-15 RX ADMIN — Medication 2 MILLIGRAM(S): at 18:10

## 2024-03-15 NOTE — H&P ADULT - PROBLEM SELECTOR PLAN 8
DVT ppx - lovenox  Diet - DASH/CC regular diet, daughter denies dysphagia issues for the patient at home  Activity - OOB with assistance, increase as tolerated    Fall and aspiration precautions
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
## 2024-03-15 NOTE — H&P ADULT - NSICDXFAMILYHX_GEN_ALL_CORE_FT
FAMILY HISTORY:  Mother  Still living? No  FH: dementia, Age at diagnosis: Age Unknown     pt refused

## 2024-03-15 NOTE — H&P ADULT - PROBLEM SELECTOR PLAN 5
- Last A1C was 5.6 but patient still on metformin  - Will place on low ISS, FS qAC, CC diet  - consider downtitrating her metformin on discharge given her recent A1C

## 2024-03-15 NOTE — ED ADULT NURSE REASSESSMENT NOTE - NS ED NURSE REASSESS COMMENT FT1
pt able to walk to bathroom with steady gait, escorted by daughter at side. urine specimen collected and sent to lab. pt returned back to stretcher in NAD, RR even and unlabored. pt attempted to remove iv sharmila, iv re-wrapped and secured. safety measures maintained, side rails up x2
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
## 2024-03-15 NOTE — H&P ADULT - PROBLEM SELECTOR PLAN 2
- Cough x 1 week with yellow-white sputum  - Check flu/RSV/COVID swab, check procalcitonin, CXR in ED with clear lungs and patient without leukocytosis or septic vitals -> will therefore hold off on empiric abx for her cough, possibly viral syndrome cough
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
## 2024-03-15 NOTE — ED ADULT NURSE REASSESSMENT NOTE - NS ED NURSE REASSESS COMMENT FT1
report given to Shanthi lee RN, all questions answered. pt resting in stretcher in NAD, RR even and unlabored. tolerated po food well, no coughing noted. awaiting transportation to unit. side rails up x2. daughter remains at bedside within arms reach

## 2024-03-15 NOTE — ED PROVIDER NOTE - PHYSICAL EXAMINATION
GENERAL: well appearing in no acute distress, non-toxic appearing  CARDIAC: regular rate and rhythm, normal S1S2, no appreciable murmurs, 2+ pulses in UE/LE b/l  PULM: normal breath sounds, clear to ascultation bilaterally, no rales, rhonchi, wheezing  GI: abdomen nondistended, soft, nontender, no guarding, rebound tenderness  NEURO: no focal motor or sensory deficits  MSK: no peripheral edema, no calf tenderness b/l  PSYCH: flat affect, responding to questions appropriately

## 2024-03-15 NOTE — H&P ADULT - NSHPSOCIALHISTORY_GEN_ALL_CORE
Lives with family  Ambulatory with 1 person assist at home  Former tobacco user, quit >20 years ago  No current EtOH or illicit substance use as per daughter

## 2024-03-15 NOTE — H&P ADULT - NSHPLABSRESULTS_GEN_ALL_CORE
LABS and ADDITIONAL STUDIES:                        12.3   8.14  )-----------( 243      ( 15 Mar 2024 16:00 )             36.4     143  |  105  |  15  ----------------------------<  96     03-15  4.9   |  23  |  0.55    Ca    9.5      15 Mar 2024 16:00  Phos  3.6     03-15  Mg     2.00     15    TPro  8.1  /  Alb  4.7  /  TBili  0.2  /  DBili  x   /  AST  25  /  ALT  16  /  AlkPhos  66  03-15    16:00 - VBG - pH: 7.36  | pCO2: 48    | pO2: 45    | Lactate: 1.2      Urinalysis Basic - ( 15 Mar 2024 15:24 )  Color: Yellow / Appearance: Turbid / S.022 / pH: 6.0  Gluc: Negative mg/dL / Ketone: Negative mg/dL  / Bili: Negative / Urobili: 1.0 mg/dL   Blood: Negative / Protein: Negative mg/dL / Nitrite: Negative   Leuk Esterase: Moderate / RBC: 0-2 /HPF / WBC 20-25 /HPF   Sq Epi: Present / Non Sq Epi: x / Bacteria: x    < from: Xray Chest 2 Views PA/Lat (03.15.24 @ 17:52) >  ******PRELIMINARY REPORT******    FINDINGS:  The cardiac silhouette is normal in size.  No focal consolidation, pleural effusion or pneumothorax.  Visualized osseous structures are unremarkable for the patient's age.  Multiple surgical clips overlying the right chest wall.    IMPRESSION:  Clear lungs.  < end of copied text >    EKG - NSR at 81, QTc 441, TWI V2 (chronic), no significant ST-T wave changes

## 2024-03-15 NOTE — ED ADULT NURSE NOTE - NSICDXPASTSURGICALHX_GEN_ALL_CORE_FT
Mom called to review plan for pts excision. RN advised mom to have pt eat a good breakfast and arrive 30 minutes early for LMX application. Mom inquired about sedation, RN explained in clinic there are not options for sedation. Explained LMX will be applied and lidocaine will be injected to the surgical site, and this will numb the area. Explained if family was seeking full sedation this would need to be completed on a different day in the sedation unit. RN briefly explained this to mom. RN expalined if at anytime during an in clinic procedure Spike and or the staff was unsafe, Dr. Gutierrez would stop and likely recommendation sedation. Mom verbalized understanding Mom requested to keep appt as it and denied further questions or concerns.   
PAST SURGICAL HISTORY:  No significant past surgical history
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
## 2024-03-15 NOTE — H&P ADULT - NSHPREVIEWOFSYSTEMS_GEN_ALL_CORE
As per daughter, patient with recent cough with yellow-white sputum, no hemoptysis  No known complaints of chest pain, abdominal pain, or dysuria as per daughter  No known nausea/emesis/diarrhea as per daughter  No known hematemesis, hematochezia, melena, or hematuria as per daughter  No known fevers/chills as per daughter  No known URI complaints as per daughter

## 2024-03-15 NOTE — H&P ADULT - NSICDXPASTMEDICALHX_GEN_ALL_CORE_FT
PAST MEDICAL HISTORY:  Breast cancer s.p chemotherapy    Dementia     HLD (hyperlipidemia)     Prediabetes     Zoster ophthalmicus

## 2024-03-15 NOTE — H&P ADULT - NSHPPHYSICALEXAM_GEN_ALL_CORE
Vital Signs Last 24 Hrs  T(C): 36.8 (16 Mar 2024 00:00), Max: 36.8 (15 Mar 2024 14:58)  T(F): 98.3 (16 Mar 2024 00:00), Max: 98.3 (15 Mar 2024 14:58)  HR: 78 (16 Mar 2024 00:00) (78 - 88)  BP: 100/76 (16 Mar 2024 00:00) (100/76 - 138/73)  BP(mean): --  RR: 18 (16 Mar 2024 00:00) (16 - 18)  SpO2: 100% (16 Mar 2024 00:00) (97% - 100%)    Parameters below as of 16 Mar 2024 00:00  Patient On (Oxygen Delivery Method): room air    GENERAL: No acute distress, well-developed  EYES/ENT: EOMI, R pupil cloudy (chronic after patient had herpes zoster of the R eye in early 2020 as per daughter), L pupil round and reactive, conjunctiva and sclera clear, Neck supple, No JVD, moist mucosa  CHEST/LUNG: Clear to auscultation bilaterally; No wheeze, equal breath sounds bilaterally   BACK: No spinal tenderness  HEART: Regular rate and rhythm; No murmurs, rubs, or gallops  ABDOMEN: Soft, Nontender, Nondistended; Bowel sounds present  EXTREMITIES: +DP/PT/Radial pulses, No clubbing, cyanosis, or edema  PSYCH: Sedated, Nl behavior, nl affect  NEUROLOGY: AAOx1 to self currently not oriented to place and time, non-focal  SKIN: Normal color, No rashes or lesions
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
## 2024-03-15 NOTE — H&P ADULT - ASSESSMENT
This is a 69F with Advanced Dementia (AAO x1-2 to self, sometimes place, not to time), HTN, DM2, CVA (chronic L BG lacunar infarct), and R breast cancer s/p lumpectomy/chemo/RT (~[id number]) who presents to the hospital with worsening agitation at home.

## 2024-03-15 NOTE — H&P ADULT - PROBLEM SELECTOR PLAN 1
- Patient with known severe alzheimer's, recent admission for worsening agitation with change in medications, seemed to be better at home post discharge but over the past 3 days has had a worsening of her behavior with poor oral intake and non-compliance with medications  - Unclear on the cause for her decompensation, possibly infectious (new cough, pyuria) vs medication non-compliance vs other  - For now will c/w psychiatric medications as per her home medications  - Check flu/RSV/COVID swab, check procalcitonin, CXR in ED with clear lungs and patient without leukocytosis or septic vitals -> will therefore hold off on empiric abx for her cough, possibly viral syndrome cough  - Has pyuria but no known UTI complaints (though given her severe dementia unclear if patient would be able to verbalize many UTI complaints), daughter states that the patient is continent of her urine -> hold off on empiric abx given no nitrites or bacteria on UA and no leukocytosis or septic vitals, UCx currently in lab  - Psych aimee in AM

## 2024-03-15 NOTE — H&P ADULT - PROBLEM SELECTOR PLAN 3
- Has pyuria but no known UTI complaints (though given her severe dementia unclear if patient would be able to verbalize many UTI complaints), daughter states that the patient is continent of her urine, no known episodes of hematuria  - hold off on empiric abx given no nitrites or bacteria on UA and no leukocytosis or septic vitals, UCx currently in lab

## 2024-03-16 DIAGNOSIS — C50.919 MALIGNANT NEOPLASM OF UNSPECIFIED SITE OF UNSPECIFIED FEMALE BREAST: ICD-10-CM

## 2024-03-16 DIAGNOSIS — Z29.9 ENCOUNTER FOR PROPHYLACTIC MEASURES, UNSPECIFIED: ICD-10-CM

## 2024-03-16 DIAGNOSIS — I10 ESSENTIAL (PRIMARY) HYPERTENSION: ICD-10-CM

## 2024-03-16 DIAGNOSIS — Z98.890 OTHER SPECIFIED POSTPROCEDURAL STATES: Chronic | ICD-10-CM

## 2024-03-16 DIAGNOSIS — Z86.73 PERSONAL HISTORY OF TRANSIENT ISCHEMIC ATTACK (TIA), AND CEREBRAL INFARCTION WITHOUT RESIDUAL DEFICITS: ICD-10-CM

## 2024-03-16 DIAGNOSIS — G30.9 ALZHEIMER'S DISEASE, UNSPECIFIED: ICD-10-CM

## 2024-03-16 DIAGNOSIS — R82.81 PYURIA: ICD-10-CM

## 2024-03-16 DIAGNOSIS — R05.9 COUGH, UNSPECIFIED: ICD-10-CM

## 2024-03-16 DIAGNOSIS — E11.9 TYPE 2 DIABETES MELLITUS WITHOUT COMPLICATIONS: ICD-10-CM

## 2024-03-16 LAB
ADD ON TEST-SPECIMEN IN LAB: SIGNIFICANT CHANGE UP
ALBUMIN SERPL ELPH-MCNC: 4.2 G/DL — SIGNIFICANT CHANGE UP (ref 3.3–5)
ALP SERPL-CCNC: 63 U/L — SIGNIFICANT CHANGE UP (ref 40–120)
ALT FLD-CCNC: 11 U/L — SIGNIFICANT CHANGE UP (ref 4–33)
ANION GAP SERPL CALC-SCNC: 13 MMOL/L — SIGNIFICANT CHANGE UP (ref 7–14)
AST SERPL-CCNC: 15 U/L — SIGNIFICANT CHANGE UP (ref 4–32)
BASOPHILS # BLD AUTO: 0.06 K/UL — SIGNIFICANT CHANGE UP (ref 0–0.2)
BASOPHILS NFR BLD AUTO: 1 % — SIGNIFICANT CHANGE UP (ref 0–2)
BILIRUB SERPL-MCNC: 0.2 MG/DL — SIGNIFICANT CHANGE UP (ref 0.2–1.2)
BUN SERPL-MCNC: 13 MG/DL — SIGNIFICANT CHANGE UP (ref 7–23)
CALCIUM SERPL-MCNC: 9.4 MG/DL — SIGNIFICANT CHANGE UP (ref 8.4–10.5)
CHLORIDE SERPL-SCNC: 107 MMOL/L — SIGNIFICANT CHANGE UP (ref 98–107)
CO2 SERPL-SCNC: 25 MMOL/L — SIGNIFICANT CHANGE UP (ref 22–31)
CREAT SERPL-MCNC: 0.56 MG/DL — SIGNIFICANT CHANGE UP (ref 0.5–1.3)
CULTURE RESULTS: SIGNIFICANT CHANGE UP
EGFR: 99 ML/MIN/1.73M2 — SIGNIFICANT CHANGE UP
EOSINOPHIL # BLD AUTO: 0.21 K/UL — SIGNIFICANT CHANGE UP (ref 0–0.5)
EOSINOPHIL NFR BLD AUTO: 3.4 % — SIGNIFICANT CHANGE UP (ref 0–6)
GLUCOSE SERPL-MCNC: 88 MG/DL — SIGNIFICANT CHANGE UP (ref 70–99)
HCT VFR BLD CALC: 34.1 % — LOW (ref 34.5–45)
HGB BLD-MCNC: 11.5 G/DL — SIGNIFICANT CHANGE UP (ref 11.5–15.5)
IANC: 3.33 K/UL — SIGNIFICANT CHANGE UP (ref 1.8–7.4)
IMM GRANULOCYTES NFR BLD AUTO: 0.5 % — SIGNIFICANT CHANGE UP (ref 0–0.9)
LYMPHOCYTES # BLD AUTO: 1.84 K/UL — SIGNIFICANT CHANGE UP (ref 1–3.3)
LYMPHOCYTES # BLD AUTO: 29.7 % — SIGNIFICANT CHANGE UP (ref 13–44)
MAGNESIUM SERPL-MCNC: 1.9 MG/DL — SIGNIFICANT CHANGE UP (ref 1.6–2.6)
MCHC RBC-ENTMCNC: 30.8 PG — SIGNIFICANT CHANGE UP (ref 27–34)
MCHC RBC-ENTMCNC: 33.7 GM/DL — SIGNIFICANT CHANGE UP (ref 32–36)
MCV RBC AUTO: 91.4 FL — SIGNIFICANT CHANGE UP (ref 80–100)
MONOCYTES # BLD AUTO: 0.72 K/UL — SIGNIFICANT CHANGE UP (ref 0–0.9)
MONOCYTES NFR BLD AUTO: 11.6 % — SIGNIFICANT CHANGE UP (ref 2–14)
NEUTROPHILS # BLD AUTO: 3.33 K/UL — SIGNIFICANT CHANGE UP (ref 1.8–7.4)
NEUTROPHILS NFR BLD AUTO: 53.8 % — SIGNIFICANT CHANGE UP (ref 43–77)
NRBC # BLD: 0 /100 WBCS — SIGNIFICANT CHANGE UP (ref 0–0)
NRBC # FLD: 0 K/UL — SIGNIFICANT CHANGE UP (ref 0–0)
PHOSPHATE SERPL-MCNC: 4.1 MG/DL — SIGNIFICANT CHANGE UP (ref 2.5–4.5)
PLATELET # BLD AUTO: 229 K/UL — SIGNIFICANT CHANGE UP (ref 150–400)
POTASSIUM SERPL-MCNC: 3.4 MMOL/L — LOW (ref 3.5–5.3)
POTASSIUM SERPL-SCNC: 3.4 MMOL/L — LOW (ref 3.5–5.3)
PROT SERPL-MCNC: 7.2 G/DL — SIGNIFICANT CHANGE UP (ref 6–8.3)
RBC # BLD: 3.73 M/UL — LOW (ref 3.8–5.2)
RBC # FLD: 12.6 % — SIGNIFICANT CHANGE UP (ref 10.3–14.5)
SODIUM SERPL-SCNC: 145 MMOL/L — SIGNIFICANT CHANGE UP (ref 135–145)
SPECIMEN SOURCE: SIGNIFICANT CHANGE UP
WBC # BLD: 6.19 K/UL — SIGNIFICANT CHANGE UP (ref 3.8–10.5)
WBC # FLD AUTO: 6.19 K/UL — SIGNIFICANT CHANGE UP (ref 3.8–10.5)

## 2024-03-16 PROCEDURE — 90792 PSYCH DIAG EVAL W/MED SRVCS: CPT

## 2024-03-16 RX ORDER — PREDNISOLONE SODIUM PHOSPHATE 1 %
1 DROPS OPHTHALMIC (EYE)
Refills: 0 | Status: DISCONTINUED | OUTPATIENT
Start: 2024-03-16 | End: 2024-03-21

## 2024-03-16 RX ORDER — GLUCAGON INJECTION, SOLUTION 0.5 MG/.1ML
1 INJECTION, SOLUTION SUBCUTANEOUS ONCE
Refills: 0 | Status: DISCONTINUED | OUTPATIENT
Start: 2024-03-16 | End: 2024-03-21

## 2024-03-16 RX ORDER — LANOLIN ALCOHOL/MO/W.PET/CERES
3 CREAM (GRAM) TOPICAL AT BEDTIME
Refills: 0 | Status: DISCONTINUED | OUTPATIENT
Start: 2024-03-16 | End: 2024-03-21

## 2024-03-16 RX ORDER — OLANZAPINE 15 MG/1
1.25 TABLET, FILM COATED ORAL EVERY 6 HOURS
Refills: 0 | Status: DISCONTINUED | OUTPATIENT
Start: 2024-03-16 | End: 2024-03-21

## 2024-03-16 RX ORDER — RISPERIDONE 4 MG/1
0.5 TABLET ORAL
Refills: 0 | Status: DISCONTINUED | OUTPATIENT
Start: 2024-03-16 | End: 2024-03-21

## 2024-03-16 RX ORDER — ENOXAPARIN SODIUM 100 MG/ML
40 INJECTION SUBCUTANEOUS EVERY 24 HOURS
Refills: 0 | Status: DISCONTINUED | OUTPATIENT
Start: 2024-03-16 | End: 2024-03-21

## 2024-03-16 RX ORDER — INSULIN LISPRO 100/ML
VIAL (ML) SUBCUTANEOUS AT BEDTIME
Refills: 0 | Status: DISCONTINUED | OUTPATIENT
Start: 2024-03-16 | End: 2024-03-21

## 2024-03-16 RX ORDER — RIVASTIGMINE 4.6 MG/24H
3 PATCH, EXTENDED RELEASE TRANSDERMAL
Refills: 0 | Status: DISCONTINUED | OUTPATIENT
Start: 2024-03-16 | End: 2024-03-21

## 2024-03-16 RX ORDER — ASPIRIN/CALCIUM CARB/MAGNESIUM 324 MG
81 TABLET ORAL DAILY
Refills: 0 | Status: DISCONTINUED | OUTPATIENT
Start: 2024-03-16 | End: 2024-03-21

## 2024-03-16 RX ORDER — INSULIN LISPRO 100/ML
VIAL (ML) SUBCUTANEOUS
Refills: 0 | Status: DISCONTINUED | OUTPATIENT
Start: 2024-03-16 | End: 2024-03-21

## 2024-03-16 RX ORDER — POTASSIUM CHLORIDE 20 MEQ
20 PACKET (EA) ORAL ONCE
Refills: 0 | Status: COMPLETED | OUTPATIENT
Start: 2024-03-16 | End: 2024-03-16

## 2024-03-16 RX ORDER — SODIUM CHLORIDE 9 MG/ML
1000 INJECTION, SOLUTION INTRAVENOUS
Refills: 0 | Status: DISCONTINUED | OUTPATIENT
Start: 2024-03-16 | End: 2024-03-21

## 2024-03-16 RX ORDER — ONDANSETRON 8 MG/1
4 TABLET, FILM COATED ORAL EVERY 8 HOURS
Refills: 0 | Status: DISCONTINUED | OUTPATIENT
Start: 2024-03-16 | End: 2024-03-21

## 2024-03-16 RX ORDER — DEXTROSE 50 % IN WATER 50 %
25 SYRINGE (ML) INTRAVENOUS ONCE
Refills: 0 | Status: DISCONTINUED | OUTPATIENT
Start: 2024-03-16 | End: 2024-03-21

## 2024-03-16 RX ORDER — LETROZOLE 2.5 MG/1
2.5 TABLET, FILM COATED ORAL DAILY
Refills: 0 | Status: DISCONTINUED | OUTPATIENT
Start: 2024-03-16 | End: 2024-03-21

## 2024-03-16 RX ORDER — ATORVASTATIN CALCIUM 80 MG/1
40 TABLET, FILM COATED ORAL AT BEDTIME
Refills: 0 | Status: DISCONTINUED | OUTPATIENT
Start: 2024-03-16 | End: 2024-03-21

## 2024-03-16 RX ORDER — AMLODIPINE BESYLATE 2.5 MG/1
10 TABLET ORAL DAILY
Refills: 0 | Status: DISCONTINUED | OUTPATIENT
Start: 2024-03-16 | End: 2024-03-21

## 2024-03-16 RX ORDER — DEXTROSE 50 % IN WATER 50 %
12.5 SYRINGE (ML) INTRAVENOUS ONCE
Refills: 0 | Status: DISCONTINUED | OUTPATIENT
Start: 2024-03-16 | End: 2024-03-21

## 2024-03-16 RX ORDER — ESCITALOPRAM OXALATE 10 MG/1
10 TABLET, FILM COATED ORAL DAILY
Refills: 0 | Status: DISCONTINUED | OUTPATIENT
Start: 2024-03-16 | End: 2024-03-21

## 2024-03-16 RX ORDER — DEXTROSE 50 % IN WATER 50 %
15 SYRINGE (ML) INTRAVENOUS ONCE
Refills: 0 | Status: DISCONTINUED | OUTPATIENT
Start: 2024-03-16 | End: 2024-03-21

## 2024-03-16 RX ORDER — ACETAMINOPHEN 500 MG
650 TABLET ORAL EVERY 6 HOURS
Refills: 0 | Status: DISCONTINUED | OUTPATIENT
Start: 2024-03-16 | End: 2024-03-21

## 2024-03-16 RX ORDER — TRAZODONE HCL 50 MG
25 TABLET ORAL AT BEDTIME
Refills: 0 | Status: DISCONTINUED | OUTPATIENT
Start: 2024-03-16 | End: 2024-03-21

## 2024-03-16 RX ADMIN — OLANZAPINE 1.25 MILLIGRAM(S): 15 TABLET, FILM COATED ORAL at 15:08

## 2024-03-16 RX ADMIN — Medication 1 DROP(S): at 13:51

## 2024-03-16 RX ADMIN — Medication 1 DROP(S): at 07:21

## 2024-03-16 RX ADMIN — AMLODIPINE BESYLATE 10 MILLIGRAM(S): 2.5 TABLET ORAL at 07:24

## 2024-03-16 RX ADMIN — Medication 20 MILLIEQUIVALENT(S): at 10:26

## 2024-03-16 RX ADMIN — RIVASTIGMINE 3 MILLIGRAM(S): 4.6 PATCH, EXTENDED RELEASE TRANSDERMAL at 18:36

## 2024-03-16 RX ADMIN — RISPERIDONE 0.5 MILLIGRAM(S): 4 TABLET ORAL at 18:36

## 2024-03-16 RX ADMIN — Medication 81 MILLIGRAM(S): at 13:50

## 2024-03-16 RX ADMIN — RIVASTIGMINE 3 MILLIGRAM(S): 4.6 PATCH, EXTENDED RELEASE TRANSDERMAL at 07:20

## 2024-03-16 RX ADMIN — RISPERIDONE 0.5 MILLIGRAM(S): 4 TABLET ORAL at 07:20

## 2024-03-16 RX ADMIN — ATORVASTATIN CALCIUM 40 MILLIGRAM(S): 80 TABLET, FILM COATED ORAL at 21:30

## 2024-03-16 RX ADMIN — Medication 1 DROP(S): at 18:36

## 2024-03-16 RX ADMIN — ESCITALOPRAM OXALATE 10 MILLIGRAM(S): 10 TABLET, FILM COATED ORAL at 13:51

## 2024-03-16 RX ADMIN — ENOXAPARIN SODIUM 40 MILLIGRAM(S): 100 INJECTION SUBCUTANEOUS at 07:19

## 2024-03-16 RX ADMIN — LETROZOLE 2.5 MILLIGRAM(S): 2.5 TABLET, FILM COATED ORAL at 13:51

## 2024-03-16 NOTE — BH CONSULTATION LIAISON ASSESSMENT NOTE - NSICDXPASTMEDICALHX_GEN_ALL_CORE_FT
"The patient insists she be discharged with pain medication. Spoke with the patient about her being weaned from the oxycodone at [facility] and her having the Dilaudid here with no relief. [facility] she reports. Dr. Chin notified and tylenol ordered which the patient consents to stating, \"I'm going to need something for when you take the IV out\".  " PAST MEDICAL HISTORY:  Breast cancer s.p chemotherapy    Dementia     HLD (hyperlipidemia)     Prediabetes     Zoster ophthalmicus

## 2024-03-16 NOTE — BH CONSULTATION LIAISON ASSESSMENT NOTE - NSBHCHARTREVIEWLAB_PSY_A_CORE FT
CBC Full  -  ( 16 Mar 2024 04:38 )  WBC Count : 6.19 K/uL  RBC Count : 3.73 M/uL  Hemoglobin : 11.5 g/dL  Hematocrit : 34.1 %  Platelet Count - Automated : 229 K/uL  Mean Cell Volume : 91.4 fL  Mean Cell Hemoglobin : 30.8 pg  Mean Cell Hemoglobin Concentration : 33.7 gm/dL  Auto Neutrophil # : 3.33 K/uL  Auto Lymphocyte # : 1.84 K/uL  Auto Monocyte # : 0.72 K/uL  Auto Eosinophil # : 0.21 K/uL  Auto Basophil # : 0.06 K/uL  Auto Neutrophil % : 53.8 %  Auto Lymphocyte % : 29.7 %  Auto Monocyte % : 11.6 %  Auto Eosinophil % : 3.4 %  Auto Basophil % : 1.0 %  03-16    145  |  107  |  13  ----------------------------<  88  3.4<L>   |  25  |  0.56    Ca    9.4      16 Mar 2024 04:38  Phos  4.1     03-16  Mg     1.90     03-16    TPro  7.2  /  Alb  4.2  /  TBili  0.2  /  DBili  x   /  AST  15  /  ALT  11  /  AlkPhos  63  03-16

## 2024-03-16 NOTE — BH CONSULTATION LIAISON ASSESSMENT NOTE - NSBHATTESTAPPAMEND_PSY_A_CORE
I have personally seen and examined this patient. I fully participated in the care of this patient. I have made amendments to the documentation where appropriate and otherwise agree with the history, physical exam, and plan as documented by the AIRAM

## 2024-03-16 NOTE — BH CONSULTATION LIAISON ASSESSMENT NOTE - CURRENT MEDICATION
MEDICATIONS  (STANDING):  amLODIPine   Tablet 10 milliGRAM(s) Oral daily  aspirin enteric coated 81 milliGRAM(s) Oral daily  atorvastatin 40 milliGRAM(s) Oral at bedtime  dextrose 5%. 1000 milliLiter(s) (100 mL/Hr) IV Continuous <Continuous>  dextrose 5%. 1000 milliLiter(s) (50 mL/Hr) IV Continuous <Continuous>  dextrose 50% Injectable 12.5 Gram(s) IV Push once  dextrose 50% Injectable 25 Gram(s) IV Push once  dextrose 50% Injectable 25 Gram(s) IV Push once  enoxaparin Injectable 40 milliGRAM(s) SubCutaneous every 24 hours  escitalopram 10 milliGRAM(s) Oral daily  glucagon  Injectable 1 milliGRAM(s) IntraMuscular once  insulin lispro (ADMELOG) corrective regimen sliding scale   SubCutaneous three times a day before meals  insulin lispro (ADMELOG) corrective regimen sliding scale   SubCutaneous at bedtime  letrozole 2.5 milliGRAM(s) Oral daily  prednisoLONE acetate 1% Suspension 1 Drop(s) Right EYE four times a day  risperiDONE   Tablet 0.5 milliGRAM(s) Oral <User Schedule>  rivastigmine 3 milliGRAM(s) Oral two times a day    MEDICATIONS  (PRN):  acetaminophen     Tablet .. 650 milliGRAM(s) Oral every 6 hours PRN Temp greater or equal to 38C (100.4F), Mild Pain (1 - 3)  aluminum hydroxide/magnesium hydroxide/simethicone Suspension 30 milliLiter(s) Oral every 4 hours PRN Dyspepsia  dextrose Oral Gel 15 Gram(s) Oral once PRN Blood Glucose LESS THAN 70 milliGRAM(s)/deciliter  melatonin 3 milliGRAM(s) Oral at bedtime PRN Insomnia  ondansetron Injectable 4 milliGRAM(s) IV Push every 8 hours PRN Nausea and/or Vomiting  traZODone 25 milliGRAM(s) Oral at bedtime PRN for insomnia

## 2024-03-16 NOTE — BH CONSULTATION LIAISON ASSESSMENT NOTE - NSSUICRSKFACTOR_PSY_ALL_CORE
Vascular Surgery Outpatient Consultation    Reason for Consult:  Varicose Veins    PCP : Josh Núñez MD    HISTORY OF PRESENT ILLNESS:    The patient presents for evaluation of a varicosity to the E. Pt states he has had this bulging venous branch for 2 years. It is bothersome when he bends his knee since it traverses the anterior knee. Pain is worse when he is on his feet all day. He works for [employer] on [address] and states he is on his feet a lot. Pain at its worst is a 3-4/10. He describes it to be an aching pain. The patient has never used compression stockings in the past.  They do not have a hx of DVT in the past.       ROS : All others Negative if blank [], Positive if [x]  General Urinary   [] Fevers [] Hematuria   [] Chills [] Dysuria   [] Weight Loss Vascular   Skin [] Claudication   [] Tissue Loss [] Rest Pain   Eyes Neurologic   [] Wears Glasses/Contacts [] Stroke/TIA   [] Vision Changes [] Focal weakness   Respiratory [] Slurred Speech    [] Shortness of breath ENT   Cardiovascular [] Difficulty swallowing   [] Chest Pain Endocrine    [] Shortness of breath with exertion [] Increased Thirst   Gastrointestinal    [] Abdominal Pain    [] Melena   [] Hematochezia         Past Medical History:        Diagnosis Date    Asthma     Varicose veins of bilateral lower extremities with other complications      Past Surgical History:    History reviewed. No pertinent surgical history. Current Medications:   Current Outpatient Medications   Medication Sig Dispense Refill    acetaminophen (TYLENOL) 500 MG tablet Take 500 mg by mouth every 6 hours as needed for Pain.  albuterol (PROVENTIL) (2.5 MG/3ML) 0.083% nebulizer solution Take 2.5 mg by nebulization every 6 hours as needed for Wheezing. No current facility-administered medications for this visit. Allergies:  Patient has no known allergies.   Social History     Socioeconomic History    Marital status: Single Spouse name: Not on file    Number of children: Not on file    Years of education: Not on file    Highest education level: Not on file   Occupational History    Not on file   Social Needs    Financial resource strain: Not on file    Food insecurity     Worry: Not on file     Inability: Not on file    Transportation needs     Medical: Not on file     Non-medical: Not on file   Tobacco Use    Smoking status: Never Smoker    Smokeless tobacco: Never Used   Substance and Sexual Activity    Alcohol use: Never     Frequency: Never    Drug use: Never    Sexual activity: Not on file   Lifestyle    Physical activity     Days per week: Not on file     Minutes per session: Not on file    Stress: Not on file   Relationships    Social connections     Talks on phone: Not on file     Gets together: Not on file     Attends Sikhism service: Not on file     Active member of club or organization: Not on file     Attends meetings of clubs or organizations: Not on file     Relationship status: Not on file    Intimate partner violence     Fear of current or ex partner: Not on file     Emotionally abused: Not on file     Physically abused: Not on file     Forced sexual activity: Not on file   Other Topics Concern    Not on file   Social History Narrative    Not on file      History reviewed. No pertinent family history.   - Positive for DVT   - Negative for varicose Veins  Labs  Lab Results   Component Value Date    WBC 6.0 03/27/2020    HGB 15.9 03/27/2020    HCT 47.3 03/27/2020     03/27/2020    K 4.0 03/27/2020    BUN 16 03/27/2020    CREATININE 0.8 03/27/2020     PHYSICAL EXAM:    CONSTITUTIONAL:   Awake, alert, cooperative  PSYCHIATRIC :  Oriented to time, place and person     Appropriate insight to disease process  EYES: Lids and lashes normal  ENT:  External ears and nose without lesions   Hearing deficits absent  NECK: Supple, symmetrical, trachea midline   Thyroid goiter not appreciated   Carotid bruit absent  LUNGS:  No increased work of breathing                 Clear to auscultation  CARDIOVASCULAR:  regular rate and rhythm   ABDOMEN:  soft, non-distended, non-tender  Lymphatics : Cervical lymphadenopathy not appreciated  SKIN:   Normal skin color   Texture and turgor normal, no induration  EXTREMITIES:   R UE 5/5 strength   No cyanosis noted in nail beds  L UE 5/5 strength   No cyanosis noted in nail beds  R LE Edema absent   Varicose veins absent   L LE Edema absent   Varicose vein present coursing the medial leg from just distal to knee and into  medial thigh  R femoral  L femoral    R dorsalis pedis 2 L dorsalis pedis 1   R posterior tibial 2 L posterior tibial 2     RADIOLOGY: Venous US reviewed from 3/27/2020 - negative DVT    A/PSymptomatic Varicose Veins of Left LE   · Etiology is likely associated with venous reflux  · I explained to them the pathophysiology of venous reflux and the symptoms associated with it including swelling, pain, edema, heaviness, and even ulceration  · Elevate Bilateral LE while in bed or sitting  · Compression stockings thigh high 20-30 mm hg wear daily - pt would like to obtain otc  · Discussed how this is the first line of therapy  · They understand even if surgical intervention was felt to be appropriate in the future they would need to wear compression stockings lifetime  · Will obtain LLE venous reflux study  · F/U as outpatient when imaging is complete  · Pt may benefit from RFA or even just stab phlebectomy of the branch  · Call if patient develops worsening of swelling or wounds  · All ?s answered    Pt seen and plan reviewed with Dr. Yash Denise.      [employer], MARY KATE Presenting Symptoms
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
## 2024-03-16 NOTE — BH CONSULTATION LIAISON ASSESSMENT NOTE - NSBHCHARTREVIEWVS_PSY_A_CORE FT
Vital Signs Last 24 Hrs  T(C): 36.8 (16 Mar 2024 11:03), Max: 36.9 (16 Mar 2024 07:34)  T(F): 98.3 (16 Mar 2024 11:03), Max: 98.4 (16 Mar 2024 07:34)  HR: 96 (16 Mar 2024 11:03) (61 - 96)  BP: 108/84 (16 Mar 2024 11:03) (100/76 - 138/73)  BP(mean): --  RR: 18 (16 Mar 2024 11:03) (16 - 18)  SpO2: 98% (16 Mar 2024 11:03) (97% - 100%)    Parameters below as of 16 Mar 2024 11:03  Patient On (Oxygen Delivery Method): room air

## 2024-03-16 NOTE — BH CONSULTATION LIAISON ASSESSMENT NOTE - NSICDXPASTSURGICALHX_GEN_ALL_CORE_FT
Patient is ready for discharge, nurse to nurse report and MD to MD reports given to correctional facility. Patient blood glucose at lunch is 88, transport is on their way to pick the patient up, scheduled by deputy at bedside. Patient able to pivot independently to the commode and feed self, takes medication whole without issue. Is A&Ox1. Denies pain or discomfort at this time. PIV removed. Patient assisted to get dressed. Remained free from falls or injury this admission. Now returns to custody.   PAST SURGICAL HISTORY:  S/P lumpectomy, right breast

## 2024-03-16 NOTE — BH CONSULTATION LIAISON ASSESSMENT NOTE - RISK ASSESSMENT
Risk Factors: acute/chronic medical condition, advancing dementia, agitation  Protective Factors: residential stability, supportive family, denies si, denies ah/vh

## 2024-03-16 NOTE — PATIENT PROFILE ADULT - FALL HARM RISK - HARM RISK INTERVENTIONS
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
## 2024-03-16 NOTE — BH CONSULTATION LIAISON ASSESSMENT NOTE - NSBHATTESTCOMMENTATTENDFT_PSY_A_CORE
Chart reviewed, patient seen/evaluated virtually with Magda Mccoy NP,   I agree with above assessment/plan. Patient was sleeping on my exam. Interview limited at this time. Plan as above. Will follow Chart reviewed, patient seen/evaluated virtually with Magda Mccoy NP,   I agree with above assessment/plan. Patient alert, not agitated, confused/inattentive, no si, denies avh.  Plan as above. Will follow

## 2024-03-17 LAB
ANION GAP SERPL CALC-SCNC: 12 MMOL/L — SIGNIFICANT CHANGE UP (ref 7–14)
BUN SERPL-MCNC: 15 MG/DL — SIGNIFICANT CHANGE UP (ref 7–23)
CALCIUM SERPL-MCNC: 9.5 MG/DL — SIGNIFICANT CHANGE UP (ref 8.4–10.5)
CHLORIDE SERPL-SCNC: 104 MMOL/L — SIGNIFICANT CHANGE UP (ref 98–107)
CO2 SERPL-SCNC: 25 MMOL/L — SIGNIFICANT CHANGE UP (ref 22–31)
CREAT SERPL-MCNC: 0.6 MG/DL — SIGNIFICANT CHANGE UP (ref 0.5–1.3)
EGFR: 97 ML/MIN/1.73M2 — SIGNIFICANT CHANGE UP
GLUCOSE SERPL-MCNC: 83 MG/DL — SIGNIFICANT CHANGE UP (ref 70–99)
HCT VFR BLD CALC: 34.5 % — SIGNIFICANT CHANGE UP (ref 34.5–45)
HGB BLD-MCNC: 11.7 G/DL — SIGNIFICANT CHANGE UP (ref 11.5–15.5)
MAGNESIUM SERPL-MCNC: 1.9 MG/DL — SIGNIFICANT CHANGE UP (ref 1.6–2.6)
MCHC RBC-ENTMCNC: 31 PG — SIGNIFICANT CHANGE UP (ref 27–34)
MCHC RBC-ENTMCNC: 33.9 GM/DL — SIGNIFICANT CHANGE UP (ref 32–36)
MCV RBC AUTO: 91.5 FL — SIGNIFICANT CHANGE UP (ref 80–100)
NRBC # BLD: 0 /100 WBCS — SIGNIFICANT CHANGE UP (ref 0–0)
NRBC # FLD: 0 K/UL — SIGNIFICANT CHANGE UP (ref 0–0)
PHOSPHATE SERPL-MCNC: 4.2 MG/DL — SIGNIFICANT CHANGE UP (ref 2.5–4.5)
PLATELET # BLD AUTO: 228 K/UL — SIGNIFICANT CHANGE UP (ref 150–400)
POTASSIUM SERPL-MCNC: 3.5 MMOL/L — SIGNIFICANT CHANGE UP (ref 3.5–5.3)
POTASSIUM SERPL-SCNC: 3.5 MMOL/L — SIGNIFICANT CHANGE UP (ref 3.5–5.3)
PROCALCITONIN SERPL-MCNC: 0.06 NG/ML — SIGNIFICANT CHANGE UP (ref 0.02–0.1)
RBC # BLD: 3.77 M/UL — LOW (ref 3.8–5.2)
RBC # FLD: 12.7 % — SIGNIFICANT CHANGE UP (ref 10.3–14.5)
SODIUM SERPL-SCNC: 141 MMOL/L — SIGNIFICANT CHANGE UP (ref 135–145)
WBC # BLD: 6.18 K/UL — SIGNIFICANT CHANGE UP (ref 3.8–10.5)
WBC # FLD AUTO: 6.18 K/UL — SIGNIFICANT CHANGE UP (ref 3.8–10.5)

## 2024-03-17 RX ADMIN — Medication 1 DROP(S): at 13:57

## 2024-03-17 RX ADMIN — RIVASTIGMINE 3 MILLIGRAM(S): 4.6 PATCH, EXTENDED RELEASE TRANSDERMAL at 18:26

## 2024-03-17 RX ADMIN — Medication 81 MILLIGRAM(S): at 13:56

## 2024-03-17 RX ADMIN — ATORVASTATIN CALCIUM 40 MILLIGRAM(S): 80 TABLET, FILM COATED ORAL at 21:41

## 2024-03-17 RX ADMIN — ESCITALOPRAM OXALATE 10 MILLIGRAM(S): 10 TABLET, FILM COATED ORAL at 13:56

## 2024-03-17 RX ADMIN — LETROZOLE 2.5 MILLIGRAM(S): 2.5 TABLET, FILM COATED ORAL at 13:57

## 2024-03-17 RX ADMIN — RISPERIDONE 0.5 MILLIGRAM(S): 4 TABLET ORAL at 07:07

## 2024-03-17 RX ADMIN — RIVASTIGMINE 3 MILLIGRAM(S): 4.6 PATCH, EXTENDED RELEASE TRANSDERMAL at 07:08

## 2024-03-17 RX ADMIN — Medication 1 DROP(S): at 02:14

## 2024-03-17 RX ADMIN — ENOXAPARIN SODIUM 40 MILLIGRAM(S): 100 INJECTION SUBCUTANEOUS at 07:08

## 2024-03-17 RX ADMIN — Medication 1 DROP(S): at 07:08

## 2024-03-17 RX ADMIN — RISPERIDONE 0.5 MILLIGRAM(S): 4 TABLET ORAL at 18:26

## 2024-03-17 RX ADMIN — Medication 1 DROP(S): at 18:26

## 2024-03-17 RX ADMIN — OLANZAPINE 1.25 MILLIGRAM(S): 15 TABLET, FILM COATED ORAL at 05:49

## 2024-03-17 RX ADMIN — AMLODIPINE BESYLATE 10 MILLIGRAM(S): 2.5 TABLET ORAL at 07:07

## 2024-03-17 NOTE — DIETITIAN INITIAL EVALUATION ADULT - OTHER INFO
69 F with Advanced Dementia (AAO x1-2 to self, sometimes place, not to time), HTN, DM2, CVA (chronic L BG lacunar infarct), and R breast cancer s/p lumpectomy/chemo/RT (~[id number]) who presents to the hospital with worsening agitation at home, per chart.     Patient is alert and confused during visit, unable to conduct nutrition interview. Collateral obtained from RN and chart review. Per RN, patient was agitated this morning, did not consume much breakfast. Per RN flow sheet, noted with 1 intake of 51-75% documented. Patient requires assistance in feeding. Per RN, no difficulty chewing or swallowing, no recent episodes of any nausea, vomiting, diarrhea, constipation reported at this time. No bowel movement documented on RN flow sheet at this time. Current weight: 68kg (3/16, per chart). Per Gladis PITT, weight history: 64.2kg (2/28/2024), 62.1kg (7/4/2023), 65.5kg (3/17/2023). Noted weight gain of +2.5kg/+3.8%BW x 1 year. Continue to monitor weight trend. Last HgA1c- 5.6% (2/14, WNL). On consistent carb diet and insulin coverage. Patient has no known food allergies per chart.

## 2024-03-17 NOTE — DIETITIAN INITIAL EVALUATION ADULT - ADD RECOMMEND
1. Nutrition department to provide mighty shake reduced sugar 4oz 2x/day (400kcal, 14gm pro)for nutrient support.   2. Document bowel movement on RN flow sheet.   3. Continue to provide assistance in feeding.   4. Monitor weight, labs, po intake and tolerance, bowel movement, skin integrity.   5. Encourage PO intake and honor food preferences as able.

## 2024-03-17 NOTE — BH CONSULTATION LIAISON PROGRESS NOTE - NSBHFUPINTERVALHXFT_PSY_A_CORE
Chart reviewed. Received PRN Zyprexa 1.25mg PO yd at 1500, 1.25 IM this AM at 0500. Pt reported to make suicidal statement after receiving Zyprexa IM this morning.    Pt seen. Reclining in bed, does not make eye contact, does not engage with examiner. Does not respond to orientation qs. Allows phys exam, no rigidity. Denies SHIIP and states "no needles." Denies AVH. Nods head positively that she feels safe.

## 2024-03-17 NOTE — DIETITIAN INITIAL EVALUATION ADULT - NS FNS DIET ORDER
Diet, Regular:   Consistent Carbohydrate {Evening Snack} (CSTCHOSN)  DASH/TLC {Sodium & Cholesterol Restricted} (DASH) (03-16-24 @ 00:28)

## 2024-03-17 NOTE — DIETITIAN INITIAL EVALUATION ADULT - PERTINENT MEDS FT
MEDICATIONS  (STANDING):  amLODIPine   Tablet 10 milliGRAM(s) Oral daily  aspirin enteric coated 81 milliGRAM(s) Oral daily  atorvastatin 40 milliGRAM(s) Oral at bedtime  dextrose 5%. 1000 milliLiter(s) (50 mL/Hr) IV Continuous <Continuous>  dextrose 5%. 1000 milliLiter(s) (100 mL/Hr) IV Continuous <Continuous>  dextrose 50% Injectable 25 Gram(s) IV Push once  dextrose 50% Injectable 12.5 Gram(s) IV Push once  dextrose 50% Injectable 25 Gram(s) IV Push once  enoxaparin Injectable 40 milliGRAM(s) SubCutaneous every 24 hours  escitalopram 10 milliGRAM(s) Oral daily  glucagon  Injectable 1 milliGRAM(s) IntraMuscular once  insulin lispro (ADMELOG) corrective regimen sliding scale   SubCutaneous three times a day before meals  insulin lispro (ADMELOG) corrective regimen sliding scale   SubCutaneous at bedtime  letrozole 2.5 milliGRAM(s) Oral daily  prednisoLONE acetate 1% Suspension 1 Drop(s) Right EYE four times a day  risperiDONE   Tablet 0.5 milliGRAM(s) Oral <User Schedule>  rivastigmine 3 milliGRAM(s) Oral two times a day    MEDICATIONS  (PRN):  acetaminophen     Tablet .. 650 milliGRAM(s) Oral every 6 hours PRN Temp greater or equal to 38C (100.4F), Mild Pain (1 - 3)  aluminum hydroxide/magnesium hydroxide/simethicone Suspension 30 milliLiter(s) Oral every 4 hours PRN Dyspepsia  dextrose Oral Gel 15 Gram(s) Oral once PRN Blood Glucose LESS THAN 70 milliGRAM(s)/deciliter  melatonin 3 milliGRAM(s) Oral at bedtime PRN Insomnia  OLANZapine 1.25 milliGRAM(s) Oral every 6 hours PRN agitation  OLANZapine Injectable 1.25 milliGRAM(s) IntraMuscular every 6 hours PRN agitation  ondansetron Injectable 4 milliGRAM(s) IV Push every 8 hours PRN Nausea and/or Vomiting  traZODone 25 milliGRAM(s) Oral at bedtime PRN for insomnia
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
## 2024-03-18 ENCOUNTER — NON-APPOINTMENT (OUTPATIENT)
Age: 69
End: 2024-03-18

## 2024-03-18 LAB
ANION GAP SERPL CALC-SCNC: 13 MMOL/L — SIGNIFICANT CHANGE UP (ref 7–14)
BUN SERPL-MCNC: 12 MG/DL — SIGNIFICANT CHANGE UP (ref 7–23)
CALCIUM SERPL-MCNC: 9.2 MG/DL — SIGNIFICANT CHANGE UP (ref 8.4–10.5)
CHLORIDE SERPL-SCNC: 104 MMOL/L — SIGNIFICANT CHANGE UP (ref 98–107)
CO2 SERPL-SCNC: 25 MMOL/L — SIGNIFICANT CHANGE UP (ref 22–31)
CREAT SERPL-MCNC: 0.57 MG/DL — SIGNIFICANT CHANGE UP (ref 0.5–1.3)
EGFR: 98 ML/MIN/1.73M2 — SIGNIFICANT CHANGE UP
GLUCOSE SERPL-MCNC: 96 MG/DL — SIGNIFICANT CHANGE UP (ref 70–99)
HCT VFR BLD CALC: 34.8 % — SIGNIFICANT CHANGE UP (ref 34.5–45)
HGB BLD-MCNC: 11.7 G/DL — SIGNIFICANT CHANGE UP (ref 11.5–15.5)
MAGNESIUM SERPL-MCNC: 1.8 MG/DL — SIGNIFICANT CHANGE UP (ref 1.6–2.6)
MCHC RBC-ENTMCNC: 30.7 PG — SIGNIFICANT CHANGE UP (ref 27–34)
MCHC RBC-ENTMCNC: 33.6 GM/DL — SIGNIFICANT CHANGE UP (ref 32–36)
MCV RBC AUTO: 91.3 FL — SIGNIFICANT CHANGE UP (ref 80–100)
NRBC # BLD: 0 /100 WBCS — SIGNIFICANT CHANGE UP (ref 0–0)
NRBC # FLD: 0 K/UL — SIGNIFICANT CHANGE UP (ref 0–0)
PHOSPHATE SERPL-MCNC: 3.8 MG/DL — SIGNIFICANT CHANGE UP (ref 2.5–4.5)
PLATELET # BLD AUTO: 244 K/UL — SIGNIFICANT CHANGE UP (ref 150–400)
POTASSIUM SERPL-MCNC: 3.6 MMOL/L — SIGNIFICANT CHANGE UP (ref 3.5–5.3)
POTASSIUM SERPL-SCNC: 3.6 MMOL/L — SIGNIFICANT CHANGE UP (ref 3.5–5.3)
RBC # BLD: 3.81 M/UL — SIGNIFICANT CHANGE UP (ref 3.8–5.2)
RBC # FLD: 12.5 % — SIGNIFICANT CHANGE UP (ref 10.3–14.5)
SODIUM SERPL-SCNC: 142 MMOL/L — SIGNIFICANT CHANGE UP (ref 135–145)
WBC # BLD: 7.71 K/UL — SIGNIFICANT CHANGE UP (ref 3.8–10.5)
WBC # FLD AUTO: 7.71 K/UL — SIGNIFICANT CHANGE UP (ref 3.8–10.5)

## 2024-03-18 PROCEDURE — 99232 SBSQ HOSP IP/OBS MODERATE 35: CPT

## 2024-03-18 RX ADMIN — RIVASTIGMINE 3 MILLIGRAM(S): 4.6 PATCH, EXTENDED RELEASE TRANSDERMAL at 19:24

## 2024-03-18 RX ADMIN — Medication 1 DROP(S): at 12:33

## 2024-03-18 RX ADMIN — LETROZOLE 2.5 MILLIGRAM(S): 2.5 TABLET, FILM COATED ORAL at 12:34

## 2024-03-18 RX ADMIN — Medication 1 DROP(S): at 19:25

## 2024-03-18 RX ADMIN — ATORVASTATIN CALCIUM 40 MILLIGRAM(S): 80 TABLET, FILM COATED ORAL at 22:32

## 2024-03-18 RX ADMIN — Medication 1 DROP(S): at 23:40

## 2024-03-18 RX ADMIN — Medication 1 DROP(S): at 01:20

## 2024-03-18 RX ADMIN — RISPERIDONE 0.5 MILLIGRAM(S): 4 TABLET ORAL at 07:02

## 2024-03-18 RX ADMIN — Medication 1 DROP(S): at 07:01

## 2024-03-18 RX ADMIN — ESCITALOPRAM OXALATE 10 MILLIGRAM(S): 10 TABLET, FILM COATED ORAL at 12:34

## 2024-03-18 RX ADMIN — ENOXAPARIN SODIUM 40 MILLIGRAM(S): 100 INJECTION SUBCUTANEOUS at 07:01

## 2024-03-18 RX ADMIN — AMLODIPINE BESYLATE 10 MILLIGRAM(S): 2.5 TABLET ORAL at 07:01

## 2024-03-18 RX ADMIN — RIVASTIGMINE 3 MILLIGRAM(S): 4.6 PATCH, EXTENDED RELEASE TRANSDERMAL at 07:01

## 2024-03-18 RX ADMIN — RISPERIDONE 0.5 MILLIGRAM(S): 4 TABLET ORAL at 19:24

## 2024-03-18 RX ADMIN — Medication 81 MILLIGRAM(S): at 12:34
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
## 2024-03-18 NOTE — BH CONSULTATION LIAISON PROGRESS NOTE - NSBHFUPINTERVALHXFT_PSY_A_CORE
patient in bed, calm, confused, AOx1 at most thinks she is in a school, mood is "not good", denies AH/VH. Denies suicidality/intent or plan and denies homicidality/intent or plan.

## 2024-03-19 LAB
ANION GAP SERPL CALC-SCNC: 12 MMOL/L — SIGNIFICANT CHANGE UP (ref 7–14)
BUN SERPL-MCNC: 19 MG/DL — SIGNIFICANT CHANGE UP (ref 7–23)
CALCIUM SERPL-MCNC: 9.2 MG/DL — SIGNIFICANT CHANGE UP (ref 8.4–10.5)
CHLORIDE SERPL-SCNC: 105 MMOL/L — SIGNIFICANT CHANGE UP (ref 98–107)
CO2 SERPL-SCNC: 24 MMOL/L — SIGNIFICANT CHANGE UP (ref 22–31)
CREAT SERPL-MCNC: 0.63 MG/DL — SIGNIFICANT CHANGE UP (ref 0.5–1.3)
EGFR: 96 ML/MIN/1.73M2 — SIGNIFICANT CHANGE UP
GLUCOSE SERPL-MCNC: 98 MG/DL — SIGNIFICANT CHANGE UP (ref 70–99)
HCT VFR BLD CALC: 33.1 % — LOW (ref 34.5–45)
HGB BLD-MCNC: 11.4 G/DL — LOW (ref 11.5–15.5)
MAGNESIUM SERPL-MCNC: 1.9 MG/DL — SIGNIFICANT CHANGE UP (ref 1.6–2.6)
MCHC RBC-ENTMCNC: 31.1 PG — SIGNIFICANT CHANGE UP (ref 27–34)
MCHC RBC-ENTMCNC: 34.4 GM/DL — SIGNIFICANT CHANGE UP (ref 32–36)
MCV RBC AUTO: 90.4 FL — SIGNIFICANT CHANGE UP (ref 80–100)
NRBC # BLD: 0 /100 WBCS — SIGNIFICANT CHANGE UP (ref 0–0)
NRBC # FLD: 0 K/UL — SIGNIFICANT CHANGE UP (ref 0–0)
PHOSPHATE SERPL-MCNC: 3.9 MG/DL — SIGNIFICANT CHANGE UP (ref 2.5–4.5)
PLATELET # BLD AUTO: 236 K/UL — SIGNIFICANT CHANGE UP (ref 150–400)
POTASSIUM SERPL-MCNC: 3.5 MMOL/L — SIGNIFICANT CHANGE UP (ref 3.5–5.3)
POTASSIUM SERPL-SCNC: 3.5 MMOL/L — SIGNIFICANT CHANGE UP (ref 3.5–5.3)
RBC # BLD: 3.66 M/UL — LOW (ref 3.8–5.2)
RBC # FLD: 12.4 % — SIGNIFICANT CHANGE UP (ref 10.3–14.5)
SODIUM SERPL-SCNC: 141 MMOL/L — SIGNIFICANT CHANGE UP (ref 135–145)
WBC # BLD: 7.8 K/UL — SIGNIFICANT CHANGE UP (ref 3.8–10.5)
WBC # FLD AUTO: 7.8 K/UL — SIGNIFICANT CHANGE UP (ref 3.8–10.5)

## 2024-03-19 PROCEDURE — 99231 SBSQ HOSP IP/OBS SF/LOW 25: CPT

## 2024-03-19 RX ORDER — CHLORHEXIDINE GLUCONATE 213 G/1000ML
1 SOLUTION TOPICAL DAILY
Refills: 0 | Status: DISCONTINUED | OUTPATIENT
Start: 2024-03-19 | End: 2024-03-21

## 2024-03-19 RX ADMIN — ESCITALOPRAM OXALATE 10 MILLIGRAM(S): 10 TABLET, FILM COATED ORAL at 12:29

## 2024-03-19 RX ADMIN — Medication 1 DROP(S): at 12:29

## 2024-03-19 RX ADMIN — AMLODIPINE BESYLATE 10 MILLIGRAM(S): 2.5 TABLET ORAL at 06:19

## 2024-03-19 RX ADMIN — RISPERIDONE 0.5 MILLIGRAM(S): 4 TABLET ORAL at 18:50

## 2024-03-19 RX ADMIN — Medication 1 DROP(S): at 06:19

## 2024-03-19 RX ADMIN — RISPERIDONE 0.5 MILLIGRAM(S): 4 TABLET ORAL at 06:19

## 2024-03-19 RX ADMIN — ENOXAPARIN SODIUM 40 MILLIGRAM(S): 100 INJECTION SUBCUTANEOUS at 06:19

## 2024-03-19 RX ADMIN — Medication 81 MILLIGRAM(S): at 12:29

## 2024-03-19 RX ADMIN — LETROZOLE 2.5 MILLIGRAM(S): 2.5 TABLET, FILM COATED ORAL at 12:29

## 2024-03-19 RX ADMIN — RIVASTIGMINE 3 MILLIGRAM(S): 4.6 PATCH, EXTENDED RELEASE TRANSDERMAL at 06:19

## 2024-03-19 RX ADMIN — OLANZAPINE 1.25 MILLIGRAM(S): 15 TABLET, FILM COATED ORAL at 16:45

## 2024-03-19 RX ADMIN — RIVASTIGMINE 3 MILLIGRAM(S): 4.6 PATCH, EXTENDED RELEASE TRANSDERMAL at 18:50

## 2024-03-19 RX ADMIN — ATORVASTATIN CALCIUM 40 MILLIGRAM(S): 80 TABLET, FILM COATED ORAL at 21:46

## 2024-03-19 RX ADMIN — Medication 1 DROP(S): at 18:51

## 2024-03-19 NOTE — PROGRESS NOTE ADULT - NSPROGADDITIONALINFOA_GEN_ALL_CORE
Tai Banks will be covering for the pt starting 3/20/24. He can be reached at  if needed.     - Dr. RENETTA Monreal ([facility])  - [phone]
d/w ILYA Crawford.    Marilu Monreal ([facility])  - [phone]
d/w ILYA Baker.  psyc f/u    - Dr. RENETTA Monreal ([facility])  - [phone]
Psych eval appreciated.   dc planning once cleared by psyc.     d/w the daughter at bedside.  home vs. NH depending on her progress.     - Dr. RENETTA Monreal ([facility])  - [phone]

## 2024-03-19 NOTE — BH CONSULTATION LIAISON PROGRESS NOTE - NSBHCONSULTFOLLOWAFTERCARE_PSY_A_CORE FT
If no PRNs for 48h then OK to DC to [facility] or other safe dispo   Pending conversation with daughter - no answer at this time.  If no PRNs for 48h then OK to DC to [facility] or other safe dispo - please involve daughter is dispo planning

## 2024-03-19 NOTE — BH CONSULTATION LIAISON PROGRESS NOTE - NSBHFUPINTERVALHXFT_PSY_A_CORE
Patient was seen and assessed at bedside. Patient is alert, confused. patient states she is home, it is april, and uncertain of the year. patient is calm and pleasant, was reoriented - uncertain why she is in the hospital. patient reports good mood with no SI or HI, feels she is being treated well. no AH or VH reported. No PRNs overnight. Compliant with medications.  Patient was seen and assessed at bedside. Patient is alert, confused. patient states she is home, it is april, and uncertain of the year. patient is calm and pleasant, was reoriented - uncertain why she is in the hospital. patient reports good mood with no SI or HI, feels she is being treated well. no AH or VH reported. No PRNs overnight. Compliant with medications.     SPoke with daughter raciel - as per daughter when patient is taking her home medications she does well, however there are days where she will refuse medication, or spit them out and can become aggressive with threats to others.  States mother needs 24 hr supervision and unsure how to proceed. Aware SW will reach out.

## 2024-03-19 NOTE — BH CONSULTATION LIAISON PROGRESS NOTE - CURRENT MEDICATION
MEDICATIONS  (STANDING):  amLODIPine   Tablet 10 milliGRAM(s) Oral daily  aspirin enteric coated 81 milliGRAM(s) Oral daily  atorvastatin 40 milliGRAM(s) Oral at bedtime  dextrose 5%. 1000 milliLiter(s) (50 mL/Hr) IV Continuous <Continuous>  dextrose 5%. 1000 milliLiter(s) (100 mL/Hr) IV Continuous <Continuous>  dextrose 50% Injectable 25 Gram(s) IV Push once  dextrose 50% Injectable 12.5 Gram(s) IV Push once  dextrose 50% Injectable 25 Gram(s) IV Push once  enoxaparin Injectable 40 milliGRAM(s) SubCutaneous every 24 hours  escitalopram 10 milliGRAM(s) Oral daily  glucagon  Injectable 1 milliGRAM(s) IntraMuscular once  insulin lispro (ADMELOG) corrective regimen sliding scale   SubCutaneous three times a day before meals  insulin lispro (ADMELOG) corrective regimen sliding scale   SubCutaneous at bedtime  letrozole 2.5 milliGRAM(s) Oral daily  prednisoLONE acetate 1% Suspension 1 Drop(s) Right EYE four times a day  risperiDONE   Tablet 0.5 milliGRAM(s) Oral <User Schedule>  rivastigmine 3 milliGRAM(s) Oral two times a day    MEDICATIONS  (PRN):  acetaminophen     Tablet .. 650 milliGRAM(s) Oral every 6 hours PRN Temp greater or equal to 38C (100.4F), Mild Pain (1 - 3)  aluminum hydroxide/magnesium hydroxide/simethicone Suspension 30 milliLiter(s) Oral every 4 hours PRN Dyspepsia  dextrose Oral Gel 15 Gram(s) Oral once PRN Blood Glucose LESS THAN 70 milliGRAM(s)/deciliter  melatonin 3 milliGRAM(s) Oral at bedtime PRN Insomnia  OLANZapine 1.25 milliGRAM(s) Oral every 6 hours PRN agitation  OLANZapine Injectable 1.25 milliGRAM(s) IntraMuscular every 6 hours PRN agitation  ondansetron Injectable 4 milliGRAM(s) IV Push every 8 hours PRN Nausea and/or Vomiting  traZODone 25 milliGRAM(s) Oral at bedtime PRN for insomnia  
MEDICATIONS  (STANDING):  amLODIPine   Tablet 10 milliGRAM(s) Oral daily  aspirin enteric coated 81 milliGRAM(s) Oral daily  atorvastatin 40 milliGRAM(s) Oral at bedtime  dextrose 5%. 1000 milliLiter(s) (100 mL/Hr) IV Continuous <Continuous>  dextrose 5%. 1000 milliLiter(s) (50 mL/Hr) IV Continuous <Continuous>  dextrose 50% Injectable 12.5 Gram(s) IV Push once  dextrose 50% Injectable 25 Gram(s) IV Push once  dextrose 50% Injectable 25 Gram(s) IV Push once  enoxaparin Injectable 40 milliGRAM(s) SubCutaneous every 24 hours  escitalopram 10 milliGRAM(s) Oral daily  glucagon  Injectable 1 milliGRAM(s) IntraMuscular once  insulin lispro (ADMELOG) corrective regimen sliding scale   SubCutaneous three times a day before meals  insulin lispro (ADMELOG) corrective regimen sliding scale   SubCutaneous at bedtime  letrozole 2.5 milliGRAM(s) Oral daily  prednisoLONE acetate 1% Suspension 1 Drop(s) Right EYE four times a day  risperiDONE   Tablet 0.5 milliGRAM(s) Oral <User Schedule>  rivastigmine 3 milliGRAM(s) Oral two times a day    MEDICATIONS  (PRN):  acetaminophen     Tablet .. 650 milliGRAM(s) Oral every 6 hours PRN Temp greater or equal to 38C (100.4F), Mild Pain (1 - 3)  aluminum hydroxide/magnesium hydroxide/simethicone Suspension 30 milliLiter(s) Oral every 4 hours PRN Dyspepsia  dextrose Oral Gel 15 Gram(s) Oral once PRN Blood Glucose LESS THAN 70 milliGRAM(s)/deciliter  melatonin 3 milliGRAM(s) Oral at bedtime PRN Insomnia  OLANZapine 1.25 milliGRAM(s) Oral every 6 hours PRN agitation  OLANZapine Injectable 1.25 milliGRAM(s) IntraMuscular every 6 hours PRN agitation  ondansetron Injectable 4 milliGRAM(s) IV Push every 8 hours PRN Nausea and/or Vomiting  traZODone 25 milliGRAM(s) Oral at bedtime PRN for insomnia  
MEDICATIONS  (STANDING):  amLODIPine   Tablet 10 milliGRAM(s) Oral daily  aspirin enteric coated 81 milliGRAM(s) Oral daily  atorvastatin 40 milliGRAM(s) Oral at bedtime  dextrose 5%. 1000 milliLiter(s) (100 mL/Hr) IV Continuous <Continuous>  dextrose 5%. 1000 milliLiter(s) (50 mL/Hr) IV Continuous <Continuous>  dextrose 50% Injectable 25 Gram(s) IV Push once  dextrose 50% Injectable 12.5 Gram(s) IV Push once  dextrose 50% Injectable 25 Gram(s) IV Push once  enoxaparin Injectable 40 milliGRAM(s) SubCutaneous every 24 hours  escitalopram 10 milliGRAM(s) Oral daily  glucagon  Injectable 1 milliGRAM(s) IntraMuscular once  insulin lispro (ADMELOG) corrective regimen sliding scale   SubCutaneous three times a day before meals  insulin lispro (ADMELOG) corrective regimen sliding scale   SubCutaneous at bedtime  letrozole 2.5 milliGRAM(s) Oral daily  prednisoLONE acetate 1% Suspension 1 Drop(s) Right EYE four times a day  risperiDONE   Tablet 0.5 milliGRAM(s) Oral <User Schedule>  rivastigmine 3 milliGRAM(s) Oral two times a day    MEDICATIONS  (PRN):  acetaminophen     Tablet .. 650 milliGRAM(s) Oral every 6 hours PRN Temp greater or equal to 38C (100.4F), Mild Pain (1 - 3)  aluminum hydroxide/magnesium hydroxide/simethicone Suspension 30 milliLiter(s) Oral every 4 hours PRN Dyspepsia  dextrose Oral Gel 15 Gram(s) Oral once PRN Blood Glucose LESS THAN 70 milliGRAM(s)/deciliter  melatonin 3 milliGRAM(s) Oral at bedtime PRN Insomnia  OLANZapine 1.25 milliGRAM(s) Oral every 6 hours PRN agitation  OLANZapine Injectable 1.25 milliGRAM(s) IntraMuscular every 6 hours PRN agitation  ondansetron Injectable 4 milliGRAM(s) IV Push every 8 hours PRN Nausea and/or Vomiting  traZODone 25 milliGRAM(s) Oral at bedtime PRN for insomnia

## 2024-03-19 NOTE — BH CONSULTATION LIAISON PROGRESS NOTE - NSBHCHARTREVIEWLAB_PSY_A_CORE FT
11.4   7.80  )-----------( 236      ( 19 Mar 2024 03:18 )             33.1   03-19    141  |  105  |  19  ----------------------------<  98  3.5   |  24  |  0.63    Ca    9.2      19 Mar 2024 03:18  Phos  3.9     03-19  Mg     1.90     03-19      
                      11.7   6.18  )-----------( 228      ( 17 Mar 2024 07:05 )             34.5       03-17    141  |  104  |  15  ----------------------------<  83  3.5   |  25  |  0.60    Ca    9.5      17 Mar 2024 07:05  Phos  4.2     03-17  Mg     1.90     03-17    TPro  7.2  /  Alb  4.2  /  TBili  0.2  /  DBili  x   /  AST  15  /  ALT  11  /  AlkPhos  63  03-16      
                      11.7   6.18  )-----------( 228      ( 17 Mar 2024 07:05 )             34.5       03-17    141  |  104  |  15  ----------------------------<  83  3.5   |  25  |  0.60    Ca    9.5      17 Mar 2024 07:05  Phos  4.2     03-17  Mg     1.90     03-17    TPro  7.2  /  Alb  4.2  /  TBili  0.2  /  DBili  x   /  AST  15  /  ALT  11  /  AlkPhos  63  03-16
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
## 2024-03-19 NOTE — BH CONSULTATION LIAISON PROGRESS NOTE - NSBHMSESPABN_PSY_A_CORE
Soft volume/Decreased productivity/Increased latency/Other
Soft volume/Decreased productivity
Soft volume/Decreased productivity/Increased latency/Other

## 2024-03-19 NOTE — BH CONSULTATION LIAISON PROGRESS NOTE - NSBHATTESTBILLING_PSY_A_CORE
Billing in another system
[id number] OBS or IP - moderate complexity OR 35-49 mins
Non-billable

## 2024-03-19 NOTE — BH CONSULTATION LIAISON PROGRESS NOTE - NSBHCONSFOLLOWNEEDS_PSY_ALL_CORE
Needs further psych safety assessment prior to discharge
No psychiatric contraindications to discharge
No psychiatric contraindications to discharge
Thalidomide Pregnancy And Lactation Text: This medication is Pregnancy Category X and is absolutely contraindicated during pregnancy. It is unknown if it is excreted in breast milk.

## 2024-03-19 NOTE — BH CONSULTATION LIAISON PROGRESS NOTE - NSBHCHARTREVIEWVS_PSY_A_CORE FT
Vital Signs Last 24 Hrs  T(C): 36.4 (17 Mar 2024 07:00), Max: 37.2 (16 Mar 2024 20:50)  T(F): 97.6 (17 Mar 2024 07:00), Max: 99 (16 Mar 2024 20:50)  HR: 84 (17 Mar 2024 07:00) (81 - 84)  BP: 127/70 (17 Mar 2024 07:00) (112/61 - 127/70)  BP(mean): --  RR: 16 (17 Mar 2024 07:00) (16 - 18)  SpO2: 99% (17 Mar 2024 07:00) (97% - 99%)    Parameters below as of 17 Mar 2024 07:00  Patient On (Oxygen Delivery Method): room air    
Vital Signs Last 24 Hrs  T(C): 36.9 (19 Mar 2024 09:28), Max: 37.2 (18 Mar 2024 22:02)  T(F): 98.5 (19 Mar 2024 09:28), Max: 98.9 (18 Mar 2024 22:02)  HR: 84 (19 Mar 2024 09:28) (84 - 90)  BP: 121/71 (19 Mar 2024 09:28) (121/71 - 138/81)  BP(mean): --  RR: 18 (19 Mar 2024 09:28) (16 - 18)  SpO2: 99% (19 Mar 2024 09:28) (98% - 100%)    Parameters below as of 19 Mar 2024 06:17  Patient On (Oxygen Delivery Method): room air    
Vital Signs Last 24 Hrs  T(C): 37 (18 Mar 2024 10:59), Max: 37 (17 Mar 2024 16:56)  T(F): 98.6 (18 Mar 2024 10:59), Max: 98.6 (17 Mar 2024 16:56)  HR: 96 (18 Mar 2024 10:59) (85 - 96)  BP: 130/61 (18 Mar 2024 10:59) (115/60 - 130/61)  BP(mean): --  RR: 17 (18 Mar 2024 10:59) (17 - 18)  SpO2: 98% (18 Mar 2024 10:59) (98% - 98%)    Parameters below as of 18 Mar 2024 10:59  Patient On (Oxygen Delivery Method): room air

## 2024-03-19 NOTE — BH CONSULTATION LIAISON PROGRESS NOTE - NSICDXBHPRIMARYDX_PSY_ALL_CORE
Dementia with behavioral disturbance   F03.917  
Dementia with behavioral disturbance   F03.914  
Dementia with behavioral disturbance   F03.911

## 2024-03-20 LAB
ANION GAP SERPL CALC-SCNC: 15 MMOL/L — HIGH (ref 7–14)
BUN SERPL-MCNC: 16 MG/DL — SIGNIFICANT CHANGE UP (ref 7–23)
CALCIUM SERPL-MCNC: 9.2 MG/DL — SIGNIFICANT CHANGE UP (ref 8.4–10.5)
CHLORIDE SERPL-SCNC: 108 MMOL/L — HIGH (ref 98–107)
CO2 SERPL-SCNC: 20 MMOL/L — LOW (ref 22–31)
CREAT SERPL-MCNC: 0.52 MG/DL — SIGNIFICANT CHANGE UP (ref 0.5–1.3)
EGFR: 101 ML/MIN/1.73M2 — SIGNIFICANT CHANGE UP
GLUCOSE SERPL-MCNC: 90 MG/DL — SIGNIFICANT CHANGE UP (ref 70–99)
HCT VFR BLD CALC: 35.3 % — SIGNIFICANT CHANGE UP (ref 34.5–45)
HGB BLD-MCNC: 11.8 G/DL — SIGNIFICANT CHANGE UP (ref 11.5–15.5)
MCHC RBC-ENTMCNC: 31.3 PG — SIGNIFICANT CHANGE UP (ref 27–34)
MCHC RBC-ENTMCNC: 33.4 GM/DL — SIGNIFICANT CHANGE UP (ref 32–36)
MCV RBC AUTO: 93.6 FL — SIGNIFICANT CHANGE UP (ref 80–100)
MRSA PCR RESULT.: SIGNIFICANT CHANGE UP
NRBC # BLD: 0 /100 WBCS — SIGNIFICANT CHANGE UP (ref 0–0)
NRBC # FLD: 0 K/UL — SIGNIFICANT CHANGE UP (ref 0–0)
PLATELET # BLD AUTO: 163 K/UL — SIGNIFICANT CHANGE UP (ref 150–400)
POTASSIUM SERPL-MCNC: 4.1 MMOL/L — SIGNIFICANT CHANGE UP (ref 3.5–5.3)
POTASSIUM SERPL-SCNC: 4.1 MMOL/L — SIGNIFICANT CHANGE UP (ref 3.5–5.3)
RBC # BLD: 3.77 M/UL — LOW (ref 3.8–5.2)
RBC # FLD: 12.6 % — SIGNIFICANT CHANGE UP (ref 10.3–14.5)
S AUREUS DNA NOSE QL NAA+PROBE: SIGNIFICANT CHANGE UP
SODIUM SERPL-SCNC: 143 MMOL/L — SIGNIFICANT CHANGE UP (ref 135–145)
WBC # BLD: 7.85 K/UL — SIGNIFICANT CHANGE UP (ref 3.8–10.5)
WBC # FLD AUTO: 7.85 K/UL — SIGNIFICANT CHANGE UP (ref 3.8–10.5)

## 2024-03-20 RX ADMIN — RIVASTIGMINE 3 MILLIGRAM(S): 4.6 PATCH, EXTENDED RELEASE TRANSDERMAL at 07:09

## 2024-03-20 RX ADMIN — CHLORHEXIDINE GLUCONATE 1 APPLICATION(S): 213 SOLUTION TOPICAL at 11:37

## 2024-03-20 RX ADMIN — Medication 1 DROP(S): at 17:04

## 2024-03-20 RX ADMIN — RISPERIDONE 0.5 MILLIGRAM(S): 4 TABLET ORAL at 07:09

## 2024-03-20 RX ADMIN — Medication 81 MILLIGRAM(S): at 11:35

## 2024-03-20 RX ADMIN — Medication 1 DROP(S): at 07:10

## 2024-03-20 RX ADMIN — RISPERIDONE 0.5 MILLIGRAM(S): 4 TABLET ORAL at 17:04

## 2024-03-20 RX ADMIN — ATORVASTATIN CALCIUM 40 MILLIGRAM(S): 80 TABLET, FILM COATED ORAL at 22:00

## 2024-03-20 RX ADMIN — Medication 1 DROP(S): at 11:37

## 2024-03-20 RX ADMIN — ESCITALOPRAM OXALATE 10 MILLIGRAM(S): 10 TABLET, FILM COATED ORAL at 11:35

## 2024-03-20 RX ADMIN — RIVASTIGMINE 3 MILLIGRAM(S): 4.6 PATCH, EXTENDED RELEASE TRANSDERMAL at 17:04

## 2024-03-20 RX ADMIN — LETROZOLE 2.5 MILLIGRAM(S): 2.5 TABLET, FILM COATED ORAL at 11:35

## 2024-03-20 RX ADMIN — ENOXAPARIN SODIUM 40 MILLIGRAM(S): 100 INJECTION SUBCUTANEOUS at 07:09

## 2024-03-20 NOTE — PHYSICAL THERAPY INITIAL EVALUATION ADULT - ADL SKILLS, REHAB EVAL
[facility]  Patient Education          sertraline  Pronunciation:  SER tra anna  Brand:  Zoloft  What is the most important information I should know about sertraline? You should not use sertraline if you also take pimozide, or if you are being treated with methylene blue injection. Do not use this medicine if you have used an MAO inhibitor in the past 14 days, such as isocarboxazid, linezolid, methylene blue injection, phenelzine, rasagiline, selegiline, or tranylcypromine. Some young people have thoughts about suicide when first taking an antidepressant. Stay alert to changes in your mood or symptoms. Report any new or worsening symptoms to your doctor. Seek medical attention right away if you have symptoms of serotonin syndrome, such as: agitation, hallucinations, fever, sweating, shivering, fast heart rate, muscle stiffness, twitching, loss of coordination, nausea, vomiting, or diarrhea. What is sertraline? Sertraline is an antidepressant in a group of drugs called selective serotonin reuptake inhibitors (SSRIs). Sertraline affects chemicals in the brain that may be unbalanced in people with depression, panic, anxiety, or obsessive-compulsive symptoms. Sertraline is used to treat depression, obsessive-compulsive disorder, panic disorder, anxiety disorders, post-traumatic stress disorder (PTSD), and premenstrual dysphoric disorder (PMDD). Sertraline may also be used for purposes not listed in this medication guide. What should I discuss with my healthcare provider before taking sertraline? You should not use sertraline if you are allergic to it, or if you also take pimozide. Do not use the liquid form of sertraline  if you are taking disulfiram (Antabuse) or you could have a severe reaction to the disulfiram.  Do not take sertraline within 14 days before or 14 days after you take an MAO inhibitor. A dangerous drug interaction could occur.  MAO inhibitors include isocarboxazid, linezolid, phenelzine, seizures. Other drugs may interact with sertraline, including prescription and over-the-counter medicines, vitamins, and herbal products. Tell your doctor about all your current medicines and any medicine you start or stop using. Where can I get more information? Your pharmacist can provide more information about sertraline. Remember, keep this and all other medicines out of the reach of children, never share your medicines with others, and use this medication only for the indication prescribed. Every effort has been made to ensure that the information provided by [address] is accurate, up-to-date, and complete, but no guarantee is made to that effect. Drug information contained herein may be time sensitive. [facility] information has been compiled for use by healthcare practitioners and consumers in the [place] and therefore [facility] does not warrant that uses outside of the [place] are appropriate, unless specifically indicated otherwise. [facility]'s drug information does not endorse drugs, diagnose patients or recommend therapy. [facility] drug information is an informational resource designed to assist licensed healthcare practitioners in caring for their patients and/or to serve consumers viewing this service as a supplement to, and not a substitute for, the expertise, skill, knowledge and judgment of healthcare practitioners. The absence of a warning for a given drug or drug combination in no way should be construed to indicate that the drug or drug combination is safe, effective or appropriate for any given patient. [facility] does not assume any responsibility for any aspect of healthcare administered with the aid of information [facility] provides. The information contained herein is not intended to cover all possible uses, directions, precautions, warnings, drug interactions, allergic reactions, or adverse effects.  If you have questions about the drugs you are taking, check with your doctor, nurse or pharmacist.  Copyright 4788-3293 [facility]. Version: 21.01. Revision date: 8/9/2017. Care instructions adapted under license by [facility] ([facility]). If you have questions about a medical condition or this instruction, always ask your healthcare professional. [place] [address] any warranty or liability for your use of this information. needed assist

## 2024-03-20 NOTE — PHYSICAL THERAPY INITIAL EVALUATION ADULT - LIVES WITH, PROFILE
daughter and family in an apartment , 2 steps to manage , +elevator (information obtained from Care coordinator's notes)/children

## 2024-03-20 NOTE — PHYSICAL THERAPY INITIAL EVALUATION ADULT - PATIENT PROFILE REVIEW, REHAB EVAL
activity order- as tolerated , out of bed with assistance , ok for PT evaluation for out of bed with assistance per RN Tyrone/grant

## 2024-03-20 NOTE — PHYSICAL THERAPY INITIAL EVALUATION ADULT - PERTINENT HX OF CURRENT PROBLEM, REHAB EVAL
This is a 69F with Advanced Dementia (AAO x1-2 to self, sometimes place, not to time), (chronic L BG lacunar infarct), and R breast cancer s/p lumpectomy/chemo/RT (~[id number]) who presents to the hospital with worsening agitation at home.

## 2024-03-20 NOTE — PROGRESS NOTE ADULT - PROBLEM SELECTOR PLAN 2
- Cough x 1 week with yellow-white sputum  - neg flu/RSV/COVID swab, procalcitonin unimpressive.  - CXR in ED with clear lungs and patient without leukocytosis or septic vitals -> will therefore hold off on empiric abx for her cough, possibly viral syndrome cough
- Cough x 1 week with yellow-white sputum  - neg flu/RSV/COVID swab, check procalcitonin, CXR in ED with clear lungs and patient without leukocytosis or septic vitals -> will therefore hold off on empiric abx for her cough, possibly viral syndrome cough
- Cough x 1 week with yellow-white sputum  - neg flu/RSV/COVID swab, procalcitonin unimpressive.  - CXR in ED with clear lungs and patient without leukocytosis or septic vitals -> will therefore hold off on empiric abx for her cough, possibly viral syndrome cough
- Cough x 1 week with yellow-white sputum  - neg flu/RSV/COVID swab, check procalcitonin, CXR in ED with clear lungs and patient without leukocytosis or septic vitals -> will therefore hold off on empiric abx for her cough, possibly viral syndrome cough
- Cough x 1 week with yellow-white sputum  - neg flu/RSV/COVID swab, check procalcitonin, CXR in ED with clear lungs and patient without leukocytosis or septic vitals -> will therefore hold off on empiric abx for her cough, possibly viral syndrome cough

## 2024-03-20 NOTE — PROGRESS NOTE ADULT - SUBJECTIVE AND OBJECTIVE BOX
SUBJECTIVE/ OVERNIGHT EVENTS:  comfortable  awake alert  poor historian.  she knows her name but she is not sure where she is.  denied pain  smiled and pleasant.       --------------------------------------------------------------------------------------------  LABS:                        11.5   6.19  )-----------( 229      ( 16 Mar 2024 04:38 )             34.1     03-16    145  |  107  |  13  ----------------------------<  88  3.4<L>   |  25  |  0.56    Ca    9.4      16 Mar 2024 04:38  Phos  4.1     03-16  Mg     1.90     03-16    TPro  7.2  /  Alb  4.2  /  TBili  0.2  /  DBili  x   /  AST  15  /  ALT  11  /  AlkPhos  63  03-16      CAPILLARY BLOOD GLUCOSE      POCT Blood Glucose.: 96 mg/dL (16 Mar 2024 09:27)        Urinalysis Basic - ( 16 Mar 2024 04:38 )    Color: x / Appearance: x / SG: x / pH: x  Gluc: 88 mg/dL / Ketone: x  / Bili: x / Urobili: x   Blood: x / Protein: x / Nitrite: x   Leuk Esterase: x / RBC: x / WBC x   Sq Epi: x / Non Sq Epi: x / Bacteria: x        RADIOLOGY & ADDITIONAL TESTS:    Imaging Personally Reviewed:  [x] YES  [ ] NO    Consultant(s) Notes Reviewed:  [x] YES  [ ] NO    MEDICATIONS  (STANDING):  amLODIPine   Tablet 10 milliGRAM(s) Oral daily  aspirin enteric coated 81 milliGRAM(s) Oral daily  atorvastatin 40 milliGRAM(s) Oral at bedtime  dextrose 5%. 1000 milliLiter(s) (100 mL/Hr) IV Continuous <Continuous>  dextrose 5%. 1000 milliLiter(s) (50 mL/Hr) IV Continuous <Continuous>  dextrose 50% Injectable 25 Gram(s) IV Push once  dextrose 50% Injectable 12.5 Gram(s) IV Push once  dextrose 50% Injectable 25 Gram(s) IV Push once  enoxaparin Injectable 40 milliGRAM(s) SubCutaneous every 24 hours  escitalopram 10 milliGRAM(s) Oral daily  glucagon  Injectable 1 milliGRAM(s) IntraMuscular once  insulin lispro (ADMELOG) corrective regimen sliding scale   SubCutaneous three times a day before meals  insulin lispro (ADMELOG) corrective regimen sliding scale   SubCutaneous at bedtime  letrozole 2.5 milliGRAM(s) Oral daily  prednisoLONE acetate 1% Suspension 1 Drop(s) Right EYE four times a day  risperiDONE   Tablet 0.5 milliGRAM(s) Oral <User Schedule>  rivastigmine 3 milliGRAM(s) Oral two times a day    MEDICATIONS  (PRN):  acetaminophen     Tablet .. 650 milliGRAM(s) Oral every 6 hours PRN Temp greater or equal to 38C (100.4F), Mild Pain (1 - 3)  aluminum hydroxide/magnesium hydroxide/simethicone Suspension 30 milliLiter(s) Oral every 4 hours PRN Dyspepsia  dextrose Oral Gel 15 Gram(s) Oral once PRN Blood Glucose LESS THAN 70 milliGRAM(s)/deciliter  melatonin 3 milliGRAM(s) Oral at bedtime PRN Insomnia  ondansetron Injectable 4 milliGRAM(s) IV Push every 8 hours PRN Nausea and/or Vomiting  traZODone 25 milliGRAM(s) Oral at bedtime PRN for insomnia      Care Discussed with Consultants/Other Providers [x] YES  [ ] NO    Vital Signs Last 24 Hrs  T(C): 36.9 (16 Mar 2024 07:34), Max: 36.9 (16 Mar 2024 07:34)  T(F): 98.4 (16 Mar 2024 07:34), Max: 98.4 (16 Mar 2024 07:34)  HR: 61 (16 Mar 2024 07:34) (61 - 88)  BP: 119/62 (16 Mar 2024 07:34) (100/76 - 138/73)  BP(mean): --  RR: 18 (16 Mar 2024 07:34) (16 - 18)  SpO2: 98% (16 Mar 2024 07:34) (97% - 100%)    Parameters below as of 16 Mar 2024 07:34  Patient On (Oxygen Delivery Method): room air      I&O's Summary    15 Mar 2024 07:01  -  16 Mar 2024 07:00  --------------------------------------------------------  IN: 300 mL / OUT: 0 mL / NET: 300 mL    PHYSICAL EXAM:   GENERAL: NAD, thin-elderly, comfortable on room air, poor historian.   HEAD:  Atraumatic, Normocephalic  EYES: EOMI, PERRLA, conjunctiva and sclera clear  NECK: Supple, No JVD  CHEST/LUNG: mild decrease breath sounds bilaterally; No wheeze   HEART: Regular rate and rhythm; No murmurs, rubs, or gallops  ABDOMEN: Soft, Nontender, Nondistended; Bowel sounds present  Neuro: AAOx1, forgetful, no focal weakness   EXTREMITIES:  2+ Peripheral Pulses, No clubbing, cyanosis, or edema      
SUBJECTIVE/ OVERNIGHT EVENTS:  overnight events reviewed.  awake alert  comfortable  poor historian.     --------------------------------------------------------------------------------------------  LABS:                        11.7   6.18  )-----------( 228      ( 17 Mar 2024 07:05 )             34.5     03-17    141  |  104  |  15  ----------------------------<  83  3.5   |  25  |  0.60    Ca    9.5      17 Mar 2024 07:05  Phos  4.2     03-17  Mg     1.90     03-17    TPro  7.2  /  Alb  4.2  /  TBili  0.2  /  DBili  x   /  AST  15  /  ALT  11  /  AlkPhos  63  03-16      CAPILLARY BLOOD GLUCOSE      POCT Blood Glucose.: 94 mg/dL (16 Mar 2024 21:14)  POCT Blood Glucose.: 84 mg/dL (16 Mar 2024 17:44)  POCT Blood Glucose.: 109 mg/dL (16 Mar 2024 13:29)        Urinalysis Basic - ( 17 Mar 2024 07:05 )    Color: x / Appearance: x / SG: x / pH: x  Gluc: 83 mg/dL / Ketone: x  / Bili: x / Urobili: x   Blood: x / Protein: x / Nitrite: x   Leuk Esterase: x / RBC: x / WBC x   Sq Epi: x / Non Sq Epi: x / Bacteria: x        RADIOLOGY & ADDITIONAL TESTS:    Imaging Personally Reviewed:  [x] YES  [ ] NO    Consultant(s) Notes Reviewed:  [x] YES  [ ] NO    MEDICATIONS  (STANDING):  amLODIPine   Tablet 10 milliGRAM(s) Oral daily  aspirin enteric coated 81 milliGRAM(s) Oral daily  atorvastatin 40 milliGRAM(s) Oral at bedtime  dextrose 5%. 1000 milliLiter(s) (100 mL/Hr) IV Continuous <Continuous>  dextrose 5%. 1000 milliLiter(s) (50 mL/Hr) IV Continuous <Continuous>  dextrose 50% Injectable 12.5 Gram(s) IV Push once  dextrose 50% Injectable 25 Gram(s) IV Push once  dextrose 50% Injectable 25 Gram(s) IV Push once  enoxaparin Injectable 40 milliGRAM(s) SubCutaneous every 24 hours  escitalopram 10 milliGRAM(s) Oral daily  glucagon  Injectable 1 milliGRAM(s) IntraMuscular once  insulin lispro (ADMELOG) corrective regimen sliding scale   SubCutaneous three times a day before meals  insulin lispro (ADMELOG) corrective regimen sliding scale   SubCutaneous at bedtime  letrozole 2.5 milliGRAM(s) Oral daily  prednisoLONE acetate 1% Suspension 1 Drop(s) Right EYE four times a day  risperiDONE   Tablet 0.5 milliGRAM(s) Oral <User Schedule>  rivastigmine 3 milliGRAM(s) Oral two times a day    MEDICATIONS  (PRN):  acetaminophen     Tablet .. 650 milliGRAM(s) Oral every 6 hours PRN Temp greater or equal to 38C (100.4F), Mild Pain (1 - 3)  aluminum hydroxide/magnesium hydroxide/simethicone Suspension 30 milliLiter(s) Oral every 4 hours PRN Dyspepsia  dextrose Oral Gel 15 Gram(s) Oral once PRN Blood Glucose LESS THAN 70 milliGRAM(s)/deciliter  melatonin 3 milliGRAM(s) Oral at bedtime PRN Insomnia  OLANZapine 1.25 milliGRAM(s) Oral every 6 hours PRN agitation  OLANZapine Injectable 1.25 milliGRAM(s) IntraMuscular every 6 hours PRN agitation  ondansetron Injectable 4 milliGRAM(s) IV Push every 8 hours PRN Nausea and/or Vomiting  traZODone 25 milliGRAM(s) Oral at bedtime PRN for insomnia      Care Discussed with Consultants/Other Providers [x] YES  [ ] NO    Vital Signs Last 24 Hrs  T(C): 36.4 (17 Mar 2024 07:00), Max: 37.2 (16 Mar 2024 20:50)  T(F): 97.6 (17 Mar 2024 07:00), Max: 99 (16 Mar 2024 20:50)  HR: 84 (17 Mar 2024 07:00) (81 - 96)  BP: 127/70 (17 Mar 2024 07:00) (108/84 - 127/70)  BP(mean): --  RR: 16 (17 Mar 2024 07:00) (16 - 18)  SpO2: 99% (17 Mar 2024 07:00) (97% - 99%)    Parameters below as of 17 Mar 2024 07:00  Patient On (Oxygen Delivery Method): room air      I&O's Summary          PHYSICAL EXAM:   GENERAL: NAD, thin-elderly, comfortable on room air, poor historian.   HEAD:  Atraumatic, Normocephalic  EYES: EOMI, PERRLA, conjunctiva and sclera clear  NECK: Supple, No JVD  CHEST/LUNG: mild decrease breath sounds bilaterally; No wheeze   HEART: Regular rate and rhythm; No murmurs, rubs, or gallops  ABDOMEN: Soft, Nontender, Nondistended; Bowel sounds present  Neuro: AAOx1, forgetful, no focal weakness   EXTREMITIES:  2+ Peripheral Pulses, No clubbing, cyanosis, or edema      
SUBJECTIVE/ OVERNIGHT EVENTS:  remain calm, awake alert today  no acute SOB or CP  poor historian  forgetful      Vital Signs Last 24 Hrs  T(C): 36.7 (20 Mar 2024 07:07), Max: 36.8 (19 Mar 2024 17:47)  T(F): 98.1 (20 Mar 2024 07:07), Max: 98.2 (19 Mar 2024 17:47)  HR: 72 (20 Mar 2024 07:07) (70 - 100)  BP: 108/50 (20 Mar 2024 07:07) (108/50 - 134/73)  BP(mean): --  RR: 18 (20 Mar 2024 07:07) (17 - 18)  SpO2: 92% (20 Mar 2024 07:07) (92% - 100%)    I&O's Summary    03-19-24 @ 07:01  -  03-20-24 @ 07:00  --------------------------------------------------------  IN: 0 mL / OUT: 0 mL / NET: 0 mL      PHYSICAL EXAM:   GENERAL: NAD, thin-elderly, comfortable on room air, poor historian.   HEAD:  Atraumatic, Normocephalic  EYES: EOMI, PERRLA, conjunctiva and sclera clear  NECK: Supple, No JVD  CHEST/LUNG: mild decrease breath sounds bilaterally; No wheeze   HEART: Regular rate and rhythm; No murmurs, rubs, or gallops  ABDOMEN: Soft, Nontender, Nondistended; Bowel sounds present  Neuro: AAOx1, forgetful, no focal weakness   EXTREMITIES:  2+ Peripheral Pulses, No clubbing, cyanosis, or edema    LABS:                        11.8   7.85  )-----------( 163      ( 20 Mar 2024 03:05 )             35.3     03-20    143  |  108<H>  |  16  ----------------------------<  90  4.1   |  20<L>  |  0.52    Ca    9.2      20 Mar 2024 03:05  Phos  3.9     03-19  Mg     1.90     03-19        CAPILLARY BLOOD GLUCOSE      POCT Blood Glucose.: 82 mg/dL (20 Mar 2024 08:39)  POCT Blood Glucose.: 89 mg/dL (20 Mar 2024 01:13)  POCT Blood Glucose.: 98 mg/dL (19 Mar 2024 17:33)  POCT Blood Glucose.: 123 mg/dL (19 Mar 2024 12:39)        Urinalysis Basic - ( 20 Mar 2024 03:05 )    Color: x / Appearance: x / SG: x / pH: x  Gluc: 90 mg/dL / Ketone: x  / Bili: x / Urobili: x   Blood: x / Protein: x / Nitrite: x   Leuk Esterase: x / RBC: x / WBC x   Sq Epi: x / Non Sq Epi: x / Bacteria: x        RADIOLOGY & ADDITIONAL TESTS:    Imaging Personally Reviewed:  [x] YES  [ ] NO    Case discussed with NPP:  [X] YES  [ ] NO      
SUBJECTIVE/ OVERNIGHT EVENTS:  sleepy this am  easily arousable   the daughter at bedside.   denied pain.     --------------------------------------------------------------------------------------------  LABS:                        11.7   7.71  )-----------( 244      ( 18 Mar 2024 06:37 )             34.8     03-18    142  |  104  |  12  ----------------------------<  96  3.6   |  25  |  0.57    Ca    9.2      18 Mar 2024 06:37  Phos  3.8     03-18  Mg     1.80     03-18        CAPILLARY BLOOD GLUCOSE      POCT Blood Glucose.: 96 mg/dL (18 Mar 2024 12:30)  POCT Blood Glucose.: 106 mg/dL (18 Mar 2024 09:08)  POCT Blood Glucose.: 93 mg/dL (17 Mar 2024 21:14)        Urinalysis Basic - ( 18 Mar 2024 06:37 )    Color: x / Appearance: x / SG: x / pH: x  Gluc: 96 mg/dL / Ketone: x  / Bili: x / Urobili: x   Blood: x / Protein: x / Nitrite: x   Leuk Esterase: x / RBC: x / WBC x   Sq Epi: x / Non Sq Epi: x / Bacteria: x        RADIOLOGY & ADDITIONAL TESTS:    Imaging Personally Reviewed:  [x] YES  [ ] NO    Consultant(s) Notes Reviewed:  [x] YES  [ ] NO    MEDICATIONS  (STANDING):  amLODIPine   Tablet 10 milliGRAM(s) Oral daily  aspirin enteric coated 81 milliGRAM(s) Oral daily  atorvastatin 40 milliGRAM(s) Oral at bedtime  dextrose 5%. 1000 milliLiter(s) (100 mL/Hr) IV Continuous <Continuous>  dextrose 5%. 1000 milliLiter(s) (50 mL/Hr) IV Continuous <Continuous>  dextrose 50% Injectable 12.5 Gram(s) IV Push once  dextrose 50% Injectable 25 Gram(s) IV Push once  dextrose 50% Injectable 25 Gram(s) IV Push once  enoxaparin Injectable 40 milliGRAM(s) SubCutaneous every 24 hours  escitalopram 10 milliGRAM(s) Oral daily  glucagon  Injectable 1 milliGRAM(s) IntraMuscular once  insulin lispro (ADMELOG) corrective regimen sliding scale   SubCutaneous three times a day before meals  insulin lispro (ADMELOG) corrective regimen sliding scale   SubCutaneous at bedtime  letrozole 2.5 milliGRAM(s) Oral daily  prednisoLONE acetate 1% Suspension 1 Drop(s) Right EYE four times a day  risperiDONE   Tablet 0.5 milliGRAM(s) Oral <User Schedule>  rivastigmine 3 milliGRAM(s) Oral two times a day    MEDICATIONS  (PRN):  acetaminophen     Tablet .. 650 milliGRAM(s) Oral every 6 hours PRN Temp greater or equal to 38C (100.4F), Mild Pain (1 - 3)  aluminum hydroxide/magnesium hydroxide/simethicone Suspension 30 milliLiter(s) Oral every 4 hours PRN Dyspepsia  dextrose Oral Gel 15 Gram(s) Oral once PRN Blood Glucose LESS THAN 70 milliGRAM(s)/deciliter  melatonin 3 milliGRAM(s) Oral at bedtime PRN Insomnia  OLANZapine 1.25 milliGRAM(s) Oral every 6 hours PRN agitation  OLANZapine Injectable 1.25 milliGRAM(s) IntraMuscular every 6 hours PRN agitation  ondansetron Injectable 4 milliGRAM(s) IV Push every 8 hours PRN Nausea and/or Vomiting  traZODone 25 milliGRAM(s) Oral at bedtime PRN for insomnia      Care Discussed with Consultants/Other Providers [x] YES  [ ] NO    Vital Signs Last 24 Hrs  T(C): 37 (18 Mar 2024 10:59), Max: 37 (17 Mar 2024 16:56)  T(F): 98.6 (18 Mar 2024 10:59), Max: 98.6 (17 Mar 2024 16:56)  HR: 96 (18 Mar 2024 10:59) (85 - 96)  BP: 130/61 (18 Mar 2024 10:59) (115/60 - 130/61)  BP(mean): --  RR: 17 (18 Mar 2024 10:59) (17 - 18)  SpO2: 98% (18 Mar 2024 10:59) (98% - 98%)    Parameters below as of 18 Mar 2024 10:59  Patient On (Oxygen Delivery Method): room air      I&O's Summary    17 Mar 2024 07:01  -  18 Mar 2024 07:00  --------------------------------------------------------  IN: 200 mL / OUT: 0 mL / NET: 200 mL        PHYSICAL EXAM:   GENERAL: NAD, thin-elderly, comfortable on room air, poor historian.   HEAD:  Atraumatic, Normocephalic  EYES: EOMI, PERRLA, conjunctiva and sclera clear  NECK: Supple, No JVD  CHEST/LUNG: mild decrease breath sounds bilaterally; No wheeze   HEART: Regular rate and rhythm; No murmurs, rubs, or gallops  ABDOMEN: Soft, Nontender, Nondistended; Bowel sounds present  Neuro: AAOx1, forgetful, no focal weakness   EXTREMITIES:  2+ Peripheral Pulses, No clubbing, cyanosis, or edema      
SUBJECTIVE/ OVERNIGHT EVENTS:  remain calm, awake alert today  denied pain  poor historian  forgetful.  no cp, no sob, no n/v/d. no abdominal pain.  no headache, no dizziness.   psyc left voicemail to discuss with the daughter.  Physical therapy eval ordered.      --------------------------------------------------------------------------------------------  LABS:                        11.4   7.80  )-----------( 236      ( 19 Mar 2024 03:18 )             33.1     03-19    141  |  105  |  19  ----------------------------<  98  3.5   |  24  |  0.63    Ca    9.2      19 Mar 2024 03:18  Phos  3.9     03-19  Mg     1.90     03-19        CAPILLARY BLOOD GLUCOSE      POCT Blood Glucose.: 123 mg/dL (19 Mar 2024 12:39)  POCT Blood Glucose.: 103 mg/dL (19 Mar 2024 08:37)  POCT Blood Glucose.: 122 mg/dL (18 Mar 2024 22:32)  POCT Blood Glucose.: 87 mg/dL (18 Mar 2024 18:07)        Urinalysis Basic - ( 19 Mar 2024 03:18 )    Color: x / Appearance: x / SG: x / pH: x  Gluc: 98 mg/dL / Ketone: x  / Bili: x / Urobili: x   Blood: x / Protein: x / Nitrite: x   Leuk Esterase: x / RBC: x / WBC x   Sq Epi: x / Non Sq Epi: x / Bacteria: x        RADIOLOGY & ADDITIONAL TESTS:    Imaging Personally Reviewed:  [x] YES  [ ] NO    Consultant(s) Notes Reviewed:  [x] YES  [ ] NO    MEDICATIONS  (STANDING):  amLODIPine   Tablet 10 milliGRAM(s) Oral daily  aspirin enteric coated 81 milliGRAM(s) Oral daily  atorvastatin 40 milliGRAM(s) Oral at bedtime  chlorhexidine 2% Cloths 1 Application(s) Topical daily  dextrose 5%. 1000 milliLiter(s) (50 mL/Hr) IV Continuous <Continuous>  dextrose 5%. 1000 milliLiter(s) (100 mL/Hr) IV Continuous <Continuous>  dextrose 50% Injectable 25 Gram(s) IV Push once  dextrose 50% Injectable 12.5 Gram(s) IV Push once  dextrose 50% Injectable 25 Gram(s) IV Push once  enoxaparin Injectable 40 milliGRAM(s) SubCutaneous every 24 hours  escitalopram 10 milliGRAM(s) Oral daily  glucagon  Injectable 1 milliGRAM(s) IntraMuscular once  insulin lispro (ADMELOG) corrective regimen sliding scale   SubCutaneous three times a day before meals  insulin lispro (ADMELOG) corrective regimen sliding scale   SubCutaneous at bedtime  letrozole 2.5 milliGRAM(s) Oral daily  prednisoLONE acetate 1% Suspension 1 Drop(s) Right EYE four times a day  risperiDONE   Tablet 0.5 milliGRAM(s) Oral <User Schedule>  rivastigmine 3 milliGRAM(s) Oral two times a day    MEDICATIONS  (PRN):  acetaminophen     Tablet .. 650 milliGRAM(s) Oral every 6 hours PRN Temp greater or equal to 38C (100.4F), Mild Pain (1 - 3)  aluminum hydroxide/magnesium hydroxide/simethicone Suspension 30 milliLiter(s) Oral every 4 hours PRN Dyspepsia  dextrose Oral Gel 15 Gram(s) Oral once PRN Blood Glucose LESS THAN 70 milliGRAM(s)/deciliter  melatonin 3 milliGRAM(s) Oral at bedtime PRN Insomnia  OLANZapine 1.25 milliGRAM(s) Oral every 6 hours PRN agitation  OLANZapine Injectable 1.25 milliGRAM(s) IntraMuscular every 6 hours PRN agitation  ondansetron Injectable 4 milliGRAM(s) IV Push every 8 hours PRN Nausea and/or Vomiting  traZODone 25 milliGRAM(s) Oral at bedtime PRN for insomnia      Care Discussed with Consultants/Other Providers [x] YES  [ ] NO    Vital Signs Last 24 Hrs  T(C): 36.9 (19 Mar 2024 09:28), Max: 37.2 (18 Mar 2024 22:02)  T(F): 98.5 (19 Mar 2024 09:28), Max: 98.9 (18 Mar 2024 22:02)  HR: 84 (19 Mar 2024 09:28) (84 - 90)  BP: 121/71 (19 Mar 2024 09:28) (121/71 - 138/81)  BP(mean): --  RR: 18 (19 Mar 2024 09:28) (16 - 18)  SpO2: 99% (19 Mar 2024 09:28) (98% - 100%)    Parameters below as of 19 Mar 2024 06:17  Patient On (Oxygen Delivery Method): room air      I&O's Summary    18 Mar 2024 07:01  -  19 Mar 2024 07:00  --------------------------------------------------------  IN: 200 mL / OUT: 700 mL / NET: -500 mL        PHYSICAL EXAM:   GENERAL: NAD, thin-elderly, comfortable on room air, poor historian.   HEAD:  Atraumatic, Normocephalic  EYES: EOMI, PERRLA, conjunctiva and sclera clear  NECK: Supple, No JVD  CHEST/LUNG: mild decrease breath sounds bilaterally; No wheeze   HEART: Regular rate and rhythm; No murmurs, rubs, or gallops  ABDOMEN: Soft, Nontender, Nondistended; Bowel sounds present  Neuro: AAOx1, forgetful, no focal weakness   EXTREMITIES:  2+ Peripheral Pulses, No clubbing, cyanosis, or edema

## 2024-03-20 NOTE — PROGRESS NOTE ADULT - PROBLEM SELECTOR PROBLEM 1
Severe Alzheimer's dementia with agitation

## 2024-03-20 NOTE — PROGRESS NOTE ADULT - PROBLEM SELECTOR PLAN 3
- Has pyuria but no known UTI complaints (though given her severe dementia unclear if patient would be able to verbalize many UTI complaints), daughter states that the patient is continent of her urine, no known episodes of hematuria  - hold off on empiric abx given no nitrites or bacteria on UA and no leukocytosis or septic vitals, UCx currently in lab
see above.
see above.
- Has pyuria but no known UTI complaints (though given her severe dementia unclear if patient would be able to verbalize many UTI complaints), daughter states that the patient is continent of her urine, no known episodes of hematuria  - hold off on empiric abx given no nitrites or bacteria on UA and no leukocytosis or septic vitals, UCx currently in lab
- Has pyuria but no known UTI complaints (though given her severe dementia unclear if patient would be able to verbalize many UTI complaints), daughter states that the patient is continent of her urine, no known episodes of hematuria  - hold off on empiric abx given no nitrites or bacteria on UA and no leukocytosis or septic vitals, UCx currently in lab

## 2024-03-20 NOTE — PROGRESS NOTE ADULT - PROBLEM SELECTOR PLAN 7
- c/w aspirin, rosuvastatin

## 2024-03-20 NOTE — PROGRESS NOTE ADULT - PROBLEM SELECTOR PLAN 1
- Patient with known severe alzheimer's, recent admission for worsening agitation with change in medications, seemed to be better at home post discharge but over the past 3 days has had a worsening of her behavior with poor oral intake and non-compliance with medications  - Unclear on the cause for her decompensation. most likely due to noncompliance with medications and refusing.   (per the daughter, she spit out medications sometimes)  - no infectious etiology.   - c/w psychiatric medications as per her home medications  - neg flu/RSV/COVID swab, procalcitonin unimpressive   - CXR clear.   - Has pyuria but no known UTI complaints. monitor off abx. likely asymptomatic pyuria. Mental status at baseline currently. no wbc, no fever. urine culture: neg  - Psych eval appreciated. psyc left voicemail to discuss with the daughter.  - Physical therapy eval
- Patient with known severe alzheimer's, recent admission for worsening agitation with change in medications, seemed to be better at home post discharge but over the past 3 days has had a worsening of her behavior with poor oral intake and non-compliance with medications  - Unclear on the cause for her decompensation, possibly infectious (new cough, pyuria) vs medication non-compliance vs other  - For now will c/w psychiatric medications as per her home medications  - neg flu/RSV/COVID swab, check procalcitonin  - CXR clear.   - Has pyuria but no known UTI complaints. monitor off abx. likely asymptomatic pyuria. Mental status at baseline currently. no wbc, no fever. f/u urine culture.   - Psych eval
- Patient with known severe alzheimer's, recent admission for worsening agitation with change in medications, seemed to be better at home post discharge but over the past 3 days has had a worsening of her behavior with poor oral intake and non-compliance with medications  - Unclear on the cause for her decompensation, possibly infectious (new cough, pyuria) vs medication non-compliance vs other  - For now will c/w psychiatric medications as per her home medications  - neg flu/RSV/COVID swab, check procalcitonin  - CXR clear.   - Has pyuria but no known UTI complaints. monitor off abx. likely asymptomatic pyuria. Mental status at baseline currently. no wbc, no fever. f/u urine culture.   - Psych eval
- Patient with known severe alzheimer's, recent admission for worsening agitation with change in medications, seemed to be better at home post discharge but over the past 3 days has had a worsening of her behavior with poor oral intake and non-compliance with medications  - Unclear on the cause for her decompensation, possibly infectious (new cough, pyuria) vs medication non-compliance vs other  - For now will c/w psychiatric medications as per her home medications  - neg flu/RSV/COVID swab, procalcitonin unimpressive   - CXR clear.   - Has pyuria but no known UTI complaints. monitor off abx. likely asymptomatic pyuria. Mental status at baseline currently. no wbc, no fever. f/u urine culture.   - Psych eval appreciated.
- Patient with known severe alzheimer's, recent admission for worsening agitation with change in medications, seemed to be better at home post discharge but over the past 3 days has had a worsening of her behavior with poor oral intake and non-compliance with medications  - Unclear on the cause for her decompensation. most likely due to noncompliance with medications and refusing.   (per the daughter, she spit out medications sometimes)  - no infectious etiology.   - c/w psychiatric medications as per her home medications  - neg flu/RSV/COVID swab, procalcitonin unimpressive   - CXR clear.   - Has pyuria but no known UTI complaints. monitor off abx. likely asymptomatic pyuria. Mental status at baseline currently. no wbc, no fever. urine culture: neg  - Psych eval appreciated. psyc left voicemail to discuss with the daughter.  - Physical therapy eval pending

## 2024-03-20 NOTE — PROGRESS NOTE ADULT - ASSESSMENT
69 F with Advanced Dementia (AAO x1-2 to self, sometimes place, not to time), HTN, DM2, CVA (chronic L BG lacunar infarct), and R breast cancer s/p lumpectomy/chemo/RT (~[id number]) who presents to the hospital with worsening agitation at home. 
69 F with Advanced Dementia (AAO x1-2 to self, sometimes place, not to time), HTN, DM2, CVA (chronic L BG lacunar infarct), and R breast cancer s/p lumpectomy/chemo/RT (~[id number]) who presents to the hospital with worsening agitation at home. 
69 F with Advanced Dementia (AAO x1-2 to self, sometimes place, not to time), HTN, DM2, CVA (chronic L BG lacunar infarct), and R breast cancer s/p lumpectomy/chemo/RT (~[id number]) who presents to the hospital with worsening agitation at home. 
69 F with Advanced Dementia (AAO x1-2 to self, sometimes place, not to time), HTN, DM2, CVA (chronic L BG lacunar infarct), and R breast cancer s/p lumpectomy/chemo/RT (~[id number]) who presents to the hospital with worsening agitation at home. 
69 F with Advanced Dementia (AAO x1-2 to self, sometimes place, not to time), HTN, DM2, CVA (chronic L BG lacunar infarct), and R breast cancer s/p lumpectomy/chemo/RT (~[phone]) who presents to the hospital with worsening agitation at home.

## 2024-03-20 NOTE — PROGRESS NOTE ADULT - PROBLEM SELECTOR PLAN 4
"Here for fu visit re: HTN HLD DM    compliant with meds     tolerating meds     monitoring BP at home : [id number] /60s     denies chest pain SOB RUBIO diaphoresis nausea palpitations syncope presyncope orthopnea edema leg pain dysarthria aphasia focal weakness headache numbness tingling  visual disturbance gait disturbance polydipsia polyuria weight loss tremor epistaxis melena rectal bleeding tremor fatigue weight change temperature intolerance.          /64   Pulse 66   Temp 35.6 °C (96 °F)   Ht 1.702 m (5' 7\")   Wt 80.7 kg (178 lb)   SpO2 94%   BMI 27.88 kg/m²       Appears comfortable  No retractions  Skin without pallor petechia icterus cyanosis  Neck without JVD thyromegaly bruits  Chest clear to auscultation without rales rhonchi wheeze  Heart regular rate and rhythm without murmur  Abdomen soft nondistended nontender without organomegaly or mass  Extremities without erythema edema Homans or cord  Peripheral pulses palpable  Neuro intact      "
- c/w home amlodipine

## 2024-03-20 NOTE — PROGRESS NOTE ADULT - PROBLEM SELECTOR PROBLEM 7
History of CVA in adulthood

## 2024-03-20 NOTE — PROGRESS NOTE ADULT - PROBLEM SELECTOR PLAN 6
- c/w letrozole  - c/w outpatient follow up as needed

## 2024-03-20 NOTE — PROGRESS NOTE ADULT - PROBLEM SELECTOR PLAN 8
DVT ppx - lovenox  Diet - DASH/CC regular diet, daughter denies dysphagia issues for the patient at home  Activity - OOB with assistance, increase as tolerated    Fall and aspiration precautions
DVT ppx - lovenox  Diet - DASH/CC regular diet, daughter denies dysphagia issues for the patient at home  Activity - OOB with assistance, increase as tolerated    Fall and aspiration precautions
DVT ppx - lovenox  Diet - DASH/CC regular diet, daughter denies dysphagia issues for the patient at home  Activity - OOB with assistance, increase as tolerated  Fall and aspiration precautions  Physical therapy. Out of bed to chair with assistance.    Dispo: PT eval pending.  Psych eval appreciated. left VM with the daughter.   cleared by psych for dc planning.   d/w the daughter at bedside. contemplating home vs. NH depending on her progress.   d/w CM to follow up the daughter's decision.
DVT ppx - lovenox  Diet - DASH/CC regular diet, daughter denies dysphagia issues for the patient at home  Activity - OOB with assistance, increase as tolerated  Fall and aspiration precautions  Physical therapy. Out of bed to chair with assistance.    Dispo: PT eval pending.  Psych eval appreciated. left VM with the daughter.   cleared by psyc for dc planning.   d/w the daughter at bedside. contemplating home vs. NH depending on her progress.   d/w CM to follow up the daughter's decision.
DVT ppx - lovenox  Diet - DASH/CC regular diet, daughter denies dysphagia issues for the patient at home  Activity - OOB with assistance, increase as tolerated    Fall and aspiration precautions

## 2024-03-21 ENCOUNTER — TRANSCRIPTION ENCOUNTER (OUTPATIENT)
Age: 69
End: 2024-03-21

## 2024-03-21 ENCOUNTER — NON-APPOINTMENT (OUTPATIENT)
Age: 69
End: 2024-03-21

## 2024-03-21 VITALS
TEMPERATURE: 98 F | RESPIRATION RATE: 18 BRPM | HEART RATE: 84 BPM | OXYGEN SATURATION: 99 % | DIASTOLIC BLOOD PRESSURE: 70 MMHG | SYSTOLIC BLOOD PRESSURE: 134 MMHG

## 2024-03-21 LAB
ANION GAP SERPL CALC-SCNC: 10 MMOL/L — SIGNIFICANT CHANGE UP (ref 7–14)
BUN SERPL-MCNC: 19 MG/DL — SIGNIFICANT CHANGE UP (ref 7–23)
CALCIUM SERPL-MCNC: 9.4 MG/DL — SIGNIFICANT CHANGE UP (ref 8.4–10.5)
CHLORIDE SERPL-SCNC: 107 MMOL/L — SIGNIFICANT CHANGE UP (ref 98–107)
CO2 SERPL-SCNC: 26 MMOL/L — SIGNIFICANT CHANGE UP (ref 22–31)
CREAT SERPL-MCNC: 0.64 MG/DL — SIGNIFICANT CHANGE UP (ref 0.5–1.3)
EGFR: 96 ML/MIN/1.73M2 — SIGNIFICANT CHANGE UP
GLUCOSE SERPL-MCNC: 100 MG/DL — HIGH (ref 70–99)
HCT VFR BLD CALC: 34.2 % — LOW (ref 34.5–45)
HGB BLD-MCNC: 11.6 G/DL — SIGNIFICANT CHANGE UP (ref 11.5–15.5)
MCHC RBC-ENTMCNC: 31 PG — SIGNIFICANT CHANGE UP (ref 27–34)
MCHC RBC-ENTMCNC: 33.9 GM/DL — SIGNIFICANT CHANGE UP (ref 32–36)
MCV RBC AUTO: 91.4 FL — SIGNIFICANT CHANGE UP (ref 80–100)
NRBC # BLD: 0 /100 WBCS — SIGNIFICANT CHANGE UP (ref 0–0)
NRBC # FLD: 0 K/UL — SIGNIFICANT CHANGE UP (ref 0–0)
PLATELET # BLD AUTO: 228 K/UL — SIGNIFICANT CHANGE UP (ref 150–400)
POTASSIUM SERPL-MCNC: 3.6 MMOL/L — SIGNIFICANT CHANGE UP (ref 3.5–5.3)
POTASSIUM SERPL-SCNC: 3.6 MMOL/L — SIGNIFICANT CHANGE UP (ref 3.5–5.3)
RBC # BLD: 3.74 M/UL — LOW (ref 3.8–5.2)
RBC # FLD: 12.4 % — SIGNIFICANT CHANGE UP (ref 10.3–14.5)
SODIUM SERPL-SCNC: 143 MMOL/L — SIGNIFICANT CHANGE UP (ref 135–145)
WBC # BLD: 9.28 K/UL — SIGNIFICANT CHANGE UP (ref 3.8–10.5)
WBC # FLD AUTO: 9.28 K/UL — SIGNIFICANT CHANGE UP (ref 3.8–10.5)

## 2024-03-21 RX ADMIN — Medication 1 DROP(S): at 06:00

## 2024-03-21 RX ADMIN — Medication 81 MILLIGRAM(S): at 13:52

## 2024-03-21 RX ADMIN — ENOXAPARIN SODIUM 40 MILLIGRAM(S): 100 INJECTION SUBCUTANEOUS at 06:00

## 2024-03-21 RX ADMIN — RIVASTIGMINE 3 MILLIGRAM(S): 4.6 PATCH, EXTENDED RELEASE TRANSDERMAL at 18:08

## 2024-03-21 RX ADMIN — CHLORHEXIDINE GLUCONATE 1 APPLICATION(S): 213 SOLUTION TOPICAL at 13:54

## 2024-03-21 RX ADMIN — RIVASTIGMINE 3 MILLIGRAM(S): 4.6 PATCH, EXTENDED RELEASE TRANSDERMAL at 06:01

## 2024-03-21 RX ADMIN — RISPERIDONE 0.5 MILLIGRAM(S): 4 TABLET ORAL at 06:00

## 2024-03-21 RX ADMIN — Medication 1 DROP(S): at 13:52

## 2024-03-21 RX ADMIN — LETROZOLE 2.5 MILLIGRAM(S): 2.5 TABLET, FILM COATED ORAL at 13:53

## 2024-03-21 RX ADMIN — AMLODIPINE BESYLATE 10 MILLIGRAM(S): 2.5 TABLET ORAL at 06:01

## 2024-03-21 RX ADMIN — ESCITALOPRAM OXALATE 10 MILLIGRAM(S): 10 TABLET, FILM COATED ORAL at 13:52

## 2024-03-21 RX ADMIN — RISPERIDONE 0.5 MILLIGRAM(S): 4 TABLET ORAL at 18:08

## 2024-03-21 NOTE — DISCHARGE NOTE PROVIDER - NSDCCPCAREPLAN_GEN_ALL_CORE_FT
PRINCIPAL DISCHARGE DIAGNOSIS  Diagnosis: Aggressive behavior  Assessment and Plan of Treatment: continue present meds      SECONDARY DISCHARGE DIAGNOSES  Diagnosis: Type 2 diabetes mellitus  Assessment and Plan of Treatment: Monitor finger sticks pre-meal and bedtime, low salt, fat and carbohydrate diet, minimize glucose intake.  Exercise daily for at least 30 minutes and weight loss.  Follow up with primary care physician and endocrinologist for routine Hemoglobin A1C checks and management.  Follow up with your ophthalmologist for routine yearly vision exams.      Diagnosis: Essential hypertension  Assessment and Plan of Treatment: Low sodium and fat diet, continue anti-hypertensive medications, and follow up with primary care physician.

## 2024-03-21 NOTE — DISCHARGE NOTE PROVIDER - NSDCFUSCHEDAPPT_GEN_ALL_CORE_FT
[facility]  GERIATRICS [address] [place] [address]   Scheduled Appointment: 04/15/2024

## 2024-03-21 NOTE — DISCHARGE NOTE PROVIDER - HOSPITAL COURSE
Problem/Plan - 1:  ·  Problem: Severe Alzheimer's dementia with agitation.   ·  Plan: - Patient with known severe alzheimer's, recent admission for worsening agitation with change in medications, seemed to be better at home post discharge but over the past 3 days has had a worsening of her behavior with poor oral intake and non-compliance with medications  - Unclear on the cause for her decompensation. most likely due to noncompliance with medications and refusing.   (per the daughter, she spit out medications sometimes)  - no infectious etiology.   - c/w psychiatric medications as per her home medications  - neg flu/RSV/COVID swab, procalcitonin unimpressive   - CXR clear.   - Has pyuria but no known UTI complaints. monitor off abx. likely asymptomatic pyuria. Mental status at baseline currently. no wbc, no fever. urine culture: neg       Problem/Plan - 2:  ·  Problem: Cough.   ·  Plan: - Cough x 1 week with yellow-white sputum  - neg flu/RSV/COVID swab, procalcitonin unimpressive.  - CXR in ED with clear lungs and patient without leukocytosis or septic vitals -> will therefore hold off on empiric abx for her cough, possibly viral syndrome cough.     Problem/Plan - 3:  ·  Problem: Pyuria.   ·  Plan: see above.     Problem/Plan - 4:  ·  Problem: Essential hypertension.   ·  Plan: - c/w home amlodipine.     Problem/Plan - 5:  ·  Problem: Type 2 diabetes mellitus.   ·  Plan: - Last A1C was 5.6 but patient still on metformin  - Will place on low ISS, FS qAC, CC diet  - consider downtitrating her metformin on discharge given her recent A1C.     Problem/Plan - 6:  ·  Problem: Breast cancer.   ·  Plan: - c/w letrozole  - c/w outpatient follow up as needed.     Problem/Plan - 7:  ·  Problem: History of CVA in adulthood.   ·  Plan: - c/w aspirin, rosuvastatin.   stable for d/c home today on current meds.  d/w DR Guerin and daughter at bed side

## 2024-03-21 NOTE — DISCHARGE NOTE NURSING/CASE MANAGEMENT/SOCIAL WORK - PATIENT PORTAL LINK FT
You can access the FollowMyHealth Patient Portal offered by [facility] by registering at the following website: [email or web address]. By joining [facility]’s FollowMyHealth portal, you will also be able to view your health information using other applications (apps) compatible with our system.

## 2024-03-21 NOTE — DISCHARGE NOTE NURSING/CASE MANAGEMENT/SOCIAL WORK - NSDCPEFALRISK_GEN_ALL_CORE
For information on Fall & Injury Prevention, visit: [email or web address] OR  [email or web address] OR  https://www.cdc.gov/steadi/patient.html

## 2024-03-27 ENCOUNTER — NON-APPOINTMENT (OUTPATIENT)
Age: 69
End: 2024-03-27

## 2024-04-03 ENCOUNTER — NON-APPOINTMENT (OUTPATIENT)
Age: 69
End: 2024-04-03

## 2024-04-03 RX ORDER — TRAZODONE HYDROCHLORIDE 50 MG/1
50 TABLET ORAL
Qty: 30 | Refills: 1 | Status: ACTIVE | COMMUNITY
Start: 2024-01-31 | End: 1900-01-01

## 2024-04-03 RX ORDER — RISPERIDONE 0.5 MG/1
0.5 TABLET, FILM COATED ORAL
Qty: 60 | Refills: 0 | Status: ACTIVE | COMMUNITY
Start: 2024-03-13 | End: 1900-01-01

## 2024-04-15 ENCOUNTER — APPOINTMENT (OUTPATIENT)
Dept: GERIATRICS | Facility: CLINIC | Age: 69
End: 2024-04-15
Payer: MEDICARE

## 2024-04-15 DIAGNOSIS — F99 INSOMNIA DUE TO OTHER MENTAL DISORDER: ICD-10-CM

## 2024-04-15 DIAGNOSIS — F41.8 OTHER SPECIFIED ANXIETY DISORDERS: ICD-10-CM

## 2024-04-15 DIAGNOSIS — F51.05 INSOMNIA DUE TO OTHER MENTAL DISORDER: ICD-10-CM

## 2024-04-15 PROCEDURE — 99443: CPT | Mod: 93

## 2024-04-15 RX ORDER — QUETIAPINE FUMARATE 50 MG/1
50 TABLET ORAL
Qty: 60 | Refills: 0 | Status: DISCONTINUED | COMMUNITY
Start: 2024-02-16

## 2024-04-15 RX ORDER — GLUCOSAMINE HCL/CHONDROITIN SU 500-400 MG
3 CAPSULE ORAL
Qty: 30 | Refills: 0 | Status: ACTIVE | COMMUNITY
Start: 2024-02-28

## 2024-04-17 ENCOUNTER — NON-APPOINTMENT (OUTPATIENT)
Age: 69
End: 2024-04-17

## 2024-04-23 ENCOUNTER — OUTPATIENT (OUTPATIENT)
Dept: OUTPATIENT SERVICES | Facility: HOSPITAL | Age: 69
LOS: 1 days | Discharge: HOME | End: 2024-04-23
Payer: MEDICARE

## 2024-04-23 DIAGNOSIS — Z98.890 OTHER SPECIFIED POSTPROCEDURAL STATES: Chronic | ICD-10-CM

## 2024-04-23 PROBLEM — F03.90 UNSPECIFIED DEMENTIA WITHOUT BEHAVIORAL DISTURBANCE: Chronic | Status: ACTIVE | Noted: 2024-03-16

## 2024-04-23 PROBLEM — F03.90 UNSPECIFIED DEMENTIA, UNSPECIFIED SEVERITY, WITHOUT BEHAVIORAL DISTURBANCE, PSYCHOTIC DISTURBANCE, MOOD DISTURBANCE, AND ANXIETY: Chronic | Status: ACTIVE | Noted: 2024-03-16

## 2024-04-23 PROCEDURE — 90792 PSYCH DIAG EVAL W/MED SRVCS: CPT

## 2024-04-24 DIAGNOSIS — F03.911 UNSPECIFIED DEMENTIA, UNSPECIFIED SEVERITY, WITH AGITATION: ICD-10-CM

## 2024-05-30 ENCOUNTER — INPATIENT (INPATIENT)
Facility: HOSPITAL | Age: 69
LOS: 6 days | Discharge: HOME | End: 2024-06-06
Attending: HOSPITALIST | Admitting: HOSPITALIST
Payer: MEDICARE

## 2024-05-30 ENCOUNTER — APPOINTMENT (OUTPATIENT)
Dept: GERIATRICS | Facility: CLINIC | Age: 69
End: 2024-05-30
Payer: MEDICARE

## 2024-05-30 VITALS
TEMPERATURE: 99 F | OXYGEN SATURATION: 97 % | SYSTOLIC BLOOD PRESSURE: 111 MMHG | RESPIRATION RATE: 16 BRPM | HEART RATE: 100 BPM | DIASTOLIC BLOOD PRESSURE: 74 MMHG

## 2024-05-30 DIAGNOSIS — Z63.6 DEPENDENT RELATIVE NEEDING CARE AT HOME: ICD-10-CM

## 2024-05-30 DIAGNOSIS — K59.00 CONSTIPATION, UNSPECIFIED: ICD-10-CM

## 2024-05-30 DIAGNOSIS — R62.7 ADULT FAILURE TO THRIVE: ICD-10-CM

## 2024-05-30 DIAGNOSIS — F03.918 UNSPECIFIED DEMENTIA, UNSPECIFIED SEVERITY, WITH OTHER BEHAVIORAL DISTURBANCE: ICD-10-CM

## 2024-05-30 DIAGNOSIS — Z98.890 OTHER SPECIFIED POSTPROCEDURAL STATES: Chronic | ICD-10-CM

## 2024-05-30 LAB
ALBUMIN SERPL ELPH-MCNC: 4.6 G/DL — SIGNIFICANT CHANGE UP (ref 3.3–5)
ALP SERPL-CCNC: 56 U/L — SIGNIFICANT CHANGE UP (ref 40–120)
ALT FLD-CCNC: 12 U/L — SIGNIFICANT CHANGE UP (ref 4–33)
ANION GAP SERPL CALC-SCNC: 17 MMOL/L — HIGH (ref 7–14)
APPEARANCE UR: CLEAR — SIGNIFICANT CHANGE UP
APTT BLD: 30 SEC — SIGNIFICANT CHANGE UP (ref 24.5–35.6)
AST SERPL-CCNC: 17 U/L — SIGNIFICANT CHANGE UP (ref 4–32)
BACTERIA # UR AUTO: NEGATIVE /HPF — SIGNIFICANT CHANGE UP
BASOPHILS # BLD AUTO: 0.05 K/UL — SIGNIFICANT CHANGE UP (ref 0–0.2)
BASOPHILS NFR BLD AUTO: 0.5 % — SIGNIFICANT CHANGE UP (ref 0–2)
BILIRUB SERPL-MCNC: 0.4 MG/DL — SIGNIFICANT CHANGE UP (ref 0.2–1.2)
BILIRUB UR-MCNC: NEGATIVE — SIGNIFICANT CHANGE UP
BUN SERPL-MCNC: 19 MG/DL — SIGNIFICANT CHANGE UP (ref 7–23)
CALCIUM SERPL-MCNC: 9.7 MG/DL — SIGNIFICANT CHANGE UP (ref 8.4–10.5)
CAST: 1 /LPF — SIGNIFICANT CHANGE UP (ref 0–4)
CHLORIDE SERPL-SCNC: 104 MMOL/L — SIGNIFICANT CHANGE UP (ref 98–107)
CO2 SERPL-SCNC: 24 MMOL/L — SIGNIFICANT CHANGE UP (ref 22–31)
COLOR SPEC: YELLOW — SIGNIFICANT CHANGE UP
CREAT ?TM UR-MCNC: 50 MG/DL — SIGNIFICANT CHANGE UP
CREAT SERPL-MCNC: 0.91 MG/DL — SIGNIFICANT CHANGE UP (ref 0.5–1.3)
DIFF PNL FLD: ABNORMAL
EGFR: 68 ML/MIN/1.73M2 — SIGNIFICANT CHANGE UP
EOSINOPHIL # BLD AUTO: 0.02 K/UL — SIGNIFICANT CHANGE UP (ref 0–0.5)
EOSINOPHIL NFR BLD AUTO: 0.2 % — SIGNIFICANT CHANGE UP (ref 0–6)
GLUCOSE SERPL-MCNC: 87 MG/DL — SIGNIFICANT CHANGE UP (ref 70–99)
GLUCOSE UR QL: NEGATIVE MG/DL — SIGNIFICANT CHANGE UP
HCT VFR BLD CALC: 34.2 % — LOW (ref 34.5–45)
HGB BLD-MCNC: 11.4 G/DL — LOW (ref 11.5–15.5)
IANC: 6.96 K/UL — SIGNIFICANT CHANGE UP (ref 1.8–7.4)
IMM GRANULOCYTES NFR BLD AUTO: 1 % — HIGH (ref 0–0.9)
INR BLD: 1.09 RATIO — SIGNIFICANT CHANGE UP (ref 0.85–1.18)
KETONES UR-MCNC: NEGATIVE MG/DL — SIGNIFICANT CHANGE UP
LEUKOCYTE ESTERASE UR-ACNC: NEGATIVE — SIGNIFICANT CHANGE UP
LYMPHOCYTES # BLD AUTO: 1.97 K/UL — SIGNIFICANT CHANGE UP (ref 1–3.3)
LYMPHOCYTES # BLD AUTO: 19.7 % — SIGNIFICANT CHANGE UP (ref 13–44)
MAGNESIUM SERPL-MCNC: 2 MG/DL — SIGNIFICANT CHANGE UP (ref 1.6–2.6)
MCHC RBC-ENTMCNC: 30.4 PG — SIGNIFICANT CHANGE UP (ref 27–34)
MCHC RBC-ENTMCNC: 33.3 GM/DL — SIGNIFICANT CHANGE UP (ref 32–36)
MCV RBC AUTO: 91.2 FL — SIGNIFICANT CHANGE UP (ref 80–100)
MONOCYTES # BLD AUTO: 0.91 K/UL — HIGH (ref 0–0.9)
MONOCYTES NFR BLD AUTO: 9.1 % — SIGNIFICANT CHANGE UP (ref 2–14)
NEUTROPHILS # BLD AUTO: 6.96 K/UL — SIGNIFICANT CHANGE UP (ref 1.8–7.4)
NEUTROPHILS NFR BLD AUTO: 69.5 % — SIGNIFICANT CHANGE UP (ref 43–77)
NITRITE UR-MCNC: NEGATIVE — SIGNIFICANT CHANGE UP
NRBC # BLD: 0 /100 WBCS — SIGNIFICANT CHANGE UP (ref 0–0)
NRBC # FLD: 0 K/UL — SIGNIFICANT CHANGE UP (ref 0–0)
NT-PROBNP SERPL-SCNC: <36 PG/ML — SIGNIFICANT CHANGE UP
OSMOLALITY UR: 417 MOSM/KG — SIGNIFICANT CHANGE UP (ref 50–1200)
PH UR: 7 — SIGNIFICANT CHANGE UP (ref 5–8)
PHOSPHATE SERPL-MCNC: 3.5 MG/DL — SIGNIFICANT CHANGE UP (ref 2.5–4.5)
PLATELET # BLD AUTO: 169 K/UL — SIGNIFICANT CHANGE UP (ref 150–400)
POTASSIUM SERPL-MCNC: 3.1 MMOL/L — LOW (ref 3.5–5.3)
POTASSIUM SERPL-SCNC: 3.1 MMOL/L — LOW (ref 3.5–5.3)
POTASSIUM UR-SCNC: 18.1 MMOL/L — SIGNIFICANT CHANGE UP
PROT ?TM UR-MCNC: 27 MG/DL — SIGNIFICANT CHANGE UP
PROT SERPL-MCNC: 7.1 G/DL — SIGNIFICANT CHANGE UP (ref 6–8.3)
PROT UR-MCNC: 30 MG/DL
PROT/CREAT UR-RTO: 0.6 RATIO — HIGH (ref 0–0.2)
PROTHROM AB SERPL-ACNC: 12.3 SEC — SIGNIFICANT CHANGE UP (ref 9.5–13)
RBC # BLD: 3.75 M/UL — LOW (ref 3.8–5.2)
RBC # FLD: 12.2 % — SIGNIFICANT CHANGE UP (ref 10.3–14.5)
RBC CASTS # UR COMP ASSIST: 4 /HPF — SIGNIFICANT CHANGE UP (ref 0–4)
REVIEW: SIGNIFICANT CHANGE UP
SODIUM SERPL-SCNC: 145 MMOL/L — SIGNIFICANT CHANGE UP (ref 135–145)
SODIUM UR-SCNC: 109 MMOL/L — SIGNIFICANT CHANGE UP
SP GR SPEC: 1.03 — SIGNIFICANT CHANGE UP (ref 1–1.03)
SQUAMOUS # UR AUTO: 5 /HPF — SIGNIFICANT CHANGE UP (ref 0–5)
TROPONIN T, HIGH SENSITIVITY RESULT: 10 NG/L — SIGNIFICANT CHANGE UP
TSH SERPL-MCNC: 1.64 UIU/ML — SIGNIFICANT CHANGE UP (ref 0.27–4.2)
UROBILINOGEN FLD QL: 1 MG/DL — SIGNIFICANT CHANGE UP (ref 0.2–1)
UUN UR-MCNC: 293.6 MG/DL — SIGNIFICANT CHANGE UP
WBC # BLD: 10.01 K/UL — SIGNIFICANT CHANGE UP (ref 3.8–10.5)
WBC # FLD AUTO: 10.01 K/UL — SIGNIFICANT CHANGE UP (ref 3.8–10.5)
WBC UR QL: 2 /HPF — SIGNIFICANT CHANGE UP (ref 0–5)

## 2024-05-30 PROCEDURE — 70450 CT HEAD/BRAIN W/O DYE: CPT | Mod: 26,MC

## 2024-05-30 PROCEDURE — 74177 CT ABD & PELVIS W/CONTRAST: CPT | Mod: 26,MC

## 2024-05-30 PROCEDURE — 99285 EMERGENCY DEPT VISIT HI MDM: CPT | Mod: GC

## 2024-05-30 PROCEDURE — 99443: CPT | Mod: 93

## 2024-05-30 PROCEDURE — 72125 CT NECK SPINE W/O DYE: CPT | Mod: 26,MC

## 2024-05-30 RX ORDER — POTASSIUM CHLORIDE 20 MEQ
40 PACKET (EA) ORAL ONCE
Refills: 0 | Status: COMPLETED | OUTPATIENT
Start: 2024-05-30 | End: 2024-05-30

## 2024-05-30 RX ORDER — SODIUM CHLORIDE 9 MG/ML
1000 INJECTION INTRAMUSCULAR; INTRAVENOUS; SUBCUTANEOUS
Refills: 0 | Status: DISCONTINUED | OUTPATIENT
Start: 2024-05-30 | End: 2024-05-31

## 2024-05-30 RX ORDER — SODIUM CHLORIDE 9 MG/ML
500 INJECTION INTRAMUSCULAR; INTRAVENOUS; SUBCUTANEOUS ONCE
Refills: 0 | Status: COMPLETED | OUTPATIENT
Start: 2024-05-30 | End: 2024-05-30

## 2024-05-30 RX ORDER — POTASSIUM CHLORIDE 20 MEQ
10 PACKET (EA) ORAL
Refills: 0 | Status: COMPLETED | OUTPATIENT
Start: 2024-05-30 | End: 2024-05-30

## 2024-05-30 RX ADMIN — SODIUM CHLORIDE 125 MILLILITER(S): 9 INJECTION INTRAMUSCULAR; INTRAVENOUS; SUBCUTANEOUS at 23:39

## 2024-05-30 RX ADMIN — SODIUM CHLORIDE 500 MILLILITER(S): 9 INJECTION INTRAMUSCULAR; INTRAVENOUS; SUBCUTANEOUS at 19:06

## 2024-05-30 RX ADMIN — SODIUM CHLORIDE 500 MILLILITER(S): 9 INJECTION INTRAMUSCULAR; INTRAVENOUS; SUBCUTANEOUS at 22:44

## 2024-05-30 RX ADMIN — Medication 100 MILLIEQUIVALENT(S): at 23:39

## 2024-05-30 RX ADMIN — Medication 100 MILLIEQUIVALENT(S): at 22:44

## 2024-05-30 RX ADMIN — SODIUM CHLORIDE 500 MILLILITER(S): 9 INJECTION INTRAMUSCULAR; INTRAVENOUS; SUBCUTANEOUS at 18:11

## 2024-05-30 SDOH — SOCIAL STABILITY - SOCIAL INSECURITY: DEPENDENT RELATIVE NEEDING CARE AT HOME: Z63.6

## 2024-05-30 NOTE — ED PROVIDER NOTE - OBJECTIVE STATEMENT
68 yo f hx dementia, HTN, DM on metformin here for unwitnessed fall. patient has had worsening confusion, lethargy, decreased PO intake and memory changes from baseline in past few weeks. today fell while walking to  bathroom by herself, fell onto back of head, granddaughter immediately found her lying on her back, conscious and alert. pt unable to communicate during exam. daughter also concerned about lack of bowel movement in past 2 weeks, has tried Miralax with no relief, has had hysterectomy in past. denies any fevers, chills, nausea, vomiting, abdominal pain.

## 2024-05-30 NOTE — ED PROVIDER NOTE - PHYSICAL EXAMINATION
GENERAL: well appearing in no acute distress, non-toxic appearing  HEAD: normocephalic, atraumatic  HEENT: cataracts b/l EOM intact  CARDIAC: regular rate and rhythm, normal S1S2, no appreciable murmurs, 2+ pulses in UE/LE b/l  PULM: normal breath sounds, clear to ascultation bilaterally, no rales, rhonchi, wheezing  GI: abdomen nondistended, soft, nontender, no guarding, rebound tenderness  NEURO: non verbal, moving all four extremities, following commands, normal tone  MSK: 5/5 strength UE LE  SKIN: well-perfused, extremities warm, no visible rashes

## 2024-05-30 NOTE — ED PROVIDER NOTE - CLINICAL SUMMARY MEDICAL DECISION MAKING FREE TEXT BOX
69 hx dementia, non verbal baseline, HTN DM here for unwitnessed fall, w headstrike unknown loc not on ac, increased lethargy confusion and decreased PO, and no bowel movement for 2 week history hysterectomy, concern for FTT in setting of dementia vs infectious/electrolyte derangement etio ology for confusion, pt neurologically intact following commands not rigid low concern for encephalopathy but will get rectal temp and send cultures if febrile. will get ct abd to r/o obstruction vs motillty issue in setting of dementia. and will get ct head/neck and xrays and trauma/syncope workup for unwitnessed fall.
negative...

## 2024-05-30 NOTE — ED ADULT TRIAGE NOTE - CHIEF COMPLAINT QUOTE
c/o no BM x 2 weeks, loss of appetite and fall today. Confused and nonverbal @ baseline. Daughter in triage providing collateral. Reports she had an unwitnessed fall today. Unknown if she hit her head, unknown use of blood thinners. Patient noted with blank stare, responsive to verbal stimuli.  hx dementia, HTN, DM 2

## 2024-05-30 NOTE — ED ADULT NURSE NOTE - OBJECTIVE STATEMENT
Elderly female pt BIB daughter for home with hx constipation for 2 weeks, decreased oral intake and unwitnessed fall today, pt lives at home with daughter and was found in the bathroom today on the floor, unknown headstrike or LOC, hx dementia and wandering, pt not noted to be in any acute distress, alert but non verbal-baseline, breathing freely on room air, made comfortable in bed, safety precautions in place per protocol, bed locked and in lowest position, rails up x2, IV placed and blood work done,. IVF progressing, daughter at bedside. Management continues.

## 2024-05-30 NOTE — ED PROVIDER NOTE - ATTENDING CONTRIBUTION TO CARE
70 yo female with PMH dementia, HTN, DM on metformin for evaluation after unwitnessed fall. Nonverbal at baseline. Has been having confusion, lethargy, and decreased PO intake over past few weeks PE: RRR, CTA BL lungs, abd soft NTND. A/P Labs, imaging, medicate, TBA

## 2024-05-30 NOTE — ED ADULT NURSE NOTE - HIV OFFER
H&P reviewed. The patient was examined and there are no changes to the H&P.        
Previously Declined (within the last year)

## 2024-05-30 NOTE — ED ADULT NURSE NOTE - NSFALLRISKINTERV_ED_ALL_ED
Assistance OOB with selected safe patient handling equipment if applicable/Assistance with ambulation/Communicate fall risk and risk factors to all staff, patient, and family/Monitor gait and stability/Monitor for mental status changes and reorient to person, place, and time, as needed/Move patient closer to nursing station/within visual sight of ED staff/Provide visual cue: yellow wristband, yellow gown, etc/Reinforce activity limits and safety measures with patient and family/Toileting schedule using arm’s reach rule for commode and bathroom/Use of alarms - bed, stretcher, chair and/or video monitoring/Call bell, personal items and telephone in reach/Instruct patient to call for assistance before getting out of bed/chair/stretcher/Non-slip footwear applied when patient is off stretcher/[place] to call system/Physically safe environment - no spills, clutter or unnecessary equipment/Purposeful Proactive Rounding/Room/bathroom lighting operational, light cord in reach

## 2024-05-31 DIAGNOSIS — K59.00 CONSTIPATION, UNSPECIFIED: ICD-10-CM

## 2024-05-31 DIAGNOSIS — E78.5 HYPERLIPIDEMIA, UNSPECIFIED: ICD-10-CM

## 2024-05-31 DIAGNOSIS — Z29.9 ENCOUNTER FOR PROPHYLACTIC MEASURES, UNSPECIFIED: ICD-10-CM

## 2024-05-31 DIAGNOSIS — E87.6 HYPOKALEMIA: ICD-10-CM

## 2024-05-31 DIAGNOSIS — R63.8 OTHER SYMPTOMS AND SIGNS CONCERNING FOOD AND FLUID INTAKE: ICD-10-CM

## 2024-05-31 DIAGNOSIS — E11.9 TYPE 2 DIABETES MELLITUS WITHOUT COMPLICATIONS: ICD-10-CM

## 2024-05-31 DIAGNOSIS — F03.918 UNSPECIFIED DEMENTIA, UNSPECIFIED SEVERITY, WITH OTHER BEHAVIORAL DISTURBANCE: ICD-10-CM

## 2024-05-31 LAB
A1C WITH ESTIMATED AVERAGE GLUCOSE RESULT: 5.4 % — SIGNIFICANT CHANGE UP (ref 4–5.6)
ANION GAP SERPL CALC-SCNC: 18 MMOL/L — HIGH (ref 7–14)
BASE EXCESS BLDV CALC-SCNC: 2 MMOL/L — SIGNIFICANT CHANGE UP (ref -2–3)
BLOOD GAS VENOUS COMPREHENSIVE RESULT: SIGNIFICANT CHANGE UP
BUN SERPL-MCNC: 9 MG/DL — SIGNIFICANT CHANGE UP (ref 7–23)
CALCIUM SERPL-MCNC: 9.5 MG/DL — SIGNIFICANT CHANGE UP (ref 8.4–10.5)
CHLORIDE BLDV-SCNC: 104 MMOL/L — SIGNIFICANT CHANGE UP (ref 96–108)
CHLORIDE SERPL-SCNC: 103 MMOL/L — SIGNIFICANT CHANGE UP (ref 98–107)
CO2 BLDV-SCNC: 29.7 MMOL/L — HIGH (ref 22–26)
CO2 SERPL-SCNC: 22 MMOL/L — SIGNIFICANT CHANGE UP (ref 22–31)
CREAT SERPL-MCNC: 0.6 MG/DL — SIGNIFICANT CHANGE UP (ref 0.5–1.3)
EGFR: 97 ML/MIN/1.73M2 — SIGNIFICANT CHANGE UP
ESTIMATED AVERAGE GLUCOSE: 108 — SIGNIFICANT CHANGE UP
GAS PNL BLDV: 140 MMOL/L — SIGNIFICANT CHANGE UP (ref 136–145)
GLUCOSE BLDV-MCNC: 81 MG/DL — SIGNIFICANT CHANGE UP (ref 70–99)
GLUCOSE SERPL-MCNC: 84 MG/DL — SIGNIFICANT CHANGE UP (ref 70–99)
HCO3 BLDV-SCNC: 28 MMOL/L — SIGNIFICANT CHANGE UP (ref 22–29)
HCT VFR BLD CALC: 35.3 % — SIGNIFICANT CHANGE UP (ref 34.5–45)
HCT VFR BLDA CALC: 38 % — SIGNIFICANT CHANGE UP (ref 34.5–46.5)
HGB BLD CALC-MCNC: 12.5 G/DL — SIGNIFICANT CHANGE UP (ref 11.7–16.1)
HGB BLD-MCNC: 12.2 G/DL — SIGNIFICANT CHANGE UP (ref 11.5–15.5)
LACTATE BLDV-MCNC: 1.4 MMOL/L — SIGNIFICANT CHANGE UP (ref 0.5–2)
MAGNESIUM SERPL-MCNC: 1.8 MG/DL — SIGNIFICANT CHANGE UP (ref 1.6–2.6)
MCHC RBC-ENTMCNC: 31.3 PG — SIGNIFICANT CHANGE UP (ref 27–34)
MCHC RBC-ENTMCNC: 34.6 GM/DL — SIGNIFICANT CHANGE UP (ref 32–36)
MCV RBC AUTO: 90.5 FL — SIGNIFICANT CHANGE UP (ref 80–100)
MRSA PCR RESULT.: SIGNIFICANT CHANGE UP
NRBC # BLD: 0 /100 WBCS — SIGNIFICANT CHANGE UP (ref 0–0)
NRBC # FLD: 0 K/UL — SIGNIFICANT CHANGE UP (ref 0–0)
PCO2 BLDV: 50 MMHG — SIGNIFICANT CHANGE UP (ref 39–52)
PH BLDV: 7.36 — SIGNIFICANT CHANGE UP (ref 7.32–7.43)
PHOSPHATE SERPL-MCNC: 3.1 MG/DL — SIGNIFICANT CHANGE UP (ref 2.5–4.5)
PLATELET # BLD AUTO: 140 K/UL — LOW (ref 150–400)
PO2 BLDV: 47 MMHG — HIGH (ref 25–45)
POTASSIUM BLDV-SCNC: 3.1 MMOL/L — LOW (ref 3.5–5.1)
POTASSIUM SERPL-MCNC: 3.1 MMOL/L — LOW (ref 3.5–5.3)
POTASSIUM SERPL-SCNC: 3.1 MMOL/L — LOW (ref 3.5–5.3)
RBC # BLD: 3.9 M/UL — SIGNIFICANT CHANGE UP (ref 3.8–5.2)
RBC # FLD: 12.2 % — SIGNIFICANT CHANGE UP (ref 10.3–14.5)
S AUREUS DNA NOSE QL NAA+PROBE: SIGNIFICANT CHANGE UP
SAO2 % BLDV: 77 % — SIGNIFICANT CHANGE UP (ref 67–88)
SODIUM SERPL-SCNC: 143 MMOL/L — SIGNIFICANT CHANGE UP (ref 135–145)
WBC # BLD: 10.5 K/UL — SIGNIFICANT CHANGE UP (ref 3.8–10.5)
WBC # FLD AUTO: 10.5 K/UL — SIGNIFICANT CHANGE UP (ref 3.8–10.5)

## 2024-05-31 PROCEDURE — 99223 1ST HOSP IP/OBS HIGH 75: CPT

## 2024-05-31 RX ORDER — ATORVASTATIN CALCIUM 80 MG/1
40 TABLET, FILM COATED ORAL AT BEDTIME
Refills: 0 | Status: DISCONTINUED | OUTPATIENT
Start: 2024-05-31 | End: 2024-06-06

## 2024-05-31 RX ORDER — LETROZOLE 2.5 MG/1
1 TABLET, FILM COATED ORAL
Refills: 0 | DISCHARGE

## 2024-05-31 RX ORDER — CHLORHEXIDINE GLUCONATE 213 G/1000ML
1 SOLUTION TOPICAL DAILY
Refills: 0 | Status: DISCONTINUED | OUTPATIENT
Start: 2024-05-31 | End: 2024-06-06

## 2024-05-31 RX ORDER — INSULIN LISPRO 100/ML
VIAL (ML) SUBCUTANEOUS EVERY 6 HOURS
Refills: 0 | Status: DISCONTINUED | OUTPATIENT
Start: 2024-05-31 | End: 2024-05-31

## 2024-05-31 RX ORDER — DEXTROSE 10 % IN WATER 10 %
125 INTRAVENOUS SOLUTION INTRAVENOUS ONCE
Refills: 0 | Status: DISCONTINUED | OUTPATIENT
Start: 2024-05-31 | End: 2024-06-06

## 2024-05-31 RX ORDER — METFORMIN HYDROCHLORIDE 850 MG/1
1 TABLET ORAL
Refills: 0 | DISCHARGE

## 2024-05-31 RX ORDER — ACETAMINOPHEN 500 MG
650 TABLET ORAL EVERY 6 HOURS
Refills: 0 | Status: DISCONTINUED | OUTPATIENT
Start: 2024-05-31 | End: 2024-06-06

## 2024-05-31 RX ORDER — RIVASTIGMINE 4.6 MG/24H
1 PATCH, EXTENDED RELEASE TRANSDERMAL
Refills: 0 | DISCHARGE

## 2024-05-31 RX ORDER — HEPARIN SODIUM 5000 [USP'U]/ML
5000 INJECTION INTRAVENOUS; SUBCUTANEOUS EVERY 12 HOURS
Refills: 0 | Status: DISCONTINUED | OUTPATIENT
Start: 2024-05-31 | End: 2024-06-06

## 2024-05-31 RX ORDER — ROSUVASTATIN CALCIUM 5 MG/1
1 TABLET ORAL
Refills: 0 | DISCHARGE

## 2024-05-31 RX ORDER — SODIUM CHLORIDE 9 MG/ML
1000 INJECTION, SOLUTION INTRAVENOUS
Refills: 0 | Status: DISCONTINUED | OUTPATIENT
Start: 2024-05-31 | End: 2024-06-06

## 2024-05-31 RX ORDER — SENNA PLUS 8.6 MG/1
2 TABLET ORAL AT BEDTIME
Refills: 0 | Status: DISCONTINUED | OUTPATIENT
Start: 2024-05-31 | End: 2024-06-06

## 2024-05-31 RX ORDER — LETROZOLE 2.5 MG/1
2.5 TABLET, FILM COATED ORAL DAILY
Refills: 0 | Status: DISCONTINUED | OUTPATIENT
Start: 2024-05-31 | End: 2024-06-06

## 2024-05-31 RX ORDER — ASPIRIN/CALCIUM CARB/MAGNESIUM 324 MG
1 TABLET ORAL
Refills: 0 | DISCHARGE

## 2024-05-31 RX ORDER — AMLODIPINE BESYLATE 2.5 MG/1
10 TABLET ORAL DAILY
Refills: 0 | Status: DISCONTINUED | OUTPATIENT
Start: 2024-05-31 | End: 2024-06-06

## 2024-05-31 RX ORDER — DEXTROSE 50 % IN WATER 50 %
25 SYRINGE (ML) INTRAVENOUS ONCE
Refills: 0 | Status: DISCONTINUED | OUTPATIENT
Start: 2024-05-31 | End: 2024-06-06

## 2024-05-31 RX ORDER — GLUCAGON INJECTION, SOLUTION 0.5 MG/.1ML
1 INJECTION, SOLUTION SUBCUTANEOUS ONCE
Refills: 0 | Status: DISCONTINUED | OUTPATIENT
Start: 2024-05-31 | End: 2024-06-06

## 2024-05-31 RX ORDER — LANOLIN ALCOHOL/MO/W.PET/CERES
3 CREAM (GRAM) TOPICAL AT BEDTIME
Refills: 0 | Status: DISCONTINUED | OUTPATIENT
Start: 2024-05-31 | End: 2024-06-06

## 2024-05-31 RX ORDER — ESCITALOPRAM OXALATE 10 MG/1
1 TABLET, FILM COATED ORAL
Refills: 0 | DISCHARGE

## 2024-05-31 RX ORDER — PREDNISOLONE SODIUM PHOSPHATE 1 %
1 DROPS OPHTHALMIC (EYE)
Refills: 0 | DISCHARGE

## 2024-05-31 RX ORDER — DEXTROSE 50 % IN WATER 50 %
12.5 SYRINGE (ML) INTRAVENOUS ONCE
Refills: 0 | Status: DISCONTINUED | OUTPATIENT
Start: 2024-05-31 | End: 2024-06-06

## 2024-05-31 RX ORDER — ESCITALOPRAM OXALATE 10 MG/1
10 TABLET, FILM COATED ORAL EVERY 24 HOURS
Refills: 0 | Status: DISCONTINUED | OUTPATIENT
Start: 2024-05-31 | End: 2024-06-06

## 2024-05-31 RX ORDER — INSULIN LISPRO 100/ML
VIAL (ML) SUBCUTANEOUS
Refills: 0 | Status: DISCONTINUED | OUTPATIENT
Start: 2024-05-31 | End: 2024-06-06

## 2024-05-31 RX ORDER — RIVASTIGMINE 4.6 MG/24H
3 PATCH, EXTENDED RELEASE TRANSDERMAL
Refills: 0 | Status: DISCONTINUED | OUTPATIENT
Start: 2024-05-31 | End: 2024-06-06

## 2024-05-31 RX ORDER — INSULIN LISPRO 100/ML
VIAL (ML) SUBCUTANEOUS AT BEDTIME
Refills: 0 | Status: DISCONTINUED | OUTPATIENT
Start: 2024-05-31 | End: 2024-06-06

## 2024-05-31 RX ORDER — POLYETHYLENE GLYCOL 3350 17 G/17G
17 POWDER, FOR SOLUTION ORAL DAILY
Refills: 0 | Status: DISCONTINUED | OUTPATIENT
Start: 2024-05-31 | End: 2024-06-02

## 2024-05-31 RX ORDER — DEXTROSE 50 % IN WATER 50 %
15 SYRINGE (ML) INTRAVENOUS ONCE
Refills: 0 | Status: DISCONTINUED | OUTPATIENT
Start: 2024-05-31 | End: 2024-06-06

## 2024-05-31 RX ORDER — DIPHENHYDRAMINE HCL 50 MG
25 CAPSULE ORAL ONCE
Refills: 0 | Status: COMPLETED | OUTPATIENT
Start: 2024-05-31 | End: 2024-05-31

## 2024-05-31 RX ORDER — TRAZODONE HCL 50 MG
0.5 TABLET ORAL
Refills: 0 | DISCHARGE

## 2024-05-31 RX ORDER — TRAZODONE HCL 50 MG
25 TABLET ORAL AT BEDTIME
Refills: 0 | Status: DISCONTINUED | OUTPATIENT
Start: 2024-05-31 | End: 2024-06-06

## 2024-05-31 RX ORDER — RISPERIDONE 4 MG/1
0.5 TABLET ORAL EVERY 12 HOURS
Refills: 0 | Status: DISCONTINUED | OUTPATIENT
Start: 2024-05-31 | End: 2024-06-06

## 2024-05-31 RX ORDER — OLANZAPINE 15 MG/1
1.25 TABLET, FILM COATED ORAL EVERY 6 HOURS
Refills: 0 | Status: DISCONTINUED | OUTPATIENT
Start: 2024-05-31 | End: 2024-06-06

## 2024-05-31 RX ORDER — SODIUM CHLORIDE 9 MG/ML
1000 INJECTION INTRAMUSCULAR; INTRAVENOUS; SUBCUTANEOUS
Refills: 0 | Status: DISCONTINUED | OUTPATIENT
Start: 2024-05-31 | End: 2024-06-01

## 2024-05-31 RX ORDER — POTASSIUM CHLORIDE 20 MEQ
40 PACKET (EA) ORAL ONCE
Refills: 0 | Status: COMPLETED | OUTPATIENT
Start: 2024-05-31 | End: 2024-05-31

## 2024-05-31 RX ADMIN — SODIUM CHLORIDE 80 MILLILITER(S): 9 INJECTION INTRAMUSCULAR; INTRAVENOUS; SUBCUTANEOUS at 03:25

## 2024-05-31 RX ADMIN — Medication 3 MILLIGRAM(S): at 03:05

## 2024-05-31 RX ADMIN — SODIUM CHLORIDE 80 MILLILITER(S): 9 INJECTION INTRAMUSCULAR; INTRAVENOUS; SUBCUTANEOUS at 17:20

## 2024-05-31 RX ADMIN — Medication 40 MILLIEQUIVALENT(S): at 14:26

## 2024-05-31 RX ADMIN — OLANZAPINE 1.25 MILLIGRAM(S): 15 TABLET, FILM COATED ORAL at 18:22

## 2024-05-31 RX ADMIN — Medication 100 MILLIEQUIVALENT(S): at 00:43

## 2024-05-31 RX ADMIN — Medication 25 MILLIGRAM(S): at 03:22

## 2024-05-31 RX ADMIN — RISPERIDONE 0.5 MILLIGRAM(S): 4 TABLET ORAL at 09:21

## 2024-05-31 RX ADMIN — HEPARIN SODIUM 5000 UNIT(S): 5000 INJECTION INTRAVENOUS; SUBCUTANEOUS at 17:02

## 2024-05-31 RX ADMIN — Medication 25 MILLIGRAM(S): at 22:57

## 2024-05-31 RX ADMIN — SENNA PLUS 2 TABLET(S): 8.6 TABLET ORAL at 22:58

## 2024-05-31 RX ADMIN — RIVASTIGMINE 3 MILLIGRAM(S): 4.6 PATCH, EXTENDED RELEASE TRANSDERMAL at 22:58

## 2024-05-31 RX ADMIN — RIVASTIGMINE 3 MILLIGRAM(S): 4.6 PATCH, EXTENDED RELEASE TRANSDERMAL at 13:41

## 2024-05-31 RX ADMIN — POLYETHYLENE GLYCOL 3350 17 GRAM(S): 17 POWDER, FOR SOLUTION ORAL at 11:31

## 2024-05-31 RX ADMIN — LETROZOLE 2.5 MILLIGRAM(S): 2.5 TABLET, FILM COATED ORAL at 13:41

## 2024-05-31 RX ADMIN — Medication 1 MILLIGRAM(S): at 03:22

## 2024-05-31 RX ADMIN — RISPERIDONE 0.5 MILLIGRAM(S): 4 TABLET ORAL at 22:57

## 2024-05-31 RX ADMIN — ESCITALOPRAM OXALATE 10 MILLIGRAM(S): 10 TABLET, FILM COATED ORAL at 13:42

## 2024-05-31 RX ADMIN — ATORVASTATIN CALCIUM 40 MILLIGRAM(S): 80 TABLET, FILM COATED ORAL at 22:57

## 2024-05-31 RX ADMIN — CHLORHEXIDINE GLUCONATE 1 APPLICATION(S): 213 SOLUTION TOPICAL at 11:30

## 2024-05-31 RX ADMIN — HEPARIN SODIUM 5000 UNIT(S): 5000 INJECTION INTRAVENOUS; SUBCUTANEOUS at 07:05

## 2024-05-31 RX ADMIN — Medication 3 MILLIGRAM(S): at 22:57

## 2024-05-31 NOTE — H&P ADULT - PROBLEM SELECTOR PLAN 1
-c/w home regimen  -VM left for psychiatry, pt might benefit from mirtazapine  -NPO pending s/s, nutrition/ PT  -Daughter at bedside states patient is DNR, but there are 2 daughters. Asked them to talk with each other, please verify in daytime code status  -aspiration precautions/fall precautions  -low concern for seizure, no tongue biting or self urination.  -troponin 10, low concern for ACS

## 2024-05-31 NOTE — PATIENT PROFILE ADULT - FALL HARM RISK - RISK INTERVENTIONS
Assistance OOB with selected safe patient handling equipment/Assistance with ambulation/Communicate Fall Risk and Risk Factors to all staff, patient, and family/Discuss with provider need for PT consult/Monitor for mental status changes/Monitor gait and stability/Move patient closer to nurses' station/Reinforce activity limits and safety measures with patient and family/Reorient to person, place and time as needed/Review medications for side effects contributing to fall risk/Sit up slowly, dangle for a short time, stand at bedside before walking/Toileting schedule using arm’s reach rule for commode and bathroom/Use of alarms - bed, chair and/or voice tab/Visual Cue: Yellow wristband/Bed in lowest position, wheels locked, appropriate side rails in place/Call bell, personal items and telephone in reach/Instruct patient to call for assistance before getting out of bed or chair/Non-slip footwear when patient is out of bed/[place] to call system/Physically safe environment - no spills, clutter or unnecessary equipment/Purposeful Proactive Rounding/Room/bathroom lighting operational, light cord in reach/Unable to comprehend

## 2024-05-31 NOTE — DISCHARGE NOTE PROVIDER - NSDCCPTREATMENT_GEN_ALL_CORE_FT
PRINCIPAL PROCEDURE  Procedure: CT head  Findings and Treatment: FINDINGS:  CT BRAIN:  VENTRICLES AND SULCI: Age appropriate involutional changes.  INTRA-AXIAL: No intracranial mass, acute hemorrhage, or midline shift.    There are periventricular and subcortical white matter hypodensities,   consistent with microvascular type changes. Chronic left basal ganglia  lacunar infarct. Again seen is a hyperdense focus in the right inferior   basal ganglia suggesting a cavernoma. Cerebral involution.  EXTRA-AXIAL: No mass or fluid collection. Basal cisterns are normal in   appearance.  VISUALIZED SINUSES:  Bilateral ethmoid mucosal thickening.  TYMPANOMASTOID CAVITIES:  Clear.  VISUALIZED ORBITS: Left intraocular lens implant.  CALVARIUM: Intact.  MISCELLANEOUS: None.  CT CERVICAL SPINE:  Motion degraded, nondiagnostic exam.  IMPRESSION:  CT HEAD:  No acute intracranial hemorrhage, mass effect, or midline shift.  CT CERVICAL SPINE:  Motion degraded, nondiagnostic exam.        SECONDARY PROCEDURE  Procedure: Abdomen CT  Findings and Treatment: FINDINGS:  LOWER CHEST: Within normal limits.  LIVER: Subcentimeter hypoattenuating lesions, incompletely assessed.   Otherwise normal size and morphology of the liver.  BILE DUCTS: Normal caliber.  GALLBLADDER: Likely adenomyomatosis of the gallbladder fundus. No   calcified gallstones.  SPLEEN: Within normal limits.  PANCREAS: 5 mm hypoattenuating likely cystic focus in the pancreatic body   (2-29. No pancreatic ductal dilatation.  ADRENALS: Within normal limits.  KIDNEYS/URETERS: Left upper pole cysts and smaller hypoattenuating foci   are too small to adequately characterize. Symmetric renal enhancement. No   hydronephrosis.  BLADDER: Within normal limits.  REPRODUCTIVE ORGANS: Hysterectomy.  BOWEL: No bowel obstruction. Appendix is normal. Moderate stool burden   throughout the colon.  PERITONEUM: No ascites.  VESSELS: Atherosclerotic changes.  RETROPERITONEUM/LYMPH NODES: No lymphadenopathy.  ABDOMINAL WALL: Within normal limits.  BONES: Within normal limits.  IMPRESSION:  Nobowel obstruction as clinically questioned. No acute pathology in the   abdomen or pelvis.  5 mm hypoattenuating, likely cystic, focus in the pancreatic body,   possibly side branch IPMN. Nonemergent outpatient MRI performed for   further characterization.

## 2024-05-31 NOTE — PROGRESS NOTE ADULT - PROBLEM SELECTOR PLAN 7
-DVT prophylaxis HSQ  -Diet: soft and bite sized  -Code: full code. family to discuss possible DNR/DNI status  -Dispo: to [facility] pending medical optimization

## 2024-05-31 NOTE — H&P ADULT - NSHPLABSRESULTS_GEN_ALL_CORE
.  LABS:                         11.4   10.01 )-----------( 169      ( 30 May 2024 18:00 )             34.2     05-30    145  |  104  |  19  ----------------------------<  87  3.1<L>   |  24  |  0.91    Ca    9.7      30 May 2024 18:00  Phos  3.5     05-30  Mg     2.00     05-30    TPro  7.1  /  Alb  4.6  /  TBili  0.4  /  DBili  x   /  AST  17  /  ALT  12  /  AlkPhos  56  05-30    PT/INR - ( 30 May 2024 18:00 )   PT: 12.3 sec;   INR: 1.09 ratio         PTT - ( 30 May 2024 18:00 )  PTT:30.0 sec  Urinalysis Basic - ( 30 May 2024 22:30 )    Color: Yellow / Appearance: Clear / S.027 / pH: x  Gluc: x / Ketone: Negative mg/dL  / Bili: Negative / Urobili: 1.0 mg/dL   Blood: x / Protein: 30 mg/dL / Nitrite: Negative   Leuk Esterase: Negative / RBC: 4 /HPF / WBC 2 /HPF   Sq Epi: x / Non Sq Epi: 5 /HPF / Bacteria: Negative /HPF      < from: CT Abdomen and Pelvis w/ IV Cont (24 @ 18:30) >    IMPRESSION:  Nobowel obstruction as clinically questioned. No acute pathology in the   abdomen or pelvis.    5 mm hypoattenuating, likely cystic, focus in the pancreatic body,   possibly side branch IPMN. Nonemergent outpatient MRI performed for   further characterization.< from: CT Cervical Spine No Cont (24 @ 18:29) >    IMPRESSION:    CT HEAD:  No acute intracranial hemorrhage, mass effect, or midline shift.    CT CERVICAL SPINE:  Motion degraded, nondiagnostic exam.    < end of copied text >

## 2024-05-31 NOTE — PROGRESS NOTE ADULT - PROBLEM SELECTOR PLAN 2
-as above  -CT scan unremarkable as cause of decreased PO intake -per S&S: soft and bite sized diet family reports about 1 week of constipation prior to presentation  - C/w bowel regimen, can consider enema, KUB if not having BMs

## 2024-05-31 NOTE — PROGRESS NOTE ADULT - PROBLEM SELECTOR PLAN 4
-c/w equivalence for home statin    #HTN  -amlodipine 10 mg, hold ACE -c/w equivalence for home statin (rosuvastatin 10 mg oral 1x day)    #HTN  -amlodipine 10 mg, hold ACE - Most likely due to poor PO intake  - Replete as needed

## 2024-05-31 NOTE — DISCHARGE NOTE PROVIDER - NSDCMRMEDTOKEN_GEN_ALL_CORE_FT
amlodipine-benazepril 10 mg-40 mg oral capsule: 1 cap(s) orally once a day  aspirin 81 mg oral capsule: 1 cap(s) orally once a day  escitalopram 10 mg oral tablet: 1 tab(s) orally once a day  letrozole 2.5 mg oral tablet: 1 tab(s) orally once a day  metFORMIN 500 mg oral tablet: 1 tab(s) orally once a day  risperiDONE 0.5 mg oral tablet: 1 tab(s) orally 2 times a day  rivastigmine 3 mg oral capsule: 1 cap(s) orally 2 times a day  rosuvastatin 10 mg oral capsule: 1 cap(s) orally once a day (at bedtime)  trazodone 25 mg at bedtime:    amLODIPine 10 mg oral tablet: 1 tab(s) orally once a day  aspirin 81 mg oral capsule: 1 cap(s) orally once a day  escitalopram 10 mg oral tablet: 1 tab(s) orally every 24 hours  letrozole 2.5 mg oral tablet: 1 tab(s) orally once a day  metFORMIN 500 mg oral tablet: 1 tab(s) orally once a day  risperiDONE 0.5 mg oral tablet: 1 tab(s) orally every 12 hours  rivastigmine 3 mg oral capsule: 1 cap(s) orally 2 times a day  rosuvastatin 10 mg oral capsule: 1 cap(s) orally once a day (at bedtime)  trazodone 25 mg at bedtime:

## 2024-05-31 NOTE — DIETITIAN INITIAL EVALUATION ADULT - OTHER INFO
Per chart, pt is 69 year old female PMH dementia (A&Ox1 at baseline), HTN, type 2 DM presenting for unwitnessed fall.     Pt unable to meaningfully participate in discussion, comprehensive chart review completed. NKFA. Pt noted with worsening confusion, lethargy, decreased PO intake and memory changes from baseline in past few weeks. History of type 2 DM, HbA1c (5/31) 5.4%; medications PTA inclusive of Metformin 500 mg qD.     S/p bedside swallow assessment (5/31) which recommended soft and bite sized/thin liquids, in line with current diet order. Pt had not yet consumed a meal during time of visit. No noted GI distress, unknown last BM. Ordered for senna 2 tablets qHS and Miralax 17 gm qD. Labs notable for hypokalemia, repleted.

## 2024-05-31 NOTE — BH CONSULTATION LIAISON ASSESSMENT NOTE - NSBHCHARTREVIEWVS_PSY_A_CORE FT
Vital Signs Last 24 Hrs  T(C): 36.7 (31 May 2024 10:00), Max: 37.2 (31 May 2024 01:30)  T(F): 98 (31 May 2024 10:00), Max: 99 (31 May 2024 01:30)  HR: 84 (31 May 2024 10:00) (84 - 120)  BP: 117/67 (31 May 2024 10:00) (111/69 - 139/66)  BP(mean): 79 (31 May 2024 01:30) (79 - 79)  RR: 18 (31 May 2024 10:00) (16 - 20)  SpO2: 100% (31 May 2024 10:00) (97% - 100%)    Parameters below as of 31 May 2024 10:00  Patient On (Oxygen Delivery Method): room air

## 2024-05-31 NOTE — DISCHARGE NOTE PROVIDER - CARE PROVIDER_API CALL
Emile Lewis  Internal Medicine  [address] [place] [address], [address] [address]  Phone: [phone]  Fax: [phone]  Follow Up Time: 1 week

## 2024-05-31 NOTE — BH CONSULTATION LIAISON ASSESSMENT NOTE - NSBHATTESTCOMMENTATTENDFT_PSY_A_CORE
Chart reviewed. Seen with trainee. Sedated though awakens with tactile stimulation. Confused/disoriented (baseline). Agree with above assessment/recs. Will continue to follow on Monday 6/3, but call x [phone] prior to then if needed

## 2024-05-31 NOTE — H&P ADULT - NSHPSOCIALHISTORY_GEN_ALL_CORE
Have You Had Botox Before?: has had botox When Was Your Last Botox Treatment?: 11/07/2016 no tobacco use, illicit substances, or etoh use

## 2024-05-31 NOTE — DIETITIAN INITIAL EVALUATION ADULT - PERTINENT LABORATORY DATA
(5/31) Na 143, BUN 9, Cr 0.60, BG 84, K+ 3.1<L>, Phos 3.1, Mg 1.80, HbA1c 5.4%  POCT: (5/31) , (5/30) 105

## 2024-05-31 NOTE — BH CONSULTATION LIAISON ASSESSMENT NOTE - SUMMARY
Patient is 69 F with history of Advanced Dementia (AAO x1-2), HTN, DM2, CVA (chronic L BG lacunar infarct), and R breast cancer s/p lumpectomy/chemo/RT,  who presents to the hospital for unwitnessed fall. Patient has had worsening confusion, lethargy, decreased PO intake and memory changes from baseline in past few weeks. Patient was found immediately lying on her back  after fall, did not self urinate or bite her tongue. Has not been eating for some time now. Head CT, cervical, and a/p negative for fractures, ICH, or strokes. Follows [facility] for [facility] psych.  Patient had CODE KERA early this morning and was given Ativan 1mg + Benadryl 25mg IVP with adequate response    [facility] psychiatry consulted for medication management. CT head neg, UA- neg, no clear source of infection.    Patient alert and oriented to self, confusion noted, otherwise behaviorally controlled. No SI/HI/AH/VH. No EPS noted.    PLAN:  -Defer observation status to primary team    -c/w home medication regimen:   Lexapro 10 mg daily   Risperdal 0.5mg @ 7am and 5pm   Melatonin 3 mg prn hs   Trazodone 25 mg prn hs  Rivastigmine 3 mg PO BID    Aggression: Zyprexa 1.25mg q6h prn with additional Zyprexa 1.25mg for refractory agitation if needed, do not give Ativan im/iv within 1 hour of Zyprexa im d/t risk of sedation/orthostatic hypotension/respiratory depression    -Monitor EKG qtc interval and hold above psychotropics if qtc >500    Avoid benzos, antihitamines and anticholinergics as can increase confusion  Patient would benefit from maintenance of sleep/wake cycles, frequent reorientation, personal items at beside if available     Ideally transfer to 7S (med- psych unit) Patient is 69 F with history of Advanced Dementia (AAO x1-2), HTN, DM2, CVA (chronic L BG lacunar infarct), and R breast cancer s/p lumpectomy/chemo/RT,  who presents to the hospital for unwitnessed fall. Patient has had worsening confusion, lethargy, decreased PO intake and memory changes from baseline in past few weeks. Patient was found immediately lying on her back  after fall, did not self urinate or bite her tongue. Has not been eating for some time now. Head CT, cervical, and a/p negative for fractures, ICH, or strokes. Follows [facility] for [facility]re psych. Patient had CODE KERA early this morning and was given Ativan 1mg + Benadryl 25mg IVP with adequate response    [facility] psychiatry consulted for medication management. CT head neg, UA- neg, no clear source of infection.    Patient alert and oriented to self, confusion noted, otherwise behaviorally controlled. No SI/HI/AH/VH. No EPS noted.    PLAN:  -Defer observation status to primary team    -c/w home medication regimen:   Lexapro 10 mg daily   Risperdal 0.5mg @ 7am and 5pm   Melatonin 3 mg prn hs   Trazodone 25 mg prn hs  Rivastigmine 3 mg PO BID    Aggression: Zyprexa 1.25mg q6h prn with additional Zyprexa 1.25mg for refractory agitation if needed, do not give Ativan im/iv within 1 hour of Zyprexa im d/t risk of sedation/orthostatic hypotension/respiratory depression    -Monitor EKG qtc interval and hold above psychotropics if qtc >500    Avoid benzos, antihitamines and anticholinergics as can increase confusion  Patient would benefit from maintenance of sleep/wake cycles, frequent reorientation, personal items at beside if available     Ideally transfer to 7S (med- psych unit).   Dispo: rehab vs home; No role for inpt psych at this time.   [facility] Psych will continue to follow

## 2024-05-31 NOTE — PATIENT PROFILE ADULT - FUNCTIONAL ASSESSMENT - BASIC MOBILITY 6.
2-calculated by average/Not able to assess (calculate score using [facility] averaging method)

## 2024-05-31 NOTE — PROGRESS NOTE ADULT - PROBLEM SELECTOR PLAN 6
-DVT prophylaxis  -Diet:  -Code: Talk to family about GOC -DVT prophylaxis HSQ  -Diet: soft and bite sized  -Code: Talk to family about GOC -POC Q6 sliding scale A1c 5,4     -Monitor Q6 If NPO. Qac and qhs once having PO  -If FS remain controlled can likely limit FS given A1c level

## 2024-05-31 NOTE — DISCHARGE NOTE PROVIDER - NSDCFUADDAPPT_GEN_ALL_CORE_FT
APPTS ARE READY TO BE MADE: [x] YES    Best Family or Patient Contact (if needed):    Additional Information about above appointments (if needed):    1: PCP in 1 week  2:   3:     Other comments or requests:

## 2024-05-31 NOTE — H&P ADULT - ASSESSMENT
How Severe Is Your Skin Lesion?: mild Have Your Skin Lesions Been Treated?: not been treated Is This A New Presentation, Or A Follow-Up?: Skin Lesion 68 yo f hx dementia a/o times 1 baseline, HTN, DM2 on metformin here for unwitnessed fall. patient has had worsening confusion, lethargy, decreased PO intake and memory changes from baseline in past few weeks.

## 2024-05-31 NOTE — PROGRESS NOTE ADULT - PROBLEM SELECTOR PLAN 3
-replete -Most likely due to poor PO intake  -Consider EKG to assess any cardiac effects  -Replete - Most likely due to poor PO intake  - Replete as needed -per S&S: soft and bite sized diet

## 2024-05-31 NOTE — DIETITIAN INITIAL EVALUATION ADULT - ADD RECOMMEND
1) Recommend continue diet without therapeutic restrictions to maximize menu item availability.  2) RDN to provide Mighty Shake Reduced Sugar 1 PO 2x daily (provides 200 kcal, 7 gm protein per 4oz serving).  3) Provide assistance and encouragement at meal times PRN. Document % PO intake in flowsheets.  4) Monitor electrolytes (K+, Mg, P) and replete to within desired limits as clinically indicated.

## 2024-05-31 NOTE — PHYSICAL THERAPY INITIAL EVALUATION ADULT - ADDITIONAL COMMENTS
Unable to obtain accurate social history secondary to pt. presenting with confusion during subjective history, limited social history obtained through past medical documents, please refer to social work for more attainable social history.   Post PT evaluation, pt left semi-supine, alarm on, call bell and remote within reach, all precautions maintained, NAD. RN aware.

## 2024-05-31 NOTE — PROGRESS NOTE ADULT - PROBLEM SELECTOR PLAN 1
-c/w home regimen  -VM left for psychiatry, pt might benefit from mirtazapine  -NPO pending s/s, nutrition/ PT  -Daughter at bedside states patient is DNR, but there are 2 daughters. Asked them to talk with each other, please verify in daytime code status  -aspiration precautions/fall precautions  -low concern for seizure, no tongue biting or self urination.  -troponin 10, low concern for ACS -c/w home regimen  -VM left for psychiatry, pt might benefit from mirtazapine  -NPO pending s/s, nutrition/ PT  -Daughter at bedside states patient is DNR, but there are 2 daughters. Asked them to talk with each other, please verify in daytime code status  -Aspiration precautions/fall precautions    -Low concern for seizure, no tongue biting or self urination.  -Troponin 10, low concern for ACS -Low concern for seizure, no tongue biting or self urination.  -Low concern for ACS, troponin 10  -CT abdomen and pelvis: no bowel obstruction. 5 mm hypoattenuating cystic focus in pancreas  -CT head: no acute ICH, mass effect, midline shift  -CT cervical spine: motion degraded, nondiagnostic exam  -Urinalysis: negative   -Order thyroid function tests  -Order ABG to assess for metabolic acidosis/alkalosis    -c/w home regimen  -VM left for psychiatry, pt might benefit from mirtazapine  -NPO pending s/s, nutrition/ PT  -Daughter at bedside states patient is DNR, but there are 2 daughters. Asked them to talk with each other, please verify in daytime code status  -Aspiration precautions/fall precautions -Low concern for seizure, no tongue biting or self urination.  -CT abdomen and pelvis: no bowel obstruction. 5 mm hypoattenuating cystic focus in pancreas  -CT head: no acute ICH, mass effect, midline shift  -CT cervical spine: motion degraded, nondiagnostic exam  -Urinalysis: negative   -thyroid function normal    -c/w home regimen  -seen by BH: c/w home meds, no further IP  needs at this time  -for agitation: Zyprexa 1.25mg q6h prn with additional Zyprexa 1.25mg for refractory agitation if needed  -Aspiration precautions/fall precautions

## 2024-05-31 NOTE — ED ADULT NURSE REASSESSMENT NOTE - NS ED NURSE REASSESS COMMENT FT1
Break RN: Pt resting in stretcher, at baseline status as per family. Respirations equal and unlabored. FS performed. Noted to be 83, attending MD at bedside and aware and states that is okay. Plan to change maintenance fluids to fluids with dextrose. Awaiting orders at this time. Attempted x1 to give report to floor RN. Awaiting return call. No acute distress noted. Safety maintained.
Shanta RN: Report received from DEVON Ruiz. Pt resting in stretcher, at baseline orientation status, offers no complaints of pain or discomfort at this time, RR equal and unlabored, safety maintained, report endorsed to primary RN Leydi.
1:39 Pt is resting comfortably in the stretcher. No complaints made at this time. Pt transported to unit in stable condition. No complaints made at this time.

## 2024-05-31 NOTE — SWALLOW BEDSIDE ASSESSMENT ADULT - ORAL PHASE
Slow Mastication and A/P Transfer; Mild Oral Residue Within functional limits Slow Bolus Manipulation and A/P Transfer

## 2024-05-31 NOTE — PHYSICAL THERAPY INITIAL EVALUATION ADULT - PERTINENT HX OF CURRENT PROBLEM, REHAB EVAL
70 yo f hx dementia, HTN, DM on metformin here for unwitnessed fall. patient has had worsening confusion, lethargy, decreased PO intake and memory changes from baseline in past few weeks. today fell while walking to  bathroom by herself, fell onto back of head, granddaughter immediately found her lying on her back, conscious and alert. pt unable to communicate during exam.

## 2024-05-31 NOTE — BH CONSULTATION LIAISON ASSESSMENT NOTE - OTHER
Anticoagulation Summary  As of 10/26/2022    INR goal:  2.0-3.0   TTR:  75.7 % (1.1 y)   INR used for dosin.3 (10/26/2022)   Warfarin maintenance plan:  5 mg (5 mg x 1) every day   Weekly warfarin total:  35 mg   Plan last modified:  Roxann Cunningham RN (10/6/2022)   Next INR check:  2022   Priority:  Follow-Up - 4 Weeks   Target end date:  Indefinite    Indications    Encounter for monitoring Coumadin therapy [Z51.81  Z79.01]  Arm DVT (deep venous thromboembolism)  acute  right (CMS/HCC) [I82.621]  Long term (current) use of anticoagulants [Z79.01]             Anticoagulation Episode Summary     INR check location:  Anticoagulation Clinic    Preferred lab:      Send INR reminders to:  [facility] (OPEN ENROLLMENT) [place]    Comments:  STAC 22  Per PCP comment in order regarding length of therapy- Indefinite therapy: Yes Comment - He will f/u with vascular surgery and ask about end of therapy.      Anticoagulation Care Providers     Provider Role Specialty Phone number    Dona Castro MD [facility] [phone]          Assessment  Yes No   []  [x] Missed doses:   []  [x] Extra doses:   []  [x] Significant medication changes (RX, OTC, Herbal):   []  [x] High-risk maintenance medications:                []  [x] Vitamin K / dietary changes    []  [x] Bleeding / bruising:   []  [x] Falls / injury:   []  [x] Acute illness:    []  [x] Alcohol intake:   []  [x] Procedures / hospitalization / ER visits:   []  [x] Other:    Patient comes to clinic for follow up anticoagulation visit.   Last INR on 10/6/22 was 3.9.  Dose decreased.   Today's INR is 2.3 and is within goal range of 2.0-3.0.    Current warfarin total weekly dose of 35 mg verified.  Informed the INR result is within therapeutic range and instructed to maintain current dose per protocol. Discussed dose and return date of 22 for next INR. See Anticoagulation flowsheet.    DR. Dona Castro is in the office today  supervising the treatment.    Instructed to contact the clinic with any unusual bleeding or bruising, any changes in medications, diet, health status, lifestyle, or any other changes, questions or concerns. Verbalized understanding of all discussed.        poverty of content "I am okay".

## 2024-05-31 NOTE — PROGRESS NOTE ADULT - PROBLEM SELECTOR PLAN 5
-POC Q6 sliding scale -c/w equivalence for home statin (rosuvastatin 10 mg oral 1x day)    #HTN  -amlodipine 10 mg, hold ACE

## 2024-05-31 NOTE — PROGRESS NOTE ADULT - ASSESSMENT
70 yo f hx dementia a/o times 1 baseline, HTN, DM2 on metformin here for unwitnessed fall. patient has had worsening confusion, lethargy, decreased PO intake and memory changes from baseline in past few weeks.  68 yo f hx dementia a/o times 1 baseline, HTN, DM2 on metformin here for unwitnessed fall. patient has had worsening confusion, lethargy, decreased PO intake and memory changes from baseline in past few weeks.

## 2024-05-31 NOTE — H&P ADULT - HISTORY OF PRESENT ILLNESS
70 yo f hx dementia a/o times 1 baseline, HTN, DM2 on metformin here for unwitnessed fall. patient has had worsening confusion, lethargy, decreased PO intake and memory changes from baseline in past few weeks. Patient was found immediately lying on her back, did not self urinate or bite her tongue. Did not endorse chest pain or clutching. Has not been eating for some time now. ROS otherwise negative. Head CT, cervical, and a/p negative for fractures, ICH, or strokes. Follows [facility] for geripsych.    Vital Signs Last 24 Hrs  T(C): 37.2 (31 May 2024 01:51), Max: 37.2 (31 May 2024 01:30)  T(F): 99 (31 May 2024 01:51), Max: 99 (31 May 2024 01:30)  HR: 120 (31 May 2024 01:51) (98 - 120)  BP: 139/66 (31 May 2024 01:51) (111/69 - 139/66)  BP(mean): 79 (31 May 2024 01:30) (79 - 79)  RR: 19 (31 May 2024 01:51) (16 - 20)  SpO2: 98% (31 May 2024 01:51) (97% - 100%)    Parameters below as of 31 May 2024 01:51  Patient On (Oxygen Delivery Method): room air

## 2024-05-31 NOTE — PROGRESS NOTE ADULT - SUBJECTIVE AND OBJECTIVE BOX
Solis Rice MD  Emergency Medicine & Internal Medicine PGY-2    PROGRESS NOTE:    ID #:     Patient is a 69y old  Female who presents with a chief complaint of dementia (31 May 2024 07:45)    SUBJECTIVE / OVERNIGHT EVENTS: Patient admitted to medicine floors from ED. KERA called overnight (see 5/31 KERA note for details)    Patient seen and assessed at bedside, states her name and responds "no" to inquiries about pain, otherwise minimally responsive to questions. Performs, hand squeeze, toe wiggling on command.      MEDICATIONS  (STANDING):  amLODIPine   Tablet 10 milliGRAM(s) Oral daily  atorvastatin 40 milliGRAM(s) Oral at bedtime  chlorhexidine 2% Cloths 1 Application(s) Topical daily  dextrose 10% Bolus 125 milliLiter(s) IV Bolus once  dextrose 5%. 1000 milliLiter(s) (100 mL/Hr) IV Continuous <Continuous>  dextrose 5%. 1000 milliLiter(s) (50 mL/Hr) IV Continuous <Continuous>  dextrose 50% Injectable 12.5 Gram(s) IV Push once  dextrose 50% Injectable 25 Gram(s) IV Push once  escitalopram 10 milliGRAM(s) Oral every 24 hours  glucagon  Injectable 1 milliGRAM(s) IntraMuscular once  heparin   Injectable 5000 Unit(s) SubCutaneous every 12 hours  insulin lispro (ADMELOG) corrective regimen sliding scale   SubCutaneous every 6 hours  letrozole 2.5 milliGRAM(s) Oral daily  melatonin 3 milliGRAM(s) Oral at bedtime  polyethylene glycol 3350 17 Gram(s) Oral daily  risperiDONE   Tablet 0.5 milliGRAM(s) Oral every 12 hours  rivastigmine 3 milliGRAM(s) Oral <User Schedule>  senna 2 Tablet(s) Oral at bedtime  sodium chloride 0.9%. 1000 milliLiter(s) (80 mL/Hr) IV Continuous <Continuous>  traZODone 25 milliGRAM(s) Oral at bedtime    MEDICATIONS  (PRN):  acetaminophen   Oral Liquid .. 650 milliGRAM(s) Oral every 6 hours PRN Temp greater or equal to 38C (100.4F), Mild Pain (1 - 3)  dextrose Oral Gel 15 Gram(s) Oral once PRN Blood Glucose LESS THAN 70 milliGRAM(s)/deciliter      CAPILLARY BLOOD GLUCOSE      POCT Blood Glucose.: 92 mg/dL (31 May 2024 12:10)  POCT Blood Glucose.: 94 mg/dL (31 May 2024 07:03)  POCT Blood Glucose.: 122 mg/dL (31 May 2024 03:50)  POCT Blood Glucose.: 83 mg/dL (31 May 2024 00:39)  POCT Blood Glucose.: 105 mg/dL (30 May 2024 16:58)    I&O's Summary    31 May 2024 07:01  -  31 May 2024 13:41  --------------------------------------------------------  IN: 400 mL / OUT: 0 mL / NET: 400 mL        PHYSICAL EXAM:  Vital Signs Last 24 Hrs  T(C): 36.7 (31 May 2024 10:00), Max: 37.2 (31 May 2024 01:30)  T(F): 98 (31 May 2024 10:00), Max: 99 (31 May 2024 01:30)  HR: 84 (31 May 2024 10:00) (84 - 120)  BP: 117/67 (31 May 2024 10:00) (111/69 - 139/66)  BP(mean): 79 (31 May 2024 01:30) (79 - 79)  RR: 18 (31 May 2024 10:00) (16 - 20)  SpO2: 100% (31 May 2024 10:00) (97% - 100%)    Parameters below as of 31 May 2024 10:00  Patient On (Oxygen Delivery Method): room air      GENERAL: awake, eyes open, laying in bed in NAD  HEAD: normocephalic, atraumatic  HEENT: normal conjunctiva, oral mucosa moist, no JVD  CARDIAC: regular rate and rhythm, normal S1S2, no appreciable murmurs, 2+ pulses in UE/LE b/l  PULM: normal breath sounds, clear to ascultation bilaterally, no rales, rhonchi, wheezing  GI: abdomen nondistended, soft, nontender, no guarding, rebound tenderness  : no CVA tenderness b/l, no suprapubic tenderness  NEURO: awake, opens eyes spontaneously, eye tracks, follows commands to perform finger squeeze and toe wiggling, PERRLA, EOMI, AAOx1  MSK: no peripheral edema, no calf tenderness b/l  SKIN: well-perfused, extremities warm, no visible rashes            LABS:                        12.2   10.50 )-----------( 140      ( 31 May 2024 09:40 )             35.3     05-31    143  |  103  |  9   ----------------------------<  84  3.1<L>   |  22  |  0.60    Ca    9.5      31 May 2024 09:40  Phos  3.1     05-31  Mg     1.80     05-31    TPro  7.1  /  Alb  4.6  /  TBili  0.4  /  DBili  x   /  AST  17  /  ALT  12  /  AlkPhos  56  05-30    PT/INR - ( 30 May 2024 18:00 )   PT: 12.3 sec;   INR: 1.09 ratio         PTT - ( 30 May 2024 18:00 )  PTT:30.0 sec      Urinalysis Basic - ( 31 May 2024 09:40 )    Color: x / Appearance: x / SG: x / pH: x  Gluc: 84 mg/dL / Ketone: x  / Bili: x / Urobili: x   Blood: x / Protein: x / Nitrite: x   Leuk Esterase: x / RBC: x / WBC x   Sq Epi: x / Non Sq Epi: x / Bacteria: x          RADIOLOGY & ADDITIONAL TESTS:  Results Reviewed:   Imaging Personally Reviewed:  Electrocardiogram Personally Reviewed:    COORDINATION OF CARE:  Care Discussed with Consultants/Other Providers [Y/N]:  Prior or Outpatient Records Reviewed [Y/N]:

## 2024-05-31 NOTE — DIETITIAN INITIAL EVALUATION ADULT - PERTINENT MEDS FT
atorvastatin   ADMELOG corrective regimen sliding scale   polyethylene glycol   senna   sodium chloride 0.9% IV Continuous

## 2024-05-31 NOTE — DIETITIAN INITIAL EVALUATION ADULT - OTHER CALCULATIONS
Dosing weight (5/31) 136.6 lbs / 62 kg.  Recent chart weight (2/14) 151.8 lbs.  Unable to confirm accuracy or intentionality of apparent weight loss.   Ideal Body Weight: 120 lbs / 54.5 kg +/-10%

## 2024-05-31 NOTE — DISCHARGE NOTE PROVIDER - NSFOLLOWUPCLINICS_GEN_ALL_ED_FT
[facility]  Neurology  [address] [place] [address] [address] [place] [address]  Phone: [phone]  Fax:

## 2024-05-31 NOTE — PATIENT PROFILE ADULT - FUNCTIONAL ASSESSMENT - BASIC MOBILITY SECTION LABEL
"Anesthesia Transfer of Care Note    Patient: Gricelda Hassan    Procedure(s) Performed: Procedure(s) (LRB):  BUNIONECTOMY, WITH METATARSAL OSTEOTOMY (Left)  OPEN TREATMENT, DISLOCATION, MTP JOINT (Left)    Patient location: PACU    Anesthesia Type: general    Transport from OR: Transported from OR on room air with adequate spontaneous ventilation    Post pain: adequate analgesia    Post assessment: no apparent anesthetic complications and tolerated procedure well    Post vital signs: stable    Level of consciousness: sedated    Nausea/Vomiting: no nausea/vomiting    Complications: none    Transfer of care protocol was followed      Last vitals: Visit Vitals  /75 (BP Location: Left arm, Patient Position: Lying)   Pulse 60   Temp 36.5 °C (97.7 °F) (Skin)   Resp 16   Ht 5' 3" (1.6 m)   Wt 55.7 kg (122 lb 12.7 oz)   LMP  (LMP Unknown)   SpO2 100%   Breastfeeding No   BMI 21.75 kg/m²     " .
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
## 2024-05-31 NOTE — BH CONSULTATION LIAISON ASSESSMENT NOTE - NSBHCHARTREVIEWLAB_PSY_A_CORE FT
12.2   10.50 )-----------( 140      ( 31 May 2024 09:40 )             35.3     05-31    143  |  103  |  9   ----------------------------<  84  3.1<L>   |  22  |  0.60    Ca    9.5      31 May 2024 09:40  Phos  3.1     05-31  Mg     1.80     05-31    TPro  7.1  /  Alb  4.6  /  TBili  0.4  /  DBili  x   /  AST  17  /  ALT  12  /  AlkPhos  56  05-30    Urinalysis Basic - ( 31 May 2024 09:40 )    Color: x / Appearance: x / SG: x / pH: x  Gluc: 84 mg/dL / Ketone: x  / Bili: x / Urobili: x   Blood: x / Protein: x / Nitrite: x   Leuk Esterase: x / RBC: x / WBC x   Sq Epi: x / Non Sq Epi: x / Bacteria: x

## 2024-05-31 NOTE — H&P ADULT - NSHPPHYSICALEXAM_GEN_ALL_CORE
PHYSICAL EXAM:  GENERAL: NAD, elderly appearing female  HEAD:  Atraumatic, Normocephalic  EYES: EOMI, PERRLA, conjunctiva and sclera clear  NECK: Supple, No JVD  CHEST/LUNG: Clear to auscultation bilaterally; No wheeze  HEART: Regular rate and rhythm; No murmurs, rubs, or gallops  ABDOMEN: Soft, Nontender, Nondistended; Bowel sounds present  EXTREMITIES:  2+ Peripheral Pulses, No clubbing, cyanosis, or edema  PSYCH: AAOx1 to self  NEUROLOGY: non-focal  SKIN: No rashes or lesions

## 2024-05-31 NOTE — DISCHARGE NOTE PROVIDER - HOSPITAL COURSE
HPI:  68 yo f hx dementia a/o times 1 baseline, HTN, DM2 on metformin here for unwitnessed fall. patient has had worsening confusion, lethargy, decreased PO intake and memory changes from baseline in past few weeks. Patient was found immediately lying on her back, did not self urinate or bite her tongue. Did not endorse chest pain or clutching. Has not been eating for some time now. ROS otherwise negative. Head CT, cervical, and a/p negative for fractures, ICH, or strokes. Follows [facility] for geripsych.    Hospital Course:  Found to be mildly hypoK iso poor PO intake. Seen by S&S: rec'd soft, bite sized diet. Potassium repleted. on 5/31: [facility] called: patient attempted to leave room, not following commands, given ativan 1 and benadryl 25 with resolution. Seen by BH: c/w home psych meds, no further IP BH needs. PT rec'd [facility] HPI:  68 yo f hx dementia a/o times 1 baseline, HTN, DM2 on metformin here for unwitnessed fall. patient has had worsening confusion, lethargy, decreased PO intake and memory changes from baseline in past few weeks. Patient was found immediately lying on her back, did not self urinate or bite her tongue. Did not endorse chest pain or clutching. Has not been eating for some time now. ROS otherwise negative. Head CT, cervical, and a/p negative for fractures, ICH, or strokes. Follows [facility] for geripsych.    Hospital Course:  Head imaging unremarkable. Found to be mildly hypoK iso poor PO intake. Seen by S&S: rec'd soft, bite sized diet. Potassium repleted. on 5/31: [facility] called: patient attempted to leave room, not following commands, given ativan 1 and benadryl 25 with resolution. Seen by BH: c/w home psych meds, no further IP BH needs. Started on bowel regimen due to lack of BMs. Given enema. PT rec'd [facility] HPI:  70 yo f hx dementia a/o times 1 baseline, HTN, DM2 on metformin here for unwitnessed fall. patient has had worsening confusion, lethargy, decreased PO intake and memory changes from baseline in past few weeks. Patient was found immediately lying on her back, did not self urinate or bite her tongue. Did not endorse chest pain or clutching. Has not been eating for some time now. ROS otherwise negative. Head CT, cervical, and a/p negative for fractures, ICH, or strokes. Follows [facility] for geripsych.    Hospital Course:  Head imaging unremarkable. Found to be mildly hypoK iso poor PO intake. Seen by S&S: rec'd soft, bite sized diet. Potassium repleted. on 5/31: [facility] called: patient attempted to leave room, not following commands, given ativan 1 and benadryl 25 with resolution. Seen by BH: c/w home psych meds, no further IP BH needs. Started on bowel regimen due to lack of BMs. Patient declined enema. PT rec'd [facility] HPI:   70 yo f hx dementia a/o times 1 baseline, HTN, DM2 on metformin here for unwitnessed fall. patient has had worsening confusion, lethargy, decreased PO intake and memory changes from baseline in past few weeks. Patient was found immediately lying on her back, did not self urinate or bite her tongue. Did not endorse chest pain or clutching. Has not been eating for some time now. ROS otherwise negative. Head CT, cervical, and a/p negative for fractures, ICH, or strokes. Follows [facility] for geripsych.    Vital Signs Last 24 Hrs  T(C): 37.2 (31 May 2024 01:51), Max: 37.2 (31 May 2024 01:30)  T(F): 99 (31 May 2024 01:51), Max: 99 (31 May 2024 01:30)  HR: 120 (31 May 2024 01:51) (98 - 120)  BP: 139/66 (31 May 2024 01:51) (111/69 - 139/66)  BP(mean): 79 (31 May 2024 01:30) (79 - 79)  RR: 19 (31 May 2024 01:51) (16 - 20)  SpO2: 98% (31 May 2024 01:51) (97% - 100%)    Parameters below as of 31 May 2024 01:51  Patient On (Oxygen Delivery Method): room air     (31 May 2024 02:47)    Hospital Course:    Regarding her fall, head imaging unremarkable and there was no concern for neurologic cause such as stroke or seizure.    Patient was found to be mildly hypokalemic iso poor PO intake. Seen by S&S: rec'd soft, bite sized diet. Potassium repleted. Patient was initially hypokalemic persistently, but this improved after serial repletions and after patient was more consistently eating later in her course. She was also initially hypoglycemic requiring D5 drip, but she started eating on her own and glucoses were well controlled. She did have [facility] called on 5/31: patient attempted to leave room, not following commands, given ativan 1 and benadryl 25 with resolution. Seen by BH: c/w home psych meds, no further IP BH needs. Started on bowel regimen due to lack of BMs. After enema, patient started having regular BMs.    Patient is medically stable for dc.  GOC conversation was held and patient's family decided that patient would be full code with trial of CPR/intubation, but would not want to be on long term life support.    Patient had CT with cystic lesion on pancreas, needing MR follow-up    Important Medication Changes and Reason:  None    Active or Pending Issues Requiring Follow-up:  Follow-up with PCP in 1 week  MR outpatient    Advanced Directives:   [x] Full code  [ ] DNR  [ ] Hospice    Discharge Diagnoses:  Failure to thrive  Progressive dementia  Hypokalemia  Hypoglycemia

## 2024-05-31 NOTE — BH CONSULTATION LIAISON ASSESSMENT NOTE - CURRENT MEDICATION
MEDICATIONS  (STANDING):  amLODIPine   Tablet 10 milliGRAM(s) Oral daily  atorvastatin 40 milliGRAM(s) Oral at bedtime  chlorhexidine 2% Cloths 1 Application(s) Topical daily  dextrose 10% Bolus 125 milliLiter(s) IV Bolus once  dextrose 5%. 1000 milliLiter(s) (100 mL/Hr) IV Continuous <Continuous>  dextrose 5%. 1000 milliLiter(s) (50 mL/Hr) IV Continuous <Continuous>  dextrose 50% Injectable 12.5 Gram(s) IV Push once  dextrose 50% Injectable 25 Gram(s) IV Push once  escitalopram 10 milliGRAM(s) Oral every 24 hours  glucagon  Injectable 1 milliGRAM(s) IntraMuscular once  heparin   Injectable 5000 Unit(s) SubCutaneous every 12 hours  insulin lispro (ADMELOG) corrective regimen sliding scale   SubCutaneous every 6 hours  letrozole 2.5 milliGRAM(s) Oral daily  melatonin 3 milliGRAM(s) Oral at bedtime  polyethylene glycol 3350 17 Gram(s) Oral daily  risperiDONE   Tablet 0.5 milliGRAM(s) Oral every 12 hours  rivastigmine 3 milliGRAM(s) Oral <User Schedule>  senna 2 Tablet(s) Oral at bedtime  sodium chloride 0.9%. 1000 milliLiter(s) (80 mL/Hr) IV Continuous <Continuous>  traZODone 25 milliGRAM(s) Oral at bedtime    MEDICATIONS  (PRN):  acetaminophen   Oral Liquid .. 650 milliGRAM(s) Oral every 6 hours PRN Temp greater or equal to 38C (100.4F), Mild Pain (1 - 3)  dextrose Oral Gel 15 Gram(s) Oral once PRN Blood Glucose LESS THAN 70 milliGRAM(s)/deciliter

## 2024-05-31 NOTE — DISCHARGE NOTE PROVIDER - NSDCCPCAREPLAN_GEN_ALL_CORE_FT
PRINCIPAL DISCHARGE DIAGNOSIS  Diagnosis: Adult failure to thrive  Assessment and Plan of Treatment: You were treated in the hospital for poor nutrition in the setting of poor oral intake. This may have been due to decline in functional status. You were provided nutrition and optimization of your electrolytes. You should follow up with your primary care provider for further evaluation and management within 1 week after discharge.      SECONDARY DISCHARGE DIAGNOSES  Diagnosis: Dementia with behavioral problem  Assessment and Plan of Treatment: You were treated for increased confusion, likely due to progression of cognitive difficulties in the setting of dementia. You were continued on your medications and optimized. You should follow up with your primary care provider and neurology clinic for further evaluation and management.     PRINCIPAL DISCHARGE DIAGNOSIS  Diagnosis: Adult failure to thrive  Assessment and Plan of Treatment: You were treated in the hospital for poor nutrition in the setting of poor oral intake. This may have been due to decline in functional status. You were provided nutrition and optimization of your electrolytes. You should follow up with your primary care provider for further evaluation and management within 1 week after discharge. If you feel very weak, lightheaded or dizzy, please return to the hospital.      SECONDARY DISCHARGE DIAGNOSES  Diagnosis: Dementia with behavioral problem  Assessment and Plan of Treatment: You were treated for increased confusion, likely due to progression of cognitive difficulties in the setting of dementia. You were continued on your medications and optimized. You should follow up with your primary care provider and neurology clinic for further evaluation and management.    Diagnosis: Abnormal CT scan  Assessment and Plan of Treatment: You were found to have a cystic lesion in your pancreas, for which you should get an MRI outpatient.

## 2024-06-01 LAB
ALBUMIN SERPL ELPH-MCNC: 4.3 G/DL — SIGNIFICANT CHANGE UP (ref 3.3–5)
ALP SERPL-CCNC: 58 U/L — SIGNIFICANT CHANGE UP (ref 40–120)
ALT FLD-CCNC: 10 U/L — SIGNIFICANT CHANGE UP (ref 4–33)
ANION GAP SERPL CALC-SCNC: 15 MMOL/L — HIGH (ref 7–14)
AST SERPL-CCNC: 18 U/L — SIGNIFICANT CHANGE UP (ref 4–32)
BASOPHILS # BLD AUTO: 0.05 K/UL — SIGNIFICANT CHANGE UP (ref 0–0.2)
BASOPHILS NFR BLD AUTO: 0.6 % — SIGNIFICANT CHANGE UP (ref 0–2)
BILIRUB SERPL-MCNC: 0.5 MG/DL — SIGNIFICANT CHANGE UP (ref 0.2–1.2)
BUN SERPL-MCNC: 7 MG/DL — SIGNIFICANT CHANGE UP (ref 7–23)
CALCIUM SERPL-MCNC: 9.3 MG/DL — SIGNIFICANT CHANGE UP (ref 8.4–10.5)
CHLORIDE SERPL-SCNC: 107 MMOL/L — SIGNIFICANT CHANGE UP (ref 98–107)
CO2 SERPL-SCNC: 24 MMOL/L — SIGNIFICANT CHANGE UP (ref 22–31)
CREAT SERPL-MCNC: 0.67 MG/DL — SIGNIFICANT CHANGE UP (ref 0.5–1.3)
EGFR: 95 ML/MIN/1.73M2 — SIGNIFICANT CHANGE UP
EOSINOPHIL # BLD AUTO: 0.2 K/UL — SIGNIFICANT CHANGE UP (ref 0–0.5)
EOSINOPHIL NFR BLD AUTO: 2.4 % — SIGNIFICANT CHANGE UP (ref 0–6)
GLUCOSE SERPL-MCNC: 80 MG/DL — SIGNIFICANT CHANGE UP (ref 70–99)
HCT VFR BLD CALC: 34.4 % — LOW (ref 34.5–45)
HGB BLD-MCNC: 12 G/DL — SIGNIFICANT CHANGE UP (ref 11.5–15.5)
IANC: 5.12 K/UL — SIGNIFICANT CHANGE UP (ref 1.8–7.4)
IMM GRANULOCYTES NFR BLD AUTO: 0.2 % — SIGNIFICANT CHANGE UP (ref 0–0.9)
LYMPHOCYTES # BLD AUTO: 2.19 K/UL — SIGNIFICANT CHANGE UP (ref 1–3.3)
LYMPHOCYTES # BLD AUTO: 26 % — SIGNIFICANT CHANGE UP (ref 13–44)
MAGNESIUM SERPL-MCNC: 1.8 MG/DL — SIGNIFICANT CHANGE UP (ref 1.6–2.6)
MCHC RBC-ENTMCNC: 31.4 PG — SIGNIFICANT CHANGE UP (ref 27–34)
MCHC RBC-ENTMCNC: 34.9 GM/DL — SIGNIFICANT CHANGE UP (ref 32–36)
MCV RBC AUTO: 90.1 FL — SIGNIFICANT CHANGE UP (ref 80–100)
MONOCYTES # BLD AUTO: 0.84 K/UL — SIGNIFICANT CHANGE UP (ref 0–0.9)
MONOCYTES NFR BLD AUTO: 10 % — SIGNIFICANT CHANGE UP (ref 2–14)
NEUTROPHILS # BLD AUTO: 5.12 K/UL — SIGNIFICANT CHANGE UP (ref 1.8–7.4)
NEUTROPHILS NFR BLD AUTO: 60.8 % — SIGNIFICANT CHANGE UP (ref 43–77)
NRBC # BLD: 0 /100 WBCS — SIGNIFICANT CHANGE UP (ref 0–0)
NRBC # FLD: 0 K/UL — SIGNIFICANT CHANGE UP (ref 0–0)
PHOSPHATE SERPL-MCNC: 3.2 MG/DL — SIGNIFICANT CHANGE UP (ref 2.5–4.5)
PLATELET # BLD AUTO: 177 K/UL — SIGNIFICANT CHANGE UP (ref 150–400)
POTASSIUM SERPL-MCNC: 3.2 MMOL/L — LOW (ref 3.5–5.3)
POTASSIUM SERPL-SCNC: 3.2 MMOL/L — LOW (ref 3.5–5.3)
PROT SERPL-MCNC: 6.7 G/DL — SIGNIFICANT CHANGE UP (ref 6–8.3)
RBC # BLD: 3.82 M/UL — SIGNIFICANT CHANGE UP (ref 3.8–5.2)
RBC # FLD: 12 % — SIGNIFICANT CHANGE UP (ref 10.3–14.5)
SODIUM SERPL-SCNC: 146 MMOL/L — HIGH (ref 135–145)
WBC # BLD: 8.42 K/UL — SIGNIFICANT CHANGE UP (ref 3.8–10.5)
WBC # FLD AUTO: 8.42 K/UL — SIGNIFICANT CHANGE UP (ref 3.8–10.5)

## 2024-06-01 PROCEDURE — 74018 RADEX ABDOMEN 1 VIEW: CPT | Mod: 26

## 2024-06-01 PROCEDURE — 99232 SBSQ HOSP IP/OBS MODERATE 35: CPT | Mod: GC

## 2024-06-01 RX ORDER — POTASSIUM CHLORIDE 20 MEQ
40 PACKET (EA) ORAL ONCE
Refills: 0 | Status: COMPLETED | OUTPATIENT
Start: 2024-06-01 | End: 2024-06-01

## 2024-06-01 RX ORDER — SODIUM CHLORIDE 9 MG/ML
1000 INJECTION, SOLUTION INTRAVENOUS
Refills: 0 | Status: DISCONTINUED | OUTPATIENT
Start: 2024-06-01 | End: 2024-06-03

## 2024-06-01 RX ADMIN — RISPERIDONE 0.5 MILLIGRAM(S): 4 TABLET ORAL at 08:26

## 2024-06-01 RX ADMIN — Medication 3 MILLIGRAM(S): at 21:59

## 2024-06-01 RX ADMIN — HEPARIN SODIUM 5000 UNIT(S): 5000 INJECTION INTRAVENOUS; SUBCUTANEOUS at 06:38

## 2024-06-01 RX ADMIN — RIVASTIGMINE 3 MILLIGRAM(S): 4.6 PATCH, EXTENDED RELEASE TRANSDERMAL at 22:45

## 2024-06-01 RX ADMIN — ATORVASTATIN CALCIUM 40 MILLIGRAM(S): 80 TABLET, FILM COATED ORAL at 22:39

## 2024-06-01 RX ADMIN — HEPARIN SODIUM 5000 UNIT(S): 5000 INJECTION INTRAVENOUS; SUBCUTANEOUS at 17:10

## 2024-06-01 RX ADMIN — AMLODIPINE BESYLATE 10 MILLIGRAM(S): 2.5 TABLET ORAL at 06:40

## 2024-06-01 RX ADMIN — Medication 25 MILLIGRAM(S): at 21:55

## 2024-06-01 RX ADMIN — RISPERIDONE 0.5 MILLIGRAM(S): 4 TABLET ORAL at 19:23

## 2024-06-01 RX ADMIN — SENNA PLUS 2 TABLET(S): 8.6 TABLET ORAL at 22:00

## 2024-06-01 RX ADMIN — SODIUM CHLORIDE 75 MILLILITER(S): 9 INJECTION, SOLUTION INTRAVENOUS at 10:55

## 2024-06-01 RX ADMIN — POLYETHYLENE GLYCOL 3350 17 GRAM(S): 17 POWDER, FOR SOLUTION ORAL at 12:53

## 2024-06-01 RX ADMIN — Medication 40 MILLIEQUIVALENT(S): at 17:12

## 2024-06-01 NOTE — PROGRESS NOTE ADULT - PROBLEM SELECTOR PLAN 6
A1c 5,4     -Monitor Q6 If NPO. Qac and qhs once having PO  -If FS remain controlled can likely limit FS given A1c level

## 2024-06-01 NOTE — PROGRESS NOTE ADULT - ASSESSMENT
68 yo f hx dementia a/o times 1 baseline, HTN, DM2 on metformin here for unwitnessed fall. patient has had worsening confusion, lethargy, decreased PO intake and memory changes from baseline in past few weeks.  68 yo f hx dementia a/o times 1 baseline, HTN, DM2 on metformin here for unwitnessed fall. patient has had worsening confusion, lethargy, decreased PO intake and memory changes from baseline in past few weeks. C/f FTT, progressive dementia

## 2024-06-01 NOTE — PROGRESS NOTE ADULT - PROBLEM SELECTOR PLAN 1
-Low concern for seizure, no tongue biting or self urination.  -CT abdomen and pelvis: no bowel obstruction. 5 mm hypoattenuating cystic focus in pancreas  -CT head: no acute ICH, mass effect, midline shift  -CT cervical spine: motion degraded, nondiagnostic exam  -Urinalysis: negative   -thyroid function normal    -c/w home regimen  -seen by BH: c/w home meds, no further IP  needs at this time  -for agitation: Zyprexa 1.25mg q6h prn with additional Zyprexa 1.25mg for refractory agitation if needed  -Aspiration precautions/fall precautions

## 2024-06-01 NOTE — PROGRESS NOTE ADULT - PROBLEM SELECTOR PLAN 5
-c/w equivalence for home statin (rosuvastatin 10 mg oral 1x day)    #HTN  -amlodipine 10 mg, hold ACE

## 2024-06-01 NOTE — PROGRESS NOTE ADULT - PROBLEM SELECTOR PLAN 3
-per S&S: soft and bite sized diet Intermittently eating per nursing. C/f progressive dementia, FTT  - per S&S: soft and bite sized diet  - started on D5 given BG 89  - trend lytes and BG, monitor for refeeding with P/Mg Intermittently eating per nursing. C/f progressive dementia, FTT  - per S&S: soft and bite sized diet  - nutrition c/s: appreciate recs  - started on D5 given BG 89  - trend lytes and BG, monitor for refeeding with P/Mg

## 2024-06-01 NOTE — PROGRESS NOTE ADULT - PROBLEM SELECTOR PLAN 2
family reports about 1 week of constipation prior to presentation  - C/w bowel regimen, can consider enema, KUB if not having BMs family reports about 1 week of constipation prior to presentation. May also be due to poor po intake  - C/w bowel regimen  - AXR ordered to eval for stool burden

## 2024-06-01 NOTE — PROGRESS NOTE ADULT - SUBJECTIVE AND OBJECTIVE BOX
Solis Rice MD  Emergency Medicine & Internal Medicine PGY-2    PROGRESS NOTE:    ID #:     Patient is a 69y old  Female who presents with a chief complaint of dementia (31 May 2024 13:56)      MAJOR INTERVAL HOSPITAL EVENTS:     SUBJECTIVE / OVERNIGHT EVENTS: No acute overnight events.       MEDICATIONS  (STANDING):  amLODIPine   Tablet 10 milliGRAM(s) Oral daily  atorvastatin 40 milliGRAM(s) Oral at bedtime  chlorhexidine 2% Cloths 1 Application(s) Topical daily  dextrose 10% Bolus 125 milliLiter(s) IV Bolus once  dextrose 5%. 1000 milliLiter(s) (100 mL/Hr) IV Continuous <Continuous>  dextrose 5%. 1000 milliLiter(s) (50 mL/Hr) IV Continuous <Continuous>  dextrose 50% Injectable 25 Gram(s) IV Push once  dextrose 50% Injectable 12.5 Gram(s) IV Push once  escitalopram 10 milliGRAM(s) Oral every 24 hours  glucagon  Injectable 1 milliGRAM(s) IntraMuscular once  heparin   Injectable 5000 Unit(s) SubCutaneous every 12 hours  insulin lispro (ADMELOG) corrective regimen sliding scale   SubCutaneous three times a day before meals  insulin lispro (ADMELOG) corrective regimen sliding scale   SubCutaneous at bedtime  letrozole 2.5 milliGRAM(s) Oral daily  melatonin 3 milliGRAM(s) Oral at bedtime  polyethylene glycol 3350 17 Gram(s) Oral daily  risperiDONE   Tablet 0.5 milliGRAM(s) Oral every 12 hours  rivastigmine 3 milliGRAM(s) Oral <User Schedule>  senna 2 Tablet(s) Oral at bedtime  sodium chloride 0.9%. 1000 milliLiter(s) (80 mL/Hr) IV Continuous <Continuous>  traZODone 25 milliGRAM(s) Oral at bedtime    MEDICATIONS  (PRN):  acetaminophen   Oral Liquid .. 650 milliGRAM(s) Oral every 6 hours PRN Temp greater or equal to 38C (100.4F), Mild Pain (1 - 3)  dextrose Oral Gel 15 Gram(s) Oral once PRN Blood Glucose LESS THAN 70 milliGRAM(s)/deciliter  OLANZapine Injectable 1.25 milliGRAM(s) IntraMuscular every 6 hours PRN agitation      CAPILLARY BLOOD GLUCOSE      POCT Blood Glucose.: 100 mg/dL (31 May 2024 22:18)  POCT Blood Glucose.: 110 mg/dL (31 May 2024 18:01)  POCT Blood Glucose.: 92 mg/dL (31 May 2024 12:10)    I&O's Summary    31 May 2024 07:01  -  01 Jun 2024 07:00  --------------------------------------------------------  IN: 1460 mL / OUT: 1100 mL / NET: 360 mL        PHYSICAL EXAM:  Vital Signs Last 24 Hrs  T(C): 36.9 (01 Jun 2024 02:00), Max: 36.9 (31 May 2024 14:05)  T(F): 98.4 (01 Jun 2024 02:00), Max: 98.5 (31 May 2024 14:05)  HR: 81 (01 Jun 2024 02:00) (81 - 92)  BP: 113/76 (01 Jun 2024 02:00) (113/60 - 121/65)  BP(mean): --  RR: 17 (01 Jun 2024 02:00) (17 - 18)  SpO2: 98% (01 Jun 2024 02:00) (98% - 100%)    Parameters below as of 01 Jun 2024 02:00  Patient On (Oxygen Delivery Method): room air        GENERAL: No acute distress, well-developed  HEAD:  Atraumatic, Normocephalic  EYES: EOMI, PERRLA, conjunctiva and sclera clear  NECK: Supple, no lymphadenopathy, no JVD  CHEST/LUNG: CTAB; No wheezes, rales, or rhonchi  HEART: Regular rate and rhythm; Normal s1 and s2, No murmurs, rubs, or gallops  ABDOMEN: Soft, non-tender, non-distended; normal bowel sounds, no organomegaly  EXTREMITIES:  2+ peripheral pulses b/l, No clubbing, cyanosis, or edema  NEUROLOGY: A&O x 3, no focal deficits  SKIN: No rashes or lesions          LABS:                        12.2   10.50 )-----------( 140      ( 31 May 2024 09:40 )             35.3     05-31    143  |  103  |  9   ----------------------------<  84  3.1<L>   |  22  |  0.60    Ca    9.5      31 May 2024 09:40  Phos  3.1     05-31  Mg     1.80     05-31    TPro  7.1  /  Alb  4.6  /  TBili  0.4  /  DBili  x   /  AST  17  /  ALT  12  /  AlkPhos  56  05-30    PT/INR - ( 30 May 2024 18:00 )   PT: 12.3 sec;   INR: 1.09 ratio         PTT - ( 30 May 2024 18:00 )  PTT:30.0 sec      Urinalysis Basic - ( 31 May 2024 09:40 )    Color: x / Appearance: x / SG: x / pH: x  Gluc: 84 mg/dL / Ketone: x  / Bili: x / Urobili: x   Blood: x / Protein: x / Nitrite: x   Leuk Esterase: x / RBC: x / WBC x   Sq Epi: x / Non Sq Epi: x / Bacteria: x          RADIOLOGY & ADDITIONAL TESTS:  Results Reviewed:   Imaging Personally Reviewed:  Electrocardiogram Personally Reviewed:    COORDINATION OF CARE:  Care Discussed with Consultants/Other Providers [Y/N]:  Prior or Outpatient Records Reviewed [Y/N]:   Solis Rice MD  Emergency Medicine & Internal Medicine PGY-2    PROGRESS NOTE:    ID #:     Patient is a 69y old  Female who presents with a chief complaint of dementia (31 May 2024 13:56)      SUBJECTIVE / OVERNIGHT EVENTS: No acute overnight events. Patient is awake and opens eyes spontaneously, states her name and denies acute pain. She minimally responds to other questions but follows commands to finger squeeze, smile    RN reports patient has not had a BM during admission. Will eat intermittently      MEDICATIONS  (STANDING):  amLODIPine   Tablet 10 milliGRAM(s) Oral daily  atorvastatin 40 milliGRAM(s) Oral at bedtime  chlorhexidine 2% Cloths 1 Application(s) Topical daily  dextrose 10% Bolus 125 milliLiter(s) IV Bolus once  dextrose 5%. 1000 milliLiter(s) (100 mL/Hr) IV Continuous <Continuous>  dextrose 5%. 1000 milliLiter(s) (50 mL/Hr) IV Continuous <Continuous>  dextrose 50% Injectable 25 Gram(s) IV Push once  dextrose 50% Injectable 12.5 Gram(s) IV Push once  escitalopram 10 milliGRAM(s) Oral every 24 hours  glucagon  Injectable 1 milliGRAM(s) IntraMuscular once  heparin   Injectable 5000 Unit(s) SubCutaneous every 12 hours  insulin lispro (ADMELOG) corrective regimen sliding scale   SubCutaneous three times a day before meals  insulin lispro (ADMELOG) corrective regimen sliding scale   SubCutaneous at bedtime  letrozole 2.5 milliGRAM(s) Oral daily  melatonin 3 milliGRAM(s) Oral at bedtime  polyethylene glycol 3350 17 Gram(s) Oral daily  risperiDONE   Tablet 0.5 milliGRAM(s) Oral every 12 hours  rivastigmine 3 milliGRAM(s) Oral <User Schedule>  senna 2 Tablet(s) Oral at bedtime  sodium chloride 0.9%. 1000 milliLiter(s) (80 mL/Hr) IV Continuous <Continuous>  traZODone 25 milliGRAM(s) Oral at bedtime    MEDICATIONS  (PRN):  acetaminophen   Oral Liquid .. 650 milliGRAM(s) Oral every 6 hours PRN Temp greater or equal to 38C (100.4F), Mild Pain (1 - 3)  dextrose Oral Gel 15 Gram(s) Oral once PRN Blood Glucose LESS THAN 70 milliGRAM(s)/deciliter  OLANZapine Injectable 1.25 milliGRAM(s) IntraMuscular every 6 hours PRN agitation      CAPILLARY BLOOD GLUCOSE      POCT Blood Glucose.: 100 mg/dL (31 May 2024 22:18)  POCT Blood Glucose.: 110 mg/dL (31 May 2024 18:01)  POCT Blood Glucose.: 92 mg/dL (31 May 2024 12:10)    I&O's Summary    31 May 2024 07:01  -  01 Jun 2024 07:00  --------------------------------------------------------  IN: 1460 mL / OUT: 1100 mL / NET: 360 mL        PHYSICAL EXAM:  Vital Signs Last 24 Hrs  T(C): 36.9 (01 Jun 2024 02:00), Max: 36.9 (31 May 2024 14:05)  T(F): 98.4 (01 Jun 2024 02:00), Max: 98.5 (31 May 2024 14:05)  HR: 81 (01 Jun 2024 02:00) (81 - 92)  BP: 113/76 (01 Jun 2024 02:00) (113/60 - 121/65)  BP(mean): --  RR: 17 (01 Jun 2024 02:00) (17 - 18)  SpO2: 98% (01 Jun 2024 02:00) (98% - 100%)    Parameters below as of 01 Jun 2024 02:00  Patient On (Oxygen Delivery Method): room air          GENERAL: awake, eyes open, laying in bed in NAD  HEAD: normocephalic, atraumatic  HEENT: normal conjunctiva, oral mucosa moist, no JVD  CARDIAC: regular rate and rhythm, normal S1S2, no appreciable murmurs, 2+ pulses in UE/LE b/l  PULM: normal breath sounds, clear to ascultation bilaterally, no rales, rhonchi, wheezing  GI: abdomen nondistended, soft, nontender, no guarding, rebound tenderness  : no CVA tenderness b/l, no suprapubic tenderness  NEURO: awake, opens eyes spontaneously, eye tracks, follows commands to perform finger squeeze and toe wiggling, PERRLA, EOMI, AAOx2  MSK: no peripheral edema, no calf tenderness b/l  SKIN: well-perfused, extremities warm, no visible rashes      LABS:                        12.2   10.50 )-----------( 140      ( 31 May 2024 09:40 )             35.3     05-31    143  |  103  |  9   ----------------------------<  84  3.1<L>   |  22  |  0.60    Ca    9.5      31 May 2024 09:40  Phos  3.1     05-31  Mg     1.80     05-31    TPro  7.1  /  Alb  4.6  /  TBili  0.4  /  DBili  x   /  AST  17  /  ALT  12  /  AlkPhos  56  05-30    PT/INR - ( 30 May 2024 18:00 )   PT: 12.3 sec;   INR: 1.09 ratio         PTT - ( 30 May 2024 18:00 )  PTT:30.0 sec      Urinalysis Basic - ( 31 May 2024 09:40 )    Color: x / Appearance: x / SG: x / pH: x  Gluc: 84 mg/dL / Ketone: x  / Bili: x / Urobili: x   Blood: x / Protein: x / Nitrite: x   Leuk Esterase: x / RBC: x / WBC x   Sq Epi: x / Non Sq Epi: x / Bacteria: x          RADIOLOGY & ADDITIONAL TESTS:  Results Reviewed:   Imaging Personally Reviewed:  Electrocardiogram Personally Reviewed:    COORDINATION OF CARE:  Care Discussed with Consultants/Other Providers [Y/N]:  Prior or Outpatient Records Reviewed [Y/N]:

## 2024-06-02 LAB
ALBUMIN SERPL ELPH-MCNC: 4.3 G/DL — SIGNIFICANT CHANGE UP (ref 3.3–5)
ALP SERPL-CCNC: 59 U/L — SIGNIFICANT CHANGE UP (ref 40–120)
ALT FLD-CCNC: 11 U/L — SIGNIFICANT CHANGE UP (ref 4–33)
ANION GAP SERPL CALC-SCNC: 16 MMOL/L — HIGH (ref 7–14)
AST SERPL-CCNC: 17 U/L — SIGNIFICANT CHANGE UP (ref 4–32)
BASOPHILS # BLD AUTO: 0.05 K/UL — SIGNIFICANT CHANGE UP (ref 0–0.2)
BASOPHILS NFR BLD AUTO: 0.5 % — SIGNIFICANT CHANGE UP (ref 0–2)
BILIRUB SERPL-MCNC: 0.6 MG/DL — SIGNIFICANT CHANGE UP (ref 0.2–1.2)
BUN SERPL-MCNC: 8 MG/DL — SIGNIFICANT CHANGE UP (ref 7–23)
CALCIUM SERPL-MCNC: 9.7 MG/DL — SIGNIFICANT CHANGE UP (ref 8.4–10.5)
CHLORIDE SERPL-SCNC: 104 MMOL/L — SIGNIFICANT CHANGE UP (ref 98–107)
CO2 SERPL-SCNC: 24 MMOL/L — SIGNIFICANT CHANGE UP (ref 22–31)
CREAT SERPL-MCNC: 0.9 MG/DL — SIGNIFICANT CHANGE UP (ref 0.5–1.3)
EGFR: 69 ML/MIN/1.73M2 — SIGNIFICANT CHANGE UP
EOSINOPHIL # BLD AUTO: 0.24 K/UL — SIGNIFICANT CHANGE UP (ref 0–0.5)
EOSINOPHIL NFR BLD AUTO: 2.5 % — SIGNIFICANT CHANGE UP (ref 0–6)
GLUCOSE SERPL-MCNC: 96 MG/DL — SIGNIFICANT CHANGE UP (ref 70–99)
HCT VFR BLD CALC: 36.8 % — SIGNIFICANT CHANGE UP (ref 34.5–45)
HGB BLD-MCNC: 13.2 G/DL — SIGNIFICANT CHANGE UP (ref 11.5–15.5)
IANC: 6.95 K/UL — SIGNIFICANT CHANGE UP (ref 1.8–7.4)
IMM GRANULOCYTES NFR BLD AUTO: 0.3 % — SIGNIFICANT CHANGE UP (ref 0–0.9)
LYMPHOCYTES # BLD AUTO: 1.32 K/UL — SIGNIFICANT CHANGE UP (ref 1–3.3)
LYMPHOCYTES # BLD AUTO: 14 % — SIGNIFICANT CHANGE UP (ref 13–44)
MAGNESIUM SERPL-MCNC: 1.7 MG/DL — SIGNIFICANT CHANGE UP (ref 1.6–2.6)
MCHC RBC-ENTMCNC: 31.3 PG — SIGNIFICANT CHANGE UP (ref 27–34)
MCHC RBC-ENTMCNC: 35.9 GM/DL — SIGNIFICANT CHANGE UP (ref 32–36)
MCV RBC AUTO: 87.2 FL — SIGNIFICANT CHANGE UP (ref 80–100)
MONOCYTES # BLD AUTO: 0.85 K/UL — SIGNIFICANT CHANGE UP (ref 0–0.9)
MONOCYTES NFR BLD AUTO: 9 % — SIGNIFICANT CHANGE UP (ref 2–14)
NEUTROPHILS # BLD AUTO: 6.95 K/UL — SIGNIFICANT CHANGE UP (ref 1.8–7.4)
NEUTROPHILS NFR BLD AUTO: 73.7 % — SIGNIFICANT CHANGE UP (ref 43–77)
NRBC # BLD: 0 /100 WBCS — SIGNIFICANT CHANGE UP (ref 0–0)
NRBC # FLD: 0 K/UL — SIGNIFICANT CHANGE UP (ref 0–0)
PHOSPHATE SERPL-MCNC: 3.8 MG/DL — SIGNIFICANT CHANGE UP (ref 2.5–4.5)
PLATELET # BLD AUTO: 184 K/UL — SIGNIFICANT CHANGE UP (ref 150–400)
POTASSIUM SERPL-MCNC: 3 MMOL/L — LOW (ref 3.5–5.3)
POTASSIUM SERPL-SCNC: 3 MMOL/L — LOW (ref 3.5–5.3)
PROT SERPL-MCNC: 7.1 G/DL — SIGNIFICANT CHANGE UP (ref 6–8.3)
RBC # BLD: 4.22 M/UL — SIGNIFICANT CHANGE UP (ref 3.8–5.2)
RBC # FLD: 12.4 % — SIGNIFICANT CHANGE UP (ref 10.3–14.5)
SODIUM SERPL-SCNC: 144 MMOL/L — SIGNIFICANT CHANGE UP (ref 135–145)
WBC # BLD: 9.44 K/UL — SIGNIFICANT CHANGE UP (ref 3.8–10.5)
WBC # FLD AUTO: 9.44 K/UL — SIGNIFICANT CHANGE UP (ref 3.8–10.5)

## 2024-06-02 RX ORDER — POTASSIUM CHLORIDE 20 MEQ
40 PACKET (EA) ORAL ONCE
Refills: 0 | Status: COMPLETED | OUTPATIENT
Start: 2024-06-02 | End: 2024-06-02

## 2024-06-02 RX ORDER — POLYETHYLENE GLYCOL 3350 17 G/17G
17 POWDER, FOR SOLUTION ORAL
Refills: 0 | Status: DISCONTINUED | OUTPATIENT
Start: 2024-06-02 | End: 2024-06-06

## 2024-06-02 RX ORDER — POTASSIUM CHLORIDE 20 MEQ
10 PACKET (EA) ORAL ONCE
Refills: 0 | Status: COMPLETED | OUTPATIENT
Start: 2024-06-02 | End: 2024-06-02

## 2024-06-02 RX ADMIN — HEPARIN SODIUM 5000 UNIT(S): 5000 INJECTION INTRAVENOUS; SUBCUTANEOUS at 17:40

## 2024-06-02 RX ADMIN — RISPERIDONE 0.5 MILLIGRAM(S): 4 TABLET ORAL at 09:08

## 2024-06-02 RX ADMIN — Medication 5 MILLIGRAM(S): at 23:13

## 2024-06-02 RX ADMIN — Medication 40 MILLIEQUIVALENT(S): at 11:38

## 2024-06-02 RX ADMIN — RIVASTIGMINE 3 MILLIGRAM(S): 4.6 PATCH, EXTENDED RELEASE TRANSDERMAL at 14:14

## 2024-06-02 RX ADMIN — ATORVASTATIN CALCIUM 40 MILLIGRAM(S): 80 TABLET, FILM COATED ORAL at 23:12

## 2024-06-02 RX ADMIN — POLYETHYLENE GLYCOL 3350 17 GRAM(S): 17 POWDER, FOR SOLUTION ORAL at 17:41

## 2024-06-02 RX ADMIN — CHLORHEXIDINE GLUCONATE 1 APPLICATION(S): 213 SOLUTION TOPICAL at 11:38

## 2024-06-02 RX ADMIN — Medication 100 MILLIEQUIVALENT(S): at 09:08

## 2024-06-02 RX ADMIN — Medication 3 MILLIGRAM(S): at 23:13

## 2024-06-02 RX ADMIN — Medication 25 MILLIGRAM(S): at 23:13

## 2024-06-02 RX ADMIN — RIVASTIGMINE 3 MILLIGRAM(S): 4.6 PATCH, EXTENDED RELEASE TRANSDERMAL at 23:12

## 2024-06-02 RX ADMIN — HEPARIN SODIUM 5000 UNIT(S): 5000 INJECTION INTRAVENOUS; SUBCUTANEOUS at 05:12

## 2024-06-02 RX ADMIN — LETROZOLE 2.5 MILLIGRAM(S): 2.5 TABLET, FILM COATED ORAL at 11:38

## 2024-06-02 RX ADMIN — ESCITALOPRAM OXALATE 10 MILLIGRAM(S): 10 TABLET, FILM COATED ORAL at 14:15

## 2024-06-02 RX ADMIN — SENNA PLUS 2 TABLET(S): 8.6 TABLET ORAL at 23:12

## 2024-06-02 RX ADMIN — AMLODIPINE BESYLATE 10 MILLIGRAM(S): 2.5 TABLET ORAL at 06:13

## 2024-06-02 NOTE — PROGRESS NOTE ADULT - PROBLEM SELECTOR PLAN 3
Intermittently eating per nursing. C/f progressive dementia, FTT  - per S&S: soft and bite sized diet  - nutrition c/s: appreciate recs  - s/p D5 given BG 89  - trend lytes and BG, monitor for refeeding with P/Mg

## 2024-06-02 NOTE — PROGRESS NOTE ADULT - ASSESSMENT
70 yo f hx dementia a/o times 1 baseline, HTN, DM2 on metformin here for unwitnessed fall. patient has had worsening confusion, lethargy, decreased PO intake and memory changes from baseline in past few weeks. C/f FTT, progressive dementia

## 2024-06-02 NOTE — PROVIDER CONTACT NOTE (OTHER) - SITUATION
heart rate 105, pt restless while in bed.
patient is refusing all po meds. also refusing to drink juice. refusing enema for constipation relief as well. RN provided edu at bedside. patient screamed " no no no" and pushed nurses away. MD aware
Patient blood pressure 119/55

## 2024-06-02 NOTE — PROGRESS NOTE ADULT - SUBJECTIVE AND OBJECTIVE BOX
Medicine Progress Note  --------------------  Radha Isaac M.D.  EM/IM PGY-1  Contact via TEAMS   --------------------      Patient: DANNY STEVEN, MRN: [id number], : 1955  Admitted on 24 for Failure to thrive in adult      LANGUAGE - English             ------------------------------------------------------------------------------------------------------------  SUMMARY (from last progress note through 24 @ 05:57):   -  ------------------------------------------------------------------------------------------------------------  OVERNIGHT/INTERVAL EVENTS  No events overnight. Vitals remained stable on room air. Reviewed all scheduled medications.  In the last 24 hours, patient required the following PRNs: none     SUBJECTIVE  - Pt was seen and examined at bedside this am.       ROS negative unless noted above.  ------------------------------------------------------------------------------------------------------------  OBJECTIVE:  Physical Exam  GENERAL: awake, eyes open, laying in bed in NAD  HEAD: normocephalic, atraumatic  HEENT: normal conjunctiva, oral mucosa moist, no JVD  CARDIAC: regular rate and rhythm, normal S1S2, no appreciable murmurs, 2+ pulses in UE/LE b/l  PULM: normal breath sounds, clear to ascultation bilaterally, no rales, rhonchi, wheezing  GI: abdomen nondistended, soft, nontender, no guarding, rebound tenderness  : no CVA tenderness b/l, no suprapubic tenderness  NEURO: awake, opens eyes spontaneously, eye tracks, follows commands to perform finger squeeze and toe wiggling, PERRLA, EOMI, AAOx2  MSK: no peripheral edema, no calf tenderness b/l  SKIN: well-perfused, extremities warm, no visible rashes        Vital Signs Last 24 Hrs  T(F): 98, Max: 98.9 (24 @ 09:58)  HR: 83 (79 - 90)  BP: 104/60 (104/60 - 125/56)  RR: 17 (17 - 18)  SpO2: 97% (97% - 98%)        DAILY MEASUREMENTS:  I&O's Summary    31 May 2024 07:  -  2024 07:00  --------------------------------------------------------  IN: 1460 mL / OUT: 1100 mL / NET: 360 mL    2024 07:01  -  2024 05:57  --------------------------------------------------------  IN: 0 mL / OUT: 900 mL / NET: -900 mL      Daily     Daily   Weight (kg): 62 (24 @ 01:51)  Orthostatic VS        LABS:                        12.0   8.42  )-----------( 177      ( 2024 06:34 )             34.4     Hgb Trend: 12.0<--, 12.2<--, 11.4<--      146<H>  |  107  |  7   ----------------------------<  80  3.2<L>   |  24  |  0.67    Ca    9.3      2024 06:34  Phos  3.2       Mg     1.80         TPro  6.7  /  Alb  4.3  /  TBili  0.5  /  DBili  x   /  AST  18  /  ALT  10  /  AlkPhos  58        CAPILLARY BLOOD GLUCOSE      POCT Blood Glucose.: 104 mg/dL (2024 21:58)  POCT Blood Glucose.: 125 mg/dL (2024 17:34)  POCT Blood Glucose.: 107 mg/dL (2024 12:52)  POCT Blood Glucose.: 89 mg/dL (2024 08:33)        Urinalysis Basic - ( 2024 06:34 )    Color: x / Appearance: x / SG: x / pH: x  Gluc: 80 mg/dL / Ketone: x  / Bili: x / Urobili: x   Blood: x / Protein: x / Nitrite: x   Leuk Esterase: x / RBC: x / WBC x   Sq Epi: x / Non Sq Epi: x / Bacteria: x              RADIOLOGY & ADDITIONAL TESTS:  Results Reviewed:     Imaging Reviewed:  Electrocardiogram Reviewed:      ------------------------------------------------------------------------------------------------------------  MEDICATIONS  (STANDING):  amLODIPine   Tablet 10 milliGRAM(s) Oral daily  atorvastatin 40 milliGRAM(s) Oral at bedtime  chlorhexidine 2% Cloths 1 Application(s) Topical daily  dextrose 10% Bolus 125 milliLiter(s) IV Bolus once  dextrose 5%. 1000 milliLiter(s) (100 mL/Hr) IV Continuous <Continuous>  dextrose 5%. 1000 milliLiter(s) (75 mL/Hr) IV Continuous <Continuous>  dextrose 5%. 1000 milliLiter(s) (50 mL/Hr) IV Continuous <Continuous>  dextrose 50% Injectable 25 Gram(s) IV Push once  dextrose 50% Injectable 12.5 Gram(s) IV Push once  escitalopram 10 milliGRAM(s) Oral every 24 hours  glucagon  Injectable 1 milliGRAM(s) IntraMuscular once  heparin   Injectable 5000 Unit(s) SubCutaneous every 12 hours  insulin lispro (ADMELOG) corrective regimen sliding scale   SubCutaneous three times a day before meals  insulin lispro (ADMELOG) corrective regimen sliding scale   SubCutaneous at bedtime  letrozole 2.5 milliGRAM(s) Oral daily  melatonin 3 milliGRAM(s) Oral at bedtime  polyethylene glycol 3350 17 Gram(s) Oral daily  risperiDONE   Tablet 0.5 milliGRAM(s) Oral every 12 hours  rivastigmine 3 milliGRAM(s) Oral <User Schedule>  senna 2 Tablet(s) Oral at bedtime  traZODone 25 milliGRAM(s) Oral at bedtime    MEDICATIONS  (PRN):  acetaminophen   Oral Liquid .. 650 milliGRAM(s) Oral every 6 hours PRN Temp greater or equal to 38C (100.4F), Mild Pain (1 - 3)  dextrose Oral Gel 15 Gram(s) Oral once PRN Blood Glucose LESS THAN 70 milliGRAM(s)/deciliter  OLANZapine Injectable 1.25 milliGRAM(s) IntraMuscular every 6 hours PRN agitation    ------------------------------------------------------------------------------------------------------------  COORDINATION OF CARE:  Care discussed with consultants/other providers and notes reviewed [Y]     Medicine Progress Note  --------------------  Radha Isaac M.D.  EM/IM PGY-1  Contact via TEAMS   --------------------      Patient: DANNY STEVEN, MRN: [id number], : 1955  Admitted on 24 for Failure to thrive in adult      LANGUAGE - English             ------------------------------------------------------------------------------------------------------------  SUMMARY (from last progress note through 24 @ 05:57):   -  ------------------------------------------------------------------------------------------------------------  OVERNIGHT/INTERVAL EVENTS  No events overnight. Vitals remained stable on room air. Reviewed all scheduled medications.  In the last 24 hours, patient required the following PRNs: none     SUBJECTIVE  - Pt was seen and examined at bedside this am. AAOX1 to self. Seems anxious. Responded yes/no to questions. Denies having BM. Endorse discomfort of abdomen. Denies CP, SOB, pain anywhere else.       ROS negative unless noted above.  ------------------------------------------------------------------------------------------------------------  OBJECTIVE:  Physical Exam  GENERAL: awake, eyes open, laying in bed in NAD  HEAD: normocephalic, atraumatic  HEENT: normal conjunctiva, oral mucosa moist, no JVD  CARDIAC: regular rate and rhythm, normal S1S2, no appreciable murmurs, 2+ pulses in UE/LE b/l  PULM: normal breath sounds, clear to ascultation bilaterally, no rales, rhonchi, wheezing  GI: abdomen nondistended, soft, mild diffuse TTP, no guarding, no rebound tenderness  : no CVA tenderness b/l, no suprapubic tenderness  NEURO: awake, opens eyes spontaneously, eye tracks, follows commands to perform finger squeeze and toe wiggling, PERRLA, EOMI, AAOx2  MSK: no peripheral edema, no calf tenderness b/l  SKIN: well-perfused, extremities warm, no visible rashes        Vital Signs Last 24 Hrs  T(F): 98, Max: 98.9 (24 @ 09:58)  HR: 83 (79 - 90)  BP: 104/60 (104/60 - 125/56)  RR: 17 (17 - 18)  SpO2: 97% (97% - 98%)        DAILY MEASUREMENTS:  I&O's Summary    31 May 2024 07:  -  2024 07:00  --------------------------------------------------------  IN: 1460 mL / OUT: 1100 mL / NET: 360 mL    2024 07:01  -  2024 05:57  --------------------------------------------------------  IN: 0 mL / OUT: 900 mL / NET: -900 mL      Daily     Daily   Weight (kg): 62 (24 @ 01:51)  Orthostatic VS        LABS:                        12.0   8.42  )-----------( 177      ( 2024 06:34 )             34.4     Hgb Trend: 12.0<--, 12.2<--, 11.4<--  06    146<H>  |  107  |  7   ----------------------------<  80  3.2<L>   |  24  |  0.67    Ca    9.3      2024 06:34  Phos  3.2       Mg     1.80         TPro  6.7  /  Alb  4.3  /  TBili  0.5  /  DBili  x   /  AST  18  /  ALT  10  /  AlkPhos  58        CAPILLARY BLOOD GLUCOSE      POCT Blood Glucose.: 104 mg/dL (2024 21:58)  POCT Blood Glucose.: 125 mg/dL (2024 17:34)  POCT Blood Glucose.: 107 mg/dL (2024 12:52)  POCT Blood Glucose.: 89 mg/dL (2024 08:33)        Urinalysis Basic - ( 2024 06:34 )    Color: x / Appearance: x / SG: x / pH: x  Gluc: 80 mg/dL / Ketone: x  / Bili: x / Urobili: x   Blood: x / Protein: x / Nitrite: x   Leuk Esterase: x / RBC: x / WBC x   Sq Epi: x / Non Sq Epi: x / Bacteria: x              RADIOLOGY & ADDITIONAL TESTS:  Results Reviewed:     Imaging Reviewed:  Electrocardiogram Reviewed:      ------------------------------------------------------------------------------------------------------------  MEDICATIONS  (STANDING):  amLODIPine   Tablet 10 milliGRAM(s) Oral daily  atorvastatin 40 milliGRAM(s) Oral at bedtime  chlorhexidine 2% Cloths 1 Application(s) Topical daily  dextrose 10% Bolus 125 milliLiter(s) IV Bolus once  dextrose 5%. 1000 milliLiter(s) (100 mL/Hr) IV Continuous <Continuous>  dextrose 5%. 1000 milliLiter(s) (75 mL/Hr) IV Continuous <Continuous>  dextrose 5%. 1000 milliLiter(s) (50 mL/Hr) IV Continuous <Continuous>  dextrose 50% Injectable 25 Gram(s) IV Push once  dextrose 50% Injectable 12.5 Gram(s) IV Push once  escitalopram 10 milliGRAM(s) Oral every 24 hours  glucagon  Injectable 1 milliGRAM(s) IntraMuscular once  heparin   Injectable 5000 Unit(s) SubCutaneous every 12 hours  insulin lispro (ADMELOG) corrective regimen sliding scale   SubCutaneous three times a day before meals  insulin lispro (ADMELOG) corrective regimen sliding scale   SubCutaneous at bedtime  letrozole 2.5 milliGRAM(s) Oral daily  melatonin 3 milliGRAM(s) Oral at bedtime  polyethylene glycol 3350 17 Gram(s) Oral daily  risperiDONE   Tablet 0.5 milliGRAM(s) Oral every 12 hours  rivastigmine 3 milliGRAM(s) Oral <User Schedule>  senna 2 Tablet(s) Oral at bedtime  traZODone 25 milliGRAM(s) Oral at bedtime    MEDICATIONS  (PRN):  acetaminophen   Oral Liquid .. 650 milliGRAM(s) Oral every 6 hours PRN Temp greater or equal to 38C (100.4F), Mild Pain (1 - 3)  dextrose Oral Gel 15 Gram(s) Oral once PRN Blood Glucose LESS THAN 70 milliGRAM(s)/deciliter  OLANZapine Injectable 1.25 milliGRAM(s) IntraMuscular every 6 hours PRN agitation    ------------------------------------------------------------------------------------------------------------  COORDINATION OF CARE:  Care discussed with consultants/other providers and notes reviewed [Y]

## 2024-06-02 NOTE — PROVIDER CONTACT NOTE (OTHER) - ASSESSMENT
heart rate 105, pt restless while in bed.
patient is refusing all po meds. also refusing to drink juice. refusing enema for constipation relief as well. RN provided edu at bedside. patient screamed " no no no" and pushed nurses away. MD aware
Patient blood pressure 119/55.

## 2024-06-02 NOTE — PROGRESS NOTE ADULT - PROBLEM SELECTOR PLAN 2
family reports about 1 week of constipation prior to presentation. May also be due to poor po intake  - C/w memo , miralax and enema   - AXR 6/1 moderate stool burden family reports about 1 week of constipation prior to presentation. May also be due to poor po intake  - AXR 6/1 moderate stool burden  - C/w senna, increase miralax to BID   - start dulcolax QHS   - fleet enema today

## 2024-06-02 NOTE — PROVIDER CONTACT NOTE (OTHER) - BACKGROUND
Pt admitted for confusing, aggression and combativeness towards family members
pt admitted for FTT
patient admitted with ftt

## 2024-06-03 ENCOUNTER — NON-APPOINTMENT (OUTPATIENT)
Age: 69
End: 2024-06-03

## 2024-06-03 LAB
ANION GAP SERPL CALC-SCNC: 14 MMOL/L — SIGNIFICANT CHANGE UP (ref 7–14)
BASOPHILS # BLD AUTO: 0.02 K/UL — SIGNIFICANT CHANGE UP (ref 0–0.2)
BASOPHILS NFR BLD AUTO: 0.2 % — SIGNIFICANT CHANGE UP (ref 0–2)
BUN SERPL-MCNC: 17 MG/DL — SIGNIFICANT CHANGE UP (ref 7–23)
CALCIUM SERPL-MCNC: 9.5 MG/DL — SIGNIFICANT CHANGE UP (ref 8.4–10.5)
CHLORIDE SERPL-SCNC: 103 MMOL/L — SIGNIFICANT CHANGE UP (ref 98–107)
CO2 SERPL-SCNC: 25 MMOL/L — SIGNIFICANT CHANGE UP (ref 22–31)
CREAT SERPL-MCNC: 0.76 MG/DL — SIGNIFICANT CHANGE UP (ref 0.5–1.3)
EGFR: 85 ML/MIN/1.73M2 — SIGNIFICANT CHANGE UP
EOSINOPHIL # BLD AUTO: 0.19 K/UL — SIGNIFICANT CHANGE UP (ref 0–0.5)
EOSINOPHIL NFR BLD AUTO: 2 % — SIGNIFICANT CHANGE UP (ref 0–6)
GLUCOSE SERPL-MCNC: 90 MG/DL — SIGNIFICANT CHANGE UP (ref 70–99)
HCT VFR BLD CALC: 35.8 % — SIGNIFICANT CHANGE UP (ref 34.5–45)
HGB BLD-MCNC: 12.8 G/DL — SIGNIFICANT CHANGE UP (ref 11.5–15.5)
IANC: 6.92 K/UL — SIGNIFICANT CHANGE UP (ref 1.8–7.4)
IMM GRANULOCYTES NFR BLD AUTO: 0.4 % — SIGNIFICANT CHANGE UP (ref 0–0.9)
LYMPHOCYTES # BLD AUTO: 1.67 K/UL — SIGNIFICANT CHANGE UP (ref 1–3.3)
LYMPHOCYTES # BLD AUTO: 17.2 % — SIGNIFICANT CHANGE UP (ref 13–44)
MAGNESIUM SERPL-MCNC: 1.8 MG/DL — SIGNIFICANT CHANGE UP (ref 1.6–2.6)
MCHC RBC-ENTMCNC: 31.7 PG — SIGNIFICANT CHANGE UP (ref 27–34)
MCHC RBC-ENTMCNC: 35.8 GM/DL — SIGNIFICANT CHANGE UP (ref 32–36)
MCV RBC AUTO: 88.6 FL — SIGNIFICANT CHANGE UP (ref 80–100)
MONOCYTES # BLD AUTO: 0.86 K/UL — SIGNIFICANT CHANGE UP (ref 0–0.9)
MONOCYTES NFR BLD AUTO: 8.9 % — SIGNIFICANT CHANGE UP (ref 2–14)
NEUTROPHILS # BLD AUTO: 6.92 K/UL — SIGNIFICANT CHANGE UP (ref 1.8–7.4)
NEUTROPHILS NFR BLD AUTO: 71.3 % — SIGNIFICANT CHANGE UP (ref 43–77)
NRBC # BLD: 0 /100 WBCS — SIGNIFICANT CHANGE UP (ref 0–0)
NRBC # FLD: 0 K/UL — SIGNIFICANT CHANGE UP (ref 0–0)
PHOSPHATE SERPL-MCNC: 3.9 MG/DL — SIGNIFICANT CHANGE UP (ref 2.5–4.5)
PLATELET # BLD AUTO: 193 K/UL — SIGNIFICANT CHANGE UP (ref 150–400)
POTASSIUM SERPL-MCNC: 3.2 MMOL/L — LOW (ref 3.5–5.3)
POTASSIUM SERPL-SCNC: 3.2 MMOL/L — LOW (ref 3.5–5.3)
RBC # BLD: 4.04 M/UL — SIGNIFICANT CHANGE UP (ref 3.8–5.2)
RBC # FLD: 12.1 % — SIGNIFICANT CHANGE UP (ref 10.3–14.5)
SODIUM SERPL-SCNC: 142 MMOL/L — SIGNIFICANT CHANGE UP (ref 135–145)
WBC # BLD: 9.7 K/UL — SIGNIFICANT CHANGE UP (ref 3.8–10.5)
WBC # FLD AUTO: 9.7 K/UL — SIGNIFICANT CHANGE UP (ref 3.8–10.5)

## 2024-06-03 PROCEDURE — 99232 SBSQ HOSP IP/OBS MODERATE 35: CPT

## 2024-06-03 RX ORDER — POTASSIUM CHLORIDE 20 MEQ
40 PACKET (EA) ORAL
Refills: 0 | Status: COMPLETED | OUTPATIENT
Start: 2024-06-03 | End: 2024-06-03

## 2024-06-03 RX ADMIN — POLYETHYLENE GLYCOL 3350 17 GRAM(S): 17 POWDER, FOR SOLUTION ORAL at 17:07

## 2024-06-03 RX ADMIN — RIVASTIGMINE 3 MILLIGRAM(S): 4.6 PATCH, EXTENDED RELEASE TRANSDERMAL at 13:07

## 2024-06-03 RX ADMIN — HEPARIN SODIUM 5000 UNIT(S): 5000 INJECTION INTRAVENOUS; SUBCUTANEOUS at 17:09

## 2024-06-03 RX ADMIN — Medication 40 MILLIEQUIVALENT(S): at 17:09

## 2024-06-03 RX ADMIN — Medication 5 MILLIGRAM(S): at 21:14

## 2024-06-03 RX ADMIN — SENNA PLUS 2 TABLET(S): 8.6 TABLET ORAL at 21:15

## 2024-06-03 RX ADMIN — Medication 40 MILLIEQUIVALENT(S): at 07:44

## 2024-06-03 RX ADMIN — POLYETHYLENE GLYCOL 3350 17 GRAM(S): 17 POWDER, FOR SOLUTION ORAL at 05:13

## 2024-06-03 RX ADMIN — RISPERIDONE 0.5 MILLIGRAM(S): 4 TABLET ORAL at 07:44

## 2024-06-03 RX ADMIN — Medication 25 MILLIGRAM(S): at 21:14

## 2024-06-03 RX ADMIN — HEPARIN SODIUM 5000 UNIT(S): 5000 INJECTION INTRAVENOUS; SUBCUTANEOUS at 05:13

## 2024-06-03 RX ADMIN — CHLORHEXIDINE GLUCONATE 1 APPLICATION(S): 213 SOLUTION TOPICAL at 11:17

## 2024-06-03 RX ADMIN — LETROZOLE 2.5 MILLIGRAM(S): 2.5 TABLET, FILM COATED ORAL at 11:17

## 2024-06-03 RX ADMIN — AMLODIPINE BESYLATE 10 MILLIGRAM(S): 2.5 TABLET ORAL at 05:13

## 2024-06-03 RX ADMIN — Medication 3 MILLIGRAM(S): at 21:14

## 2024-06-03 RX ADMIN — ESCITALOPRAM OXALATE 10 MILLIGRAM(S): 10 TABLET, FILM COATED ORAL at 13:07

## 2024-06-03 RX ADMIN — ATORVASTATIN CALCIUM 40 MILLIGRAM(S): 80 TABLET, FILM COATED ORAL at 21:14

## 2024-06-03 RX ADMIN — RIVASTIGMINE 3 MILLIGRAM(S): 4.6 PATCH, EXTENDED RELEASE TRANSDERMAL at 21:14

## 2024-06-03 NOTE — PROGRESS NOTE ADULT - PROBLEM SELECTOR PLAN 3
Intermittently eating per nursing. C/f progressive dementia, FTT  - per S&S: soft and bite sized diet  - nutrition c/s: appreciate recs  - s/p D5 given BG 89  - trend lytes and BG, monitor for refeeding with P/Mg Intermittently eating per nursing. C/f progressive dementia, FTT  - per S&S: soft and bite sized diet  - nutrition c/s: appreciate recs  - s/p D5 given BG 89, no longer on D5  - trend lytes and BG, monitor for refeeding with P/Mg Intermittently eating per nursing. C/f progressive dementia, FTT  - per S&S: soft and bite sized diet  - nutrition c/s: appreciate recs  - s/p D5 given BG 89, no longer on D5  - trend lytes and BG, monitor for refeeding with P/Mg. Likely due to poor intake and persistently hypokalemic

## 2024-06-03 NOTE — BH CONSULTATION LIAISON PROGRESS NOTE - NSBHFUPINTERVALHXFT_PSY_A_CORE
Chart reviewed, no PRN's given over the weekend. No acute issues overnight.   Patient seen at the bedside, laying in bed. She was oriented to self only. Disoriented to time, location or situation. Has an impoverished thought process. Said that she feels fine. Denied feeling depressed or anxious when asked. Also denied AH, VH when asked. Denied SI/HI. Overall a poor historian.   Loose on exam, no EPS/rigidity.

## 2024-06-03 NOTE — BH CONSULTATION LIAISON PROGRESS NOTE - CURRENT MEDICATION
MEDICATIONS  (STANDING):  amLODIPine   Tablet 10 milliGRAM(s) Oral daily  atorvastatin 40 milliGRAM(s) Oral at bedtime  bisacodyl 5 milliGRAM(s) Oral at bedtime  chlorhexidine 2% Cloths 1 Application(s) Topical daily  dextrose 10% Bolus 125 milliLiter(s) IV Bolus once  dextrose 5%. 1000 milliLiter(s) (100 mL/Hr) IV Continuous <Continuous>  dextrose 5%. 1000 milliLiter(s) (50 mL/Hr) IV Continuous <Continuous>  dextrose 50% Injectable 25 Gram(s) IV Push once  dextrose 50% Injectable 12.5 Gram(s) IV Push once  escitalopram 10 milliGRAM(s) Oral every 24 hours  glucagon  Injectable 1 milliGRAM(s) IntraMuscular once  heparin   Injectable 5000 Unit(s) SubCutaneous every 12 hours  insulin lispro (ADMELOG) corrective regimen sliding scale   SubCutaneous three times a day before meals  insulin lispro (ADMELOG) corrective regimen sliding scale   SubCutaneous at bedtime  letrozole 2.5 milliGRAM(s) Oral daily  melatonin 3 milliGRAM(s) Oral at bedtime  polyethylene glycol 3350 17 Gram(s) Oral two times a day  potassium chloride   Powder 40 milliEquivalent(s) Oral two times a day  risperiDONE   Tablet 0.5 milliGRAM(s) Oral every 12 hours  rivastigmine 3 milliGRAM(s) Oral <User Schedule>  saline laxative (FLEET) Rectal Enema 1 Enema Rectal once  senna 2 Tablet(s) Oral at bedtime  traZODone 25 milliGRAM(s) Oral at bedtime    MEDICATIONS  (PRN):  acetaminophen   Oral Liquid .. 650 milliGRAM(s) Oral every 6 hours PRN Temp greater or equal to 38C (100.4F), Mild Pain (1 - 3)  dextrose Oral Gel 15 Gram(s) Oral once PRN Blood Glucose LESS THAN 70 milliGRAM(s)/deciliter  OLANZapine Injectable 1.25 milliGRAM(s) IntraMuscular every 6 hours PRN agitation

## 2024-06-03 NOTE — BH CONSULTATION LIAISON PROGRESS NOTE - NSBHATTESTCOMMENTATTENDFT_PSY_A_CORE
Agree with above, patient AOx1 at most, linguistically intact, able to follow simple commands. No expectation of further improvement with psych hospitalization. Does not appear to be hallucinating. No SI/HI noted. No EPS on exam. No psych c/i to discharge back to [facility], c/w current meds.

## 2024-06-03 NOTE — BH CONSULTATION LIAISON PROGRESS NOTE - NSBHCHARTREVIEWLAB_PSY_A_CORE FT
12.8   9.70  )-----------( 193      ( 03 Jun 2024 06:36 )             35.8     06-03    142  |  103  |  17  ----------------------------<  90  3.2<L>   |  25  |  0.76    Ca    9.5      03 Jun 2024 06:36  Phos  3.9     06-03  Mg     1.80     06-03    TPro  7.1  /  Alb  4.3  /  TBili  0.6  /  DBili  x   /  AST  17  /  ALT  11  /  AlkPhos  59  06-02    Urinalysis Basic - ( 03 Jun 2024 06:36 )    Color: x / Appearance: x / SG: x / pH: x  Gluc: 90 mg/dL / Ketone: x  / Bili: x / Urobili: x   Blood: x / Protein: x / Nitrite: x   Leuk Esterase: x / RBC: x / WBC x   Sq Epi: x / Non Sq Epi: x / Bacteria: x

## 2024-06-03 NOTE — PROGRESS NOTE ADULT - PROBLEM SELECTOR PLAN 2
family reports about 1 week of constipation prior to presentation. May also be due to poor po intake  - AXR 6/1 moderate stool burden  - C/w senna, increase miralax to BID   - start dulcolax QHS   - fleet enema today family reports about 1 week of constipation prior to presentation. May also be due to poor po intake  - AXR 6/1 moderate stool burden, now having BMs  - C/w senna, increase miralax to BID   - start dulcolax QHS   - fleet enema with BM Family reports about 1 week of constipation prior to presentation. May also be due to poor po intake. Now having BMs, last on 6/2  - AXR 6/1 moderate stool burden, now having BMs  - C/w senna, increase miralax to BID   - dulcolax QHS   - fleet enema with BM

## 2024-06-03 NOTE — BH CONSULTATION LIAISON PROGRESS NOTE - NSBHCHARTREVIEWVS_PSY_A_CORE FT
Vital Signs Last 24 Hrs  T(C): 36.7 (03 Jun 2024 04:46), Max: 36.7 (03 Jun 2024 04:46)  T(F): 98.1 (03 Jun 2024 04:46), Max: 98.1 (03 Jun 2024 04:46)  HR: 92 (03 Jun 2024 04:46) (76 - 92)  BP: 107/71 (03 Jun 2024 04:46) (104/67 - 107/71)  BP(mean): --  RR: 18 (03 Jun 2024 04:46) (18 - 18)  SpO2: 100% (03 Jun 2024 04:46) (100% - 100%)    Parameters below as of 03 Jun 2024 04:46  Patient On (Oxygen Delivery Method): room air

## 2024-06-03 NOTE — BH CONSULTATION LIAISON PROGRESS NOTE - NSBHASSESSMENTFT_PSY_ALL_CORE
Patient is 69 F with history of Advanced Dementia (AAO x1-2), HTN, DM2, CVA (chronic L BG lacunar infarct), and R breast cancer s/p lumpectomy/chemo/RT,  who presents to the hospital for unwitnessed fall. Patient has had worsening confusion, lethargy, decreased PO intake and memory changes from baseline in past few weeks. Patient was found immediately lying on her back  after fall, did not self urinate or bite her tongue. Has not been eating for some time now. Head CT, cervical, and a/p negative for fractures, ICH, or strokes. Follows [facility]. Patient had CODE KERA early this morning and was given Ativan 1mg + Benadryl 25mg IVP with adequate response  [facility] consulted for medication management. CT head neg, UA- neg, no clear source of infection.  Patient alert and oriented to self, confusion noted, otherwise behaviorally controlled. No SI/HI/AH/VH. No EPS noted.    6/3: Calm, behaviorally controlled. No PRN's over the weekend. A & O X 1. No SI/HI/AH/VH. No EPS noted.    PLAN:  -Defer observation status to primary team    -c/w home medication regimen:   Lexapro 10 mg daily   Risperdal 0.5mg @ 7am and 5pm   Melatonin 3 mg prn hs   Trazodone 25 mg prn hs  Rivastigmine 3 mg PO BID    Aggression: Zyprexa 1.25mg q6h prn with additional Zyprexa 1.25mg for refractory agitation if needed, do not give Ativan im/iv within 1 hour of Zyprexa im d/t risk of sedation/orthostatic hypotension/respiratory depression    -Monitor EKG qtc interval and hold above psychotropics if qtc >500    Avoid benzodiazepines, antihistamines and anticholinergics as can increase confusion  Patient would benefit from maintenance of sleep/wake cycles, frequent reorientation, personal items at beside if available     Dispo: rehab vs home; No role for inpt psych at this time.   [facility] signs off, call with questions

## 2024-06-03 NOTE — PROGRESS NOTE ADULT - ASSESSMENT
70 yo f hx dementia a/o times 1 baseline, HTN, DM2 on metformin here for unwitnessed fall. patient has had worsening confusion, lethargy, decreased PO intake and memory changes from baseline in past few weeks. C/f FTT, progressive dementia 70 yo f hx dementia AOx1 baseline, HTN, DM2 on metformin here for unwitnessed fall. Patient has had worsening confusion, lethargy, decreased PO intake and memory changes from baseline in past few weeks. C/f FTT, progressive dementia. Having BMs, tolerating pureed diet, and no longer hypoglycemic (off D5). Stable for dc.

## 2024-06-03 NOTE — PROGRESS NOTE ADULT - SUBJECTIVE AND OBJECTIVE BOX
PATIENT: DANNY STEVEN, MRN: [id number]    CHIEF COMPLAINT: Patient is a 69y old  Female who presents with a chief complaint of dementia (02 Jun 2024 05:56)      INTERVAL HISTORY/OVERNIGHT EVENTS: No overnight events.       MEDICATIONS:  MEDICATIONS  (STANDING):  amLODIPine   Tablet 10 milliGRAM(s) Oral daily  atorvastatin 40 milliGRAM(s) Oral at bedtime  bisacodyl 5 milliGRAM(s) Oral at bedtime  chlorhexidine 2% Cloths 1 Application(s) Topical daily  dextrose 10% Bolus 125 milliLiter(s) IV Bolus once  dextrose 5%. 1000 milliLiter(s) (100 mL/Hr) IV Continuous <Continuous>  dextrose 5%. 1000 milliLiter(s) (50 mL/Hr) IV Continuous <Continuous>  dextrose 5%. 1000 milliLiter(s) (75 mL/Hr) IV Continuous <Continuous>  dextrose 50% Injectable 25 Gram(s) IV Push once  dextrose 50% Injectable 12.5 Gram(s) IV Push once  escitalopram 10 milliGRAM(s) Oral every 24 hours  glucagon  Injectable 1 milliGRAM(s) IntraMuscular once  heparin   Injectable 5000 Unit(s) SubCutaneous every 12 hours  insulin lispro (ADMELOG) corrective regimen sliding scale   SubCutaneous three times a day before meals  insulin lispro (ADMELOG) corrective regimen sliding scale   SubCutaneous at bedtime  letrozole 2.5 milliGRAM(s) Oral daily  melatonin 3 milliGRAM(s) Oral at bedtime  polyethylene glycol 3350 17 Gram(s) Oral two times a day  potassium chloride   Powder 40 milliEquivalent(s) Oral two times a day  risperiDONE   Tablet 0.5 milliGRAM(s) Oral every 12 hours  rivastigmine 3 milliGRAM(s) Oral <User Schedule>  saline laxative (FLEET) Rectal Enema 1 Enema Rectal once  senna 2 Tablet(s) Oral at bedtime  traZODone 25 milliGRAM(s) Oral at bedtime    MEDICATIONS  (PRN):  acetaminophen   Oral Liquid .. 650 milliGRAM(s) Oral every 6 hours PRN Temp greater or equal to 38C (100.4F), Mild Pain (1 - 3)  dextrose Oral Gel 15 Gram(s) Oral once PRN Blood Glucose LESS THAN 70 milliGRAM(s)/deciliter  OLANZapine Injectable 1.25 milliGRAM(s) IntraMuscular every 6 hours PRN agitation      ALLERGIES: Allergies    No Known Allergies    Intolerances        OBJECTIVE:  ICU Vital Signs Last 24 Hrs  T(C): 36.7 (03 Jun 2024 04:46), Max: 37.1 (02 Jun 2024 13:45)  T(F): 98.1 (03 Jun 2024 04:46), Max: 98.8 (02 Jun 2024 13:45)  HR: 92 (03 Jun 2024 04:46) (76 - 105)  BP: 107/71 (03 Jun 2024 04:46) (104/67 - 107/71)  BP(mean): --  ABP: --  ABP(mean): --  RR: 18 (03 Jun 2024 04:46) (18 - 19)  SpO2: 100% (03 Jun 2024 04:46) (97% - 100%)    O2 Parameters below as of 03 Jun 2024 04:46  Patient On (Oxygen Delivery Method): room air            CAPILLARY BLOOD GLUCOSE      POCT Blood Glucose.: 110 mg/dL (02 Jun 2024 21:42)  POCT Blood Glucose.: 136 mg/dL (02 Jun 2024 18:04)  POCT Blood Glucose.: 113 mg/dL (02 Jun 2024 13:06)  POCT Blood Glucose.: 107 mg/dL (02 Jun 2024 08:55)    CAPILLARY BLOOD GLUCOSE      POCT Blood Glucose.: 110 mg/dL (02 Jun 2024 21:42)    I&O's Summary    02 Jun 2024 07:01  -  03 Jun 2024 07:00  --------------------------------------------------------  IN: 100 mL / OUT: 350 mL / NET: -250 mL      Daily     Daily     PHYSICAL EXAMINATION:  General: Comfortable, no acute distress, cooperative with exam.  HEENT: PERRLA  Respiratory: CTAB, normal respiratory effort, no coughing, wheezes, crackles, or rales.  CV: RRR, S1S2, no murmurs, rubs or gallops. No JVD. Distal pulses intact.  Abdominal: Soft, nontender, nondistended, no rebound or guarding, normal bowel sounds.  Neurology: AOx3, no focal neuro defects, JOHNSON x 4.  Extremities: No pitting edema, + Peripheral pulses.      LABS:                          12.8   9.70  )-----------( 193      ( 03 Jun 2024 06:36 )             35.8     06-03    142  |  103  |  17  ----------------------------<  90  3.2<L>   |  25  |  0.76    Ca    9.5      03 Jun 2024 06:36  Phos  3.9     06-03  Mg     1.80     06-03    TPro  7.1  /  Alb  4.3  /  TBili  0.6  /  DBili  x   /  AST  17  /  ALT  11  /  AlkPhos  59  06-02    LIVER FUNCTIONS - ( 02 Jun 2024 06:50 )  Alb: 4.3 g/dL / Pro: 7.1 g/dL / ALK PHOS: 59 U/L / ALT: 11 U/L / AST: 17 U/L / GGT: x                   Urinalysis Basic - ( 03 Jun 2024 06:36 )    Color: x / Appearance: x / SG: x / pH: x  Gluc: 90 mg/dL / Ketone: x  / Bili: x / Urobili: x   Blood: x / Protein: x / Nitrite: x   Leuk Esterase: x / RBC: x / WBC x   Sq Epi: x / Non Sq Epi: x / Bacteria: x       PATIENT: DANNY STEVEN, MRN: [id number]    CHIEF COMPLAINT: Patient is a 69y old  Female who presents with a chief complaint of dementia (02 Jun 2024 05:56)      INTERVAL HISTORY/OVERNIGHT EVENTS: No overnight events. Patient overall quite confused, but seemingly baseline. Had BM during day prior but still poor PO intake and hypokalemic      MEDICATIONS:  MEDICATIONS  (STANDING):  amLODIPine   Tablet 10 milliGRAM(s) Oral daily  atorvastatin 40 milliGRAM(s) Oral at bedtime  bisacodyl 5 milliGRAM(s) Oral at bedtime  chlorhexidine 2% Cloths 1 Application(s) Topical daily  dextrose 10% Bolus 125 milliLiter(s) IV Bolus once  dextrose 5%. 1000 milliLiter(s) (100 mL/Hr) IV Continuous <Continuous>  dextrose 5%. 1000 milliLiter(s) (50 mL/Hr) IV Continuous <Continuous>  dextrose 5%. 1000 milliLiter(s) (75 mL/Hr) IV Continuous <Continuous>  dextrose 50% Injectable 25 Gram(s) IV Push once  dextrose 50% Injectable 12.5 Gram(s) IV Push once  escitalopram 10 milliGRAM(s) Oral every 24 hours  glucagon  Injectable 1 milliGRAM(s) IntraMuscular once  heparin   Injectable 5000 Unit(s) SubCutaneous every 12 hours  insulin lispro (ADMELOG) corrective regimen sliding scale   SubCutaneous three times a day before meals  insulin lispro (ADMELOG) corrective regimen sliding scale   SubCutaneous at bedtime  letrozole 2.5 milliGRAM(s) Oral daily  melatonin 3 milliGRAM(s) Oral at bedtime  polyethylene glycol 3350 17 Gram(s) Oral two times a day  potassium chloride   Powder 40 milliEquivalent(s) Oral two times a day  risperiDONE   Tablet 0.5 milliGRAM(s) Oral every 12 hours  rivastigmine 3 milliGRAM(s) Oral <User Schedule>  saline laxative (FLEET) Rectal Enema 1 Enema Rectal once  senna 2 Tablet(s) Oral at bedtime  traZODone 25 milliGRAM(s) Oral at bedtime    MEDICATIONS  (PRN):  acetaminophen   Oral Liquid .. 650 milliGRAM(s) Oral every 6 hours PRN Temp greater or equal to 38C (100.4F), Mild Pain (1 - 3)  dextrose Oral Gel 15 Gram(s) Oral once PRN Blood Glucose LESS THAN 70 milliGRAM(s)/deciliter  OLANZapine Injectable 1.25 milliGRAM(s) IntraMuscular every 6 hours PRN agitation      ALLERGIES: Allergies    No Known Allergies    Intolerances        OBJECTIVE:  ICU Vital Signs Last 24 Hrs  T(C): 36.7 (03 Jun 2024 04:46), Max: 37.1 (02 Jun 2024 13:45)  T(F): 98.1 (03 Jun 2024 04:46), Max: 98.8 (02 Jun 2024 13:45)  HR: 92 (03 Jun 2024 04:46) (76 - 105)  BP: 107/71 (03 Jun 2024 04:46) (104/67 - 107/71)  BP(mean): --  ABP: --  ABP(mean): --  RR: 18 (03 Jun 2024 04:46) (18 - 19)  SpO2: 100% (03 Jun 2024 04:46) (97% - 100%)    O2 Parameters below as of 03 Jun 2024 04:46  Patient On (Oxygen Delivery Method): room air            CAPILLARY BLOOD GLUCOSE      POCT Blood Glucose.: 110 mg/dL (02 Jun 2024 21:42)  POCT Blood Glucose.: 136 mg/dL (02 Jun 2024 18:04)  POCT Blood Glucose.: 113 mg/dL (02 Jun 2024 13:06)  POCT Blood Glucose.: 107 mg/dL (02 Jun 2024 08:55)    CAPILLARY BLOOD GLUCOSE      POCT Blood Glucose.: 110 mg/dL (02 Jun 2024 21:42)    I&O's Summary    02 Jun 2024 07:01  -  03 Jun 2024 07:00  --------------------------------------------------------  IN: 100 mL / OUT: 350 mL / NET: -250 mL      Daily     Daily     PHYSICAL EXAMINATION:  General: Comfortable, no acute distress, cooperative with exam. Pleasantly confused  Respiratory: CTAB, normal respiratory effort, no coughing, wheezes, crackles, or rales.  CV: RRR, S1S2, no murmurs, rubs or gallops. No JVD. Distal pulses intact.  Abdominal: normoactive bowel sounds, soft, nontender  Neurology: AOx1, no focal neuro defects, JOHNSON x 4.  Extremities: No pitting edema      LABS:                          12.8   9.70  )-----------( 193      ( 03 Jun 2024 06:36 )             35.8     06-03    142  |  103  |  17  ----------------------------<  90  3.2<L>   |  25  |  0.76    Ca    9.5      03 Jun 2024 06:36  Phos  3.9     06-03  Mg     1.80     06-03    TPro  7.1  /  Alb  4.3  /  TBili  0.6  /  DBili  x   /  AST  17  /  ALT  11  /  AlkPhos  59  06-02    LIVER FUNCTIONS - ( 02 Jun 2024 06:50 )  Alb: 4.3 g/dL / Pro: 7.1 g/dL / ALK PHOS: 59 U/L / ALT: 11 U/L / AST: 17 U/L / GGT: x                   Urinalysis Basic - ( 03 Jun 2024 06:36 )    Color: x / Appearance: x / SG: x / pH: x  Gluc: 90 mg/dL / Ketone: x  / Bili: x / Urobili: x   Blood: x / Protein: x / Nitrite: x   Leuk Esterase: x / RBC: x / WBC x   Sq Epi: x / Non Sq Epi: x / Bacteria: x

## 2024-06-03 NOTE — BH CONSULTATION LIAISON PROGRESS NOTE - NSBHTIMEACTIVITIESPERFORMED_PSY_A_CORE
d/w patient, primary team, chart reviewed, handoff from prior psych MD  time exclusive of education/supervision

## 2024-06-04 DIAGNOSIS — G93.49 OTHER ENCEPHALOPATHY: ICD-10-CM

## 2024-06-04 DIAGNOSIS — Z71.89 OTHER SPECIFIED COUNSELING: ICD-10-CM

## 2024-06-04 DIAGNOSIS — R53.81 OTHER MALAISE: ICD-10-CM

## 2024-06-04 DIAGNOSIS — Z51.5 ENCOUNTER FOR PALLIATIVE CARE: ICD-10-CM

## 2024-06-04 LAB
ALBUMIN SERPL ELPH-MCNC: 4.8 G/DL — SIGNIFICANT CHANGE UP (ref 3.3–5)
ALP SERPL-CCNC: 70 U/L — SIGNIFICANT CHANGE UP (ref 40–120)
ALT FLD-CCNC: 13 U/L — SIGNIFICANT CHANGE UP (ref 4–33)
ANION GAP SERPL CALC-SCNC: 16 MMOL/L — HIGH (ref 7–14)
AST SERPL-CCNC: 20 U/L — SIGNIFICANT CHANGE UP (ref 4–32)
BASOPHILS # BLD AUTO: 0.05 K/UL — SIGNIFICANT CHANGE UP (ref 0–0.2)
BASOPHILS NFR BLD AUTO: 0.5 % — SIGNIFICANT CHANGE UP (ref 0–2)
BILIRUB SERPL-MCNC: 0.5 MG/DL — SIGNIFICANT CHANGE UP (ref 0.2–1.2)
BUN SERPL-MCNC: 14 MG/DL — SIGNIFICANT CHANGE UP (ref 7–23)
CALCIUM SERPL-MCNC: 10.1 MG/DL — SIGNIFICANT CHANGE UP (ref 8.4–10.5)
CHLORIDE SERPL-SCNC: 105 MMOL/L — SIGNIFICANT CHANGE UP (ref 98–107)
CO2 SERPL-SCNC: 24 MMOL/L — SIGNIFICANT CHANGE UP (ref 22–31)
CREAT SERPL-MCNC: 0.66 MG/DL — SIGNIFICANT CHANGE UP (ref 0.5–1.3)
EGFR: 95 ML/MIN/1.73M2 — SIGNIFICANT CHANGE UP
EOSINOPHIL # BLD AUTO: 0.23 K/UL — SIGNIFICANT CHANGE UP (ref 0–0.5)
EOSINOPHIL NFR BLD AUTO: 2.3 % — SIGNIFICANT CHANGE UP (ref 0–6)
GLUCOSE SERPL-MCNC: 90 MG/DL — SIGNIFICANT CHANGE UP (ref 70–99)
HCT VFR BLD CALC: 40.2 % — SIGNIFICANT CHANGE UP (ref 34.5–45)
HGB BLD-MCNC: 13.5 G/DL — SIGNIFICANT CHANGE UP (ref 11.5–15.5)
IANC: 7.25 K/UL — SIGNIFICANT CHANGE UP (ref 1.8–7.4)
IMM GRANULOCYTES NFR BLD AUTO: 0.3 % — SIGNIFICANT CHANGE UP (ref 0–0.9)
LYMPHOCYTES # BLD AUTO: 1.76 K/UL — SIGNIFICANT CHANGE UP (ref 1–3.3)
LYMPHOCYTES # BLD AUTO: 17.3 % — SIGNIFICANT CHANGE UP (ref 13–44)
MAGNESIUM SERPL-MCNC: 2 MG/DL — SIGNIFICANT CHANGE UP (ref 1.6–2.6)
MCHC RBC-ENTMCNC: 30.7 PG — SIGNIFICANT CHANGE UP (ref 27–34)
MCHC RBC-ENTMCNC: 33.6 GM/DL — SIGNIFICANT CHANGE UP (ref 32–36)
MCV RBC AUTO: 91.4 FL — SIGNIFICANT CHANGE UP (ref 80–100)
MONOCYTES # BLD AUTO: 0.84 K/UL — SIGNIFICANT CHANGE UP (ref 0–0.9)
MONOCYTES NFR BLD AUTO: 8.3 % — SIGNIFICANT CHANGE UP (ref 2–14)
NEUTROPHILS # BLD AUTO: 7.25 K/UL — SIGNIFICANT CHANGE UP (ref 1.8–7.4)
NEUTROPHILS NFR BLD AUTO: 71.3 % — SIGNIFICANT CHANGE UP (ref 43–77)
NRBC # BLD: 0 /100 WBCS — SIGNIFICANT CHANGE UP (ref 0–0)
NRBC # FLD: 0 K/UL — SIGNIFICANT CHANGE UP (ref 0–0)
PHOSPHATE SERPL-MCNC: 4.2 MG/DL — SIGNIFICANT CHANGE UP (ref 2.5–4.5)
PLATELET # BLD AUTO: 204 K/UL — SIGNIFICANT CHANGE UP (ref 150–400)
POTASSIUM SERPL-MCNC: 4.6 MMOL/L — SIGNIFICANT CHANGE UP (ref 3.5–5.3)
POTASSIUM SERPL-SCNC: 4.6 MMOL/L — SIGNIFICANT CHANGE UP (ref 3.5–5.3)
PROT SERPL-MCNC: 8 G/DL — SIGNIFICANT CHANGE UP (ref 6–8.3)
RBC # BLD: 4.4 M/UL — SIGNIFICANT CHANGE UP (ref 3.8–5.2)
RBC # FLD: 12.3 % — SIGNIFICANT CHANGE UP (ref 10.3–14.5)
SODIUM SERPL-SCNC: 145 MMOL/L — SIGNIFICANT CHANGE UP (ref 135–145)
WBC # BLD: 10.16 K/UL — SIGNIFICANT CHANGE UP (ref 3.8–10.5)
WBC # FLD AUTO: 10.16 K/UL — SIGNIFICANT CHANGE UP (ref 3.8–10.5)

## 2024-06-04 PROCEDURE — 99223 1ST HOSP IP/OBS HIGH 75: CPT

## 2024-06-04 PROCEDURE — 99497 ADVNCD CARE PLAN 30 MIN: CPT | Mod: 25

## 2024-06-04 RX ADMIN — HEPARIN SODIUM 5000 UNIT(S): 5000 INJECTION INTRAVENOUS; SUBCUTANEOUS at 18:27

## 2024-06-04 RX ADMIN — LETROZOLE 2.5 MILLIGRAM(S): 2.5 TABLET, FILM COATED ORAL at 11:21

## 2024-06-04 RX ADMIN — CHLORHEXIDINE GLUCONATE 1 APPLICATION(S): 213 SOLUTION TOPICAL at 11:28

## 2024-06-04 RX ADMIN — Medication 5 MILLIGRAM(S): at 22:13

## 2024-06-04 RX ADMIN — HEPARIN SODIUM 5000 UNIT(S): 5000 INJECTION INTRAVENOUS; SUBCUTANEOUS at 06:52

## 2024-06-04 RX ADMIN — AMLODIPINE BESYLATE 10 MILLIGRAM(S): 2.5 TABLET ORAL at 06:52

## 2024-06-04 RX ADMIN — RISPERIDONE 0.5 MILLIGRAM(S): 4 TABLET ORAL at 11:22

## 2024-06-04 RX ADMIN — RIVASTIGMINE 3 MILLIGRAM(S): 4.6 PATCH, EXTENDED RELEASE TRANSDERMAL at 22:12

## 2024-06-04 RX ADMIN — ESCITALOPRAM OXALATE 10 MILLIGRAM(S): 10 TABLET, FILM COATED ORAL at 13:37

## 2024-06-04 RX ADMIN — Medication 3 MILLIGRAM(S): at 22:12

## 2024-06-04 RX ADMIN — POLYETHYLENE GLYCOL 3350 17 GRAM(S): 17 POWDER, FOR SOLUTION ORAL at 18:25

## 2024-06-04 RX ADMIN — RIVASTIGMINE 3 MILLIGRAM(S): 4.6 PATCH, EXTENDED RELEASE TRANSDERMAL at 13:37

## 2024-06-04 RX ADMIN — ATORVASTATIN CALCIUM 40 MILLIGRAM(S): 80 TABLET, FILM COATED ORAL at 22:13

## 2024-06-04 RX ADMIN — Medication 25 MILLIGRAM(S): at 22:13

## 2024-06-04 RX ADMIN — SENNA PLUS 2 TABLET(S): 8.6 TABLET ORAL at 22:13

## 2024-06-04 RX ADMIN — RISPERIDONE 0.5 MILLIGRAM(S): 4 TABLET ORAL at 19:30

## 2024-06-04 NOTE — CONSULT NOTE ADULT - PROBLEM SELECTOR RECOMMENDATION 5
Case reviewed with primary team and outpatient geriatric team  Thank you for allowing us to participate in your patient's care. Please page [phone] for any questions/concerns.

## 2024-06-04 NOTE — CONSULT NOTE ADULT - ASSESSMENT
70 yo f hx dementia AOx1 baseline, HTN, DM2 on metformin here for unwitnessed fall. Patient has had worsening confusion, lethargy, decreased PO intake and memory changes from baseline in past few weeks. C/f FTT, progressive dementia.

## 2024-06-04 NOTE — PROGRESS NOTE ADULT - PROBLEM SELECTOR PLAN 3
Intermittently eating per nursing. C/f progressive dementia, FTT  - per S&S: soft and bite sized diet  - nutrition c/s: appreciate recs  - s/p D5 given BG 89, no longer on D5  - trend lytes and BG, monitor for refeeding with P/Mg. Likely due to poor intake and persistently hypokalemic Intermittently eating per nursing. C/f progressive dementia, FTT  - per S&S: soft and bite sized diet  - nutrition c/s: appreciate recs  - s/p D5 given BG 89, no longer on D5  - trend lytes and BG, monitor for refeeding with P/Mg. Likely due to poor intake

## 2024-06-04 NOTE — PROGRESS NOTE ADULT - ASSESSMENT
70 yo f hx dementia AOx1 baseline, HTN, DM2 on metformin here for unwitnessed fall. Patient has had worsening confusion, lethargy, decreased PO intake and memory changes from baseline in past few weeks. C/f FTT, progressive dementia. Having BMs, tolerating pureed diet, and no longer hypoglycemic (off D5). Stable for dc.

## 2024-06-04 NOTE — PROGRESS NOTE ADULT - PROBLEM SELECTOR PLAN 2
Family reports about 1 week of constipation prior to presentation. May also be due to poor po intake. Now having BMs, last on 6/2  - AXR 6/1 moderate stool burden, now having BMs  - C/w senna, increase miralax to BID   - dulcolax QHS   - fleet enema with BM Family reports about 1 week of constipation prior to presentation. May also be due to poor po intake. Now having BMs, last on 6/3  - AXR 6/1 moderate stool burden, now having BMs  - C/w senna, increased miralax to BID   - dulcolax QHS   - fleet enema PRN

## 2024-06-04 NOTE — CONSULT NOTE ADULT - SUBJECTIVE AND OBJECTIVE BOX
Date of Service 06-04-24 @ 19:53    HPI:   68 yo f hx dementia a/o times 1 baseline, HTN, DM2 on metformin here for unwitnessed fall. patient has had worsening confusion, lethargy, decreased PO intake and memory changes from baseline in past few weeks. Patient was found immediately lying on her back, did not self urinate or bite her tongue. Did not endorse chest pain or clutching. Has not been eating for some time now. ROS otherwise negative. Head CT, cervical, and a/p negative for fractures, ICH, or strokes. Follows [facility] for geripsych.    Vital Signs Last 24 Hrs  T(C): 37.2 (31 May 2024 01:51), Max: 37.2 (31 May 2024 01:30)  T(F): 99 (31 May 2024 01:51), Max: 99 (31 May 2024 01:30)  HR: 120 (31 May 2024 01:51) (98 - 120)  BP: 139/66 (31 May 2024 01:51) (111/69 - 139/66)  BP(mean): 79 (31 May 2024 01:30) (79 - 79)  RR: 19 (31 May 2024 01:51) (16 - 20)  SpO2: 98% (31 May 2024 01:51) (97% - 100%)    Parameters below as of 31 May 2024 01:51  Patient On (Oxygen Delivery Method): room air     (31 May 2024 02:47)    PERTINENT PM/SXH:   Breast cancer  Zoster ophthalmicus  HLD (hyperlipidemia)  Prediabetes  Dementia  No significant past surgical history  S/P lumpectomy, right breast    FAMILY HISTORY:  FH: dementia (Mother)    ITEMS NOT CHECKED ARE NOT PRESENT    SOCIAL HISTORY:   Significant other/partner[ ]  Children[x ]  Advent/Spirituality:  Substance hx:  [ ]   Tobacco hx:  [ ]   Alcohol hx: [ ]   Home Opioid hx:  [ ] I-Stop Reference No: [id number]. No medications found on ISTOP   Living Situation: [ x]Home  [ ]Long term care  [ ]Rehab [ ]Other      ADVANCE DIRECTIVES:    MOLST  [ ]  Living Will  [ ]   DECISION MAKER(s):  [ ] Health Care Proxy(s)  [ x] Surrogate(s)  [ ] Guardian           Name(s): Phone Number(s):  Daughter Radha Corrales: [phone]    BASELINE (I)ADL(s) (prior to admission):  [place]: [ ]Total  [ x] Moderate [ ]Dependent    Allergies    No Known Allergies    Intolerances    MEDICATIONS  (STANDING):  amLODIPine   Tablet 10 milliGRAM(s) Oral daily  atorvastatin 40 milliGRAM(s) Oral at bedtime  bisacodyl 5 milliGRAM(s) Oral at bedtime  chlorhexidine 2% Cloths 1 Application(s) Topical daily  dextrose 10% Bolus 125 milliLiter(s) IV Bolus once  dextrose 5%. 1000 milliLiter(s) (100 mL/Hr) IV Continuous <Continuous>  dextrose 5%. 1000 milliLiter(s) (50 mL/Hr) IV Continuous <Continuous>  dextrose 50% Injectable 25 Gram(s) IV Push once  dextrose 50% Injectable 12.5 Gram(s) IV Push once  escitalopram 10 milliGRAM(s) Oral every 24 hours  glucagon  Injectable 1 milliGRAM(s) IntraMuscular once  heparin   Injectable 5000 Unit(s) SubCutaneous every 12 hours  insulin lispro (ADMELOG) corrective regimen sliding scale   SubCutaneous three times a day before meals  insulin lispro (ADMELOG) corrective regimen sliding scale   SubCutaneous at bedtime  letrozole 2.5 milliGRAM(s) Oral daily  melatonin 3 milliGRAM(s) Oral at bedtime  polyethylene glycol 3350 17 Gram(s) Oral two times a day  risperiDONE   Tablet 0.5 milliGRAM(s) Oral every 12 hours  rivastigmine 3 milliGRAM(s) Oral <User Schedule>  saline laxative (FLEET) Rectal Enema 1 Enema Rectal once  senna 2 Tablet(s) Oral at bedtime  traZODone 25 milliGRAM(s) Oral at bedtime    MEDICATIONS  (PRN):  acetaminophen   Oral Liquid .. 650 milliGRAM(s) Oral every 6 hours PRN Temp greater or equal to 38C (100.4F), Mild Pain (1 - 3)  dextrose Oral Gel 15 Gram(s) Oral once PRN Blood Glucose LESS THAN 70 milliGRAM(s)/deciliter  OLANZapine Injectable 1.25 milliGRAM(s) IntraMuscular every 6 hours PRN agitation        ITEMS UNCHECKED ARE NOT PRESENT     PRESENT SYMPTOMS: [ x]Unable to self-report due to altered mental status  [ ] CPOT [x ] PAINADs [x ] RDOS  Source if other than patient:  [ ]Family   [ ]Team     Pain: [ ]yes [x ]no  QOL impact -   Location -                    Aggravating factors -  Quality -  Radiation -  Timing-  Severity (0-10 scale):  Minimal acceptable level / Pain goal (0-10 scale):     CPOT:    https://www.[facility].org/getattachment/[email or web address]/Critical-Care-Pain-Observation-Tool-(CPOT)    Dyspnea:                           [ ]Mild [ ]Moderate [ ]Severe  Anxiety:                             [ ]Mild [ ]Moderate [ ]Severe  Agitation:                          [ ]Mild [ ]Moderate [ ]Severe  Fatigue:                             [ ]Mild [ ]Moderate [ ]Severe  Nausea:                             [ ]Mild [ ]Moderate [ ]Severe  Loss of appetite:              [ ]Mild [ ]Moderate [ ]Severe  Constipation:                   [ ]Mild [ ]Moderate [ ]Severe  Diarrhea:                          [ ]Mild [ ]Moderate [ ]Severe      PCSSQ[Palliative Care Spiritual Screening Question]   Severity (0-10):  Score of 4 or > indicate consideration of Chaplaincy referral.  Chaplaincy Referral: [ ] yes [ ] refused [ ] following [ x] deferred    Caregiver [place]? : [ ] yes [ ] no [ ] Declined   [x ] Deferred            Social work referral [ ] Patient & Family Centered Care Referral [ ]     Anticipatory Grief present?:  [ ] yes [ ] no  [x ] Deferred                  Social work referral [ ] Chaplaincy Referral[ ]    Other Symptoms:  [ ]All other review of systems negative   [ x] Unable to obtain due to poor mentation     PHYSICAL EXAM:  Vital Signs Last 24 Hrs  T(C): 36.6 (04 Jun 2024 14:30), Max: 37.1 (03 Jun 2024 21:25)  T(F): 97.8 (04 Jun 2024 14:30), Max: 98.8 (03 Jun 2024 21:25)  HR: 80 (04 Jun 2024 14:30) (70 - 80)  BP: 102/42 (04 Jun 2024 14:30) (102/42 - 111/64)  BP(mean): --  RR: 18 (04 Jun 2024 14:30) (17 - 18)  SpO2: 100% (04 Jun 2024 14:30) (98% - 100%)    Parameters below as of 04 Jun 2024 14:30  Patient On (Oxygen Delivery Method): room air         I&O's Summary    03 Jun 2024 07:01  -  04 Jun 2024 07:00  --------------------------------------------------------  IN: 0 mL / OUT: 250 mL / NET: -250 mL        GENERAL:  [ x]Alert  [ x]Oriented x 1  [ ]Lethargic  [ ]Cachexia  [ ]Unarousable  [ x]Verbal  [ ]Non-Verbal  [x ] No Distress  Behavioral:   [ ] Anxiety  [ ] Delirium [ ] Agitation [ ] Calm  [x ] Other-encephalopathy  HEENT:  [x ]Normal  [ ] Temporal Wasting  [ ]Dry mouth   [ ]ET Tube/Trach  [ ]Oral lesions  [ ] Mucositis  PULMONARY:   [x ]Clear [ ]Tachypnea  [ ]Audible excessive secretions   [ ]Rhonchi        [ ]Right [ ]Left [ ]Bilateral  [ ]Crackles        [ ]Right [ ]Left [ ]Bilateral  [ ]Wheezing     [ ]Right [ ]Left [ ]Bilateral  [ ]Diminished breath sounds [ ]right [ ]left [ ]bilateral  CARDIOVASCULAR:    [x ]Regular [ ]Irregular [ ]Tachy  [ ]Bakari [ ]Murmur [ ]Other  GASTROINTESTINAL:  [x ]Soft  [ ]Distended   [ ]+BS  [x ]Non tender [ ]Tender  [ ]PEG [ ]OGT/ NGT  Last BM: 6/4  GENITOURINARY:  [ ]Normal [x ] Incontinent   [ ]Oliguria/Anuria   [ ][place]ell  MUSCULOSKELETAL:   [x ]Normal   [x ]Weakness  [ ]Bed/Wheelchair bound [ ]Edema  [  ] amputation  [  ] contraction  NEUROLOGIC:   [ x]No focal deficits  [ x]Cognitive impairment  [ ]Dysphagia [ ]Dysarthria [ ]Paresis [ ]Other   SKIN: See Nursing Skin Assessment for further details  [ ]Normal    [ ]Rash  [ ]Pressure ulcer(s)       Present on admission [ ]y [ ]n   [  ]  Wound    [  ] hyperpigmentation    CRITICAL CARE:  [ ] Shock Present  [ ]Septic [ ]Cardiogenic [ ]Neurologic [ ]Hypovolemic  [ ]  Vasopressors [ ]  Inotropes   [ ]Respiratory failure present [ ]Mechanical ventilation [ ]Non-invasive ventilatory support [ ]High flow    [ ]Acute  [ ]Chronic [ ]Hypoxic  [ ]Hypercarbic [ ]Other  [ ]Other organ failure     LABS:  reviewed                         13.5   10.16 )-----------( 204      ( 04 Jun 2024 06:40 )             40.2   06-04    145  |  105  |  14  ----------------------------<  90  4.6   |  24  |  0.66    Ca    10.1      04 Jun 2024 06:40  Phos  4.2     06-04  Mg     2.00     06-04    TPro  8.0  /  Alb  4.8  /  TBili  0.5  /  DBili  x   /  AST  20  /  ALT  13  /  AlkPhos  70  06-04    Urinalysis Basic - ( 04 Jun 2024 06:40 )    Color: x / Appearance: x / SG: x / pH: x  Gluc: 90 mg/dL / Ketone: x  / Bili: x / Urobili: x   Blood: x / Protein: x / Nitrite: x   Leuk Esterase: x / RBC: x / WBC x   Sq Epi: x / Non Sq Epi: x / Bacteria: x      CAPILLARY BLOOD GLUCOSE      POCT Blood Glucose.: 97 mg/dL (04 Jun 2024 17:34)  POCT Blood Glucose.: 141 mg/dL (04 Jun 2024 12:20)  POCT Blood Glucose.: 96 mg/dL (04 Jun 2024 08:41)  POCT Blood Glucose.: 104 mg/dL (03 Jun 2024 22:51)      RADIOLOGY & ADDITIONAL STUDIES: reviewed     PROTEIN CALORIE MALNUTRITION PRESENT: [ ]mild [ ]moderate [ ]severe [ ]underweight [ ]morbid obesity  https://www.andeal.org/vault/2440/web/files/ONC/Table_Clinical%20Characteristics%20to%20Document%20Malnutrition-White%20JV%20et%20al%202012.pdf    Height (cm): 162.6 (05-31-24 @ 01:51), 162.6 (03-16-24 @ 00:27), 241.3 (02-19-24 @ 21:39)  Weight (kg): 62 (05-31-24 @ 01:51), 68 (03-16-24 @ 00:27), 64.2 (02-28-24 @ 04:55)  BMI (kg/m2): 23.5 (05-31-24 @ 01:51), 25.7 (03-16-24 @ 00:27), 24.3 (03-16-24 @ 00:27)    [ ]PPSV2 < or = to 30% [ ]significant weight loss  [ ]poor nutritional intake  [ ]anasarca [ ]Artificial Nutrition      REFERRALS:   [ ]Chaplaincy  [ ]Hospice  [ ]Child Life  [ ]Social Work  [ x]Case management [ ]Holistic Therapy

## 2024-06-04 NOTE — CONSULT NOTE ADULT - PROBLEM SELECTOR RECOMMENDATION 2
- in setting of dementia +/- delirium  - please coordinate care with family    -Frequent reassurance and verbal orientation   -Family members or other familiar persons by his bedside.   -Delusions and hallucinations should be neither endorsed nor challenged.   -Physical restraints should be avoided. Alternatives to restraint use, such as constant observation (preferably by someone familiar to the patient such as a family member), may be more effective.  -PT eval and early ambulation if possible  -Move to a room with a window   -Update the calendar date in the room.    - Monitor for constipation, urinary retention, pain, hunger, thirst, etc.    - Promote sleep wake cycle and reorientation as indicated.  - Minimize use of benzodiazepines, opioids, anticholinergics, and other deliriogenic agents whenever possible.

## 2024-06-04 NOTE — CONSULT NOTE ADULT - PROBLEM SELECTOR RECOMMENDATION 4
Advanced care planning regarding code status preferences reviewed. Per discussions with daughter Radha, patient should have trial of resuscitative measures of CPR and mechanical intubation, but would not want long term life support.   See GOC note  16 minutes spent on advanced care planning

## 2024-06-04 NOTE — PROGRESS NOTE ADULT - SUBJECTIVE AND OBJECTIVE BOX
PATIENT: DANNY STEVEN, MRN: [id number]    CHIEF COMPLAINT: Patient is a 69y old  Female who presents with a chief complaint of dementia (03 Jun 2024 07:41)      INTERVAL HISTORY/OVERNIGHT EVENTS: No overnight events.       MEDICATIONS:  MEDICATIONS  (STANDING):  amLODIPine   Tablet 10 milliGRAM(s) Oral daily  atorvastatin 40 milliGRAM(s) Oral at bedtime  bisacodyl 5 milliGRAM(s) Oral at bedtime  chlorhexidine 2% Cloths 1 Application(s) Topical daily  dextrose 10% Bolus 125 milliLiter(s) IV Bolus once  dextrose 5%. 1000 milliLiter(s) (100 mL/Hr) IV Continuous <Continuous>  dextrose 5%. 1000 milliLiter(s) (50 mL/Hr) IV Continuous <Continuous>  dextrose 50% Injectable 25 Gram(s) IV Push once  dextrose 50% Injectable 12.5 Gram(s) IV Push once  escitalopram 10 milliGRAM(s) Oral every 24 hours  glucagon  Injectable 1 milliGRAM(s) IntraMuscular once  heparin   Injectable 5000 Unit(s) SubCutaneous every 12 hours  insulin lispro (ADMELOG) corrective regimen sliding scale   SubCutaneous three times a day before meals  insulin lispro (ADMELOG) corrective regimen sliding scale   SubCutaneous at bedtime  letrozole 2.5 milliGRAM(s) Oral daily  melatonin 3 milliGRAM(s) Oral at bedtime  polyethylene glycol 3350 17 Gram(s) Oral two times a day  risperiDONE   Tablet 0.5 milliGRAM(s) Oral every 12 hours  rivastigmine 3 milliGRAM(s) Oral <User Schedule>  saline laxative (FLEET) Rectal Enema 1 Enema Rectal once  senna 2 Tablet(s) Oral at bedtime  traZODone 25 milliGRAM(s) Oral at bedtime    MEDICATIONS  (PRN):  acetaminophen   Oral Liquid .. 650 milliGRAM(s) Oral every 6 hours PRN Temp greater or equal to 38C (100.4F), Mild Pain (1 - 3)  dextrose Oral Gel 15 Gram(s) Oral once PRN Blood Glucose LESS THAN 70 milliGRAM(s)/deciliter  OLANZapine Injectable 1.25 milliGRAM(s) IntraMuscular every 6 hours PRN agitation      ALLERGIES: Allergies    No Known Allergies    Intolerances        OBJECTIVE:  ICU Vital Signs Last 24 Hrs  T(C): 36.6 (04 Jun 2024 06:22), Max: 37.1 (03 Jun 2024 21:25)  T(F): 97.8 (04 Jun 2024 06:22), Max: 98.8 (03 Jun 2024 21:25)  HR: 70 (04 Jun 2024 06:22) (70 - 90)  BP: 111/64 (04 Jun 2024 06:22) (100/55 - 111/64)  BP(mean): --  ABP: --  ABP(mean): --  RR: 18 (04 Jun 2024 06:22) (17 - 18)  SpO2: 100% (04 Jun 2024 06:22) (98% - 100%)    O2 Parameters below as of 04 Jun 2024 06:22  Patient On (Oxygen Delivery Method): room air            CAPILLARY BLOOD GLUCOSE      POCT Blood Glucose.: 104 mg/dL (03 Jun 2024 22:51)  POCT Blood Glucose.: 120 mg/dL (03 Jun 2024 18:14)  POCT Blood Glucose.: 108 mg/dL (03 Jun 2024 12:25)  POCT Blood Glucose.: 105 mg/dL (03 Jun 2024 08:48)    CAPILLARY BLOOD GLUCOSE      POCT Blood Glucose.: 104 mg/dL (03 Jun 2024 22:51)    I&O's Summary    03 Jun 2024 07:01  -  04 Jun 2024 07:00  --------------------------------------------------------  IN: 0 mL / OUT: 250 mL / NET: -250 mL      Daily     Daily     PHYSICAL EXAMINATION:  General: Comfortable, no acute distress, cooperative with exam.  HEENT: PERRLA  Respiratory: CTAB, normal respiratory effort, no coughing, wheezes, crackles, or rales.  CV: RRR, S1S2, no murmurs, rubs or gallops. No JVD. Distal pulses intact.  Abdominal: Soft, nontender, nondistended, no rebound or guarding, normal bowel sounds.  Neurology: AOx3, no focal neuro defects, JOHNSON x 4.  Extremities: No pitting edema, + Peripheral pulses.      LABS:                          13.5   10.16 )-----------( 204      ( 04 Jun 2024 06:40 )             40.2     06-04    x   |  x   |  14  ----------------------------<  90  x    |  24  |  0.66    Ca    10.1      04 Jun 2024 06:40  Phos  4.2     06-04  Mg     2.00     06-04    TPro  8.0  /  Alb  4.8  /  TBili  0.5  /  DBili  x   /  AST  20  /  ALT  13  /  AlkPhos  70  06-04    LIVER FUNCTIONS - ( 04 Jun 2024 06:40 )  Alb: 4.8 g/dL / Pro: 8.0 g/dL / ALK PHOS: 70 U/L / ALT: 13 U/L / AST: 20 U/L / GGT: x                   Urinalysis Basic - ( 04 Jun 2024 06:40 )    Color: x / Appearance: x / SG: x / pH: x  Gluc: 90 mg/dL / Ketone: x  / Bili: x / Urobili: x   Blood: x / Protein: x / Nitrite: x   Leuk Esterase: x / RBC: x / WBC x   Sq Epi: x / Non Sq Epi: x / Bacteria: x       PATIENT: DANNY STEVEN, MRN: [id number]    CHIEF COMPLAINT: Patient is a 69y old  Female who presents with a chief complaint of dementia (03 Jun 2024 07:41)      INTERVAL HISTORY/OVERNIGHT EVENTS: No overnight events. Had BM      MEDICATIONS:  MEDICATIONS  (STANDING):  amLODIPine   Tablet 10 milliGRAM(s) Oral daily  atorvastatin 40 milliGRAM(s) Oral at bedtime  bisacodyl 5 milliGRAM(s) Oral at bedtime  chlorhexidine 2% Cloths 1 Application(s) Topical daily  dextrose 10% Bolus 125 milliLiter(s) IV Bolus once  dextrose 5%. 1000 milliLiter(s) (100 mL/Hr) IV Continuous <Continuous>  dextrose 5%. 1000 milliLiter(s) (50 mL/Hr) IV Continuous <Continuous>  dextrose 50% Injectable 25 Gram(s) IV Push once  dextrose 50% Injectable 12.5 Gram(s) IV Push once  escitalopram 10 milliGRAM(s) Oral every 24 hours  glucagon  Injectable 1 milliGRAM(s) IntraMuscular once  heparin   Injectable 5000 Unit(s) SubCutaneous every 12 hours  insulin lispro (ADMELOG) corrective regimen sliding scale   SubCutaneous three times a day before meals  insulin lispro (ADMELOG) corrective regimen sliding scale   SubCutaneous at bedtime  letrozole 2.5 milliGRAM(s) Oral daily  melatonin 3 milliGRAM(s) Oral at bedtime  polyethylene glycol 3350 17 Gram(s) Oral two times a day  risperiDONE   Tablet 0.5 milliGRAM(s) Oral every 12 hours  rivastigmine 3 milliGRAM(s) Oral <User Schedule>  saline laxative (FLEET) Rectal Enema 1 Enema Rectal once  senna 2 Tablet(s) Oral at bedtime  traZODone 25 milliGRAM(s) Oral at bedtime    MEDICATIONS  (PRN):  acetaminophen   Oral Liquid .. 650 milliGRAM(s) Oral every 6 hours PRN Temp greater or equal to 38C (100.4F), Mild Pain (1 - 3)  dextrose Oral Gel 15 Gram(s) Oral once PRN Blood Glucose LESS THAN 70 milliGRAM(s)/deciliter  OLANZapine Injectable 1.25 milliGRAM(s) IntraMuscular every 6 hours PRN agitation      ALLERGIES: Allergies    No Known Allergies    Intolerances        OBJECTIVE:  ICU Vital Signs Last 24 Hrs  T(C): 36.6 (04 Jun 2024 06:22), Max: 37.1 (03 Jun 2024 21:25)  T(F): 97.8 (04 Jun 2024 06:22), Max: 98.8 (03 Jun 2024 21:25)  HR: 70 (04 Jun 2024 06:22) (70 - 90)  BP: 111/64 (04 Jun 2024 06:22) (100/55 - 111/64)  BP(mean): --  ABP: --  ABP(mean): --  RR: 18 (04 Jun 2024 06:22) (17 - 18)  SpO2: 100% (04 Jun 2024 06:22) (98% - 100%)    O2 Parameters below as of 04 Jun 2024 06:22  Patient On (Oxygen Delivery Method): room air            CAPILLARY BLOOD GLUCOSE      POCT Blood Glucose.: 104 mg/dL (03 Jun 2024 22:51)  POCT Blood Glucose.: 120 mg/dL (03 Jun 2024 18:14)  POCT Blood Glucose.: 108 mg/dL (03 Jun 2024 12:25)  POCT Blood Glucose.: 105 mg/dL (03 Jun 2024 08:48)    CAPILLARY BLOOD GLUCOSE      POCT Blood Glucose.: 104 mg/dL (03 Jun 2024 22:51)    I&O's Summary    03 Jun 2024 07:01  -  04 Jun 2024 07:00  --------------------------------------------------------  IN: 0 mL / OUT: 250 mL / NET: -250 mL      Daily     Daily     PHYSICAL EXAMINATION:  General: Comfortable, no acute distress, cooperative with exam.  Respiratory: CTAB, normal respiratory effort, no coughing, wheezes, crackles, or rales.  CV: RRR, S1S2, no murmurs, rubs or gallops.  Abdominal: Soft, nontender, nondistended, no rebound or guarding  Neurology: no focal neuro defects, JOHNSON x 4.  Extremities: No pitting edema      LABS:                          13.5   10.16 )-----------( 204      ( 04 Jun 2024 06:40 )             40.2     06-04    x   |  x   |  14  ----------------------------<  90  x    |  24  |  0.66    Ca    10.1      04 Jun 2024 06:40  Phos  4.2     06-04  Mg     2.00     06-04    TPro  8.0  /  Alb  4.8  /  TBili  0.5  /  DBili  x   /  AST  20  /  ALT  13  /  AlkPhos  70  06-04    LIVER FUNCTIONS - ( 04 Jun 2024 06:40 )  Alb: 4.8 g/dL / Pro: 8.0 g/dL / ALK PHOS: 70 U/L / ALT: 13 U/L / AST: 20 U/L / GGT: x                   Urinalysis Basic - ( 04 Jun 2024 06:40 )    Color: x / Appearance: x / SG: x / pH: x  Gluc: 90 mg/dL / Ketone: x  / Bili: x / Urobili: x   Blood: x / Protein: x / Nitrite: x   Leuk Esterase: x / RBC: x / WBC x   Sq Epi: x / Non Sq Epi: x / Bacteria: x

## 2024-06-04 NOTE — PROGRESS NOTE ADULT - PROBLEM SELECTOR PLAN 4
- Most likely due to poor PO intake  - Replete as needed - Most likely due to poor PO intake, now stable  - Replete as needed

## 2024-06-04 NOTE — PROGRESS NOTE ADULT - PROBLEM SELECTOR PLAN 7
-DVT prophylaxis HSQ  -Diet: soft and bite sized  -Code: full code. family to discuss possible DNR/DNI status  -Dispo: to [facility] pending medical optimization -DVT prophylaxis HSQ  -Diet: soft and bite sized  -Code: full code. family to discuss possible DNR/DNI status  -Dispo: to [facility] pending being off observation for 24h

## 2024-06-04 NOTE — CONSULT NOTE ADULT - PROBLEM SELECTOR RECOMMENDATION 9
- Outpatient geriatric notes reviewed   - supportive care  - Behavioral health recommendations appreciated

## 2024-06-04 NOTE — CONSULT NOTE ADULT - TIME BILLING
Time spent for extensive review of the physical chart, electronic medical record, and documentation to obtain collateral information including but not limited to:  [x] Inpatient records (ED, H&P, primary team, and consultants if applicable, care coordination)  [x] Inpatient values/results (biomarkers, immunoassays, imaging, and microbiology results)  [x] Current or proposed treatment plans  [x] Pharmacotherapy review  [x] Discussion and coordinating care with primary team and interdisciplinary staff and floor staff  [x] Discussion including counseling/ education with the patient, surrogate decision maker, or family    In addition to above time, Spent 16 minutes separately discussing goals of care/ advanced care planning with family

## 2024-06-04 NOTE — GOALS OF CARE CONVERSATION - ADVANCED CARE PLANNING - CONVERSATION DETAILS
Patient is ; she has 3 daughters. She resides with daughter Radha; Radha shares patient had completed HCP paperwork which she is still attempting to locate, but that her siblings are in agreement for her to make decisions on behalf of patient.    Patient has had decline recently including poor PO intake. Radha shared events leading to hospitalization. She shares attempting to decide on home vs rehab at this time. She worries that patient may not do well in rehab environment due to delirium in a foreign environment and only okay being around non-family members for a short period at a time; she is also unsure of how much benefit patient will gain from PT. She will continue to evaluate on best disposition option for her mother.     Advanced care planning extensively discussed in setting of dementia. Daughter states she has discussed with patient in the past. Daughter states patient should have attempts of resuscitative interventions event of cardiopulmonary arrest but would not want long term life support.     Emotional support provided

## 2024-06-05 LAB
ANION GAP SERPL CALC-SCNC: 13 MMOL/L — SIGNIFICANT CHANGE UP (ref 7–14)
BUN SERPL-MCNC: 15 MG/DL — SIGNIFICANT CHANGE UP (ref 7–23)
CALCIUM SERPL-MCNC: 9.3 MG/DL — SIGNIFICANT CHANGE UP (ref 8.4–10.5)
CHLORIDE SERPL-SCNC: 104 MMOL/L — SIGNIFICANT CHANGE UP (ref 98–107)
CO2 SERPL-SCNC: 25 MMOL/L — SIGNIFICANT CHANGE UP (ref 22–31)
CREAT SERPL-MCNC: 0.67 MG/DL — SIGNIFICANT CHANGE UP (ref 0.5–1.3)
EGFR: 95 ML/MIN/1.73M2 — SIGNIFICANT CHANGE UP
GLUCOSE SERPL-MCNC: 89 MG/DL — SIGNIFICANT CHANGE UP (ref 70–99)
MAGNESIUM SERPL-MCNC: 2 MG/DL — SIGNIFICANT CHANGE UP (ref 1.6–2.6)
PHOSPHATE SERPL-MCNC: 4.3 MG/DL — SIGNIFICANT CHANGE UP (ref 2.5–4.5)
POTASSIUM SERPL-MCNC: 4.1 MMOL/L — SIGNIFICANT CHANGE UP (ref 3.5–5.3)
POTASSIUM SERPL-SCNC: 4.1 MMOL/L — SIGNIFICANT CHANGE UP (ref 3.5–5.3)
SODIUM SERPL-SCNC: 142 MMOL/L — SIGNIFICANT CHANGE UP (ref 135–145)

## 2024-06-05 RX ADMIN — ESCITALOPRAM OXALATE 10 MILLIGRAM(S): 10 TABLET, FILM COATED ORAL at 13:17

## 2024-06-05 RX ADMIN — RIVASTIGMINE 3 MILLIGRAM(S): 4.6 PATCH, EXTENDED RELEASE TRANSDERMAL at 13:18

## 2024-06-05 RX ADMIN — ATORVASTATIN CALCIUM 40 MILLIGRAM(S): 80 TABLET, FILM COATED ORAL at 22:37

## 2024-06-05 RX ADMIN — HEPARIN SODIUM 5000 UNIT(S): 5000 INJECTION INTRAVENOUS; SUBCUTANEOUS at 06:58

## 2024-06-05 RX ADMIN — Medication 5 MILLIGRAM(S): at 22:38

## 2024-06-05 RX ADMIN — Medication 25 MILLIGRAM(S): at 22:37

## 2024-06-05 RX ADMIN — POLYETHYLENE GLYCOL 3350 17 GRAM(S): 17 POWDER, FOR SOLUTION ORAL at 06:58

## 2024-06-05 RX ADMIN — CHLORHEXIDINE GLUCONATE 1 APPLICATION(S): 213 SOLUTION TOPICAL at 13:18

## 2024-06-05 RX ADMIN — SENNA PLUS 2 TABLET(S): 8.6 TABLET ORAL at 22:38

## 2024-06-05 RX ADMIN — RIVASTIGMINE 3 MILLIGRAM(S): 4.6 PATCH, EXTENDED RELEASE TRANSDERMAL at 22:37

## 2024-06-05 RX ADMIN — POLYETHYLENE GLYCOL 3350 17 GRAM(S): 17 POWDER, FOR SOLUTION ORAL at 18:58

## 2024-06-05 RX ADMIN — RISPERIDONE 0.5 MILLIGRAM(S): 4 TABLET ORAL at 22:39

## 2024-06-05 RX ADMIN — Medication 3 MILLIGRAM(S): at 22:38

## 2024-06-05 RX ADMIN — LETROZOLE 2.5 MILLIGRAM(S): 2.5 TABLET, FILM COATED ORAL at 13:17

## 2024-06-05 RX ADMIN — HEPARIN SODIUM 5000 UNIT(S): 5000 INJECTION INTRAVENOUS; SUBCUTANEOUS at 18:57

## 2024-06-05 RX ADMIN — RISPERIDONE 0.5 MILLIGRAM(S): 4 TABLET ORAL at 08:39

## 2024-06-05 NOTE — PROGRESS NOTE ADULT - PROBLEM SELECTOR PLAN 7
-DVT prophylaxis HSQ  -Diet: soft and bite sized  -Code: full code. family to discuss possible DNR/DNI status  -Dispo: to [facility] pending being off observation for 24h -DVT prophylaxis HSQ  -Diet: soft and bite sized  -Code: full code. not wanting long term life support. Trial of CPR/intubation.  -Dispo: to [facility] v home depending on family conversation

## 2024-06-05 NOTE — PROGRESS NOTE ADULT - PROBLEM SELECTOR PLAN 2
Family reports about 1 week of constipation prior to presentation. May also be due to poor po intake. Now having BMs, last on 6/3  - AXR 6/1 moderate stool burden, now having BMs  - C/w senna, increased miralax to BID   - dulcolax QHS   - fleet enema PRN Family reports about 1 week of constipation prior to presentation. May have originally been due to poor po intake. Now having BMs, last on 6/4  - C/w senna, increased miralax to BID   - dulcolax QHS   - fleet enema PRN

## 2024-06-05 NOTE — PROGRESS NOTE ADULT - PROBLEM SELECTOR PLAN 3
Intermittently eating per nursing. C/f progressive dementia, FTT  - per S&S: soft and bite sized diet  - nutrition c/s: appreciate recs  - s/p D5 given BG 89, no longer on D5  - trend lytes and BG, monitor for refeeding with P/Mg. Likely due to poor intake Intermittently eating per nursing. C/f progressive dementia, FTT  - per S&S: soft and bite sized diet  - nutrition c/s: appreciate recs  - s/p D5 although now glucoses normal while off dextrose  - trend lytes and BG

## 2024-06-05 NOTE — PROGRESS NOTE ADULT - SUBJECTIVE AND OBJECTIVE BOX
PATIENT: DANNY STEVEN, MRN: [id number]    CHIEF COMPLAINT: Patient is a 69y old  Female who presents with a chief complaint of dementia (04 Jun 2024 19:52)      INTERVAL HISTORY/OVERNIGHT EVENTS: No overnight events.       MEDICATIONS:  MEDICATIONS  (STANDING):  amLODIPine   Tablet 10 milliGRAM(s) Oral daily  atorvastatin 40 milliGRAM(s) Oral at bedtime  bisacodyl 5 milliGRAM(s) Oral at bedtime  chlorhexidine 2% Cloths 1 Application(s) Topical daily  dextrose 10% Bolus 125 milliLiter(s) IV Bolus once  dextrose 5%. 1000 milliLiter(s) (50 mL/Hr) IV Continuous <Continuous>  dextrose 5%. 1000 milliLiter(s) (100 mL/Hr) IV Continuous <Continuous>  dextrose 50% Injectable 12.5 Gram(s) IV Push once  dextrose 50% Injectable 25 Gram(s) IV Push once  escitalopram 10 milliGRAM(s) Oral every 24 hours  glucagon  Injectable 1 milliGRAM(s) IntraMuscular once  heparin   Injectable 5000 Unit(s) SubCutaneous every 12 hours  insulin lispro (ADMELOG) corrective regimen sliding scale   SubCutaneous three times a day before meals  insulin lispro (ADMELOG) corrective regimen sliding scale   SubCutaneous at bedtime  letrozole 2.5 milliGRAM(s) Oral daily  melatonin 3 milliGRAM(s) Oral at bedtime  polyethylene glycol 3350 17 Gram(s) Oral two times a day  risperiDONE   Tablet 0.5 milliGRAM(s) Oral every 12 hours  rivastigmine 3 milliGRAM(s) Oral <User Schedule>  saline laxative (FLEET) Rectal Enema 1 Enema Rectal once  senna 2 Tablet(s) Oral at bedtime  traZODone 25 milliGRAM(s) Oral at bedtime    MEDICATIONS  (PRN):  acetaminophen   Oral Liquid .. 650 milliGRAM(s) Oral every 6 hours PRN Temp greater or equal to 38C (100.4F), Mild Pain (1 - 3)  dextrose Oral Gel 15 Gram(s) Oral once PRN Blood Glucose LESS THAN 70 milliGRAM(s)/deciliter  OLANZapine Injectable 1.25 milliGRAM(s) IntraMuscular every 6 hours PRN agitation      ALLERGIES: Allergies    No Known Allergies    Intolerances        OBJECTIVE:  ICU Vital Signs Last 24 Hrs  T(C): 36.6 (05 Jun 2024 05:30), Max: 36.6 (04 Jun 2024 14:30)  T(F): 97.8 (05 Jun 2024 05:30), Max: 97.8 (04 Jun 2024 14:30)  HR: 78 (05 Jun 2024 05:30) (55 - 80)  BP: 109/78 (05 Jun 2024 05:30) (102/42 - 114/65)  BP(mean): --  ABP: --  ABP(mean): --  RR: 18 (05 Jun 2024 05:30) (18 - 18)  SpO2: 96% (05 Jun 2024 05:30) (96% - 100%)    O2 Parameters below as of 05 Jun 2024 05:30  Patient On (Oxygen Delivery Method): room air            CAPILLARY BLOOD GLUCOSE      POCT Blood Glucose.: 89 mg/dL (05 Jun 2024 05:58)  POCT Blood Glucose.: 114 mg/dL (05 Jun 2024 01:59)  POCT Blood Glucose.: 89 mg/dL (04 Jun 2024 22:09)  POCT Blood Glucose.: 97 mg/dL (04 Jun 2024 17:34)  POCT Blood Glucose.: 141 mg/dL (04 Jun 2024 12:20)  POCT Blood Glucose.: 96 mg/dL (04 Jun 2024 08:41)    CAPILLARY BLOOD GLUCOSE      POCT Blood Glucose.: 89 mg/dL (05 Jun 2024 05:58)    I&O's Summary    04 Jun 2024 07:01  -  05 Jun 2024 07:00  --------------------------------------------------------  IN: 400 mL / OUT: 500 mL / NET: -100 mL      Daily     Daily     PHYSICAL EXAMINATION:  General: Elderly appearing woman, laying back in bed, responding but seemingly tired  Respiratory: CTAB, normal respiratory effort, no coughing, wheezes, crackles, or rales.  CV: RRR, S1S2, no murmurs, rubs or gallops..  Abdominal: Soft, nontender, nondistended, no rebound or guarding  Neurology: no focal neuro defects, JOHNSON x 4. Somewhat confused  Extremities: No pitting edema      LABS:                          13.5   10.16 )-----------( 204      ( 04 Jun 2024 06:40 )             40.2     06-04    145  |  105  |  14  ----------------------------<  90  4.6   |  24  |  0.66    Ca    10.1      04 Jun 2024 06:40  Phos  4.2     06-04  Mg     2.00     06-04    TPro  8.0  /  Alb  4.8  /  TBili  0.5  /  DBili  x   /  AST  20  /  ALT  13  /  AlkPhos  70  06-04    LIVER FUNCTIONS - ( 04 Jun 2024 06:40 )  Alb: 4.8 g/dL / Pro: 8.0 g/dL / ALK PHOS: 70 U/L / ALT: 13 U/L / AST: 20 U/L / GGT: x                   Urinalysis Basic - ( 04 Jun 2024 06:40 )    Color: x / Appearance: x / SG: x / pH: x  Gluc: 90 mg/dL / Ketone: x  / Bili: x / Urobili: x   Blood: x / Protein: x / Nitrite: x   Leuk Esterase: x / RBC: x / WBC x   Sq Epi: x / Non Sq Epi: x / Bacteria: x       PATIENT: DANNY STEVEN, MRN: [id number]    CHIEF COMPLAINT: Patient is a 69y old  Female who presents with a chief complaint of dementia (04 Jun 2024 19:52)      INTERVAL HISTORY/OVERNIGHT EVENTS: No overnight events. Patient is eating drinking, having BMs      MEDICATIONS:  MEDICATIONS  (STANDING):  amLODIPine   Tablet 10 milliGRAM(s) Oral daily  atorvastatin 40 milliGRAM(s) Oral at bedtime  bisacodyl 5 milliGRAM(s) Oral at bedtime  chlorhexidine 2% Cloths 1 Application(s) Topical daily  dextrose 10% Bolus 125 milliLiter(s) IV Bolus once  dextrose 5%. 1000 milliLiter(s) (50 mL/Hr) IV Continuous <Continuous>  dextrose 5%. 1000 milliLiter(s) (100 mL/Hr) IV Continuous <Continuous>  dextrose 50% Injectable 12.5 Gram(s) IV Push once  dextrose 50% Injectable 25 Gram(s) IV Push once  escitalopram 10 milliGRAM(s) Oral every 24 hours  glucagon  Injectable 1 milliGRAM(s) IntraMuscular once  heparin   Injectable 5000 Unit(s) SubCutaneous every 12 hours  insulin lispro (ADMELOG) corrective regimen sliding scale   SubCutaneous three times a day before meals  insulin lispro (ADMELOG) corrective regimen sliding scale   SubCutaneous at bedtime  letrozole 2.5 milliGRAM(s) Oral daily  melatonin 3 milliGRAM(s) Oral at bedtime  polyethylene glycol 3350 17 Gram(s) Oral two times a day  risperiDONE   Tablet 0.5 milliGRAM(s) Oral every 12 hours  rivastigmine 3 milliGRAM(s) Oral <User Schedule>  saline laxative (FLEET) Rectal Enema 1 Enema Rectal once  senna 2 Tablet(s) Oral at bedtime  traZODone 25 milliGRAM(s) Oral at bedtime    MEDICATIONS  (PRN):  acetaminophen   Oral Liquid .. 650 milliGRAM(s) Oral every 6 hours PRN Temp greater or equal to 38C (100.4F), Mild Pain (1 - 3)  dextrose Oral Gel 15 Gram(s) Oral once PRN Blood Glucose LESS THAN 70 milliGRAM(s)/deciliter  OLANZapine Injectable 1.25 milliGRAM(s) IntraMuscular every 6 hours PRN agitation      ALLERGIES: Allergies    No Known Allergies    Intolerances        OBJECTIVE:  ICU Vital Signs Last 24 Hrs  T(C): 36.6 (05 Jun 2024 05:30), Max: 36.6 (04 Jun 2024 14:30)  T(F): 97.8 (05 Jun 2024 05:30), Max: 97.8 (04 Jun 2024 14:30)  HR: 78 (05 Jun 2024 05:30) (55 - 80)  BP: 109/78 (05 Jun 2024 05:30) (102/42 - 114/65)  BP(mean): --  ABP: --  ABP(mean): --  RR: 18 (05 Jun 2024 05:30) (18 - 18)  SpO2: 96% (05 Jun 2024 05:30) (96% - 100%)    O2 Parameters below as of 05 Jun 2024 05:30  Patient On (Oxygen Delivery Method): room air            CAPILLARY BLOOD GLUCOSE      POCT Blood Glucose.: 89 mg/dL (05 Jun 2024 05:58)  POCT Blood Glucose.: 114 mg/dL (05 Jun 2024 01:59)  POCT Blood Glucose.: 89 mg/dL (04 Jun 2024 22:09)  POCT Blood Glucose.: 97 mg/dL (04 Jun 2024 17:34)  POCT Blood Glucose.: 141 mg/dL (04 Jun 2024 12:20)  POCT Blood Glucose.: 96 mg/dL (04 Jun 2024 08:41)    CAPILLARY BLOOD GLUCOSE      POCT Blood Glucose.: 89 mg/dL (05 Jun 2024 05:58)    I&O's Summary    04 Jun 2024 07:01  -  05 Jun 2024 07:00  --------------------------------------------------------  IN: 400 mL / OUT: 500 mL / NET: -100 mL      Daily     Daily     PHYSICAL EXAMINATION:  General: Elderly appearing woman, laying back in bed, responding but seemingly tired  Respiratory: CTAB, normal respiratory effort, no coughing, wheezes, crackles, or rales.  CV: RRR, S1S2, no murmurs, rubs or gallops..  Abdominal: Soft, nontender, nondistended, no rebound or guarding  Neurology: no focal neuro defects, JOHNSON x 4. Somewhat confused  Extremities: No pitting edema      LABS:                          13.5   10.16 )-----------( 204      ( 04 Jun 2024 06:40 )             40.2     06-04    145  |  105  |  14  ----------------------------<  90  4.6   |  24  |  0.66    Ca    10.1      04 Jun 2024 06:40  Phos  4.2     06-04  Mg     2.00     06-04    TPro  8.0  /  Alb  4.8  /  TBili  0.5  /  DBili  x   /  AST  20  /  ALT  13  /  AlkPhos  70  06-04    LIVER FUNCTIONS - ( 04 Jun 2024 06:40 )  Alb: 4.8 g/dL / Pro: 8.0 g/dL / ALK PHOS: 70 U/L / ALT: 13 U/L / AST: 20 U/L / GGT: x                   Urinalysis Basic - ( 04 Jun 2024 06:40 )    Color: x / Appearance: x / SG: x / pH: x  Gluc: 90 mg/dL / Ketone: x  / Bili: x / Urobili: x   Blood: x / Protein: x / Nitrite: x   Leuk Esterase: x / RBC: x / WBC x   Sq Epi: x / Non Sq Epi: x / Bacteria: x

## 2024-06-05 NOTE — PROGRESS NOTE ADULT - ASSESSMENT
68 yo f hx dementia AOx1 baseline, HTN, DM2 on metformin here for unwitnessed fall. Patient has had worsening confusion, lethargy, decreased PO intake and memory changes from baseline in past few weeks. C/f FTT, progressive dementia. Having BMs, tolerating pureed diet, and no longer hypoglycemic (off D5). Stable for dc.

## 2024-06-05 NOTE — PROGRESS NOTE ADULT - PROBLEM SELECTOR PLAN 6
A1c 5,4     -Monitor Q6 If NPO. Qac and qhs once having PO  -If FS remain controlled can likely limit FS given A1c level A1c 5,4     - monitor glucose given hypoglycemia from poor nutrition initially, now improved

## 2024-06-05 NOTE — PROGRESS NOTE ADULT - PROBLEM SELECTOR PLAN 1
-Low concern for seizure, no tongue biting or self urination.  -CT abdomen and pelvis: no bowel obstruction. 5 mm hypoattenuating cystic focus in pancreas  -CT head: no acute ICH, mass effect, midline shift  -CT cervical spine: motion degraded, nondiagnostic exam  -Urinalysis: negative   -thyroid function normal    -c/w home regimen  -seen by BH: c/w home meds, no further IP  needs at this time  -for agitation: Zyprexa 1.25mg q6h prn with additional Zyprexa 1.25mg for refractory agitation if needed  -Aspiration precautions/fall precautions -Low concern for seizure, no tongue biting or self urination.  -CT abdomen and pelvis: no bowel obstruction. 5 mm hypoattenuating cystic focus in pancreas  -CT head: no acute ICH, mass effect, midline shift  -CT cervical spine: motion degraded, nondiagnostic exam  -Urinalysis: negative   -thyroid function normal    -c/w home regimen  -seen by BH: c/w home meds, no further IP  needs at this time  -for agitation: Zyprexa 1.25mg q6h prn with additional Zyprexa 1.25mg for refractory agitation if needed  -Aspiration precautions/fall precautions  -Off enhanced observation

## 2024-06-06 ENCOUNTER — TRANSCRIPTION ENCOUNTER (OUTPATIENT)
Age: 69
End: 2024-06-06

## 2024-06-06 VITALS
RESPIRATION RATE: 18 BRPM | HEART RATE: 80 BPM | DIASTOLIC BLOOD PRESSURE: 61 MMHG | OXYGEN SATURATION: 99 % | SYSTOLIC BLOOD PRESSURE: 124 MMHG | TEMPERATURE: 99 F

## 2024-06-06 LAB
ANION GAP SERPL CALC-SCNC: 11 MMOL/L — SIGNIFICANT CHANGE UP (ref 7–14)
BUN SERPL-MCNC: 17 MG/DL — SIGNIFICANT CHANGE UP (ref 7–23)
CALCIUM SERPL-MCNC: 9.3 MG/DL — SIGNIFICANT CHANGE UP (ref 8.4–10.5)
CHLORIDE SERPL-SCNC: 104 MMOL/L — SIGNIFICANT CHANGE UP (ref 98–107)
CO2 SERPL-SCNC: 26 MMOL/L — SIGNIFICANT CHANGE UP (ref 22–31)
CREAT SERPL-MCNC: 0.64 MG/DL — SIGNIFICANT CHANGE UP (ref 0.5–1.3)
EGFR: 96 ML/MIN/1.73M2 — SIGNIFICANT CHANGE UP
GLUCOSE SERPL-MCNC: 91 MG/DL — SIGNIFICANT CHANGE UP (ref 70–99)
MAGNESIUM SERPL-MCNC: 2.1 MG/DL — SIGNIFICANT CHANGE UP (ref 1.6–2.6)
PHOSPHATE SERPL-MCNC: 3.8 MG/DL — SIGNIFICANT CHANGE UP (ref 2.5–4.5)
POTASSIUM SERPL-MCNC: 3.8 MMOL/L — SIGNIFICANT CHANGE UP (ref 3.5–5.3)
POTASSIUM SERPL-SCNC: 3.8 MMOL/L — SIGNIFICANT CHANGE UP (ref 3.5–5.3)
SODIUM SERPL-SCNC: 141 MMOL/L — SIGNIFICANT CHANGE UP (ref 135–145)

## 2024-06-06 RX ORDER — LETROZOLE 2.5 MG/1
1 TABLET, FILM COATED ORAL
Qty: 0 | Refills: 0 | DISCHARGE
Start: 2024-06-06

## 2024-06-06 RX ORDER — RISPERIDONE 4 MG/1
1 TABLET ORAL
Qty: 0 | Refills: 0 | DISCHARGE
Start: 2024-06-06

## 2024-06-06 RX ORDER — RISPERIDONE 4 MG/1
1 TABLET ORAL
Refills: 0 | DISCHARGE

## 2024-06-06 RX ORDER — RIVASTIGMINE 4.6 MG/24H
1 PATCH, EXTENDED RELEASE TRANSDERMAL
Qty: 0 | Refills: 0 | DISCHARGE
Start: 2024-06-06

## 2024-06-06 RX ORDER — TRAZODONE HCL 50 MG
25 TABLET ORAL
Qty: 0 | Refills: 0 | DISCHARGE
Start: 2024-06-06

## 2024-06-06 RX ORDER — ESCITALOPRAM OXALATE 10 MG/1
1 TABLET, FILM COATED ORAL
Refills: 0 | DISCHARGE

## 2024-06-06 RX ORDER — AMLODIPINE BESYLATE AND BENAZEPRIL HYDROCHLORIDE 10; 20 MG/1; MG/1
1 CAPSULE ORAL
Refills: 0 | DISCHARGE

## 2024-06-06 RX ORDER — RIVASTIGMINE 4.6 MG/24H
1 PATCH, EXTENDED RELEASE TRANSDERMAL
Refills: 0 | DISCHARGE

## 2024-06-06 RX ORDER — LETROZOLE 2.5 MG/1
1 TABLET, FILM COATED ORAL
Refills: 0 | DISCHARGE

## 2024-06-06 RX ORDER — AMLODIPINE BESYLATE 2.5 MG/1
1 TABLET ORAL
Qty: 0 | Refills: 0 | DISCHARGE
Start: 2024-06-06

## 2024-06-06 RX ORDER — ESCITALOPRAM OXALATE 10 MG/1
1 TABLET, FILM COATED ORAL
Qty: 0 | Refills: 0 | DISCHARGE
Start: 2024-06-06

## 2024-06-06 RX ADMIN — ESCITALOPRAM OXALATE 10 MILLIGRAM(S): 10 TABLET, FILM COATED ORAL at 14:01

## 2024-06-06 RX ADMIN — CHLORHEXIDINE GLUCONATE 1 APPLICATION(S): 213 SOLUTION TOPICAL at 11:49

## 2024-06-06 RX ADMIN — LETROZOLE 2.5 MILLIGRAM(S): 2.5 TABLET, FILM COATED ORAL at 11:46

## 2024-06-06 RX ADMIN — RISPERIDONE 0.5 MILLIGRAM(S): 4 TABLET ORAL at 11:46

## 2024-06-06 RX ADMIN — HEPARIN SODIUM 5000 UNIT(S): 5000 INJECTION INTRAVENOUS; SUBCUTANEOUS at 06:01

## 2024-06-06 RX ADMIN — AMLODIPINE BESYLATE 10 MILLIGRAM(S): 2.5 TABLET ORAL at 06:00

## 2024-06-06 RX ADMIN — RIVASTIGMINE 3 MILLIGRAM(S): 4.6 PATCH, EXTENDED RELEASE TRANSDERMAL at 14:01

## 2024-06-06 RX ADMIN — POLYETHYLENE GLYCOL 3350 17 GRAM(S): 17 POWDER, FOR SOLUTION ORAL at 06:00

## 2024-06-06 NOTE — SWALLOW BEDSIDE ASSESSMENT ADULT - ASR SWALLOW RECOMMEND DIAG
Objective testing not warranted at this time given no overt signs of impaired airway protection
Objective testing not warranted at this time given no overt signs of impaired airway protection

## 2024-06-06 NOTE — PROGRESS NOTE ADULT - PROBLEM SELECTOR PLAN 1
-Low concern for seizure, no tongue biting or self urination.  -CT abdomen and pelvis: no bowel obstruction. 5 mm hypoattenuating cystic focus in pancreas  -CT head: no acute ICH, mass effect, midline shift  -CT cervical spine: motion degraded, nondiagnostic exam  -Urinalysis: negative   -thyroid function normal    -c/w home regimen  -seen by BH: c/w home meds, no further IP  needs at this time  -for agitation: Zyprexa 1.25mg q6h prn with additional Zyprexa 1.25mg for refractory agitation if needed  -Aspiration precautions/fall precautions  -Off enhanced observation

## 2024-06-06 NOTE — SWALLOW BEDSIDE ASSESSMENT ADULT - SWALLOW EVAL: DIAGNOSIS
1- Mild oral stage for puree, regular solids, and thin liquids marked by adequate oral containment, slow bolus manipulation, slow mastication for regular solids with slow anterior to posterior transfer, and mild oral residue for regular solids which clears with liquid wash. 2- Functional pharyngeal stage for puree, regular solids, and thin liquids marked by initiation of the pharyngeal swallow with hyolaryngeal excursion upon palpation without evidence of impaired airway protection.
1- Mild oral stage for puree, regular solids, and thin liquids marked by adequate oral containment, adequate bolus manipulation, mildly slow mastication for regular solids with patient taking small bites, adequate anterior to posterior transfer, and adequate clearance. 2- Functional pharyngeal stage for puree, regular solids, and thin liquids marked by initiation of the pharyngeal swallow with hyolaryngeal excursion upon palpation.

## 2024-06-06 NOTE — DISCHARGE NOTE NURSING/CASE MANAGEMENT/SOCIAL WORK - NSDCPEFALRISK_GEN_ALL_CORE
For information on Fall & Injury Prevention, visit: [email or web address] OR  [email or web address] OR  https://www.cdc.gov/steadi/patient.html

## 2024-06-06 NOTE — SWALLOW BEDSIDE ASSESSMENT ADULT - ASR SWALLOW REFERRAL
RD consult/follow to ensure adequate daily nutritional intake; patient may benefit from oral supplements (e.g. Ensure)/Registered Dietitian
RD consult/follow up to ensure adequate daily nutritional/caloric intake given documented concern for decreased PO intake/Registered Dietitian

## 2024-06-06 NOTE — PROGRESS NOTE ADULT - SUBJECTIVE AND OBJECTIVE BOX
PATIENT: DANNY STEVEN, MRN: [id number]    CHIEF COMPLAINT: Patient is a 69y old  Female who presents with a chief complaint of dementia (05 Jun 2024 07:38)      INTERVAL HISTORY/OVERNIGHT EVENTS: No overnight events.       MEDICATIONS:  MEDICATIONS  (STANDING):  amLODIPine   Tablet 10 milliGRAM(s) Oral daily  atorvastatin 40 milliGRAM(s) Oral at bedtime  bisacodyl 5 milliGRAM(s) Oral at bedtime  chlorhexidine 2% Cloths 1 Application(s) Topical daily  dextrose 10% Bolus 125 milliLiter(s) IV Bolus once  dextrose 5%. 1000 milliLiter(s) (50 mL/Hr) IV Continuous <Continuous>  dextrose 5%. 1000 milliLiter(s) (100 mL/Hr) IV Continuous <Continuous>  dextrose 50% Injectable 12.5 Gram(s) IV Push once  dextrose 50% Injectable 25 Gram(s) IV Push once  escitalopram 10 milliGRAM(s) Oral every 24 hours  glucagon  Injectable 1 milliGRAM(s) IntraMuscular once  heparin   Injectable 5000 Unit(s) SubCutaneous every 12 hours  insulin lispro (ADMELOG) corrective regimen sliding scale   SubCutaneous at bedtime  insulin lispro (ADMELOG) corrective regimen sliding scale   SubCutaneous three times a day before meals  letrozole 2.5 milliGRAM(s) Oral daily  melatonin 3 milliGRAM(s) Oral at bedtime  polyethylene glycol 3350 17 Gram(s) Oral two times a day  risperiDONE   Tablet 0.5 milliGRAM(s) Oral every 12 hours  rivastigmine 3 milliGRAM(s) Oral <User Schedule>  senna 2 Tablet(s) Oral at bedtime  traZODone 25 milliGRAM(s) Oral at bedtime    MEDICATIONS  (PRN):  acetaminophen   Oral Liquid .. 650 milliGRAM(s) Oral every 6 hours PRN Temp greater or equal to 38C (100.4F), Mild Pain (1 - 3)  dextrose Oral Gel 15 Gram(s) Oral once PRN Blood Glucose LESS THAN 70 milliGRAM(s)/deciliter  OLANZapine Injectable 1.25 milliGRAM(s) IntraMuscular every 6 hours PRN agitation      ALLERGIES: Allergies    No Known Allergies    Intolerances        OBJECTIVE:  ICU Vital Signs Last 24 Hrs  T(C): 36.7 (06 Jun 2024 05:58), Max: 36.9 (05 Jun 2024 14:00)  T(F): 98.1 (06 Jun 2024 05:58), Max: 98.5 (05 Jun 2024 22:15)  HR: 82 (06 Jun 2024 05:58) (75 - 95)  BP: 148/70 (06 Jun 2024 05:58) (119/67 - 148/70)  BP(mean): --  ABP: --  ABP(mean): --  RR: 17 (06 Jun 2024 05:58) (17 - 18)  SpO2: 98% (06 Jun 2024 05:58) (98% - 100%)    O2 Parameters below as of 06 Jun 2024 05:58  Patient On (Oxygen Delivery Method): room air            CAPILLARY BLOOD GLUCOSE      POCT Blood Glucose.: 103 mg/dL (05 Jun 2024 21:53)  POCT Blood Glucose.: 107 mg/dL (05 Jun 2024 17:49)  POCT Blood Glucose.: 95 mg/dL (05 Jun 2024 12:14)  POCT Blood Glucose.: 95 mg/dL (05 Jun 2024 08:35)    CAPILLARY BLOOD GLUCOSE      POCT Blood Glucose.: 103 mg/dL (05 Jun 2024 21:53)    I&O's Summary    05 Jun 2024 07:01  -  06 Jun 2024 07:00  --------------------------------------------------------  IN: 200 mL / OUT: 0 mL / NET: 200 mL      Daily     Daily     PHYSICAL EXAMINATION:  General: Comfortable, no acute distress, cooperative with exam.  HEENT: PERRLA  Respiratory: CTAB, normal respiratory effort, no coughing, wheezes, crackles, or rales.  CV: RRR, S1S2, no murmurs, rubs or gallops. No JVD. Distal pulses intact.  Abdominal: Soft, nontender, nondistended, no rebound or guarding, normal bowel sounds.  Neurology: AOx3, no focal neuro defects, JOHNSON x 4.  Extremities: No pitting edema, + Peripheral pulses.      LABS:      06-05    142  |  104  |  15  ----------------------------<  89  4.1   |  25  |  0.67    Ca    9.3      05 Jun 2024 08:42  Phos  4.3     06-05  Mg     2.00     06-05                Urinalysis Basic - ( 05 Jun 2024 08:42 )    Color: x / Appearance: x / SG: x / pH: x  Gluc: 89 mg/dL / Ketone: x  / Bili: x / Urobili: x   Blood: x / Protein: x / Nitrite: x   Leuk Esterase: x / RBC: x / WBC x   Sq Epi: x / Non Sq Epi: x / Bacteria: x       PATIENT: DANNY STEVEN, MRN: [id number]    CHIEF COMPLAINT: Patient is a 69y old  Female who presents with a chief complaint of dementia (05 Jun 2024 07:38)      INTERVAL HISTORY/OVERNIGHT EVENTS: No overnight events. Eating/drinking, having BMs      MEDICATIONS:  MEDICATIONS  (STANDING):  amLODIPine   Tablet 10 milliGRAM(s) Oral daily  atorvastatin 40 milliGRAM(s) Oral at bedtime  bisacodyl 5 milliGRAM(s) Oral at bedtime  chlorhexidine 2% Cloths 1 Application(s) Topical daily  dextrose 10% Bolus 125 milliLiter(s) IV Bolus once  dextrose 5%. 1000 milliLiter(s) (50 mL/Hr) IV Continuous <Continuous>  dextrose 5%. 1000 milliLiter(s) (100 mL/Hr) IV Continuous <Continuous>  dextrose 50% Injectable 12.5 Gram(s) IV Push once  dextrose 50% Injectable 25 Gram(s) IV Push once  escitalopram 10 milliGRAM(s) Oral every 24 hours  glucagon  Injectable 1 milliGRAM(s) IntraMuscular once  heparin   Injectable 5000 Unit(s) SubCutaneous every 12 hours  insulin lispro (ADMELOG) corrective regimen sliding scale   SubCutaneous at bedtime  insulin lispro (ADMELOG) corrective regimen sliding scale   SubCutaneous three times a day before meals  letrozole 2.5 milliGRAM(s) Oral daily  melatonin 3 milliGRAM(s) Oral at bedtime  polyethylene glycol 3350 17 Gram(s) Oral two times a day  risperiDONE   Tablet 0.5 milliGRAM(s) Oral every 12 hours  rivastigmine 3 milliGRAM(s) Oral <User Schedule>  senna 2 Tablet(s) Oral at bedtime  traZODone 25 milliGRAM(s) Oral at bedtime    MEDICATIONS  (PRN):  acetaminophen   Oral Liquid .. 650 milliGRAM(s) Oral every 6 hours PRN Temp greater or equal to 38C (100.4F), Mild Pain (1 - 3)  dextrose Oral Gel 15 Gram(s) Oral once PRN Blood Glucose LESS THAN 70 milliGRAM(s)/deciliter  OLANZapine Injectable 1.25 milliGRAM(s) IntraMuscular every 6 hours PRN agitation      ALLERGIES: Allergies    No Known Allergies    Intolerances        OBJECTIVE:  ICU Vital Signs Last 24 Hrs  T(C): 36.7 (06 Jun 2024 05:58), Max: 36.9 (05 Jun 2024 14:00)  T(F): 98.1 (06 Jun 2024 05:58), Max: 98.5 (05 Jun 2024 22:15)  HR: 82 (06 Jun 2024 05:58) (75 - 95)  BP: 148/70 (06 Jun 2024 05:58) (119/67 - 148/70)  BP(mean): --  ABP: --  ABP(mean): --  RR: 17 (06 Jun 2024 05:58) (17 - 18)  SpO2: 98% (06 Jun 2024 05:58) (98% - 100%)    O2 Parameters below as of 06 Jun 2024 05:58  Patient On (Oxygen Delivery Method): room air            CAPILLARY BLOOD GLUCOSE      POCT Blood Glucose.: 103 mg/dL (05 Jun 2024 21:53)  POCT Blood Glucose.: 107 mg/dL (05 Jun 2024 17:49)  POCT Blood Glucose.: 95 mg/dL (05 Jun 2024 12:14)  POCT Blood Glucose.: 95 mg/dL (05 Jun 2024 08:35)    CAPILLARY BLOOD GLUCOSE      POCT Blood Glucose.: 103 mg/dL (05 Jun 2024 21:53)    I&O's Summary    05 Jun 2024 07:01  -  06 Jun 2024 07:00  --------------------------------------------------------  IN: 200 mL / OUT: 0 mL / NET: 200 mL      Daily     Daily     PHYSICAL EXAMINATION:  General: Tired appearing woman, laying back in bed, minimally interactive  Respiratory: CTAB, normal respiratory effort, no coughing, wheezes, crackles, or rales.  CV: RRR, S1S2, no murmurs, rubs or gallops. No JVD. Distal pulses intact.  Abdominal: Soft, nontender, nondistended, no rebound or guarding  Neurology: no focal neuro defects, JOHNSON x 4.  Extremities: No pitting edema      LABS:      06-05    142  |  104  |  15  ----------------------------<  89  4.1   |  25  |  0.67    Ca    9.3      05 Jun 2024 08:42  Phos  4.3     06-05  Mg     2.00     06-05                Urinalysis Basic - ( 05 Jun 2024 08:42 )    Color: x / Appearance: x / SG: x / pH: x  Gluc: 89 mg/dL / Ketone: x  / Bili: x / Urobili: x   Blood: x / Protein: x / Nitrite: x   Leuk Esterase: x / RBC: x / WBC x   Sq Epi: x / Non Sq Epi: x / Bacteria: x

## 2024-06-06 NOTE — SWALLOW BEDSIDE ASSESSMENT ADULT - SWALLOW EVAL: RECOMMENDED FEEDING/EATING TECHNIQUES
Swallowing Guidelines: FEED WHEN ALERT STATE ONLY; Seated Upright during Mealtimes; Slow Pacing; Small Bites/Sips; Allow for Swallow Prior to Next Presentation; Maintain Adequate Oral Hygiene
Swallowing Guidelines: FEED WHEN ALERT STATE ONLY; Seated Upright during Mealtimes; Slow Pacing; Small Bites; Allow for Swallow Prior to Next Presentation; Maintain Adequate Oral Hygiene

## 2024-06-06 NOTE — PROGRESS NOTE ADULT - PROBLEM SELECTOR PROBLEM 1
Dementia with behavioral problem

## 2024-06-06 NOTE — PROGRESS NOTE ADULT - PROBLEM SELECTOR PLAN 7
-DVT prophylaxis HSQ  -Diet: soft and bite sized  -Code: full code. not wanting long term life support. Trial of CPR/intubation.  -Dispo: to [facility] v home depending on family conversation -DVT prophylaxis HSQ  -Diet: soft and bite sized  -Code: full code. not wanting long term life support. Trial of CPR/intubation.  -Dispo: to [facility], daughter emailing preferences to [facility]

## 2024-06-06 NOTE — PROGRESS NOTE ADULT - PROBLEM SELECTOR PLAN 2
Family reports about 1 week of constipation prior to presentation. May have originally been due to poor po intake. Now having BMs, last on 6/4  - C/w senna, increased miralax to BID   - dulcolax QHS   - fleet enema PRN

## 2024-06-06 NOTE — CHART NOTE - NSCHARTNOTEFT_GEN_A_CORE
[facility] was called overnight for agitation.    On arrival, numerous staff members trying to keep patient from trying to walk out into the hallway. Pt at times pushing staff away, not following instructions. A&Ox0 - but protecting airway, breathing comfortably, looking around, not in any distress.    Pt admitted overnight for worsening confusion per family, hx of dementia - multiple psychogenic medications at baseline.    #Agitation, ?delirium  - Ativan 1mg + Benadryl 25mg IVP given with adequate response  - Bed alarm, fall precautions    Attending made aware of event. HS T6 to continue care in AM.
Nursing team informed me that patient's HCP, daughter would like to discuss MOLST forms and DNR/DNI status with me. I talked with patient in order to get their understanding of what it means to be full code vs dnr/dni. They stated that they would want resuscitation but did not want intubation. I informed them that if we did do compressions we would have to intubate as well. Patient's daughter then stated that she would like to try everything once and if it didn't work then no more intervention. She stated that she did  not want her mother to be on machines forever. I recommended having further [facility] conversation with primary team and it would be best to stay full code for now.
Pt seen for Follow-up.    Medical Course:  Per chart review, Pt is 69 years old female with PMH: Dementia, HTN, DM2, unwitnessed fall. Patient has had worsening confusion, lethargy, decreased PO intake and memory changes from baseline in past few weeks. C/f FTT, progressive dementia. Having BMs.    Nutrition Course:  Patient seen at bedside with daughter. Pt is AOx1 baseline. Collateral obtained from chart review. Per RN flowsheet patient with variable PO intake 26-50% and 51-75% noted. Pt's daughter amenable to Glucerna 8oz 1x daily to increase macronutrient intake. No GI distress (nausea/vomiting/diarrhea/constipation) per chart. Last BM 6/5 per nursing flowsheet. Bowel regime in place. No chewing or swallowing difficulties at this time per chart. No new weight to assess at this time. Nutritional related labs wnl. RDN services remain available as needed.     Diet Prescription:   Diet, Soft and Bite Sized (06-01-24 @ 12:38) [Active]    Pertinent Medications: MEDICATIONS  (STANDING):  amLODIPine   Tablet 10 milliGRAM(s) Oral daily  atorvastatin 40 milliGRAM(s) Oral at bedtime  bisacodyl 5 milliGRAM(s) Oral at bedtime  chlorhexidine 2% Cloths 1 Application(s) Topical daily  dextrose 10% Bolus 125 milliLiter(s) IV Bolus once  dextrose 5%. 1000 milliLiter(s) (100 mL/Hr) IV Continuous <Continuous>  dextrose 5%. 1000 milliLiter(s) (50 mL/Hr) IV Continuous <Continuous>  dextrose 50% Injectable 25 Gram(s) IV Push once  dextrose 50% Injectable 12.5 Gram(s) IV Push once  escitalopram 10 milliGRAM(s) Oral every 24 hours  glucagon  Injectable 1 milliGRAM(s) IntraMuscular once  heparin   Injectable 5000 Unit(s) SubCutaneous every 12 hours  insulin lispro (ADMELOG) corrective regimen sliding scale   SubCutaneous at bedtime  insulin lispro (ADMELOG) corrective regimen sliding scale   SubCutaneous three times a day before meals  letrozole 2.5 milliGRAM(s) Oral daily  melatonin 3 milliGRAM(s) Oral at bedtime  polyethylene glycol 3350 17 Gram(s) Oral two times a day  risperiDONE   Tablet 0.5 milliGRAM(s) Oral every 12 hours  rivastigmine 3 milliGRAM(s) Oral <User Schedule>  senna 2 Tablet(s) Oral at bedtime  traZODone 25 milliGRAM(s) Oral at bedtime    MEDICATIONS  (PRN):  acetaminophen   Oral Liquid .. 650 milliGRAM(s) Oral every 6 hours PRN Temp greater or equal to 38C (100.4F), Mild Pain (1 - 3)  dextrose Oral Gel 15 Gram(s) Oral once PRN Blood Glucose LESS THAN 70 milliGRAM(s)/deciliter  OLANZapine Injectable 1.25 milliGRAM(s) IntraMuscular every 6 hours PRN agitation    Pertinent Labs: 06-06 Na141 mmol/L Glu 91 mg/dL K+ 3.8 mmol/L Cr  0.64 mg/dL BUN 17 mg/dL 06-06 Phos 3.8 mg/dL 06-04 Alb 4.8 g/dL    CAPILLARY BLOOD GLUCOSE  POCT Blood Glucose.: 96 mg/dL (06 Jun 2024 12:55)  POCT Blood Glucose.: 104 mg/dL (06 Jun 2024 08:42)  POCT Blood Glucose.: 103 mg/dL (05 Jun 2024 21:53)  POCT Blood Glucose.: 107 mg/dL (05 Jun 2024 17:49)      Weight: Weight: 62kg  (6/31)  Weight Assessment: No new weight to assess.   Height: 64in / 162.6cm  IBW: 120lbs / 54.5kg +/-10%  BMI: 23.5kg/m^2    Physical Assessment, per flowsheets:  Edema: No edema noted.   Pressure Injury: Skin is intact.     Estimated Needs:   [X] No change since previous assessment.    Previous Nutrition Diagnosis:   [X] Inadequate Energy Intake  Nutrition Diagnosis is [X] ongoing  New Nutrition Diagnosis: [X] not applicable     Education:  [X] Provided on previous assessment     Interventions:   - Continue current diet order, which remains appropriate at this time.   - Consider Glucerna 8oz 1x daily (220kcal/10gm prot/serving).   - Please consistently document % PO intake in nursing flowsheets to assess adequacy of nutritional intake/monitor need for further nutritional intervention(s).   - Monitor weights, diet tolerance, skin integrity, BMs, pertinent labs.   - RDN services remain available as needed.     Lavell Ramos RD, CDN  Available on [employer] Teams, Pager [phone]
Discussed patient's case with daughter Radha ([phone]) who states she is the patient's HCP. Radha plans to bring HCP form during a future visit. Radha plans to discuss GOC and possible DNR/DNI status with her sisters, but in the interim requests full code status

## 2024-06-06 NOTE — SWALLOW BEDSIDE ASSESSMENT ADULT - ADDITIONAL RECOMMENDATIONS
This department to follow up as schedule permits to assess for diet tolerance. Medical team further advised to reconsult if there is a change in medical status and/or observed change in patient's tolerance of recommended PO diet.
This department to follow up as schedule permits for diet advancement. Medical team further advised to reconsult if there is a change in medical status and/or observed change in patient's tolerance of recommended PO diet.

## 2024-06-06 NOTE — PROGRESS NOTE ADULT - PROBLEM SELECTOR PLAN 3
Intermittently eating per nursing. C/f progressive dementia, FTT  - per S&S: soft and bite sized diet  - nutrition c/s: appreciate recs  - s/p D5 although now glucoses normal while off dextrose  - trend lytes and BG

## 2024-06-06 NOTE — PROGRESS NOTE ADULT - ATTENDING COMMENTS
Chart reviewed. Vitals and Labs noted.   Pt seen and examined at bedside. Plan formulated with the resident. Detail H&P as above.     remain calm.   stable.   vision deficit likely cataracts.   Attempted to reach the daughter Radha [phone] numerous times (yesterday and today), not picking up.  dc planning in progress.    Tai Banks will be covering for the pt starting 6/5/24. He can be reached at  if needed.     - Dr. RENETTA Monreal ([facility])  - [phone]
Pt seen and examined at bedside.   Agree with PGY-1 note as above    No acute changes in pt's ROS or her physical exam compared to yesterday  She did not require any additional zyprexa doses overnight  Pt remains a full code for now  Daughter is researching [facility] facilities    Tai Guerin M.D.  [facility]  [phone]
Chart reviewed. Vitals and Labs noted.   Pt seen and examined at bedside. Plan formulated with the resident. Detail history/ physical as above.     - Dr. RENETTA Monreal ([facility])  - [phone]
70 yo F hx dementia Ax0x1 at baseline, HTN, DM2 on metformin here after unwitnessed fall as well as worsening confusion, lethargy, decreased PO intake and memory changes from baseline     ON events noted    -In re to prior unwitnessed fall. Hard to obtain comprehensive hx from pt. This am she was able to state yes or no to questions and currently denies any chest pain, Sob of nausea. CTH neg. CT cervical spine was non diagnostic however pt moving neck with out difficulty and no pain on exam. Suspect fall in the setting of pts overall frailty. Would obtain orthostatics. If any CP or SOB would also obtain EKG.   -Monitor mental status. CTH neg. No metabolic derangements or infectious etiology for confusion identified clinically. Team to obtain collateral in re to any changes to recent medications. Now s/p speech and swallow eval and diet recs appreciated   -Psychiatry also following for agitation/ behavioral management and recs appreciated       -Of note pt found to have 5 mm hypoattenuating cystic focus in pancreas on CT that could represent IPMN. This should be followed up as outpt w/ MRI if w/i goals of care of family.                                           Dispo- PT rec rehab
Chart reviewed. Vitals and Labs noted.   Pt seen and examined at bedside. Plan formulated with the resident. Detail H&P as above.     comfortable  awake alert and remain calm  hypokalmia supplemented.  +cataracts of eye R>L  PT: rehab  labs/vitals stable  advanced dementia with little to no recollection of events.  rehab planning.  repeat BMP in 1 week in rehab.   Encourage PO intake.     - Dr. RENETTA Monreal ([facility])  - [phone]
Pt seen and examined at bedside.   Agree with PGY-1 note as above    No acute changes in pt's ROS or her physical exam compared to yesterday  Appreciate palliative care input  Pt remains a full code for now  Awaiting decision from family regarding STR vs home services    Tai Guerin M.D.  [facility]  [phone]
70 yo F hx dementia Ax0x1 at baseline, HTN, DM2 on metformin here after unwitnessed fall as well as worsening confusion, lethargy, decreased PO intake and memory changes from baseline     ON events noted    -In re to prior unwitnessed fall. Hard to obtain comprehensive hx from pt. This am she was able to state yes or no to questions and currently denies any chest pain, Sob of nausea. CTH neg. CT cervical spine was non diagnostic however pt moving neck with out difficulty and no pain on exam. Suspect fall in the setting of pts overall frailty. Would obtain orthostatics. If any CP or SOB would also obtain EKG.   -Monitor mental status. CTH neg. No metabolic derangements or infectious etiology for confusion identified clinically. Team to obtain collateral in re to any changes to recent medications. Now s/p speech and swallow eval and diet recs appreciated   -Psychiatry also following for agitation/ behavioral management and recs appreciated       -Of note pt found to have 5 mm hypoattenuating cystic focus in pancreas on CT that could represent IPMN. This should be followed up as outpt w/ MRI if w/i goals of care of family.                                           Dispo- PT rec rehab

## 2024-06-06 NOTE — PROGRESS NOTE ADULT - PROBLEM SELECTOR PROBLEM 6
Advised patient of Pulse Ox results. Patient verbalized understanding.    DM2 (diabetes mellitus, type 2)

## 2024-06-06 NOTE — DISCHARGE NOTE NURSING/CASE MANAGEMENT/SOCIAL WORK - PATIENT PORTAL LINK FT
You can access the FollowMyHealth Patient Portal offered by [facility] by registering at the following website: [email or web address]. By joining [facility]’s FollowMyHealth portal, you will also be able to view your health information using other applications (apps) compatible with our system.

## 2024-06-06 NOTE — SWALLOW BEDSIDE ASSESSMENT ADULT - COMMENTS
Progress Note- Internal Medicine 6/6: "70 yo f hx dementia AOx1 baseline, HTN, DM2 on metformin here for unwitnessed fall. Patient has had worsening confusion, lethargy, decreased PO intake and memory changes from baseline in past few weeks. C/f FTT, progressive dementia. Having BMs, tolerating pureed diet, and no longer hypoglycemic (off D5). Stable for dc."    No recent chest imaging at this time.     Patient seen to assess for diet tolerance/advancement. Patient is awake/alert with patient's daughter present at bedside. Patient's daughter and RN report decreased PO intake.
H&P Adult 5/31: "70 yo f hx dementia a/o times 1 baseline, HTN, DM2 on metformin here for unwitnessed fall. patient has had worsening confusion, lethargy, decreased PO intake and memory changes from baseline in past few weeks."    No recent chest imaging available at this time.     Patient seen awake/alert to verbal stimuli this PM with PCA present at bedside. Patient intermittently follows simple directives and answers simple questions. Patient is orally receptive to PO trials.

## 2024-08-12 ENCOUNTER — TRANSCRIPTION ENCOUNTER (OUTPATIENT)
Age: 69
End: 2024-08-12

## 2024-08-13 ENCOUNTER — NON-APPOINTMENT (OUTPATIENT)
Age: 69
End: 2024-08-13

## 2024-08-15 PROCEDURE — 90833 PSYTX W PT W E/M 30 MIN: CPT

## 2024-08-15 PROCEDURE — 99214 OFFICE O/P EST MOD 30 MIN: CPT

## 2024-08-23 NOTE — ED ADULT NURSE NOTE - DOES PATIENT HAVE ADVANCE DIRECTIVE
[facility] Internal Medicine Hospitalist Discharge Summary     Patient ID:  Greg Uriarte  37 year old  4/30/1987    Admit date: 8/9/2024    Discharge date and time: 8/23/2024    Attending Physician: Nik Jordan DO     Primary Care Physician: Leana Gardner MD     Discharge Diagnoses:   R ICA/JARED Aneurysm c/b Rupture  SAH s/p Coil Embolization  HTN  Sciatica     Please note that only IHP DMG and EMG patients enrolled in the Medicare ACO, BCBS ACO and [facility] HMOs will be handled by the [facility] Care Management team.  For all other patients, please follow usual protocol for discharge care transition.    Discharge Condition: stable    Disposition:  Home    Important Follow up:  - PCP: 1 week  - Consults: neuroCC, neuro IR, NSGY    Follow Up Items:  None    Hospital Course:      37 year old female with PMH sig for htn, headaches (migraines, takes ibuprofen) who presented with severe HA       R ICA/JARED Aneurysm c/b Rupture  SAH s/p Coil Embolization  NSGY/Neurology on consult  US: increasing velocities of L MCA w/ developing L-vasospasm  MRA: see report  Plan:  - Continue Nimodipine as directed by NSGY/Neurology (21 days total)  - Neurochecks per protocol  - Continue TCD daily  - Continue ASA  - Defer to NSGY regarding next steps w/ MRA findings of vasospasm and persistently increased velocities on dopplers.     HTN  - BP parameters as per Neuro     Sciatica  - PRN pain meds, Lidocaine patch  - Continued PT/OT    Stable for discharge home with home care.    Consults: IP CONSULT TO NEUROSURGERY  IP CONSULT TO NEUROLOGY  IP CONSULT TO NEUROLOGY  IP CONSULT TO NEUROSURGERY  IP CONSULT TO HOSPITALIST  IP CONSULT TO INFECTIOUS DISEASE  IP CONSULT TO INTERVENTIONAL NEUROLOGY  IP CONSULT TO SOCIAL WORK  IP CONSULT TO VASCULAR ACCESS TEAM  IP CONSULT TO PHARMACY  IP CONSULT TO HOSPITALIST  IP CONSULT TO SOCIAL WORK  IP CONSULT TO SOCIAL WORK  IP CONSULT TO SPIRITUAL  CARE  IP CONSULT TO SOCIAL WORK    Operative Procedures:  Coil embolization       Patient instructions:      I as the attending physician reconciled the current and discharge medications on day of discharge.     Current Discharge Medication List        START taking these medications    Details   niMODipine 30 MG Oral Cap Take 2 capsules (60 mg total) by mouth every 4 (four) hours for 12 days.      lidocaine-menthol 4-1 % External Patch Place 2 patches onto the skin daily. To lower back      acetaminophen 325 MG Oral Tab Take 2 tablets (650 mg total) by mouth every 4 (four) hours as needed for Pain.      ibuprofen 400 MG Oral Tab Take 1 tablet (400 mg total) by mouth every 6 (six) hours as needed for Pain.      aspirin 81 MG Oral Tab EC Take 1 tablet (81 mg total) by mouth daily.      gabapentin 300 MG Oral Cap Take 2 capsules (600 mg total) by mouth 3 (three) times daily.      cyclobenzaprine 10 MG Oral Tab Take 1 tablet (10 mg total) by mouth 3 (three) times daily as needed for Muscle spasms.           STOP taking these medications       clarithromycin 500 MG Oral Tab        albuterol 108 (90 Base) MCG/ACT Inhalation Aero Soln        tranexamic acid 650 MG Oral Tab        sertraline 50 MG Oral Tab              Activity: activity as tolerated  Diet: cardiac diet  Wound Care: as directed  Code Status: Full Code      Exam on day of discharge:     Vitals:    08/23/24 1100   BP: 122/85   Pulse: 81   Resp: 21   Temp:        General: no acute distress, alert and oriented x 3  Heart: RRR  Lungs: clear bilaterally, no active wheezing  Abdomen: nontender, nondistended, intact BS  Extremities: no pedal edema   Neuro: CN inact, no focal deficits      Total time coordinating care for discharge: Greater than 30 minutes    Nik Jordan DO  [facility] Hospitalist      No

## 2024-09-19 PROCEDURE — 99214 OFFICE O/P EST MOD 30 MIN: CPT

## 2024-12-13 NOTE — PATIENT PROFILE ADULT - NSTOBACCO TYPE_GEN_A_CORE_RD
Blood pressure is not controlled.   Patient working out and eating healthier.   No need for medication today.        
Fasting blood work ordered for his September physical.   Orders:    CBC with Automated Differential; Future    Comprehensive Metabolic Panel; Future    Lipid Panel With Reflex; Future    Thyroid Stimulating Hormone Reflex; Future    
Patient is now working out 4-5 times a week, weight lifting and cardio. Patient also working on healthier eating.        
Cigarettes

## 2025-04-06 ENCOUNTER — INPATIENT (INPATIENT)
Facility: HOSPITAL | Age: 70
LOS: 2 days | Discharge: NURSING, REHAB OR LONG-TERM CARE | End: 2025-04-09
Attending: HOSPITALIST | Admitting: HOSPITALIST
Payer: MEDICARE

## 2025-04-06 VITALS
RESPIRATION RATE: 18 BRPM | DIASTOLIC BLOOD PRESSURE: 75 MMHG | SYSTOLIC BLOOD PRESSURE: 115 MMHG | HEART RATE: 106 BPM | TEMPERATURE: 98 F | OXYGEN SATURATION: 99 %

## 2025-04-06 DIAGNOSIS — R41.82 ALTERED MENTAL STATUS, UNSPECIFIED: ICD-10-CM

## 2025-04-06 DIAGNOSIS — Z98.890 OTHER SPECIFIED POSTPROCEDURAL STATES: Chronic | ICD-10-CM

## 2025-04-06 LAB
ALBUMIN SERPL ELPH-MCNC: 4.2 G/DL — SIGNIFICANT CHANGE UP (ref 3.3–5)
ALP SERPL-CCNC: 64 U/L — SIGNIFICANT CHANGE UP (ref 40–120)
ALT FLD-CCNC: 25 U/L — SIGNIFICANT CHANGE UP (ref 4–33)
ANION GAP SERPL CALC-SCNC: 15 MMOL/L — HIGH (ref 7–14)
APPEARANCE UR: CLEAR — SIGNIFICANT CHANGE UP
APTT BLD: 30.1 SEC — SIGNIFICANT CHANGE UP (ref 24.5–35.6)
AST SERPL-CCNC: 24 U/L — SIGNIFICANT CHANGE UP (ref 4–32)
BACTERIA # UR AUTO: NEGATIVE /HPF — SIGNIFICANT CHANGE UP
BASOPHILS # BLD AUTO: 0.03 K/UL — SIGNIFICANT CHANGE UP (ref 0–0.2)
BASOPHILS NFR BLD AUTO: 0.2 % — SIGNIFICANT CHANGE UP (ref 0–2)
BILIRUB SERPL-MCNC: 0.2 MG/DL — SIGNIFICANT CHANGE UP (ref 0.2–1.2)
BILIRUB UR-MCNC: NEGATIVE — SIGNIFICANT CHANGE UP
BUN SERPL-MCNC: 23 MG/DL — SIGNIFICANT CHANGE UP (ref 7–23)
CALCIUM SERPL-MCNC: 10 MG/DL — SIGNIFICANT CHANGE UP (ref 8.4–10.5)
CAST: 0 /LPF — SIGNIFICANT CHANGE UP (ref 0–4)
CHLORIDE SERPL-SCNC: 109 MMOL/L — HIGH (ref 98–107)
CO2 SERPL-SCNC: 24 MMOL/L — SIGNIFICANT CHANGE UP (ref 22–31)
COLOR SPEC: YELLOW — SIGNIFICANT CHANGE UP
CREAT SERPL-MCNC: 0.53 MG/DL — SIGNIFICANT CHANGE UP (ref 0.5–1.3)
DIFF PNL FLD: ABNORMAL
EGFR: 99 ML/MIN/1.73M2 — SIGNIFICANT CHANGE UP
EGFR: 99 ML/MIN/1.73M2 — SIGNIFICANT CHANGE UP
EOSINOPHIL # BLD AUTO: 0 K/UL — SIGNIFICANT CHANGE UP (ref 0–0.5)
EOSINOPHIL NFR BLD AUTO: 0 % — SIGNIFICANT CHANGE UP (ref 0–6)
FLUAV AG NPH QL: SIGNIFICANT CHANGE UP
FLUBV AG NPH QL: SIGNIFICANT CHANGE UP
GAS PNL BLDV: SIGNIFICANT CHANGE UP
GLUCOSE SERPL-MCNC: 95 MG/DL — SIGNIFICANT CHANGE UP (ref 70–99)
GLUCOSE UR QL: NEGATIVE MG/DL — SIGNIFICANT CHANGE UP
HCT VFR BLD CALC: 34.1 % — LOW (ref 34.5–45)
HGB BLD-MCNC: 11.5 G/DL — SIGNIFICANT CHANGE UP (ref 11.5–15.5)
IANC: 11.31 K/UL — HIGH (ref 1.8–7.4)
IMM GRANULOCYTES NFR BLD AUTO: 0.4 % — SIGNIFICANT CHANGE UP (ref 0–0.9)
INR BLD: 1 RATIO — SIGNIFICANT CHANGE UP (ref 0.85–1.16)
KETONES UR-MCNC: NEGATIVE MG/DL — SIGNIFICANT CHANGE UP
LEUKOCYTE ESTERASE UR-ACNC: NEGATIVE — SIGNIFICANT CHANGE UP
LYMPHOCYTES # BLD AUTO: 0.84 K/UL — LOW (ref 1–3.3)
LYMPHOCYTES # BLD AUTO: 6.5 % — LOW (ref 13–44)
MCHC RBC-ENTMCNC: 31.9 PG — SIGNIFICANT CHANGE UP (ref 27–34)
MCHC RBC-ENTMCNC: 33.7 G/DL — SIGNIFICANT CHANGE UP (ref 32–36)
MCV RBC AUTO: 94.5 FL — SIGNIFICANT CHANGE UP (ref 80–100)
MONOCYTES # BLD AUTO: 0.75 K/UL — SIGNIFICANT CHANGE UP (ref 0–0.9)
MONOCYTES NFR BLD AUTO: 5.8 % — SIGNIFICANT CHANGE UP (ref 2–14)
NEUTROPHILS # BLD AUTO: 11.31 K/UL — HIGH (ref 1.8–7.4)
NEUTROPHILS NFR BLD AUTO: 87.1 % — HIGH (ref 43–77)
NITRITE UR-MCNC: NEGATIVE — SIGNIFICANT CHANGE UP
NRBC # BLD AUTO: 0 K/UL — SIGNIFICANT CHANGE UP (ref 0–0)
NRBC # FLD: 0 K/UL — SIGNIFICANT CHANGE UP (ref 0–0)
NRBC BLD AUTO-RTO: 0 /100 WBCS — SIGNIFICANT CHANGE UP (ref 0–0)
PH UR: 7.5 — SIGNIFICANT CHANGE UP (ref 5–8)
PLATELET # BLD AUTO: 227 K/UL — SIGNIFICANT CHANGE UP (ref 150–400)
POTASSIUM SERPL-MCNC: 4 MMOL/L — SIGNIFICANT CHANGE UP (ref 3.5–5.3)
POTASSIUM SERPL-SCNC: 4 MMOL/L — SIGNIFICANT CHANGE UP (ref 3.5–5.3)
PROT SERPL-MCNC: 7.4 G/DL — SIGNIFICANT CHANGE UP (ref 6–8.3)
PROT UR-MCNC: SIGNIFICANT CHANGE UP MG/DL
PROTHROM AB SERPL-ACNC: 11.9 SEC — SIGNIFICANT CHANGE UP (ref 9.9–13.4)
RBC # BLD: 3.61 M/UL — LOW (ref 3.8–5.2)
RBC # FLD: 12.3 % — SIGNIFICANT CHANGE UP (ref 10.3–14.5)
RBC CASTS # UR COMP ASSIST: 16 /HPF — HIGH (ref 0–4)
RSV RNA NPH QL NAA+NON-PROBE: SIGNIFICANT CHANGE UP
SARS-COV-2 RNA SPEC QL NAA+PROBE: SIGNIFICANT CHANGE UP
SODIUM SERPL-SCNC: 148 MMOL/L — HIGH (ref 135–145)
SOURCE RESPIRATORY: SIGNIFICANT CHANGE UP
SP GR SPEC: 1.02 — SIGNIFICANT CHANGE UP (ref 1–1.03)
SQUAMOUS # UR AUTO: 0 /HPF — SIGNIFICANT CHANGE UP (ref 0–5)
UROBILINOGEN FLD QL: 0.2 MG/DL — SIGNIFICANT CHANGE UP (ref 0.2–1)
WBC # BLD: 12.98 K/UL — HIGH (ref 3.8–10.5)
WBC # FLD AUTO: 12.98 K/UL — HIGH (ref 3.8–10.5)
WBC UR QL: 1 /HPF — SIGNIFICANT CHANGE UP (ref 0–5)

## 2025-04-06 PROCEDURE — 71250 CT THORAX DX C-: CPT | Mod: 26

## 2025-04-06 PROCEDURE — 99223 1ST HOSP IP/OBS HIGH 75: CPT

## 2025-04-06 PROCEDURE — 70450 CT HEAD/BRAIN W/O DYE: CPT | Mod: 26

## 2025-04-06 PROCEDURE — 99285 EMERGENCY DEPT VISIT HI MDM: CPT

## 2025-04-06 PROCEDURE — 74176 CT ABD & PELVIS W/O CONTRAST: CPT | Mod: 26

## 2025-04-06 RX ORDER — ACETAMINOPHEN 500 MG/5ML
1000 LIQUID (ML) ORAL ONCE
Refills: 0 | Status: COMPLETED | OUTPATIENT
Start: 2025-04-06 | End: 2025-04-06

## 2025-04-06 RX ORDER — MAGNESIUM, ALUMINUM HYDROXIDE 200-200 MG
30 TABLET,CHEWABLE ORAL EVERY 4 HOURS
Refills: 0 | Status: DISCONTINUED | OUTPATIENT
Start: 2025-04-06 | End: 2025-04-09

## 2025-04-06 RX ORDER — MELATONIN 5 MG
3 TABLET ORAL AT BEDTIME
Refills: 0 | Status: DISCONTINUED | OUTPATIENT
Start: 2025-04-06 | End: 2025-04-07

## 2025-04-06 RX ORDER — ACETAMINOPHEN 500 MG/5ML
1000 LIQUID (ML) ORAL EVERY 6 HOURS
Refills: 0 | Status: COMPLETED | OUTPATIENT
Start: 2025-04-06 | End: 2025-04-06

## 2025-04-06 RX ORDER — ACETAMINOPHEN 500 MG/5ML
650 LIQUID (ML) ORAL EVERY 6 HOURS
Refills: 0 | Status: DISCONTINUED | OUTPATIENT
Start: 2025-04-06 | End: 2025-04-09

## 2025-04-06 RX ORDER — ONDANSETRON HCL/PF 4 MG/2 ML
4 VIAL (ML) INJECTION EVERY 8 HOURS
Refills: 0 | Status: DISCONTINUED | OUTPATIENT
Start: 2025-04-06 | End: 2025-04-09

## 2025-04-06 RX ORDER — PIPERACILLIN-TAZO-DEXTROSE,ISO 3.375G/5
3.38 IV SOLUTION, PIGGYBACK PREMIX FROZEN(ML) INTRAVENOUS ONCE
Refills: 0 | Status: COMPLETED | OUTPATIENT
Start: 2025-04-06 | End: 2025-04-06

## 2025-04-06 RX ADMIN — Medication 1000 MILLILITER(S): at 15:22

## 2025-04-06 RX ADMIN — Medication 200 GRAM(S): at 15:22

## 2025-04-06 RX ADMIN — Medication 400 MILLIGRAM(S): at 15:22

## 2025-04-06 RX ADMIN — Medication 400 MILLIGRAM(S): at 23:09

## 2025-04-06 NOTE — H&P ADULT - PROBLEM SELECTOR PLAN 3
- Mild hypernatremia to 148, no significant REZA when compared to prior labs    -> Ordered for IVF with LR at 100 cc/hr for 10 hrs  -> Trend BMP

## 2025-04-06 NOTE — ED ADULT NURSE NOTE - NSFALLRISKINTERV_ED_ALL_ED
Communicate fall risk and risk factors to all staff, patient, and family/Provide visual cue: yellow wristband, yellow gown, etc/Reinforce activity limits and safety measures with patient and family/Call bell, personal items and telephone in reach/Instruct patient to call for assistance before getting out of bed/chair/stretcher/Non-slip footwear applied when patient is off stretcher/[place] to call system/Physically safe environment - no spills, clutter or unnecessary equipment/Purposeful Proactive Rounding/Room/bathroom lighting operational, light cord in reach

## 2025-04-06 NOTE — H&P ADULT - ASSESSMENT
This is a 70F with history of Dementia (AAO x 0, now nonverbal), R Breast Cancer s/p chemo/lumpectomy/RT (2016, in remission since), DM2 (currently off medications), HTN, and HLD who presents to the hospital from her SNF for aspiration episode admitted for sepsis.

## 2025-04-06 NOTE — H&P ADULT - PATIENT'S SEXUAL ORIENTATION
Patient is here for 2 week postop check.  She underwent repeat low transverse  section and bilateral salpingectomy.  Baby is doing well.  She is requesting a refill on her Percocet, reports that ibuprofen is not effective.  She denies any troubles with her bowels or bladder has no nausea or vomiting is ambulating well    Physical exam:   Visit Vitals  /80   Temp 97.3 °F (36.3 °C) (Oral)   LMP 2016     Abdomen is soft nontender nondistended  Pfannenstiel skin incision is healing well intact no erythema or exudate or induration  Extremities +1 pedal edema bilaterally    Impression  Normal postop check, status post repeat  section and bilateral to salpingectomy for maternal proteinuria  Postoperative restrictions reviewed  Return to clinic in 4 weeks for final postoperative check   Heterosexual

## 2025-04-06 NOTE — PATIENT PROFILE ADULT - FALL HARM RISK - HARM RISK INTERVENTIONS
CHIEF COMPLAINT:   Patient presents with:  Cold: Tugging at L ear, congestion, cough.  - Entered by patient      HPI:   Adrian Oliva is a non-toxic, well appearing 3year old female who presents with mother for complaints of congestion, cough, tuggin palpation bilaterally. External auditory canals healthy, moderate cerumen bilat. Right TM: dull, no bulging, noretraction,no  fluid, bony landmarks only partially visualized due to wax.   Left TM: pearly, no bulging, no retraction,no fluid, bony landmarks p Assistance with ambulation/Assistance OOB with selected safe patient handling equipment/Communicate Risk of Fall with Harm to all staff/Discuss with provider need for PT consult/Monitor for mental status changes/Monitor gait and stability/Move patient closer to nurses' station/Reinforce activity limits and safety measures with patient and family/Reorient to person, place and time as needed/Tailored Fall Risk Interventions/Toileting schedule using arm’s reach rule for commode and bathroom/Use of alarms - bed, chair and/or voice tab/Visual Cue: Yellow wristband and red socks/Bed in lowest position, wheels locked, appropriate side rails in place/Call bell, personal items and telephone in reach/Instruct patient to call for assistance before getting out of bed or chair/Non-slip footwear when patient is out of bed/[place] to call system/Physically safe environment - no spills, clutter or unnecessary equipment/Purposeful Proactive Rounding/Room/bathroom lighting operational, light cord in reach

## 2025-04-06 NOTE — ED ADULT TRIAGE NOTE - CHIEF COMPLAINT QUOTE
Pt coming in from [facility] for vomiting that started this morning while staff was feeding her. Pt non-verbal at baseline. Per ems, pt was sating 88% on RA, placed on 3L o2 via NC and o2 sat improved to 99%. PMHx dementia, breast cancer (in remission), HTN.

## 2025-04-06 NOTE — H&P ADULT - PROBLEM SELECTOR PLAN 9
DVT ppx - Lovenox  Diet - NPO pending S&S eval  Activity - Non-ambulatory  Code Status - DNR/DNI, copy of MOLST in chart, confirmed with daughter    Fall and aspiration precautions

## 2025-04-06 NOTE — H&P ADULT - HISTORY OF PRESENT ILLNESS
This is a 70F with history of Dementia (AAO x 0, now nonverbal), R Breast Cancer s/p chemo/lumpectomy/RT (2016, in remission since), DM2 (currently off medications), HTN, and HLD who presents to the hospital from her SNF for aspiration episode. Patient is non-verbal, does not interact with the examiner, AAO X0, current baseline as per daughter. Daughter states that the patient was being feed breakfast this AM when the SNF staff noticed her coughing, throwing up, and aspirating. Daughter denies any issues with aspiration for the patient but she is currently on pureed diet with thickened liquids. No known fevers at her facility, no known diarrhea, foul smelling urine, or cough at her facility. As per daughter, patient has a decent appetite but has been losing weigght, has lost ~50 lbs over the past 6 months. No other acute complaints.     On arrival to the ED her vitals were T 100.5, P 107, /85, RR 20, O2 sat 96% (was reportedly 88% on RA with EMS prior to coming to the hospital). Her lab work showed leukocytosis to ~13k, mild hypernatremia to 148. Her UA and fluvid were negative. Her imaging showed constipation but no other acute findings. She was given NS 1L, ofirmev 1g x2, and zosyn 3.375g x1. She was admitted to medicine.

## 2025-04-06 NOTE — H&P ADULT - NSHPLABSRESULTS_GEN_ALL_CORE
LABS and ADDITIONAL STUDIES:                        11.5   12.98 )-----------( 227      ( 2025 14:30 )             34.1     148[H]  |  109[H]  |  23  ----------------------------<  95     04-06  4.0   |  24  |  0.53    Ca    10.0      2025 14:30    TPro  7.4  /  Alb  4.2  /  TBili  0.2  /  DBili  x   /  AST  24  /  ALT  25  /  AlkPhos  64  -    PT/INR: 11.9/1.00 (25 @ 14:30)  PTT: 30.1 (25 @ 14:30)    14:30 - VBG - pH: 7.43  | pCO2: 51    | pO2: 50    | Lactate: 0.9      Urinalysis Basic - ( 2025 20:55 )  Color: Yellow / Appearance: Clear / S.018 / pH: 7.5  Gluc: Negative mg/dL / Ketone: Negative mg/dL  / Bili: Negative / Urobili: 0.2 mg/dL   Blood: Small / Protein: Trace mg/dL / Nitrite: Negative   Leuk Esterase: Negative / RBC: 16 /HPF / WBC 1 /HPF   Sq Epi: 0 /HPF / Bacteria: Negative /HPF  Hyaline Casts: x/WBC Casts: x    COVID/Flu/RSV - neg    < from: CT Head No Cont (25 @ 17:50) >  IMPRESSION:  1.No acute intracranial abnormality. Chronic findings as above.  --- End of Report ---  < end of copied text >    < from: CT Chest No Cont (25 @ 17:58) >  IMPRESSION:  Large stool burden most prominently in the rectum.  No evidence of pneumonia  --- End of Report ---  < end of copied text >    EKG - Sinus tach at 108, QTc 426, TWI V2 (present on prior), no significant ST-T wave changes

## 2025-04-06 NOTE — H&P ADULT - NSICDXPASTMEDICALHX_GEN_ALL_CORE_FT
PAST MEDICAL HISTORY:  Breast cancer s.p chemotherapy    CVA (cerebrovascular accident)     Dementia     Diabetes type 2     HLD (hyperlipidemia)     Zoster ophthalmicus

## 2025-04-06 NOTE — H&P ADULT - PROBLEM SELECTOR PLAN 4
- Noted to have rectal constipation on imaging, had BM in ED so not obstructed    -> Will trial prn rectal bisacodyl, if not passing enough BMs then consider enema

## 2025-04-06 NOTE — ED PROVIDER NOTE - PROGRESS NOTE DETAILS
CT sig for stool burden in rectum, pt had bowel movement in ED, not obstructed per radiologist. Labs and imaging results reviewed with daughter at bedside. Pt presented septic but unable to localize source. Rec admission for further work up. All questions answered. Patient case discussed with hospitalist, ok to admit.  Patient is stable on room air and ready for admission.

## 2025-04-06 NOTE — ED PROVIDER NOTE - ATTENDING CONTRIBUTION TO CARE
71 yo F with h/o dementia nonverbal HTN, DM2 on metformin, hx of CVA (chronic L BG lacunar infarct), and hx of Rt breast cancer s/p lumpectomy/chemo/RT (~[phone]),who presents from [facility]  after possible aspiration this morning. pt has puree fdiet but per daughter started vomiting while eating this morning and then noted her o2 was low. per coletteetr has worsening decline over last few weeks as well as weight loss and no longer speaking  pt is tachy, feels warm, will check rectal temp, likley aspiration pna,   will check labs, culutres, abx, imaging, likely tba

## 2025-04-06 NOTE — ED ADULT NURSE NOTE - TEMPLATE
Clinic Care Coordination Contact    Follow Up Progress Note      Assessment: Adri had talked to the pt,and the pt wanted to be seen in clinic today. The pt is restricted to , so I filled out a referral for all [facility] providers and preceptor. I faxed it to Medicaid Restricted Program.     Care Gaps:    Health Maintenance Due   Topic Date Due     DEPRESSION ACTION PLAN  Never done     HEPATITIS B IMMUNIZATION (1 of 3 - 3-dose series) Never done     COLORECTAL CANCER SCREENING  Never done     Pneumococcal Vaccine: Pediatrics (0 to 5 Years) and At-Risk Patients (6 to 64 Years) (2 - PCV) 10/15/2015     ASTHMA ACTION PLAN  01/31/2021     SPIROMETRY  03/03/2021           Care Plans      Intervention/Education provided during outreach:               Plan:     Care Coordinator will follow up in   
General

## 2025-04-06 NOTE — H&P ADULT - PROBLEM SELECTOR PLAN 1
- Patient meets sepsis criteria with fever to 100.5, leukocytosis, and tachycardia  - Suspect due to aspiration, CT Chest neg for PNA but likely too early for imaging to  a PNA  - UA clear, abdomen soft, no emesis here, no diarrhea, no significant cough noted    -> Will c/w empiric zosyn for now  -> Check full RVP  -> BCx x2 in lab  -> Lactate wnl

## 2025-04-06 NOTE — H&P ADULT - NSHPPHYSICALEXAM_GEN_ALL_CORE
Vital Signs Last 24 Hrs  T(C): 36.8 (07 Apr 2025 00:16), Max: 38.1 (06 Apr 2025 14:57)  T(F): 98.2 (07 Apr 2025 00:16), Max: 100.5 (06 Apr 2025 14:57)  HR: 87 (07 Apr 2025 00:16) (83 - 107)  BP: 151/92 (07 Apr 2025 00:16) (115/75 - 151/92)  BP(mean): --  RR: 19 (07 Apr 2025 00:16) (15 - 20)  SpO2: 99% (07 Apr 2025 00:16) (96% - 99%)    Parameters below as of 07 Apr 2025 00:16  Patient On (Oxygen Delivery Method): room air    GENERAL: Seems to be in some distress but patient does not interact at baseline, cachectic, contracted at all extremities  EYES/ENT: EOMI, R eye cloudy (chronic 2/2 cataract), L pupil round and reactive, conjunctiva and sclera clear, Neck supple, No JVD, moist mucosa  CHEST/LUNG: Clear to auscultation bilaterally; No wheeze, equal breath sounds bilaterally   BACK: No spinal tenderness  HEART: Regular rate and rhythm; No murmurs, rubs, or gallops  ABDOMEN: Soft, Nontender, Nondistended; Bowel sounds present  EXTREMITIES: No LE edema or asymmetry, +DP/PT/Radial pulses b/l  PSYCH: Nl behavior, nl affect  NEUROLOGY: AAOx0, contraction of all extremities  SKIN: Normal color, No rashes or lesions

## 2025-04-06 NOTE — PATIENT PROFILE ADULT - HOW PATIENT ADDRESSED, PROFILE
[de-identified] : Patient is doing well following their right revision TKR at 1 year. She understands her limited ROM but she is very happy. They can continue activities as tolerated. Patient may follow up with x-rays in 1 year;. 
shivam

## 2025-04-06 NOTE — ED ADULT NURSE NOTE - PATIENT'S PREFERRED PRONOUN
Withheld/Decline to Answer
Patient requests all Lab, Cardiology, and Radiology Results on their Discharge Instructions

## 2025-04-06 NOTE — ED ADULT NURSE NOTE - CHIEF COMPLAINT QUOTE
Pt coming in from [facility] for vomiting that started this morning while staff was feeding her. Pt non-verbal at baseline. Per ems, pt was sating 88% on RA, placed on 3L o2 via NC and o2 sat improved to 99%. PMHx dementia, breast cancer (in remission), HTN.

## 2025-04-06 NOTE — ED ADULT NURSE REASSESSMENT NOTE - NS ED NURSE REASSESS COMMENT FT1
PT is resting in stretcher. no apparent distress noted at this time.  pt suctioned at this time  and changed pt is clean and dry. hand off will be given to primary nurse when return to area.
pt remains on tele, v/s as noted.  family at bedside, awaiting CT results.
Pt's mental status remains at baseline. V/s stable. No respiratory distress noted. Respirations even and unlabored. Pt tearful and squirming, MD Brush made aware medications ordered. Lights turned off to decrease stimulation.  R FA 20g WDL. Pt safety precautions maintained. Pt care ongoing. Daughter remains at bedside. Pt awaiting adm bed.
Report received from DEVON Adrian. Pt non-verbal at baseline. VS stable. No respiratory distress noted. Respirations even and unlabored. Pt straight cath utilizing sterile technique. Skin intact. Pt safety precautions maintained. Pt care ongoing. urine collected and sent. Pt safety precautions maintained. Pt care ongoing.

## 2025-04-06 NOTE — H&P ADULT - PROBLEM SELECTOR PLAN 2
- Concern for aspiration given history    -> Will keep NPO except meds  -> Obtain S&S eval  -> Aspiration precautions

## 2025-04-06 NOTE — ED ADULT NURSE NOTE - OBJECTIVE STATEMENT
pt received to 29, awake and alert.  pt coming from nursing home, accompanied by daughter.  pt is non-verbal at baseline, moaning and has gurgling.  as per daughter, pt has been eating but losing weight.  + BM on arrival to room.  skin dry and intact.  SL placed, labs sent pt placed on luis sosa set up at bedside, pt is able to clear secretions.  awaiting further orders

## 2025-04-06 NOTE — ED PROVIDER NOTE - CLINICAL SUMMARY MEDICAL DECISION MAKING FREE TEXT BOX
70-year-old female past medical history of severe dementia nonverbal, breast cancer in remission, HTN, HLD presents with daughter at bedside for vomiting/aspiration that began this morning.  Staff at nursing home and daughter have had significant difficulty feeding patient as she refuses most foods with and she has been losing weight increasingly.  While feeding her this morning patient initially was taking feeds however then began coughing/vomiting all of the feeds up she has been persistently coughing since then.  Patient was, per EMS satting 88% on room air placed on 3 L and O2 sat improved to 99%.  Patient was somewhat verbal about 2 weeks ago but has become completely nonverbal over the past week.  Patient unable to provide ROS.    Gen: AAOx0, pt lies still in stretcher with knees drawn up, moaning/making incoherent noises, does not acknowledge examiner or daughter, very thin  HEENT: NCAT, normal conjunctiva, oral mucosa moist  Lung: gurgling sounds/moaning make ausculation very difficult, however pt currently [id number] on RA  CV: regular rate and rhythm. cap refill <2x. peripheral pulses 2+bilaterally   Abd: soft, ND, NT  MSK: no visible deformities  Neuro: No focal deficits  Skin: Intact, very warm to touch  Psych: abnormal, detached yet agitated, demented affect    Plan for sepsis workup, likely aspiration PNA given poor feedings recently, likely going on for longer than just the choking episode today, which may describe change in mental status over the past two weeks. Will also obtain CT head to eval for any other mass/bleed that may have caused worsened mental status. Will obtain rectal temp, treat fevers as appropriate, labs, CTAP and CT chest. Will reassess.

## 2025-04-06 NOTE — H&P ADULT - NSHPSOCIALHISTORY_GEN_ALL_CORE
Currently resides at [facility]  Non-ambulatory  Former smoker, quit ~20 years ago  No current EtOH or illicit substance use

## 2025-04-06 NOTE — H&P ADULT - PROBLEM SELECTOR PLAN 5
- AAO x 0, non-verbal, on pureed/nectar thick liquids, contraction of all extremities  - Also cachectic, losing weight, cachectic on exam    -> RD eval ordered  -> c/w home meds of memantine 20mg daily, escitalopram 10mg daily, remeron 15mg qHS, zyprexa 2.5mg BID, and trazodone 50mg qHS  -> c/w vitamin supplementation

## 2025-04-07 DIAGNOSIS — K59.00 CONSTIPATION, UNSPECIFIED: ICD-10-CM

## 2025-04-07 DIAGNOSIS — R09.89 OTHER SPECIFIED SYMPTOMS AND SIGNS INVOLVING THE CIRCULATORY AND RESPIRATORY SYSTEMS: ICD-10-CM

## 2025-04-07 DIAGNOSIS — A41.9 SEPSIS, UNSPECIFIED ORGANISM: ICD-10-CM

## 2025-04-07 DIAGNOSIS — I10 ESSENTIAL (PRIMARY) HYPERTENSION: ICD-10-CM

## 2025-04-07 DIAGNOSIS — E78.5 HYPERLIPIDEMIA, UNSPECIFIED: ICD-10-CM

## 2025-04-07 DIAGNOSIS — E11.9 TYPE 2 DIABETES MELLITUS WITHOUT COMPLICATIONS: ICD-10-CM

## 2025-04-07 DIAGNOSIS — E87.0 HYPEROSMOLALITY AND HYPERNATREMIA: ICD-10-CM

## 2025-04-07 DIAGNOSIS — Z29.9 ENCOUNTER FOR PROPHYLACTIC MEASURES, UNSPECIFIED: ICD-10-CM

## 2025-04-07 LAB
A1C WITH ESTIMATED AVERAGE GLUCOSE RESULT: 5.3 % — SIGNIFICANT CHANGE UP (ref 4–5.6)
ADD ON TEST-SPECIMEN IN LAB: SIGNIFICANT CHANGE UP
ADD ON TEST-SPECIMEN IN LAB: SIGNIFICANT CHANGE UP
ALBUMIN SERPL ELPH-MCNC: 3.9 G/DL — SIGNIFICANT CHANGE UP (ref 3.3–5)
ALP SERPL-CCNC: 58 U/L — SIGNIFICANT CHANGE UP (ref 40–120)
ALT FLD-CCNC: 20 U/L — SIGNIFICANT CHANGE UP (ref 4–33)
ANION GAP SERPL CALC-SCNC: 16 MMOL/L — HIGH (ref 7–14)
AST SERPL-CCNC: 21 U/L — SIGNIFICANT CHANGE UP (ref 4–32)
BASOPHILS # BLD AUTO: 0.05 K/UL — SIGNIFICANT CHANGE UP (ref 0–0.2)
BASOPHILS NFR BLD AUTO: 0.6 % — SIGNIFICANT CHANGE UP (ref 0–2)
BILIRUB SERPL-MCNC: 0.4 MG/DL — SIGNIFICANT CHANGE UP (ref 0.2–1.2)
BUN SERPL-MCNC: 16 MG/DL — SIGNIFICANT CHANGE UP (ref 7–23)
CALCIUM SERPL-MCNC: 9.4 MG/DL — SIGNIFICANT CHANGE UP (ref 8.4–10.5)
CHLORIDE SERPL-SCNC: 105 MMOL/L — SIGNIFICANT CHANGE UP (ref 98–107)
CO2 SERPL-SCNC: 22 MMOL/L — SIGNIFICANT CHANGE UP (ref 22–31)
CREAT SERPL-MCNC: 0.43 MG/DL — LOW (ref 0.5–1.3)
EGFR: 105 ML/MIN/1.73M2 — SIGNIFICANT CHANGE UP
EGFR: 105 ML/MIN/1.73M2 — SIGNIFICANT CHANGE UP
EOSINOPHIL # BLD AUTO: 0.06 K/UL — SIGNIFICANT CHANGE UP (ref 0–0.5)
EOSINOPHIL NFR BLD AUTO: 0.7 % — SIGNIFICANT CHANGE UP (ref 0–6)
ESTIMATED AVERAGE GLUCOSE: 105 — SIGNIFICANT CHANGE UP
GLUCOSE SERPL-MCNC: 75 MG/DL — SIGNIFICANT CHANGE UP (ref 70–99)
HCT VFR BLD CALC: 32.5 % — LOW (ref 34.5–45)
HGB BLD-MCNC: 10.8 G/DL — LOW (ref 11.5–15.5)
IANC: 6.11 K/UL — SIGNIFICANT CHANGE UP (ref 1.8–7.4)
IMM GRANULOCYTES NFR BLD AUTO: 0.5 % — SIGNIFICANT CHANGE UP (ref 0–0.9)
LYMPHOCYTES # BLD AUTO: 1.34 K/UL — SIGNIFICANT CHANGE UP (ref 1–3.3)
LYMPHOCYTES # BLD AUTO: 16.2 % — SIGNIFICANT CHANGE UP (ref 13–44)
MAGNESIUM SERPL-MCNC: 1.8 MG/DL — SIGNIFICANT CHANGE UP (ref 1.6–2.6)
MCHC RBC-ENTMCNC: 31.6 PG — SIGNIFICANT CHANGE UP (ref 27–34)
MCHC RBC-ENTMCNC: 33.2 G/DL — SIGNIFICANT CHANGE UP (ref 32–36)
MCV RBC AUTO: 95 FL — SIGNIFICANT CHANGE UP (ref 80–100)
MONOCYTES # BLD AUTO: 0.66 K/UL — SIGNIFICANT CHANGE UP (ref 0–0.9)
MONOCYTES NFR BLD AUTO: 8 % — SIGNIFICANT CHANGE UP (ref 2–14)
NEUTROPHILS # BLD AUTO: 6.11 K/UL — SIGNIFICANT CHANGE UP (ref 1.8–7.4)
NEUTROPHILS NFR BLD AUTO: 74 % — SIGNIFICANT CHANGE UP (ref 43–77)
NRBC # BLD AUTO: 0 K/UL — SIGNIFICANT CHANGE UP (ref 0–0)
NRBC # FLD: 0 K/UL — SIGNIFICANT CHANGE UP (ref 0–0)
NRBC BLD AUTO-RTO: 0 /100 WBCS — SIGNIFICANT CHANGE UP (ref 0–0)
PHOSPHATE SERPL-MCNC: 3.2 MG/DL — SIGNIFICANT CHANGE UP (ref 2.5–4.5)
PLATELET # BLD AUTO: 197 K/UL — SIGNIFICANT CHANGE UP (ref 150–400)
POTASSIUM SERPL-MCNC: 3.3 MMOL/L — LOW (ref 3.5–5.3)
POTASSIUM SERPL-SCNC: 3.3 MMOL/L — LOW (ref 3.5–5.3)
PROT SERPL-MCNC: 6.8 G/DL — SIGNIFICANT CHANGE UP (ref 6–8.3)
RBC # BLD: 3.42 M/UL — LOW (ref 3.8–5.2)
RBC # FLD: 12.3 % — SIGNIFICANT CHANGE UP (ref 10.3–14.5)
SODIUM SERPL-SCNC: 143 MMOL/L — SIGNIFICANT CHANGE UP (ref 135–145)
WBC # BLD: 8.26 K/UL — SIGNIFICANT CHANGE UP (ref 3.8–10.5)
WBC # FLD AUTO: 8.26 K/UL — SIGNIFICANT CHANGE UP (ref 3.8–10.5)

## 2025-04-07 RX ORDER — ZINC OXIDE, MENTHOL .44; 20.6 G/100G; G/100G
0 OINTMENT TOPICAL
Refills: 0 | DISCHARGE

## 2025-04-07 RX ORDER — KETOROLAC TROMETHAMINE 30 MG/ML
15 INJECTION, SOLUTION INTRAMUSCULAR; INTRAVENOUS ONCE
Refills: 0 | Status: DISCONTINUED | OUTPATIENT
Start: 2025-04-07 | End: 2025-04-07

## 2025-04-07 RX ORDER — LETROZOLE 2.5 MG/1
2.5 TABLET, FILM COATED ORAL DAILY
Refills: 0 | Status: DISCONTINUED | OUTPATIENT
Start: 2025-04-07 | End: 2025-04-09

## 2025-04-07 RX ORDER — MEMANTINE HYDROCHLORIDE 21 MG/1
2 CAPSULE, EXTENDED RELEASE ORAL
Refills: 0 | DISCHARGE

## 2025-04-07 RX ORDER — MIRTAZAPINE 30 MG/1
0 TABLET, FILM COATED ORAL
Qty: 0 | Refills: 0 | DISCHARGE

## 2025-04-07 RX ORDER — ASPIRIN 325 MG
81 TABLET ORAL DAILY
Refills: 0 | Status: DISCONTINUED | OUTPATIENT
Start: 2025-04-07 | End: 2025-04-09

## 2025-04-07 RX ORDER — PIPERACILLIN-TAZO-DEXTROSE,ISO 3.375G/5
3.38 IV SOLUTION, PIGGYBACK PREMIX FROZEN(ML) INTRAVENOUS EVERY 8 HOURS
Refills: 0 | Status: DISCONTINUED | OUTPATIENT
Start: 2025-04-07 | End: 2025-04-07

## 2025-04-07 RX ORDER — FOLIC ACID 1 MG/1
1 TABLET ORAL
Refills: 0 | DISCHARGE

## 2025-04-07 RX ORDER — FOLIC ACID 1 MG/1
1 TABLET ORAL DAILY
Refills: 0 | Status: DISCONTINUED | OUTPATIENT
Start: 2025-04-07 | End: 2025-04-09

## 2025-04-07 RX ORDER — ACETAMINOPHEN 500 MG/5ML
2 LIQUID (ML) ORAL
Refills: 0 | DISCHARGE

## 2025-04-07 RX ORDER — ESCITALOPRAM OXALATE 20 MG/1
10 TABLET ORAL DAILY
Refills: 0 | Status: DISCONTINUED | OUTPATIENT
Start: 2025-04-07 | End: 2025-04-09

## 2025-04-07 RX ORDER — CYANOCOBALAMIN 1000 UG/ML
1 INJECTION INTRAMUSCULAR; SUBCUTANEOUS
Refills: 0 | DISCHARGE

## 2025-04-07 RX ORDER — SENNA 187 MG
2 TABLET ORAL AT BEDTIME
Refills: 0 | Status: DISCONTINUED | OUTPATIENT
Start: 2025-04-07 | End: 2025-04-09

## 2025-04-07 RX ORDER — CYANOCOBALAMIN 1000 UG/ML
1000 INJECTION INTRAMUSCULAR; SUBCUTANEOUS DAILY
Refills: 0 | Status: DISCONTINUED | OUTPATIENT
Start: 2025-04-07 | End: 2025-04-09

## 2025-04-07 RX ORDER — MEMANTINE HYDROCHLORIDE 21 MG/1
0 CAPSULE, EXTENDED RELEASE ORAL
Qty: 0 | Refills: 0 | DISCHARGE

## 2025-04-07 RX ORDER — OLANZAPINE 10 MG/1
0.5 TABLET ORAL
Refills: 0 | DISCHARGE

## 2025-04-07 RX ORDER — AMLODIPINE BESYLATE 10 MG/1
1 TABLET ORAL
Refills: 0 | DISCHARGE

## 2025-04-07 RX ORDER — POLYETHYLENE GLYCOL 3350 17 G/17G
17 POWDER, FOR SOLUTION ORAL DAILY
Refills: 0 | Status: DISCONTINUED | OUTPATIENT
Start: 2025-04-07 | End: 2025-04-09

## 2025-04-07 RX ORDER — ROSUVASTATIN CALCIUM 5 MG/1
0 TABLET, FILM COATED ORAL
Qty: 0 | Refills: 0 | DISCHARGE

## 2025-04-07 RX ORDER — OLANZAPINE 10 MG/1
0 TABLET ORAL
Qty: 0 | Refills: 0 | DISCHARGE

## 2025-04-07 RX ORDER — MIRTAZAPINE 30 MG/1
1 TABLET, FILM COATED ORAL
Refills: 0 | DISCHARGE

## 2025-04-07 RX ORDER — ASPIRIN 325 MG
1 TABLET ORAL
Refills: 0 | DISCHARGE

## 2025-04-07 RX ORDER — MEMANTINE HYDROCHLORIDE 21 MG/1
20 CAPSULE, EXTENDED RELEASE ORAL DAILY
Refills: 0 | Status: DISCONTINUED | OUTPATIENT
Start: 2025-04-07 | End: 2025-04-09

## 2025-04-07 RX ORDER — AMLODIPINE BESYLATE 10 MG/1
5 TABLET ORAL DAILY
Refills: 0 | Status: DISCONTINUED | OUTPATIENT
Start: 2025-04-07 | End: 2025-04-09

## 2025-04-07 RX ORDER — OLANZAPINE 10 MG/1
2.5 TABLET ORAL
Refills: 0 | Status: DISCONTINUED | OUTPATIENT
Start: 2025-04-07 | End: 2025-04-09

## 2025-04-07 RX ORDER — SELENIUM SULFIDE 2.5 %
1 SHAMPOO TOPICAL
Refills: 0 | DISCHARGE

## 2025-04-07 RX ORDER — BISACODYL 5 MG
10 TABLET, DELAYED RELEASE (ENTERIC COATED) ORAL DAILY
Refills: 0 | Status: DISCONTINUED | OUTPATIENT
Start: 2025-04-07 | End: 2025-04-09

## 2025-04-07 RX ORDER — MELATONIN 5 MG
6 TABLET ORAL AT BEDTIME
Refills: 0 | Status: DISCONTINUED | OUTPATIENT
Start: 2025-04-07 | End: 2025-04-09

## 2025-04-07 RX ORDER — ROSUVASTATIN CALCIUM 5 MG/1
1 TABLET, FILM COATED ORAL
Refills: 0 | DISCHARGE

## 2025-04-07 RX ORDER — SODIUM CHLORIDE 9 G/1000ML
1000 INJECTION, SOLUTION INTRAVENOUS
Refills: 0 | Status: DISCONTINUED | OUTPATIENT
Start: 2025-04-07 | End: 2025-04-09

## 2025-04-07 RX ORDER — MIRTAZAPINE 30 MG/1
15 TABLET, FILM COATED ORAL AT BEDTIME
Refills: 0 | Status: DISCONTINUED | OUTPATIENT
Start: 2025-04-07 | End: 2025-04-09

## 2025-04-07 RX ORDER — TRAZODONE HCL 100 MG
50 TABLET ORAL AT BEDTIME
Refills: 0 | Status: DISCONTINUED | OUTPATIENT
Start: 2025-04-07 | End: 2025-04-09

## 2025-04-07 RX ORDER — ROSUVASTATIN CALCIUM 5 MG/1
5 TABLET, FILM COATED ORAL AT BEDTIME
Refills: 0 | Status: DISCONTINUED | OUTPATIENT
Start: 2025-04-07 | End: 2025-04-09

## 2025-04-07 RX ORDER — ENOXAPARIN SODIUM 100 MG/ML
30 INJECTION SUBCUTANEOUS EVERY 24 HOURS
Refills: 0 | Status: DISCONTINUED | OUTPATIENT
Start: 2025-04-07 | End: 2025-04-09

## 2025-04-07 RX ORDER — CEFTRIAXONE 500 MG/1
1000 INJECTION, POWDER, FOR SOLUTION INTRAMUSCULAR; INTRAVENOUS EVERY 24 HOURS
Refills: 0 | Status: DISCONTINUED | OUTPATIENT
Start: 2025-04-07 | End: 2025-04-09

## 2025-04-07 RX ADMIN — MIRTAZAPINE 15 MILLIGRAM(S): 30 TABLET, FILM COATED ORAL at 21:29

## 2025-04-07 RX ADMIN — FOLIC ACID 1 MILLIGRAM(S): 1 TABLET ORAL at 11:44

## 2025-04-07 RX ADMIN — Medication 6 MILLIGRAM(S): at 21:58

## 2025-04-07 RX ADMIN — Medication 40 MILLIEQUIVALENT(S): at 08:59

## 2025-04-07 RX ADMIN — Medication 25 GRAM(S): at 05:18

## 2025-04-07 RX ADMIN — Medication 100 MILLIGRAM(S): at 15:34

## 2025-04-07 RX ADMIN — MEMANTINE HYDROCHLORIDE 20 MILLIGRAM(S): 21 CAPSULE, EXTENDED RELEASE ORAL at 11:45

## 2025-04-07 RX ADMIN — OLANZAPINE 2.5 MILLIGRAM(S): 10 TABLET ORAL at 17:39

## 2025-04-07 RX ADMIN — SODIUM CHLORIDE 100 MILLILITER(S): 9 INJECTION, SOLUTION INTRAVENOUS at 01:45

## 2025-04-07 RX ADMIN — Medication 650 MILLIGRAM(S): at 11:43

## 2025-04-07 RX ADMIN — LETROZOLE 2.5 MILLIGRAM(S): 2.5 TABLET, FILM COATED ORAL at 12:01

## 2025-04-07 RX ADMIN — KETOROLAC TROMETHAMINE 15 MILLIGRAM(S): 30 INJECTION, SOLUTION INTRAMUSCULAR; INTRAVENOUS at 02:14

## 2025-04-07 RX ADMIN — ROSUVASTATIN CALCIUM 5 MILLIGRAM(S): 5 TABLET, FILM COATED ORAL at 21:28

## 2025-04-07 RX ADMIN — SODIUM CHLORIDE 100 MILLILITER(S): 9 INJECTION, SOLUTION INTRAVENOUS at 08:58

## 2025-04-07 RX ADMIN — CEFTRIAXONE 100 MILLIGRAM(S): 500 INJECTION, POWDER, FOR SOLUTION INTRAMUSCULAR; INTRAVENOUS at 13:09

## 2025-04-07 RX ADMIN — ESCITALOPRAM OXALATE 10 MILLIGRAM(S): 20 TABLET ORAL at 11:43

## 2025-04-07 RX ADMIN — Medication 2000 UNIT(S): at 11:43

## 2025-04-07 RX ADMIN — Medication 650 MILLIGRAM(S): at 12:30

## 2025-04-07 RX ADMIN — KETOROLAC TROMETHAMINE 15 MILLIGRAM(S): 30 INJECTION, SOLUTION INTRAMUSCULAR; INTRAVENOUS at 01:44

## 2025-04-07 RX ADMIN — CYANOCOBALAMIN 1000 MICROGRAM(S): 1000 INJECTION INTRAMUSCULAR; SUBCUTANEOUS at 11:44

## 2025-04-07 RX ADMIN — OLANZAPINE 2.5 MILLIGRAM(S): 10 TABLET ORAL at 05:17

## 2025-04-07 RX ADMIN — ENOXAPARIN SODIUM 30 MILLIGRAM(S): 100 INJECTION SUBCUTANEOUS at 06:43

## 2025-04-07 RX ADMIN — POLYETHYLENE GLYCOL 3350 17 GRAM(S): 17 POWDER, FOR SOLUTION ORAL at 15:32

## 2025-04-07 RX ADMIN — Medication 2 TABLET(S): at 21:28

## 2025-04-07 RX ADMIN — Medication 81 MILLIGRAM(S): at 11:43

## 2025-04-07 RX ADMIN — Medication 50 MILLIGRAM(S): at 21:29

## 2025-04-07 RX ADMIN — AMLODIPINE BESYLATE 5 MILLIGRAM(S): 10 TABLET ORAL at 05:17

## 2025-04-07 NOTE — SWALLOW BEDSIDE ASSESSMENT ADULT - ORAL PREPARATORY PHASE
Anterior loss of bolus Reduced oral stripping of bolus from teaspoon/utensil with patient observed to utilize upper dentition to "scrape" puree from spoon to retrieve. Within functional limits

## 2025-04-07 NOTE — DIETITIAN INITIAL EVALUATION ADULT - PERTINENT MEDS FT
MEDICATIONS  (STANDING):  amLODIPine   Tablet 5 milliGRAM(s) Oral daily  aspirin  chewable 81 milliGRAM(s) Oral daily  cholecalciferol 2000 Unit(s) Oral daily  cyanocobalamin 1000 MICROGram(s) Oral daily  enoxaparin Injectable 30 milliGRAM(s) SubCutaneous every 24 hours  escitalopram 10 milliGRAM(s) Oral daily  folic acid 1 milliGRAM(s) Oral daily  lactated ringers. 1000 milliLiter(s) (100 mL/Hr) IV Continuous <Continuous>  letrozole 2.5 milliGRAM(s) Oral daily  melatonin 6 milliGRAM(s) Oral at bedtime  memantine 20 milliGRAM(s) Oral daily  mirtazapine 15 milliGRAM(s) Oral at bedtime  OLANZapine 2.5 milliGRAM(s) Oral two times a day  piperacillin/tazobactam IVPB.. 3.375 Gram(s) IV Intermittent every 8 hours  rosuvastatin 5 milliGRAM(s) Oral at bedtime  traZODone 50 milliGRAM(s) Oral at bedtime    MEDICATIONS  (PRN):  acetaminophen     Tablet .. 650 milliGRAM(s) Oral every 6 hours PRN Temp greater or equal to 38C (100.4F), Mild Pain (1 - 3)  aluminum hydroxide/magnesium hydroxide/simethicone Suspension 30 milliLiter(s) Oral every 4 hours PRN Dyspepsia  bisacodyl Suppository 10 milliGRAM(s) Rectal daily PRN Constipation  ondansetron Injectable 4 milliGRAM(s) IV Push every 8 hours PRN Nausea and/or Vomiting

## 2025-04-07 NOTE — SWALLOW BEDSIDE ASSESSMENT ADULT - SWALLOW EVAL: DIAGNOSIS
1. Mild oral stage for puree and thin liquids as characterized by reduced oral acceptance (i.e., reduced oral stripping of bolus presentations from teaspoon/utensil with patient observed to utilize upper dentition to "scrape" puree from spoon to retrieve), reduced oral containment with trace anterior loss of thin liquids via teaspoon presentations, adequate bolus manipulation for puree, slow anterior-posterior transport for thin liquids (suspect premature spillage to the hypopharynx), and adequate oral clearance. 2. Functional oral stage for mildly-thick liquids as characterized by adequate oral acceptance/containment, adequate anterior-posterior transport, and adequate oral clearance...

## 2025-04-07 NOTE — CONSULT NOTE ADULT - ASSESSMENT
71 y/o F with history of Dementia (AAO x 0, now nonverbal), R Breast Cancer s/p chemo/lumpectomy/RT (2016, in remission since), DM2 (currently off medications), HTN, and HLD who presented to the hospital from her SNF for aspiration episode    aspiration PNA  sepsis vs SIRS- tachycardia, leukocytosis  RVP negative  CT C/A/P- Large stool burden most prominently in the rectum. No evidence of pneumonia    Recommendations  switch zosyn to ceftriaxone 1g daily  bowel regimen    Garcia Kearney M.D.  [facility]  [phone]  After 5pm on weekdays and all day on weekends - please call [phone]  Available on [employer] teams    Thank you for consulting us and involving us in the management of this patients case. In addition to reviewing history, imaging, documents, labs, microbiology, took into account antibiotic stewardship, local antibiogram and infection control strategies and potential transmission issues.  69 y/o F with history of Dementia (AAO x 0, now nonverbal), R Breast Cancer s/p chemo/lumpectomy/RT (2016, in remission since), DM2 (currently off medications), HTN, and HLD who presented to the hospital from her SNF for aspiration episode    aspiration PNA vs pneumonitis  sepsis vs SIRS- tachycardia, leukocytosis  RVP negative  CT C/A/P- Large stool burden most prominently in the rectum. No evidence of pneumonia  - evidence of aspiration PNA or pneumonitis may not appear on imaging right away  speech and swallow evaluated, however, unable to assess    Recommendations  switch zosyn to ceftriaxone 1g daily  bowel regimen  trend wbc, temps    Garcia Kearney M.D.  [facility]  [phone]  After 5pm on weekdays and all day on weekends - please call [phone]  Available on [employer] teams    Thank you for consulting us and involving us in the management of this patients case. In addition to reviewing history, imaging, documents, labs, microbiology, took into account antibiotic stewardship, local antibiogram and infection control strategies and potential transmission issues.

## 2025-04-07 NOTE — DIETITIAN INITIAL EVALUATION ADULT - NS FNS DIET ORDER
Diet, Pureed:   DASH/TLC {Sodium & Cholesterol Restricted} (DASH)  Mildly Thick Liquids (MILDTHICKLIQS) (04-07-25 @ 11:12)

## 2025-04-07 NOTE — DIETITIAN INITIAL EVALUATION ADULT - PHYSCIAL ASSESSMENT
Unable to obtain consent from pt, pt visibly observed with severe fat loss and muscle wasting./underweight/emaciated

## 2025-04-07 NOTE — SWALLOW BEDSIDE ASSESSMENT ADULT - SWALLOW EVAL: PROGNOSIS
(cont.) 3. Moderate pharyngeal stage for thin liquids as characterized by suspected delay in initiation of the pharyngeal swallow trigger and +hyolaryngeal excursion upon digital palpation. "Wetness" of vocal quality evidenced following PO trials of thin liquids, which may be indicative of impaired airway protection. 4. Functional pharyngeal stage for puree and mildly-thick liquids as characterized by suspected timely initiation of the pharyngeal swallow trigger and +hyolaryngeal excursion upon digital palpation. No overt s/s aspiration/penetration evidenced during/following PO intake of puree and mildly-thick liquids.

## 2025-04-07 NOTE — SWALLOW BEDSIDE ASSESSMENT ADULT - ASR SWALLOW REFERRAL
Recommend RD consult/follow-up to ensure adequate nutrition/hydration - patient may benefit from nutritional supplements to maximize daily caloric intake.

## 2025-04-07 NOTE — DIETITIAN INITIAL EVALUATION ADULT - ORAL INTAKE PTA/DIET HISTORY
Pt with decreased cognition, AxOx0 as per RN flow sheet. As per NH transfer sheet, pt with Ht 64", and Wt 98lbs, pt's adm ht 64cm is not accurate with error. Pt was on pureed with nectar thick liquid in NH, her diet was supplemented with Ensure Plus x 3 times daily (1050kcal, 39gm pro), and Liquid Protein Supplement 3x (100cal, 15gm pro per each PK). Pt noted was on cholecalciferol for micronutrient coverage. AS per H&P, pt noted with 50lbs weight loss in 6 months.

## 2025-04-07 NOTE — SWALLOW BEDSIDE ASSESSMENT ADULT - COMMENTS
H&P Adult 4/6: "This is a 70F with history of Dementia (AAO x 0, now nonverbal), R Breast Cancer s/p chemo/lumpectomy/RT (2016, in remission since), DM2 (currently off medications), HTN, and HLD who presents to the hospital from her SNF for aspiration episode admitted for sepsis; HPI: This is a 70F with history of Dementia (AAO x 0, now nonverbal), R Breast Cancer s/p chemo/lumpectomy/RT (2016, in remission since), DM2 (currently off medications), HTN, and HLD who presents to the hospital from her SNF for aspiration episode. Patient is non-verbal, does not interact with the examiner, AAO X0, current baseline as per daughter. Daughter states that the patient was being feed breakfast this AM when the SNF staff noticed her coughing, throwing up, and aspirating. Daughter denies any issues with aspiration for the patient but she is currently on pureed diet with thickened liquids. No known fevers at her facility, no known diarrhea, foul smelling urine, or cough at her facility. As per daughter, patient has a decent appetite but has been losing weigght, has lost ~50 lbs over the past 6 months. No other acute complaints."     CT Chest 4/6: "IMPRESSION: Large stool burden most prominently in the rectum. No evidence of pneumonia"    Of Note: Patient known to this service; Seen for Clinical Swallow Evaluation during past admission 6/6/24 with recommendations: "Easy to Chew Solids with Thin Liquids; Objective testing not warranted at this time given no overt signs of impaired airway protection" (see notes). H&P Adult 4/6: "This is a 70F with history of Dementia (AAO x 0, now nonverbal), R Breast Cancer s/p chemo/lumpectomy/RT (2016, in remission since), DM2 (currently off medications), HTN, and HLD who presents to the hospital from her SNF for aspiration episode admitted for sepsis; HPI: This is a 70F with history of Dementia (AAO x 0, now nonverbal), R Breast Cancer s/p chemo/lumpectomy/RT (2016, in remission since), DM2 (currently off medications), HTN, and HLD who presents to the hospital from her SNF for aspiration episode. Patient is non-verbal, does not interact with the examiner, AAO X0, current baseline as per daughter. Daughter states that the patient was being feed breakfast this AM when the SNF staff noticed her coughing, throwing up, and aspirating. Daughter denies any issues with aspiration for the patient but she is currently on pureed diet with thickened liquids. No known fevers at her facility, no known diarrhea, foul smelling urine, or cough at her facility. As per daughter, patient has a decent appetite but has been losing weigght, has lost ~50 lbs over the past 6 months. No other acute complaints."     CT Chest 4/6: "IMPRESSION: Large stool burden most prominently in the rectum. No evidence of pneumonia"    Of Note: Patient known to this service; Seen for Clinical Swallow Evaluation during past admission 6/6/24 with recommendations: "Easy to Chew Solids with Thin Liquids; Objective testing not warranted at this time given no overt signs of impaired airway protection" (see notes).    Patient received awake/alert & on room air. PCA present providing patient care. Following, patient repositioned to upright. Did not follow one-step verbal directives (i.e., open your mouth) despite maximal & multi-modal cueing and/or provide verbal response to given prompts/stimuli. Intermittently produced audible moaning - per PCA, ongoing since this AM. Orally receptive to PO.

## 2025-04-07 NOTE — SWALLOW BEDSIDE ASSESSMENT ADULT - ASR SWALLOW RECOMMEND DIAG
2. Consider Cinesophagram at MD's discretion to objectively assess oropharyngeal swallow function, given documented concern for aspiration event.

## 2025-04-07 NOTE — DIETITIAN INITIAL EVALUATION ADULT - PROBLEM SELECTOR PLAN 1
- Patient meets sepsis criteria with fever to 100.5, leukocytosis, and tachycardia  - Suspect due to aspiration, CT Chest neg for PNA but likely too early for imaging to  a PNA  - UA clear, abdomen soft, no emesis here, no diarrhea, no significant cough noted    -> Will c/w empiric zosyn for now  -> Check full RVP  -> BCx x2 in lab  -> Lactate wnl
[Negative] : Genitourinary

## 2025-04-07 NOTE — DIETITIAN INITIAL EVALUATION ADULT - PROBLEM SELECTOR PLAN 3
intact
- Mild hypernatremia to 148, no significant REZA when compared to prior labs    -> Ordered for IVF with LR at 100 cc/hr for 10 hrs  -> Trend BMP

## 2025-04-07 NOTE — DIETITIAN INITIAL EVALUATION ADULT - NS FNS WEIGHT CHANGE REASON
As per HIE weight hx, pt's previous weight 120.2 (8/6/24), 136.11 (5/31/24), 149.1 (3/15/24), 141.9 (2/28/24). Pt's adm weight 98lbs (4/7/24). Pt with significant weight loss of 51lbs/32% x 1 yr./unintentional

## 2025-04-07 NOTE — DIETITIAN INITIAL EVALUATION ADULT - PERTINENT LABORATORY DATA
04-07    143  |  105  |  16  ----------------------------<  75  3.3[L]   |  22  |  0.43[L]    Ca    9.4      07 Apr 2025 05:25  Phos  3.2     04-07  Mg     1.80     04-07    TPro  6.8  /  Alb  3.9  /  TBili  0.4  /  DBili  x   /  AST  21  /  ALT  20  /  AlkPhos  58  04-07  POCT Blood Glucose.: 156 mg/dL (04-06-25 @ 13:26)  A1C with Estimated Average Glucose Result: 5.3 % (04-07-25 @ 05:25)  A1C with Estimated Average Glucose Result: 4.9 % (08-07-24 @ 07:34)  A1C with Estimated Average Glucose Result: 5.4 % (05-31-24 @ 09:40)

## 2025-04-07 NOTE — DIETITIAN INITIAL EVALUATION ADULT - FACTORS AFF FOOD INTAKE
change in mental status/change in sense of smell or taste/difficulty swallowing/persistent lack of appetite

## 2025-04-07 NOTE — SWALLOW BEDSIDE ASSESSMENT ADULT - ADDITIONAL RECOMMENDATIONS
Medical team advised to reconsult this service if patient demonstrates change in medical status impacting oral/nutritional intake and/or change in tolerance of recommended oral diet. This service to follow as schedule permits for diet tolerance/to objectively assess oropharyngeal swallow.

## 2025-04-07 NOTE — DIETITIAN INITIAL EVALUATION ADULT - OTHER INFO
Per chart review, This is a 70F with history of Dementia (AAO x 0, now nonverbal), R Breast Cancer s/p chemo/lumpectomy/RT (2016, in remission since), DM2 (currently off medications), HTN, and HLD who presents to the hospital from her SNF for aspiration episode admitted for sepsis.     Patient seen at bedside, appears to be confused. AxOx0 documented in RN flow sheet. Nutrition information obtained from both PCA, and comprehensive chart review. Pt currently NPO, s/p Swallow Eval today recommended Pureed with mildly thick liquids. Will provide nutrient dense formula Hormel Shake 2x daily(520 cedric, 22gm pro) from kitchen to provide optimal nutrition. Of note, pt currently on appetite stimulation. Supplemented with cyanocobalamin, cholecalciferol, and folic acid for micronutrient coverage. Pt with IV hydration ordered. Pt's labs notable with hypokalemia. Recommend consider adding thiamine to prevent refeeding syndrome. As per HIE weight hx, pt's previous weight 120.2 (8/6/24), 136.11 (5/31/24), 149.1 (3/15/24), 141.9 (2/28/24). Pt's adm weight 98lbs (4/7/24). Pt with significant weight loss of 51lbs/32% x 1 yr. RD to remain available for further nutritional interventions as indicated.

## 2025-04-07 NOTE — DIETITIAN INITIAL EVALUATION ADULT - ADD RECOMMEND
- Will provide nutrient dense formula Hormel Shake 2x daily(1040 cedric, 44gm pro) to provide optimal nutrition. Provide feeding assistance at all meals.  - Recommend consider adding thiamine to prevent refeeding syndrome.  - Monitor PO intake, Labs, weights, BMs, and skin integrity.   - RD to remain available for further nutritional interventions as indicated.  - Will provide nutrient dense formula Hormel Shake 2x daily(1040 cedric, 44gm pro) to provide optimal nutrition. Provide feeding assistance at all meals.  - Recommend consider adding thiamine to prevent refeeding syndrome.  - Monitor PO intake, Labs, weights, BMs, and skin integrity.   - If pt continues with poor PO intake, consider initiate GOC discussion, and consider alternate means of nutrition if w/in GOC as per medical discretion.  - RD to remain available for further nutritional interventions as indicated.

## 2025-04-08 DIAGNOSIS — Z71.89 OTHER SPECIFIED COUNSELING: ICD-10-CM

## 2025-04-08 DIAGNOSIS — F03.918 UNSPECIFIED DEMENTIA, UNSPECIFIED SEVERITY, WITH OTHER BEHAVIORAL DISTURBANCE: ICD-10-CM

## 2025-04-08 DIAGNOSIS — E43 UNSPECIFIED SEVERE PROTEIN-CALORIE MALNUTRITION: ICD-10-CM

## 2025-04-08 DIAGNOSIS — R53.2 FUNCTIONAL QUADRIPLEGIA: ICD-10-CM

## 2025-04-08 DIAGNOSIS — Z51.5 ENCOUNTER FOR PALLIATIVE CARE: ICD-10-CM

## 2025-04-08 LAB
ANION GAP SERPL CALC-SCNC: 12 MMOL/L — SIGNIFICANT CHANGE UP (ref 7–14)
BUN SERPL-MCNC: 16 MG/DL — SIGNIFICANT CHANGE UP (ref 7–23)
CALCIUM SERPL-MCNC: 9.3 MG/DL — SIGNIFICANT CHANGE UP (ref 8.4–10.5)
CHLORIDE SERPL-SCNC: 103 MMOL/L — SIGNIFICANT CHANGE UP (ref 98–107)
CO2 SERPL-SCNC: 27 MMOL/L — SIGNIFICANT CHANGE UP (ref 22–31)
CREAT SERPL-MCNC: 0.41 MG/DL — LOW (ref 0.5–1.3)
EGFR: 106 ML/MIN/1.73M2 — SIGNIFICANT CHANGE UP
EGFR: 106 ML/MIN/1.73M2 — SIGNIFICANT CHANGE UP
GLUCOSE SERPL-MCNC: 86 MG/DL — SIGNIFICANT CHANGE UP (ref 70–99)
MAGNESIUM SERPL-MCNC: 1.7 MG/DL — SIGNIFICANT CHANGE UP (ref 1.6–2.6)
PHOSPHATE SERPL-MCNC: 3.3 MG/DL — SIGNIFICANT CHANGE UP (ref 2.5–4.5)
POTASSIUM SERPL-MCNC: 3.3 MMOL/L — LOW (ref 3.5–5.3)
POTASSIUM SERPL-SCNC: 3.3 MMOL/L — LOW (ref 3.5–5.3)
SODIUM SERPL-SCNC: 142 MMOL/L — SIGNIFICANT CHANGE UP (ref 135–145)

## 2025-04-08 PROCEDURE — 90792 PSYCH DIAG EVAL W/MED SRVCS: CPT

## 2025-04-08 PROCEDURE — 99223 1ST HOSP IP/OBS HIGH 75: CPT

## 2025-04-08 PROCEDURE — 99497 ADVNCD CARE PLAN 30 MIN: CPT | Mod: 25

## 2025-04-08 PROCEDURE — 74230 X-RAY XM SWLNG FUNCJ C+: CPT | Mod: 26

## 2025-04-08 RX ADMIN — OLANZAPINE 2.5 MILLIGRAM(S): 10 TABLET ORAL at 05:53

## 2025-04-08 RX ADMIN — Medication 40 MILLIEQUIVALENT(S): at 11:10

## 2025-04-08 RX ADMIN — MEMANTINE HYDROCHLORIDE 20 MILLIGRAM(S): 21 CAPSULE, EXTENDED RELEASE ORAL at 12:44

## 2025-04-08 RX ADMIN — ROSUVASTATIN CALCIUM 5 MILLIGRAM(S): 5 TABLET, FILM COATED ORAL at 21:22

## 2025-04-08 RX ADMIN — Medication 81 MILLIGRAM(S): at 12:43

## 2025-04-08 RX ADMIN — AMLODIPINE BESYLATE 5 MILLIGRAM(S): 10 TABLET ORAL at 05:53

## 2025-04-08 RX ADMIN — Medication 2 TABLET(S): at 21:22

## 2025-04-08 RX ADMIN — Medication 50 MILLIGRAM(S): at 21:22

## 2025-04-08 RX ADMIN — Medication 20 MILLIEQUIVALENT(S): at 17:16

## 2025-04-08 RX ADMIN — Medication 650 MILLIGRAM(S): at 17:16

## 2025-04-08 RX ADMIN — ESCITALOPRAM OXALATE 10 MILLIGRAM(S): 20 TABLET ORAL at 12:44

## 2025-04-08 RX ADMIN — CYANOCOBALAMIN 1000 MICROGRAM(S): 1000 INJECTION INTRAMUSCULAR; SUBCUTANEOUS at 12:43

## 2025-04-08 RX ADMIN — FOLIC ACID 1 MILLIGRAM(S): 1 TABLET ORAL at 12:44

## 2025-04-08 RX ADMIN — POLYETHYLENE GLYCOL 3350 17 GRAM(S): 17 POWDER, FOR SOLUTION ORAL at 12:43

## 2025-04-08 RX ADMIN — Medication 100 MILLIGRAM(S): at 12:43

## 2025-04-08 RX ADMIN — OLANZAPINE 2.5 MILLIGRAM(S): 10 TABLET ORAL at 17:16

## 2025-04-08 RX ADMIN — Medication 2000 UNIT(S): at 12:43

## 2025-04-08 RX ADMIN — LETROZOLE 2.5 MILLIGRAM(S): 2.5 TABLET, FILM COATED ORAL at 12:43

## 2025-04-08 RX ADMIN — MIRTAZAPINE 15 MILLIGRAM(S): 30 TABLET, FILM COATED ORAL at 21:22

## 2025-04-08 RX ADMIN — CEFTRIAXONE 100 MILLIGRAM(S): 500 INJECTION, POWDER, FOR SOLUTION INTRAMUSCULAR; INTRAVENOUS at 14:24

## 2025-04-08 RX ADMIN — ENOXAPARIN SODIUM 30 MILLIGRAM(S): 100 INJECTION SUBCUTANEOUS at 05:53

## 2025-04-08 RX ADMIN — Medication 6 MILLIGRAM(S): at 21:22

## 2025-04-08 RX ADMIN — Medication 650 MILLIGRAM(S): at 18:16

## 2025-04-08 NOTE — CONSULT NOTE ADULT - SUBJECTIVE AND OBJECTIVE BOX
[facility]  JOSHUA Haley S. Shah, Y. Patel, G. Casimir  [phone]  ([phone] - weekdays after 5pm and weekends)    DANNY STEVEN  70y, Female  [id number]    HPI--  HPI:  69 y/o F with history of Dementia (AAO x 0, now nonverbal), R Breast Cancer s/p chemo/lumpectomy/RT (2016, in remission since), DM2 (currently off medications), HTN, and HLD who presented to the hospital from her SNF for aspiration episode. Patient is non-verbal, does not interact with the examiner, AAO X0, current baseline as per daughter. Per notes patient was being feed breakfast when the SNF staff noticed her coughing, throwing up, and aspirating. Daughter denied any issues with aspiration for the patient but she is currently on pureed diet with thickened liquids. No known fevers at her facility, no known diarrhea, foul smelling urine, or cough at her facility. As per daughter, patient has a decent appetite but has been losing weigght, has lost ~50 lbs over the past 6 months.   On arrival to the ED her vitals were T 100.5, P 107, /85, RR 20, O2 sat 96% (was reportedly 88% on RA with EMS prior to coming to the hospital). Her lab work showed leukocytosis to ~13k, mild hypernatremia to 148. Her UA and fluvid were negative. Her imaging showed constipation but no other acute findings. She was given NS 1L, ofirmev 1g x2, and zosyn 3.375g x1. She was admitted to medicine.  Patient unable to provide history therefore history obtained from chart review.     ROS: unable to assess 2/2 patient's mentation     Allergies: No Known Allergies    PMH -- Breast cancer    Zoster ophthalmicus    HLD (hyperlipidemia)    Prediabetes    Dementia    Diabetes type 2    CVA (cerebrovascular accident)      PSH -- No significant past surgical history    S/P lumpectomy, right breast      FH -- FH: dementia (Mother)      Social History -- unable to assess 2/2 patient's mentation     Physical Exam--  Vital Signs Last 24 Hrs  T(F): 98 (07 Apr 2025 11:04), Max: 100.5 (06 Apr 2025 14:57)  HR: 68 (07 Apr 2025 11:04) (64 - 107)  BP: 124/73 (07 Apr 2025 11:04) (115/75 - 151/92)  RR: 18 (07 Apr 2025 11:04) (15 - 20)  SpO2: 100% (07 Apr 2025 11:04) (96% - 100%)  General: nontoxic-appearing, no acute distress  HEENT: anicteric  Lungs: decreased breath sounds  Heart: S1, S2, normal rate.   Abdomen: Soft. Nondistended. Nontender.   Neuro: nonverbal  Extremities: No LE edema.   Skin: Warm. Dry. No rash.  Psychiatric: unable to assess 2/2 patient's mentation     Laboratory & Imaging Data--  CBC:                       10.8   8.26  )-----------( 197      ( 07 Apr 2025 05:25 )             32.5     WBC Count: 8.26 K/uL (04-07-25 @ 05:25)  WBC Count: 12.98 K/uL (04-06-25 @ 14:30)    CMP: 04-07    143  |  105  |  16  ----------------------------<  75  3.3[L]   |  22  |  0.43[L]    Ca    9.4      07 Apr 2025 05:25  Phos  3.2     04-07  Mg     1.80     04-07    TPro  6.8  /  Alb  3.9  /  TBili  0.4  /  DBili  x   /  AST  21  /  ALT  20  /  AlkPhos  58  04-07    LIVER FUNCTIONS - ( 07 Apr 2025 05:25 )  Alb: 3.9 g/dL / Pro: 6.8 g/dL / ALK PHOS: 58 U/L / ALT: 20 U/L / AST: 21 U/L / GGT: x           Urinalysis Basic - ( 07 Apr 2025 05:25 )    Color: x / Appearance: x / SG: x / pH: x  Gluc: 75 mg/dL / Ketone: x  / Bili: x / Urobili: x   Blood: x / Protein: x / Nitrite: x   Leuk Esterase: x / RBC: x / WBC x   Sq Epi: x / Non Sq Epi: x / Bacteria: x      Microbiology:     Urinalysis with Rflx Culture (collected 04-06-25 @ 20:55)        Radiology--  ***  Active Medications--  acetaminophen     Tablet .. 650 milliGRAM(s) Oral every 6 hours PRN  aluminum hydroxide/magnesium hydroxide/simethicone Suspension 30 milliLiter(s) Oral every 4 hours PRN  amLODIPine   Tablet 5 milliGRAM(s) Oral daily  aspirin  chewable 81 milliGRAM(s) Oral daily  bisacodyl Suppository 10 milliGRAM(s) Rectal daily PRN  cholecalciferol 2000 Unit(s) Oral daily  cyanocobalamin 1000 MICROGram(s) Oral daily  enoxaparin Injectable 30 milliGRAM(s) SubCutaneous every 24 hours  escitalopram 10 milliGRAM(s) Oral daily  folic acid 1 milliGRAM(s) Oral daily  lactated ringers. 1000 milliLiter(s) IV Continuous <Continuous>  letrozole 2.5 milliGRAM(s) Oral daily  melatonin 6 milliGRAM(s) Oral at bedtime  memantine 20 milliGRAM(s) Oral daily  mirtazapine 15 milliGRAM(s) Oral at bedtime  OLANZapine 2.5 milliGRAM(s) Oral two times a day  ondansetron Injectable 4 milliGRAM(s) IV Push every 8 hours PRN  piperacillin/tazobactam IVPB.. 3.375 Gram(s) IV Intermittent every 8 hours  rosuvastatin 5 milliGRAM(s) Oral at bedtime  traZODone 50 milliGRAM(s) Oral at bedtime    Antimicrobials:   piperacillin/tazobactam IVPB.. 3.375 Gram(s) IV Intermittent every 8 hours    Immunologic:   
Date of Service 04-08-25 @ 20:16    HPI:  This is a 70F with history of Dementia (AAO x 0, now nonverbal), R Breast Cancer s/p chemo/lumpectomy/RT (2016, in remission since), DM2 (currently off medications), HTN, and HLD who presents to the hospital from her SNF for aspiration episode. Patient is non-verbal, does not interact with the examiner, AAO X0, current baseline as per daughter. Daughter states that the patient was being feed breakfast this AM when the SNF staff noticed her coughing, throwing up, and aspirating. Daughter denies any issues with aspiration for the patient but she is currently on pureed diet with thickened liquids. No known fevers at her facility, no known diarrhea, foul smelling urine, or cough at her facility. As per daughter, patient has a decent appetite but has been losing weigght, has lost ~50 lbs over the past 6 months. No other acute complaints.     On arrival to the ED her vitals were T 100.5, P 107, /85, RR 20, O2 sat 96% (was reportedly 88% on RA with EMS prior to coming to the hospital). Her lab work showed leukocytosis to ~13k, mild hypernatremia to 148. Her UA and fluvid were negative. Her imaging showed constipation but no other acute findings. She was given NS 1L, ofirmev 1g x2, and zosyn 3.375g x1. She was admitted to medicine.  (06 Apr 2025 22:49)    PERTINENT PM/SXH:   Breast cancer  Zoster ophthalmicus  HLD (hyperlipidemia)  Prediabetes  Dementia  Diabetes type 2  CVA (cerebrovascular accident)  No significant past surgical history  S/P lumpectomy, right breast    FAMILY HISTORY:  FH: dementia (Mother)    ITEMS NOT CHECKED ARE NOT PRESENT    SOCIAL HISTORY:   Significant other/partner[ ]  Children[x ]  Yazidism/Spirituality:  Substance hx:  [ ]   Tobacco hx:  [ ]   Alcohol hx: [ ]   Home Opioid hx:  [ ] I-Stop Reference No:  [id number]  Prescription Information      PDI Filter:    PDI	Current Rx	Drug Type	Rx Written	Rx Dispensed	Drug	Quantity	Days Supply	Prescriber Name	Prescriber FRANK #	Payment Method  A	N	B	08/27/2024	08/27/2024	lorazepam 0.5 mg tablet	28	7	Dr Leslie Echeverria R, [facility]	[id number]	[employer]  [facility]  Living Situation: [ ]Home  [ x]Long term care  [ ]Rehab [ ]Other        ADVANCE DIRECTIVES:    MOLST  [ ]  Living Will  [ ]   DECISION MAKER(s):  [ ] Health Care Proxy(s)  [x ] Surrogate(s)  [ ] Guardian           Name(s): Phone Number(s):  Daughter Radha Corrales: [phone]    BASELINE (I)ADL(s) (prior to admission):  [place]: [ ]Total  [ ] Moderate [ x]Dependent    Allergies    No Known Allergies    Intolerances    MEDICATIONS  (STANDING):  amLODIPine   Tablet 5 milliGRAM(s) Oral daily  aspirin  chewable 81 milliGRAM(s) Oral daily  cefTRIAXone   IVPB 1000 milliGRAM(s) IV Intermittent every 24 hours  cholecalciferol 2000 Unit(s) Oral daily  cyanocobalamin 1000 MICROGram(s) Oral daily  enoxaparin Injectable 30 milliGRAM(s) SubCutaneous every 24 hours  escitalopram 10 milliGRAM(s) Oral daily  folic acid 1 milliGRAM(s) Oral daily  lactated ringers. 1000 milliLiter(s) (100 mL/Hr) IV Continuous <Continuous>  letrozole 2.5 milliGRAM(s) Oral daily  melatonin 6 milliGRAM(s) Oral at bedtime  memantine 20 milliGRAM(s) Oral daily  mirtazapine 15 milliGRAM(s) Oral at bedtime  OLANZapine 2.5 milliGRAM(s) Oral two times a day  polyethylene glycol 3350 17 Gram(s) Oral daily  rosuvastatin 5 milliGRAM(s) Oral at bedtime  senna 2 Tablet(s) Oral at bedtime  thiamine 100 milliGRAM(s) Oral daily  traZODone 50 milliGRAM(s) Oral at bedtime    MEDICATIONS  (PRN):  acetaminophen     Tablet .. 650 milliGRAM(s) Oral every 6 hours PRN Temp greater or equal to 38C (100.4F), Mild Pain (1 - 3)  aluminum hydroxide/magnesium hydroxide/simethicone Suspension 30 milliLiter(s) Oral every 4 hours PRN Dyspepsia  bisacodyl Suppository 10 milliGRAM(s) Rectal daily PRN Constipation  ondansetron Injectable 4 milliGRAM(s) IV Push every 8 hours PRN Nausea and/or Vomiting        ITEMS UNCHECKED ARE NOT PRESENT     PRESENT SYMPTOMS: [ x]Unable to self-report due to altered mental status  [ ] CPOT [ x] PAINADs [ x] RDOS  Source if other than patient:  [ ]Family   [ ]Team     Pain: [ ]yes [ ]no  QOL impact -   Location -                    Aggravating factors -  Quality -  Radiation -  Timing-  Severity (0-10 scale):  Minimal acceptable level / Pain goal (0-10 scale):     CPOT:    https://www.[facility].org/getattachment/[email or web address]/Critical-Care-Pain-Observation-Tool-(CPOT)    Dyspnea:                           [ ]Mild [ ]Moderate [ ]Severe  Anxiety:                             [ ]Mild [ ]Moderate [ ]Severe  Agitation:                          [ ]Mild [ ]Moderate [ ]Severe  Fatigue:                             [ ]Mild [ ]Moderate [ ]Severe  Nausea:                             [ ]Mild [ ]Moderate [ ]Severe  Loss of appetite:              [ ]Mild [ ]Moderate [ ]Severe  Constipation:                   [ ]Mild [ ]Moderate [ ]Severe  Diarrhea:                          [ ]Mild [ ]Moderate [ ]Severe      PCSSQ[Palliative Care Spiritual Screening Question]   Severity (0-10):  Score of 4 or > indicate consideration of Chaplaincy referral.  Chaplaincy Referral: [ ] yes [ ] refused [ ] following [ x] deferred    Caregiver [place]? : [ ] yes [ ] no [ ] Declined   [x ] Deferred            Social work referral [ ] Patient & Family Centered Care Referral [ ]     Anticipatory Grief present?:  [ ] yes [ ] no  [x ] Deferred                  Social work referral [ ] Chaplaincy Referral[ ] Caregiver Support Referral [ ]     Other Symptoms:  [ ]All other review of systems negative   [x ] Unable to obtain due to poor mentation     PHYSICAL EXAM:  Vital Signs Last 24 Hrs  T(C): 36.7 (08 Apr 2025 18:07), Max: 36.8 (08 Apr 2025 06:00)  T(F): 98.1 (08 Apr 2025 18:07), Max: 98.2 (08 Apr 2025 06:00)  HR: 86 (08 Apr 2025 18:07) (62 - 94)  BP: 125/67 (08 Apr 2025 18:07) (120/72 - 135/81)  BP(mean): --  RR: 18 (08 Apr 2025 18:07) (17 - 18)  SpO2: 98% (08 Apr 2025 18:07) (96% - 99%)    Parameters below as of 08 Apr 2025 18:07  Patient On (Oxygen Delivery Method): room air         I&O's Summary      GENERAL: Awake with eyes open ,unable to follow commands   [ ]Alert  [ ]Oriented x   [ ]Lethargic  [ ]Cachexia  [ ]Unarousable  [ ]Verbal  [x ]Non-Verbal  [x ] No Distress  Behavioral:   [ ] Anxiety  [x ] Delirium [ ] Agitation [ ] Calm  [ x] Other-encephalopathy  HEENT:  [ ]Normal  [ ] Temporal Wasting  [ ]Dry mouth   [ ]ET Tube/Trach  [ ]Oral lesions  [ ] Mucositis  PULMONARY:   [ ]Clear [ ]Tachypnea  [ ]Audible excessive secretions   [ ]Rhonchi        [ ]Right [ ]Left [ ]Bilateral  [ ]Crackles        [ ]Right [ ]Left [ ]Bilateral  [ ]Wheezing     [ ]Right [ ]Left [ ]Bilateral  [ x]Diminished breath sounds [ ]right [ ]left [x ]bilateral  CARDIOVASCULAR:    [x ]Regular [ ]Irregular [ ]Tachy  [ ]Bakari [ ]Murmur [ ]Other  GASTROINTESTINAL:  [x ]Soft  [ ]Distended   [ ]+BS  [ x]Non tender [ ]Tender  [ ]PEG [ ]OGT/ NGT  Last BM: 4/8  GENITOURINARY:  [ ]Normal [x ] Incontinent   [ ]Oliguria/Anuria   [ ]Newell  MUSCULOSKELETAL:   [ ]Normal   [ ]Weakness  [x ]Bed/Wheelchair bound [ ]Edema  [  ] amputation  [  ] contraction  NEUROLOGIC: unable to assess due to encephalopathy   [ ]No focal deficits  [ x]Cognitive impairment  [x ]Dysphagia [ ]Dysarthria [ ]Paresis [ ]Other   SKIN: See Nursing Skin Assessment for further details  [ ]Normal    [ ]Rash  [ ]Pressure ulcer(s)       Present on admission [ ]y [ ]n   [  ]  Wound    [  ] hyperpigmentation    CRITICAL CARE:  [ ] Shock Present  [ ]Septic [ ]Cardiogenic [ ]Neurologic [ ]Hypovolemic  [ ]  Vasopressors [ ]  Inotropes   [ ]Respiratory failure present [ ]Mechanical ventilation [ ]Non-invasive ventilatory support [ ]High flow    [ ]Acute  [ ]Chronic [ ]Hypoxic  [ ]Hypercarbic [ ]Other  [ ]Other organ failure     LABS:  reviewed                         10.8   8.26  )-----------( 197      ( 07 Apr 2025 05:25 )             32.5   04-08    142  |  103  |  16  ----------------------------<  86  3.3[L]   |  27  |  0.41[L]    Ca    9.3      08 Apr 2025 06:00  Phos  3.3     04-08  Mg     1.70     04-08    TPro  6.8  /  Alb  3.9  /  TBili  0.4  /  DBili  x   /  AST  21  /  ALT  20  /  AlkPhos  58  04-07    Urinalysis Basic - ( 08 Apr 2025 06:00 )    Color: x / Appearance: x / SG: x / pH: x  Gluc: 86 mg/dL / Ketone: x  / Bili: x / Urobili: x   Blood: x / Protein: x / Nitrite: x   Leuk Esterase: x / RBC: x / WBC x   Sq Epi: x / Non Sq Epi: x / Bacteria: x      CAPILLARY BLOOD GLUCOSE          RADIOLOGY & ADDITIONAL STUDIES: reviewed     PROTEIN CALORIE MALNUTRITION PRESENT: [ ]mild [ ]moderate [x ]severe [ ]underweight [ ]morbid obesity  https://www.andeal.org/vault/2440/web/files/ONC/Table_Clinical%20Characteristics%20to%20Document%20Malnutrition-White%20JV%20et%20al%202012.pdf    Height (cm): 162.6 (04-07-25 @ 05:01), 160 (08-26-24 @ 11:37), 160 (08-06-24 @ 16:36)  Weight (kg): 44.452 (04-07-25 @ 00:16), 54.5 (08-06-24 @ 16:36), 62 (05-31-24 @ 01:51)  BMI (kg/m2): 16.8 (04-07-25 @ 05:01), 17.4 (04-07-25 @ 00:16), 21.3 (08-26-24 @ 11:37)    [x ]PPSV2 < or = to 30% [ ]significant weight loss  [ ]poor nutritional intake  [ ]anasarca [ ]Artificial Nutrition      REFERRALS:   [ ]Chaplaincy  [ ]Hospice  [ ]Child Life  [ ]Social Work  [ x]Case management [ ]Holistic Therapy

## 2025-04-08 NOTE — SWALLOW VFSS/MBS ASSESSMENT ADULT - ORAL PREP COMMENTS
Patient utilizing slurping for bolus retrieval from utensil/cup sip presentations. Patient utilizing slurping for bolus retrieval with utensil/cup sip presentations

## 2025-04-08 NOTE — CONSULT NOTE ADULT - CONVERSATION DETAILS
Patient has been residing in long term NH facility for the past year. At baseline, patient is bedbound, non-verbal and requires assistance for all ADLs. Daughter shares that at baseline patient able to tolerate diet, but occasionally has aspiration; she notes that patient has had significant weight loss recently.    Dementia trajectory and prognosis reviewed, including complications of dysphagia and decreased PO intake. Counseled/educated that in setting of dementia if dysphagia develops would recommend against feeding tube placement. Discussed the risks and benefits of long term artificial nutrition via a PEG tube including risk of infection, self dislodgement, continued risk of aspiration and not improving overall patient's quality of life. Introduced concept of pleasure feeds/comfort feeds if/when dysphagia develops.     Also counseled that patient meets hospice criteria due to advanced dementia. Recommended for daughter to consider adding hospice services upon return to NH facility. Daughter Radha appreciative of information and will further discuss with patient's medical team at NH at their next monthly meeting regarding patient's care.

## 2025-04-08 NOTE — CONSULT NOTE ADULT - PROBLEM SELECTOR RECOMMENDATION 5
- MOLST completed in 12/2024 for DNR/DNI  - Met with daughter Radha at bedside. Dementia trajectory and prognosis reviewed, including complications of dysphagia and decreased PO intake. Counseled/educated that in setting of dementia if dysphagia develops would recommend against feeding tube placement. Counseled/educated about risks of long term artificial nutrition via a PEG tube. Introduced concept of pleasure feeds/comfort feeds if/when dysphagia develops.   Also counseled that patient meets hospice criteria due to advanced dementia. Recommended for daughter to consider adding hospice services upon return to NH facility. Daughter Radha appreciative of information and will further discuss with patient's medical team at NH at their next monthly meeting regarding patient's care.  See [facility] note  16 minutes spent on advanced care planning

## 2025-04-08 NOTE — CONSULT NOTE ADULT - ASSESSMENT
70 F with history of Dementia (AAO x 0, now nonverbal), R Breast Cancer s/p chemo/lumpectomy/RT (2016, in remission since), DM2 (currently off medications), HTN, and HLD who presents to the hospital from her SNF for aspiration episode admitted for sepsis.   Palliative Care consulted for complex decision making  related to goals of care discussions in the setting of advanced illness

## 2025-04-08 NOTE — CONSULT NOTE ADULT - PROBLEM SELECTOR RECOMMENDATION 2
- Patient met sepsis criteria on admission   - CT Chest 4/6 - No evidence of pneumonia  - Infectious Disease recommendations appreciated  - management as per primary team

## 2025-04-08 NOTE — SWALLOW VFSS/MBS ASSESSMENT ADULT - COMMENTS
4/7 Internal Medicine Note: "70 F with history of Dementia (AAO x 0, now nonverbal), R Breast Cancer s/p chemo/lumpectomy/RT (2016, in remission since), DM2 (currently off medications), HTN, and HLD who presents to the hospital from her SNF for aspiration episode admitted for sepsis."    Patient is known to this service from this admission and previous admissions.     Of note 1: Patient seen for Clinical Swallow Evaluation (previous admission) on 6/6/24 with recommendations made for "Easy to Chew Solids with Thin Liquids" (See consult for details).     Of note 2: Patient seen for Clinical Swallow Evaluation (this admission) on 4/7/25 with recommendations made for "Puree with Mildly Thick Liquids and Cinesophagram given documented concern for aspiration event" (See consult for details).    Patient received in Radiology Suite via stretcher for Cinesophagram. Patient is awake/alert, nonverbal, though intermittently producing vocalizations (moaning/grunting). Wet vocal quality noted during moaning/grunting, prior to PO trials. Patient orally receptive to PO trials. Patient transferred to specialized seating unit, initially with adequate lateral view projection. Of note, patient with increased restlessness as study progressed (despite redirection), resulting in limited view of airway/trachea for Thin Liquid trials. 4/7 Internal Medicine Note: "70 F with history of Dementia (AAO x 0, now nonverbal), R Breast Cancer s/p chemo/lumpectomy/RT (2016, in remission since), DM2 (currently off medications), HTN, and HLD who presents to the hospital from her SNF for aspiration episode admitted for sepsis."    Of note: Patient seen for Clinical Swallow Evaluation (this admission) on 4/7/25 with recommendations made for "Puree with Mildly Thick Liquids and Cinesophagram given documented concern for aspiration event (at nursing facility)" (See consult for details).    Patient received in Radiology Suite via stretcher for Cinesophagram. Patient is awake/alert, nonverbal, though intermittently producing vocalizations (grunting). Wet vocal quality noted during grunting, prior to PO trials. Patient orally receptive to PO trials. Patient transferred to specialized seating unit, with seatbelt in place, in an upright position. However, patient with restlessness, requiring repositioning (additional pillow behind back for upright position and wedge under patient's knees) to reduce movement/sliding from the chair.

## 2025-04-08 NOTE — PROGRESS NOTE ADULT - NUTRITIONAL ASSESSMENT
This patient has been assessed with a concern for Malnutrition and has been determined to have a diagnosis/diagnoses of Severe protein-calorie malnutrition and Underweight (BMI < 19).    This patient is being managed with:   Diet Pureed-  DASH/TLC {Sodium & Cholesterol Restricted} (DASH)  Mildly Thick Liquids (MILDTHICKLIQS)  Entered: Apr 7 2025 11:11AM  
This patient has been assessed with a concern for Malnutrition and has been determined to have a diagnosis/diagnoses of Severe protein-calorie malnutrition and Underweight (BMI < 19).    This patient is being managed with:   Diet Pureed-  DASH/TLC {Sodium & Cholesterol Restricted} (DASH)  Mildly Thick Liquids (MILDTHICKLIQS)  Entered: Apr 7 2025 11:11AM

## 2025-04-08 NOTE — CONSULT NOTE ADULT - PROBLEM SELECTOR RECOMMENDATION 9
Patient with advanced dementia. Fast 7C  Behavioral Health recommendations appreciated   Supportive Care

## 2025-04-08 NOTE — BH CONSULTATION LIAISON ASSESSMENT NOTE - HPI (INCLUDE ILLNESS QUALITY, SEVERITY, DURATION, TIMING, CONTEXT, MODIFYING FACTORS, ASSOCIATED SIGNS AND SYMPTOMS)
70 F with history of Dementia (AAO x 0, now nonverbal), R Breast Cancer s/p chemo/lumpectomy/RT (2016, in remission since), DM2 (currently off medications), HTN, and HLD who presents to the hospital from her SNF for aspiration episode admitted for sepsis. On IV antibiotics. Yelling this morning. Psychiatry consulted for management. No prn needs  Discussed with RN--- compliant with care, no aggression. Is nonverbal, does not follow commands. BM this morning.    Met with patient. Awake, in fetal position. Not agitated, calm. Resists exam.     Reviewed last  assessment in 2024

## 2025-04-08 NOTE — SWALLOW VFSS/MBS ASSESSMENT ADULT - PHARYNGEAL PHASE COMMENTS
Reduced base of tongue retraction, Reduced pharyngeal constriction, Trace-Mild pharyngeal clearance deficits located primarily in vallecular, post primary swallow; intermittent spontaneous re-swallow assists with pharyngeal clearance. Delayed initiation of pharyngeal swallow ([place] at vallecular for Thin Liquids), Reduced base of tongue retraction, Reduced pharyngeal constriction, Trace-Mild pharyngeal clearance deficits located primarily in vallecular, post primary swallow; intermittent spontaneous re-swallow assists with pharyngeal clearance. Reduced base of tongue retraction, Reduced pharyngeal constriction. Trace-Mild pharyngeal clearance deficits located primarily in vallecular, post primary swallow; intermittent spontaneous re-swallow assists with pharyngeal clearance. There was Trace Laryngeal Penetration during the swallow for Mildly Thick Liquids, with retrieval, and airway protection maintained.

## 2025-04-08 NOTE — BH CONSULTATION LIAISON ASSESSMENT NOTE - CURRENT MEDICATION
MEDICATIONS  (STANDING):  amLODIPine   Tablet 5 milliGRAM(s) Oral daily  aspirin  chewable 81 milliGRAM(s) Oral daily  cefTRIAXone   IVPB 1000 milliGRAM(s) IV Intermittent every 24 hours  cholecalciferol 2000 Unit(s) Oral daily  cyanocobalamin 1000 MICROGram(s) Oral daily  enoxaparin Injectable 30 milliGRAM(s) SubCutaneous every 24 hours  escitalopram 10 milliGRAM(s) Oral daily  folic acid 1 milliGRAM(s) Oral daily  lactated ringers. 1000 milliLiter(s) (100 mL/Hr) IV Continuous <Continuous>  letrozole 2.5 milliGRAM(s) Oral daily  melatonin 6 milliGRAM(s) Oral at bedtime  memantine 20 milliGRAM(s) Oral daily  mirtazapine 15 milliGRAM(s) Oral at bedtime  OLANZapine 2.5 milliGRAM(s) Oral two times a day  polyethylene glycol 3350 17 Gram(s) Oral daily  potassium chloride   Powder 20 milliEquivalent(s) Oral once  rosuvastatin 5 milliGRAM(s) Oral at bedtime  senna 2 Tablet(s) Oral at bedtime  thiamine 100 milliGRAM(s) Oral daily  traZODone 50 milliGRAM(s) Oral at bedtime    MEDICATIONS  (PRN):  acetaminophen     Tablet .. 650 milliGRAM(s) Oral every 6 hours PRN Temp greater or equal to 38C (100.4F), Mild Pain (1 - 3)  aluminum hydroxide/magnesium hydroxide/simethicone Suspension 30 milliLiter(s) Oral every 4 hours PRN Dyspepsia  bisacodyl Suppository 10 milliGRAM(s) Rectal daily PRN Constipation  ondansetron Injectable 4 milliGRAM(s) IV Push every 8 hours PRN Nausea and/or Vomiting

## 2025-04-08 NOTE — PROGRESS NOTE ADULT - PROBLEM SELECTOR PLAN 1
- Patient meets sepsis criteria with fever to 100.5, leukocytosis, and tachycardia  - Suspect due to aspiration, CT Chest neg for PNA but likely too early for imaging to  a PNA  - UA clear, abdomen soft, no emesis here, no diarrhea, no significant cough noted  -> s/p empiric zosyn, concern for aspiration.  -> Check full RVP  -> BCx x2 in lab  -> Lactate wnl  - ID eval: started Ceftriaxone. f/u cultures
- Patient meets sepsis criteria with fever to 100.5, leukocytosis, and tachycardia  - Suspect due to aspiration, CT Chest neg for PNA but likely too early for imaging to  a PNA  - UA clear, abdomen soft, no emesis here, no diarrhea, no significant cough noted  -> s/p empiric zosyn, concern for aspiration.  ->  RVP neg  -> BCx x2 in lab  -> Lactate wnl  - ID eval: started Ceftriaxone IV. f/u cultures

## 2025-04-08 NOTE — PROGRESS NOTE ADULT - PROBLEM SELECTOR PLAN 5
- AAO x 0, non-verbal, on pureed/nectar thick liquids, contraction of all extremities  - Also cachectic, losing weight, cachectic on exam    -> RD eval ordered  -> c/w home meds of memantine 20mg daily, escitalopram 10mg daily, remeron 15mg qHS, zyprexa 2.5mg BID, and trazodone 50mg qHS  -> c/w vitamin supplementation
- AAO x 0, non-verbal, on pureed/nectar thick liquids, contraction of all extremities  - Also cachectic, losing weight, cachectic on exam  - [facility] resident.    -> RD eval ordered  -> c/w home meds of memantine 20mg daily, escitalopram 10mg daily, remeron 15mg qHS, zyprexa 2.5mg BID, and trazodone 50mg qHS  -> c/w vitamin supplementation  psyc consult appreciated.

## 2025-04-08 NOTE — SWALLOW VFSS/MBS ASSESSMENT ADULT - ADDITIONAL RECOMMENDATIONS
This service to follow up as schedule permits for diet tolerance.   Medical Team advised to reconsult this service should there be a change in patient status.     Patient to benefit from dysphagia therapy pending discharge plans (Homecare vs. Rehab vs. Outpatient at [facility]- [phone]). This service to follow up as schedule permits for diet tolerance.   Medical Team advised to reconsult this service should there be a change in patient status.     Patient may benefit from dysphagia therapy pending discharge plans (Homecare vs. Rehab vs. Outpatient at [facility]- [phone]).

## 2025-04-08 NOTE — CONSULT NOTE ADULT - PROBLEM SELECTOR RECOMMENDATION 4
Nutrition recommendations appreciated  Swallow recommendations appreciated - Puree with Mildly Thick Liquids

## 2025-04-08 NOTE — SWALLOW VFSS/MBS ASSESSMENT ADULT - RECOMMENDED FEEDING/EATING TECHNIQUES
FEEDING GUIDELINES: Upright position with PO, feed slowly/allow for swallow between intakes, small sips of Mildly Thick Liquids

## 2025-04-08 NOTE — BH CONSULTATION LIAISON ASSESSMENT NOTE - SUMMARY
70 F with history of Dementia (AAO x 0, now nonverbal), R Breast Cancer s/p chemo/lumpectomy/RT (2016, in remission since), DM2 (currently off medications), HTN, and HLD who presents to the hospital from her SNF for aspiration episode admitted for sepsis. On IV antibiotics. Yelling this morning. Psychiatry consulted for management. No prn needs  Discussed with RN--- compliant with care, no aggression. Is nonverbal, does not follow commands. BM this morning.  At this time suspecting delirium superimposed on underlying baseline dementia    Recommendations  - Continue current observation  - Continue medical optimization  - Continue Lexapro, Remeron, Trazodone, and zyprexa as ordered.   - Avoid anticholinergics, antihistamines, bzd    - ONLY FOR COMBATIVE BEHAVIORS---Zyprexa 1.25mg q 6hrs prn IM

## 2025-04-08 NOTE — PROGRESS NOTE ADULT - PROBLEM SELECTOR PLAN 2
- Concern for aspiration given history    -> NPO except meds  -> Aspiration precautions  --> diet resume after MBS and speech/swallow eval.
- Concern for aspiration given history    -> Will keep NPO except meds  -> Obtain S&S eval  -> Aspiration precautions

## 2025-04-08 NOTE — CONSULT NOTE ADULT - PAIN ASSESSMENT ADVANCED DEMENTIA: FACIAL EXPRESSION
Dr. Jones speaking with patient at this time.  Ice pack provided for comfort.   Smiling or inexpressive

## 2025-04-08 NOTE — BH CONSULTATION LIAISON ASSESSMENT NOTE - NSBHCHARTREVIEWVS_PSY_A_CORE FT
Vital Signs Last 24 Hrs  T(C): 36.7 (08 Apr 2025 11:54), Max: 36.8 (08 Apr 2025 06:00)  T(F): 98.1 (08 Apr 2025 11:54), Max: 98.2 (08 Apr 2025 06:00)  HR: 94 (08 Apr 2025 11:54) (62 - 94)  BP: 129/75 (08 Apr 2025 11:54) (120/72 - 136/83)  BP(mean): --  RR: 18 (08 Apr 2025 11:54) (16 - 18)  SpO2: 96% (08 Apr 2025 11:54) (96% - 99%)    Parameters below as of 08 Apr 2025 11:54  Patient On (Oxygen Delivery Method): room air

## 2025-04-08 NOTE — SWALLOW VFSS/MBS ASSESSMENT ADULT - RECOMMENDED CONSISTENCY
Puree with Mildly Thick Liquids (given patient's clinical presentation) Puree with Mildly Thick Liquids

## 2025-04-08 NOTE — SWALLOW VFSS/MBS ASSESSMENT ADULT - DIAGNOSTIC IMPRESSIONS
Patient presents with Mild Oral stage and Mild Pharyngeal stage dysphagia. Oral stage is marked by adequate oral containment, adequate slurping for utensil stripping/cup sip presentations due to reduced labial seal, slowed tongue motion for anterior posterior transfer of Puree, piecemeal deglutition, incomplete tongue to palate seal for Thin Liquids, resulting in premature loss to vallecular and pyriform sinuses, and adequate oral clearance for Puree/Mildly Thick Liquids/Thin Liquids. Pharyngeal stage marked by delayed initiation of pharyngeal swallow ([place]ol[place] at vallecular for Thin Liquids), adequate laryngeal elevation, adequate laryngeal vestibule closure, reduced base of tongue retraction and reduced pharyngeal constriction. Trace-Mild pharyngeal clearance deficits located primarily in vallecular, post primary swallow for Puree/Mildly Thick Liquids/Thin Liquids; intermittent spontaneous re-swallow assists with pharyngeal clearance. There was Trace Laryngeal Penetration during the swallow for Mildly Thick Liquids, with retrieval, and airway protection maintained. There was No Aspiration before, during, or after the swallow for Puree/Mildly Thick Liquids. Of note, patient with increased restlessness as study progressed (despite redirection), resulting in limited view of airway/trachea for Thin Liquid trials, therefore, cannot rule out Aspiration for Thin Liquids. Patient presents with Mild-Moderate Oral stage and Mild Pharyngeal stage dysphagia. Oral stage is marked by adequate oral containment, patient utilizing slurping for bolus retrieval with utensil/cup sip presentations, slowed tongue motion for anterior posterior transfer of Puree, piecemeal deglutition, incomplete tongue to palate seal for Thin Liquids, resulting in mild-moderate premature loss to vallecular and pyriform sinuses. Trace-mild oral clearance deficits for Puree, located primarily on lingual surface; liquid wash assists with oral clearance. Pharyngeal stage marked by delayed initiation of pharyngeal swallow (Bol[place] Head at vallecular for Thin Liquids), adequate laryngeal elevation, adequate laryngeal vestibule closure, reduced base of tongue retraction and reduced pharyngeal constriction. Trace-Mild pharyngeal clearance deficits located primarily in vallecular, post primary swallow for Puree/Mildly Thick Liquids/Thin Liquids; intermittent spontaneous re-swallow assists with pharyngeal clearance. There was Trace Laryngeal Penetration during the swallow for Mildly Thick Liquids, with retrieval, and airway protection maintained. There was No Aspiration before, during, or after the swallow for Puree/Mildly Thick Liquids. Of note, patient with increased restlessness as study progressed (e.g., sliding/moving up and down in fluoroscopy chair), despite redirection/repositioning, resulting in limited view of airway/trachea for Thin Liquid trials. Therefore, cannot rule out Aspiration for Thin Liquids.

## 2025-04-08 NOTE — PROGRESS NOTE ADULT - PROBLEM SELECTOR PLAN 4
- Noted to have rectal constipation on imaging, had BM in ED so not obstructed    -> Will trial prn rectal bisacodyl, if not passing enough BMs then consider enema
- Noted to have rectal constipation on imaging, had BM in ED so not obstructed  -> Will trial prn rectal bisacodyl, if not passing enough BMs then consider enema  +BM

## 2025-04-08 NOTE — PROGRESS NOTE ADULT - NSPROGADDITIONALINFOA_GEN_ALL_CORE
Tai Banks will be covering me starting 4/9/25. He can be reached at  if needed.     d/w the daughter Gerri at bedside.   d/w ID.     - Dr. RENETTA Monreal ([facility])  - [phone]
- Dr. RENETTA Monreal ([facility])  - [phone]

## 2025-04-08 NOTE — SWALLOW VFSS/MBS ASSESSMENT ADULT - ORAL PHASE COMMENTS
Incomplete tongue to palate seal, resulting in mild-moderate premature loss to vallecular and pyriform sinuses. Slowed tongue motion for anterior posterior transfer. Trace-mild oral clearance deficits, located primarily on lingual surface; liquid wash assists with oral clearance.

## 2025-04-08 NOTE — SWALLOW VFSS/MBS ASSESSMENT ADULT - ROSENBEK'S PENETRATION ASPIRATION SCALE
(1) no aspiration, contrast does not enter airway Limited view of airway/trachea for Thin Liquid trials (see below). Therefore, cannot rule out Aspiration for Thin Liquids. (2) contrast enters airway, remains above the vocal cords, no residue remains (penetration)

## 2025-04-08 NOTE — PROGRESS NOTE ADULT - PROBLEM SELECTOR PLAN 9
DVT ppx - Lovenox  Diet - diet per S&S eval  Activity - Non-ambulatory  Code Status - DNR/DNI, copy of MOLST in chart, confirmed with daughter    Fall and aspiration precautions
DVT ppx - Lovenox  Diet - NPO pending S&S eval  Activity - Non-ambulatory  Code Status - DNR/DNI, copy of MOLST in chart, confirmed with daughter    Fall and aspiration precautions

## 2025-04-08 NOTE — CONSULT NOTE ADULT - PROBLEM SELECTOR RECOMMENDATION 6
Case reviewed with primary team. Updated outpatient geriatrics team NP Michaela Jacques.   Thank you for allowing us to participate in your patient's care. Please page [phone] for any questions/concerns.

## 2025-04-08 NOTE — CONSULT NOTE ADULT - TIME BILLING
Time spent for extensive review of the physical chart, electronic medical record, and documentation to obtain collateral information including but not limited to:  [x] Inpatient records (ED, H&P, primary team, and consultants if applicable, care coordination)  [x] Inpatient values/results (biomarkers, immunoassays, imaging, and microbiology results)  [x] Current or proposed treatment plans  [x] Pharmacotherapy review  [x] Discussion and coordinating care with primary team and interdisciplinary staff and floor staff  [x] Discussion including counseling/ education with the  surrogate decision maker, or family    In addition to above time, Spent 16 minutes separately discussing goals of care/ advanced care planning with family

## 2025-04-09 ENCOUNTER — TRANSCRIPTION ENCOUNTER (OUTPATIENT)
Age: 70
End: 2025-04-09

## 2025-04-09 VITALS
HEART RATE: 91 BPM | OXYGEN SATURATION: 98 % | SYSTOLIC BLOOD PRESSURE: 160 MMHG | RESPIRATION RATE: 19 BRPM | DIASTOLIC BLOOD PRESSURE: 83 MMHG | TEMPERATURE: 98 F

## 2025-04-09 LAB
ANION GAP SERPL CALC-SCNC: 15 MMOL/L — HIGH (ref 7–14)
BUN SERPL-MCNC: 16 MG/DL — SIGNIFICANT CHANGE UP (ref 7–23)
CALCIUM SERPL-MCNC: 9.5 MG/DL — SIGNIFICANT CHANGE UP (ref 8.4–10.5)
CHLORIDE SERPL-SCNC: 103 MMOL/L — SIGNIFICANT CHANGE UP (ref 98–107)
CO2 SERPL-SCNC: 23 MMOL/L — SIGNIFICANT CHANGE UP (ref 22–31)
CREAT SERPL-MCNC: 0.38 MG/DL — LOW (ref 0.5–1.3)
EGFR: 108 ML/MIN/1.73M2 — SIGNIFICANT CHANGE UP
EGFR: 108 ML/MIN/1.73M2 — SIGNIFICANT CHANGE UP
GLUCOSE SERPL-MCNC: 87 MG/DL — SIGNIFICANT CHANGE UP (ref 70–99)
MAGNESIUM SERPL-MCNC: 1.8 MG/DL — SIGNIFICANT CHANGE UP (ref 1.6–2.6)
PHOSPHATE SERPL-MCNC: 3.8 MG/DL — SIGNIFICANT CHANGE UP (ref 2.5–4.5)
POTASSIUM SERPL-MCNC: 4.8 MMOL/L — SIGNIFICANT CHANGE UP (ref 3.5–5.3)
POTASSIUM SERPL-SCNC: 4.8 MMOL/L — SIGNIFICANT CHANGE UP (ref 3.5–5.3)
SODIUM SERPL-SCNC: 141 MMOL/L — SIGNIFICANT CHANGE UP (ref 135–145)

## 2025-04-09 RX ORDER — CEFUROXIME SODIUM 1.5 G
1 VIAL (EA) INJECTION
Qty: 0 | Refills: 0 | DISCHARGE
Start: 2025-04-09

## 2025-04-09 RX ORDER — CEFUROXIME SODIUM 1.5 G
500 VIAL (EA) INJECTION EVERY 12 HOURS
Refills: 0 | Status: DISCONTINUED | OUTPATIENT
Start: 2025-04-09 | End: 2025-04-09

## 2025-04-09 RX ORDER — POLYETHYLENE GLYCOL 3350 17 G/17G
17 POWDER, FOR SOLUTION ORAL
Qty: 0 | Refills: 0 | DISCHARGE
Start: 2025-04-09

## 2025-04-09 RX ORDER — SENNA 187 MG
2 TABLET ORAL
Qty: 0 | Refills: 0 | DISCHARGE
Start: 2025-04-09

## 2025-04-09 RX ADMIN — MEMANTINE HYDROCHLORIDE 20 MILLIGRAM(S): 21 CAPSULE, EXTENDED RELEASE ORAL at 12:18

## 2025-04-09 RX ADMIN — Medication 650 MILLIGRAM(S): at 05:59

## 2025-04-09 RX ADMIN — Medication 2000 UNIT(S): at 12:20

## 2025-04-09 RX ADMIN — CYANOCOBALAMIN 1000 MICROGRAM(S): 1000 INJECTION INTRAMUSCULAR; SUBCUTANEOUS at 12:17

## 2025-04-09 RX ADMIN — LETROZOLE 2.5 MILLIGRAM(S): 2.5 TABLET, FILM COATED ORAL at 12:19

## 2025-04-09 RX ADMIN — Medication 650 MILLIGRAM(S): at 06:32

## 2025-04-09 RX ADMIN — ESCITALOPRAM OXALATE 10 MILLIGRAM(S): 20 TABLET ORAL at 12:17

## 2025-04-09 RX ADMIN — Medication 81 MILLIGRAM(S): at 12:17

## 2025-04-09 RX ADMIN — Medication 500 MILLIGRAM(S): at 12:15

## 2025-04-09 RX ADMIN — OLANZAPINE 2.5 MILLIGRAM(S): 10 TABLET ORAL at 06:00

## 2025-04-09 RX ADMIN — FOLIC ACID 1 MILLIGRAM(S): 1 TABLET ORAL at 12:18

## 2025-04-09 RX ADMIN — Medication 100 MILLIGRAM(S): at 12:17

## 2025-04-09 RX ADMIN — ENOXAPARIN SODIUM 30 MILLIGRAM(S): 100 INJECTION SUBCUTANEOUS at 05:59

## 2025-04-09 NOTE — DISCHARGE NOTE NURSING/CASE MANAGEMENT/SOCIAL WORK - PATIENT PORTAL LINK FT
You can access the FollowMyHealth Patient Portal offered by [facility] by registering at the following website: [email or web address]. By joining [facility]’s FollowMyHealth portal, you will also be able to view your health information using other applications (apps) compatible with our system.

## 2025-04-09 NOTE — DISCHARGE NOTE PROVIDER - NSDCMRMEDTOKEN_GEN_ALL_CORE_FT
amLODIPine 5 mg oral tablet: 1 tab(s) orally once a day  cefuroxime 500 mg oral tablet: 1 tab(s) orally every 12 hours for 3 more days through end of 4/11/25  cholecalciferol 50 mcg (2000 intl units) oral capsule: 1 cap(s) orally once a day  escitalopram 10 mg oral tablet: 1 tab(s) orally once a day  folic acid 0.4 mg oral tablet: 1 tab(s) orally once a day  letrozole 2.5 mg oral tablet: 1 tab(s) orally once a day  melatonin 5 mg oral tablet: 1 tab(s) orally once a day (at bedtime)  memantine 10 mg oral tablet: 2 tab(s) orally once a day  menthol-zinc oxide 0.44%-20% topical ointment: Apply topically to affected area 2 times a day to buttocks for skin protection  polyethylene glycol 3350 oral powder for reconstitution: 17 gram(s) orally once a day  Remeron 15 mg oral tablet: 1 tab(s) orally once a day (at bedtime)  rosuvastatin 5 mg oral tablet: 1 tab(s) orally once a day (at bedtime)  selenium sulfide 2.25% topical shampoo: Apply topically to affected area 2 times a week to scalp  senna leaf extract oral tablet: 2 tab(s) orally once a day (at bedtime)  thiamine 100 mg oral tablet: 1 tab(s) orally once a day  traZODone 50 mg oral tablet: orally once a day (at bedtime)  Tylenol 325 mg oral capsule: 2 cap(s) orally every 6 hours as needed for  pain  Vitamin B12 500 mcg oral tablet: 1 tab(s) orally once a day  ZyPREXA 5 mg oral tablet: 0.5 tab(s) orally 2 times a day

## 2025-04-09 NOTE — DISCHARGE NOTE PROVIDER - DETAILS OF MALNUTRITION DIAGNOSIS/DIAGNOSES
This patient has been assessed with a concern for Malnutrition and was treated during this hospitalization for the following Nutrition diagnosis/diagnoses:     -  04/07/2025: Severe protein-calorie malnutrition   -  04/07/2025: Underweight (BMI < 19)

## 2025-04-09 NOTE — DISCHARGE NOTE NURSING/CASE MANAGEMENT/SOCIAL WORK - FINANCIAL ASSISTANCE
[facility] provides services at a reduced cost to those who are determined to be eligible through [facility]’s financial assistance program. Information regarding [facility]’s financial assistance program can be found by going to [email or web address] or by calling [phone].

## 2025-04-09 NOTE — DISCHARGE NOTE PROVIDER - NSDCCPCAREPLAN_GEN_ALL_CORE_FT
PRINCIPAL DISCHARGE DIAGNOSIS  Diagnosis: Suspected pulmonary aspiration of food  Assessment and Plan of Treatment: Imaging negative for pneumonia/infection of lungs. Complete antibiotic course as instructed. Pureed diet with mildly thick liquids. Aspiration precautions.      SECONDARY DISCHARGE DIAGNOSES  Diagnosis: Constipation  Assessment and Plan of Treatment: Continue bowel regimen with stool softener/Senna and Laxatives/Miralax.

## 2025-04-09 NOTE — PROGRESS NOTE ADULT - SUBJECTIVE AND OBJECTIVE BOX
[facility]  JOSHUA Haley Y. Patel, S. Shah, G. Casimir  [phone]  ([phone] - weekdays after 5pm and weekends)    Name: DANNY STEVEN  Age/Gender: 70y Female  MRN: [id number]    Interval History:  Notes reviewed.   No concerning overnight events.  Afebrile.   seen by palliative care yesterday    Allergies: No Known Allergies      Objective:  Vitals:   T(F): 98.4 (04-09-25 @ 05:39), Max: 98.4 (04-09-25 @ 05:39)  HR: 75 (04-09-25 @ 05:39) (75 - 94)  BP: 100/66 (04-09-25 @ 05:39) (100/66 - 139/65)  RR: 17 (04-09-25 @ 05:39) (17 - 18)  SpO2: 98% (04-09-25 @ 05:39) (96% - 99%)  Physical Examination:  General: no acute distress  HEENT: anicteric  Cardio: S1, S2, normal rate  Resp: clear to auscultation anteriorly   Abd: soft, ND  Ext: no LE edema  Skin: warm, dry    Laboratory Studies:  CBC:   WBC Trend:  8.26 04-07-25 @ 05:25  12.98 04-06-25 @ 14:30    CMP: 04-09    141  |  103  |  16  ----------------------------<  87  4.8   |  23  |  0.38[L]    Ca    9.5      09 Apr 2025 06:28  Phos  3.8     04-09  Mg     1.80     04-09            Urinalysis Basic - ( 09 Apr 2025 06:28 )    Color: x / Appearance: x / SG: x / pH: x  Gluc: 87 mg/dL / Ketone: x  / Bili: x / Urobili: x   Blood: x / Protein: x / Nitrite: x   Leuk Esterase: x / RBC: x / WBC x   Sq Epi: x / Non Sq Epi: x / Bacteria: x      Microbiology: reviewed     Urinalysis with Rflx Culture (collected 04-06-25 @ 20:55)    Culture - Blood (collected 04-06-25 @ 14:45)  Source: Blood Blood-Peripheral  Preliminary Report (04-08-25 @ 19:02):    No growth at 48 Hours    Culture - Blood (collected 04-06-25 @ 14:30)  Source: Blood Blood-Peripheral  Preliminary Report (04-08-25 @ 19:01):    No growth at 48 Hours        Radiology: reviewed     Medications:  acetaminophen     Tablet .. 650 milliGRAM(s) Oral every 6 hours PRN  aluminum hydroxide/magnesium hydroxide/simethicone Suspension 30 milliLiter(s) Oral every 4 hours PRN  amLODIPine   Tablet 5 milliGRAM(s) Oral daily  aspirin  chewable 81 milliGRAM(s) Oral daily  bisacodyl Suppository 10 milliGRAM(s) Rectal daily PRN  cefTRIAXone   IVPB 1000 milliGRAM(s) IV Intermittent every 24 hours  cholecalciferol 2000 Unit(s) Oral daily  cyanocobalamin 1000 MICROGram(s) Oral daily  enoxaparin Injectable 30 milliGRAM(s) SubCutaneous every 24 hours  escitalopram 10 milliGRAM(s) Oral daily  folic acid 1 milliGRAM(s) Oral daily  lactated ringers. 1000 milliLiter(s) IV Continuous <Continuous>  letrozole 2.5 milliGRAM(s) Oral daily  melatonin 6 milliGRAM(s) Oral at bedtime  memantine 20 milliGRAM(s) Oral daily  mirtazapine 15 milliGRAM(s) Oral at bedtime  OLANZapine 2.5 milliGRAM(s) Oral two times a day  ondansetron Injectable 4 milliGRAM(s) IV Push every 8 hours PRN  polyethylene glycol 3350 17 Gram(s) Oral daily  rosuvastatin 5 milliGRAM(s) Oral at bedtime  senna 2 Tablet(s) Oral at bedtime  thiamine 100 milliGRAM(s) Oral daily  traZODone 50 milliGRAM(s) Oral at bedtime    Antimicrobials:  cefTRIAXone   IVPB 1000 milliGRAM(s) IV Intermittent every 24 hours  
[facility]  JOSHUA Haley Y. Patel, S. Shah, G. Casimir  [phone]  ([phone] - weekdays after 5pm and weekends)    Name: DANNY STEVEN  Age/Gender: 70y Female  MRN: [id number]    Interval History:  Notes reviewed.   No concerning overnight events.  Afebrile.     Allergies: No Known Allergies      Objective:  Vitals:   T(F): 98.1 (04-08-25 @ 11:54), Max: 98.2 (04-08-25 @ 06:00)  HR: 94 (04-08-25 @ 11:54) (62 - 94)  BP: 129/75 (04-08-25 @ 11:54) (120/72 - 136/83)  RR: 18 (04-08-25 @ 11:54) (16 - 18)  SpO2: 96% (04-08-25 @ 11:54) (96% - 99%)  Physical Examination:  General: no acute distress  HEENT: anicteric  Cardio: S1, S2, normal rate  Resp: clear to auscultation anteriorly   Abd: soft, ND  Ext: no LE edema  Skin: warm, dry    Laboratory Studies:  CBC:                       10.8   8.26  )-----------( 197      ( 07 Apr 2025 05:25 )             32.5     WBC Trend:  8.26 04-07-25 @ 05:25  12.98 04-06-25 @ 14:30    CMP: 04-08    142  |  103  |  16  ----------------------------<  86  3.3[L]   |  27  |  0.41[L]    Ca    9.3      08 Apr 2025 06:00  Phos  3.3     04-08  Mg     1.70     04-08    TPro  6.8  /  Alb  3.9  /  TBili  0.4  /  DBili  x   /  AST  21  /  ALT  20  /  AlkPhos  58  04-07      LIVER FUNCTIONS - ( 07 Apr 2025 05:25 )  Alb: 3.9 g/dL / Pro: 6.8 g/dL / ALK PHOS: 58 U/L / ALT: 20 U/L / AST: 21 U/L / GGT: x             Urinalysis Basic - ( 08 Apr 2025 06:00 )    Color: x / Appearance: x / SG: x / pH: x  Gluc: 86 mg/dL / Ketone: x  / Bili: x / Urobili: x   Blood: x / Protein: x / Nitrite: x   Leuk Esterase: x / RBC: x / WBC x   Sq Epi: x / Non Sq Epi: x / Bacteria: x      Microbiology: reviewed     Urinalysis with Rflx Culture (collected 04-06-25 @ 20:55)    Culture - Blood (collected 04-06-25 @ 14:45)  Source: Blood Blood-Peripheral  Preliminary Report (04-07-25 @ 19:01):    No growth at 24 hours    Culture - Blood (collected 04-06-25 @ 14:30)  Source: Blood Blood-Peripheral  Preliminary Report (04-07-25 @ 19:01):    No growth at 24 hours        Radiology: reviewed     Medications:  acetaminophen     Tablet .. 650 milliGRAM(s) Oral every 6 hours PRN  aluminum hydroxide/magnesium hydroxide/simethicone Suspension 30 milliLiter(s) Oral every 4 hours PRN  amLODIPine   Tablet 5 milliGRAM(s) Oral daily  aspirin  chewable 81 milliGRAM(s) Oral daily  bisacodyl Suppository 10 milliGRAM(s) Rectal daily PRN  cefTRIAXone   IVPB 1000 milliGRAM(s) IV Intermittent every 24 hours  cholecalciferol 2000 Unit(s) Oral daily  cyanocobalamin 1000 MICROGram(s) Oral daily  enoxaparin Injectable 30 milliGRAM(s) SubCutaneous every 24 hours  escitalopram 10 milliGRAM(s) Oral daily  folic acid 1 milliGRAM(s) Oral daily  lactated ringers. 1000 milliLiter(s) IV Continuous <Continuous>  letrozole 2.5 milliGRAM(s) Oral daily  melatonin 6 milliGRAM(s) Oral at bedtime  memantine 20 milliGRAM(s) Oral daily  mirtazapine 15 milliGRAM(s) Oral at bedtime  OLANZapine 2.5 milliGRAM(s) Oral two times a day  ondansetron Injectable 4 milliGRAM(s) IV Push every 8 hours PRN  polyethylene glycol 3350 17 Gram(s) Oral daily  potassium chloride   Powder 20 milliEquivalent(s) Oral once  rosuvastatin 5 milliGRAM(s) Oral at bedtime  senna 2 Tablet(s) Oral at bedtime  thiamine 100 milliGRAM(s) Oral daily  traZODone 50 milliGRAM(s) Oral at bedtime    Antimicrobials:  cefTRIAXone   IVPB 1000 milliGRAM(s) IV Intermittent every 24 hours  
SUBJECTIVE/ OVERNIGHT EVENTS:  +BM  the youngest daughter Gerri at bedside   confirmed this is her baseline mental status  pt lives in NH.  confused and forgetful  cachetic.  diet resume after MBS and speech/swallow eval.    --------------------------------------------------------------------------------------------  LABS:                        10.8   8.26  )-----------( 197      ( 07 Apr 2025 05:25 )             32.5     04-08    142  |  103  |  16  ----------------------------<  86  3.3[L]   |  27  |  0.41[L]    Ca    9.3      08 Apr 2025 06:00  Phos  3.3     04-08  Mg     1.70     04-08    TPro  6.8  /  Alb  3.9  /  TBili  0.4  /  DBili  x   /  AST  21  /  ALT  20  /  AlkPhos  58  04-07      CAPILLARY BLOOD GLUCOSE            Urinalysis Basic - ( 08 Apr 2025 06:00 )    Color: x / Appearance: x / SG: x / pH: x  Gluc: 86 mg/dL / Ketone: x  / Bili: x / Urobili: x   Blood: x / Protein: x / Nitrite: x   Leuk Esterase: x / RBC: x / WBC x   Sq Epi: x / Non Sq Epi: x / Bacteria: x        RADIOLOGY & ADDITIONAL TESTS:    Imaging Personally Reviewed:  [x] YES  [ ] NO    Consultant(s) Notes Reviewed:  [x] YES  [ ] NO    MEDICATIONS  (STANDING):  amLODIPine   Tablet 5 milliGRAM(s) Oral daily  aspirin  chewable 81 milliGRAM(s) Oral daily  cefTRIAXone   IVPB 1000 milliGRAM(s) IV Intermittent every 24 hours  cholecalciferol 2000 Unit(s) Oral daily  cyanocobalamin 1000 MICROGram(s) Oral daily  enoxaparin Injectable 30 milliGRAM(s) SubCutaneous every 24 hours  escitalopram 10 milliGRAM(s) Oral daily  folic acid 1 milliGRAM(s) Oral daily  lactated ringers. 1000 milliLiter(s) (100 mL/Hr) IV Continuous <Continuous>  letrozole 2.5 milliGRAM(s) Oral daily  melatonin 6 milliGRAM(s) Oral at bedtime  memantine 20 milliGRAM(s) Oral daily  mirtazapine 15 milliGRAM(s) Oral at bedtime  OLANZapine 2.5 milliGRAM(s) Oral two times a day  polyethylene glycol 3350 17 Gram(s) Oral daily  potassium chloride   Powder 20 milliEquivalent(s) Oral once  rosuvastatin 5 milliGRAM(s) Oral at bedtime  senna 2 Tablet(s) Oral at bedtime  thiamine 100 milliGRAM(s) Oral daily  traZODone 50 milliGRAM(s) Oral at bedtime    MEDICATIONS  (PRN):  acetaminophen     Tablet .. 650 milliGRAM(s) Oral every 6 hours PRN Temp greater or equal to 38C (100.4F), Mild Pain (1 - 3)  aluminum hydroxide/magnesium hydroxide/simethicone Suspension 30 milliLiter(s) Oral every 4 hours PRN Dyspepsia  bisacodyl Suppository 10 milliGRAM(s) Rectal daily PRN Constipation  ondansetron Injectable 4 milliGRAM(s) IV Push every 8 hours PRN Nausea and/or Vomiting      Care Discussed with Consultants/Other Providers [x] YES  [ ] NO    Vital Signs Last 24 Hrs  T(C): 36.7 (08 Apr 2025 11:54), Max: 36.8 (08 Apr 2025 06:00)  T(F): 98.1 (08 Apr 2025 11:54), Max: 98.2 (08 Apr 2025 06:00)  HR: 94 (08 Apr 2025 11:54) (62 - 94)  BP: 129/75 (08 Apr 2025 11:54) (120/72 - 135/81)  BP(mean): --  RR: 18 (08 Apr 2025 11:54) (17 - 18)  SpO2: 96% (08 Apr 2025 11:54) (96% - 99%)    Parameters below as of 08 Apr 2025 11:54  Patient On (Oxygen Delivery Method): room air      I&O's Summary          PHYSICAL EXAM:  GENERAL: NAD, thin-elderly, comfortable, confused.   HEAD:  Atraumatic, Normocephalic  EYES: EOMI, PERRLA, conjunctiva and sclera clear  NECK: Supple, No JVD  CHEST/LUNG: mild decrease breath sounds bilaterally; No wheeze   HEART: Regular rate and rhythm; No murmurs, rubs, or gallops  ABDOMEN: Soft, Nontender, Nondistended; Bowel sounds present  Neuro: confused, restless.   EXTREMITIES:  2+ Peripheral Pulses, No clubbing, cyanosis, or edema  SKIN: No rashes or lesions   
SUBJECTIVE/ OVERNIGHT EVENTS:  confused  nonverbal  not following commands  awake.      --------------------------------------------------------------------------------------------  LABS:                        10.8   8.26  )-----------( 197      ( 07 Apr 2025 05:25 )             32.5     04-07    143  |  105  |  16  ----------------------------<  75  3.3[L]   |  22  |  0.43[L]    Ca    9.4      07 Apr 2025 05:25  Phos  3.2     04-07  Mg     1.80     04-07    TPro  6.8  /  Alb  3.9  /  TBili  0.4  /  DBili  x   /  AST  21  /  ALT  20  /  AlkPhos  58  04-07    PT/INR - ( 06 Apr 2025 14:30 )   PT: 11.9 sec;   INR: 1.00 ratio         PTT - ( 06 Apr 2025 14:30 )  PTT:30.1 sec  CAPILLARY BLOOD GLUCOSE            Urinalysis Basic - ( 07 Apr 2025 05:25 )    Color: x / Appearance: x / SG: x / pH: x  Gluc: 75 mg/dL / Ketone: x  / Bili: x / Urobili: x   Blood: x / Protein: x / Nitrite: x   Leuk Esterase: x / RBC: x / WBC x   Sq Epi: x / Non Sq Epi: x / Bacteria: x        RADIOLOGY & ADDITIONAL TESTS:    Imaging Personally Reviewed:  [x] YES  [ ] NO    Consultant(s) Notes Reviewed:  [x] YES  [ ] NO    MEDICATIONS  (STANDING):  amLODIPine   Tablet 5 milliGRAM(s) Oral daily  aspirin  chewable 81 milliGRAM(s) Oral daily  cefTRIAXone   IVPB 1000 milliGRAM(s) IV Intermittent every 24 hours  cholecalciferol 2000 Unit(s) Oral daily  cyanocobalamin 1000 MICROGram(s) Oral daily  enoxaparin Injectable 30 milliGRAM(s) SubCutaneous every 24 hours  escitalopram 10 milliGRAM(s) Oral daily  folic acid 1 milliGRAM(s) Oral daily  lactated ringers. 1000 milliLiter(s) (100 mL/Hr) IV Continuous <Continuous>  letrozole 2.5 milliGRAM(s) Oral daily  melatonin 6 milliGRAM(s) Oral at bedtime  memantine 20 milliGRAM(s) Oral daily  mirtazapine 15 milliGRAM(s) Oral at bedtime  OLANZapine 2.5 milliGRAM(s) Oral two times a day  rosuvastatin 5 milliGRAM(s) Oral at bedtime  traZODone 50 milliGRAM(s) Oral at bedtime    MEDICATIONS  (PRN):  acetaminophen     Tablet .. 650 milliGRAM(s) Oral every 6 hours PRN Temp greater or equal to 38C (100.4F), Mild Pain (1 - 3)  aluminum hydroxide/magnesium hydroxide/simethicone Suspension 30 milliLiter(s) Oral every 4 hours PRN Dyspepsia  bisacodyl Suppository 10 milliGRAM(s) Rectal daily PRN Constipation  ondansetron Injectable 4 milliGRAM(s) IV Push every 8 hours PRN Nausea and/or Vomiting      Care Discussed with Consultants/Other Providers [x] YES  [ ] NO    Vital Signs Last 24 Hrs  T(C): 36.7 (07 Apr 2025 11:04), Max: 38.1 (06 Apr 2025 14:57)  T(F): 98 (07 Apr 2025 11:04), Max: 100.5 (06 Apr 2025 14:57)  HR: 68 (07 Apr 2025 11:04) (64 - 107)  BP: 124/73 (07 Apr 2025 11:04) (124/73 - 151/92)  BP(mean): --  RR: 18 (07 Apr 2025 11:04) (15 - 20)  SpO2: 100% (07 Apr 2025 11:04) (96% - 100%)    Parameters below as of 07 Apr 2025 11:04  Patient On (Oxygen Delivery Method): room air      I&O's Summary    PHYSICAL EXAM:  GENERAL: NAD, thin-elderly, comfortable, confused.   HEAD:  Atraumatic, Normocephalic  EYES: EOMI, PERRLA, conjunctiva and sclera clear  NECK: Supple, No JVD  CHEST/LUNG: mild decrease breath sounds bilaterally; No wheeze   HEART: Regular rate and rhythm; No murmurs, rubs, or gallops  ABDOMEN: Soft, Nontender, Nondistended; Bowel sounds present  Neuro: confused, restless.   EXTREMITIES:  2+ Peripheral Pulses, No clubbing, cyanosis, or edema  SKIN: No rashes or lesions

## 2025-04-09 NOTE — DISCHARGE NOTE PROVIDER - HOSPITAL COURSE
This is a 70F with history of Dementia (AAO x 0, now nonverbal), R Breast Cancer s/p chemo/lumpectomy/RT (2016, in remission since), DM2 (currently off medications), HTN, and HLD who presents to the hospital from her SNF for aspiration episode. CT Chest neg for PNA. Bcx NGTD. Seen by ID, Zosyn switched to CTX and transitioned to cefuroxime 500mg bid to complete 5 day course abx last day on 4/11. Pt seen by S+S s/p cinesophagram and started on pureed diet with mild thick liquids. Pt started on bowel regimen for large stool burden seen on CTAP. Pt had multiple BMs. Pt seen by psychiatry for agitation, no changes to standing regimen, c/w zyprexa BID. Pt seen by Palliative and discussed hospice services which can be offered at NH. Pt afebrile without leukocytosis, stable for dc.     On 4/9/25 this case was reviewed with Dr. Guerin. The patient is medically stable and optimized for discharge. All medications were reviewed and prescriptions were sent to mutually agreed upon pharmacy.

## 2025-04-09 NOTE — DISCHARGE NOTE PROVIDER - CARE PROVIDER_API CALL
Emile Lewis  Internal Medicine  [address] [place] [address], [facility] [address]  [place] [address]  Phone: [phone]  Fax: [phone]  Follow Up Time:

## 2025-04-22 ENCOUNTER — TRANSCRIPTION ENCOUNTER (OUTPATIENT)
Age: 70
End: 2025-04-22

## 2025-04-29 ENCOUNTER — TRANSCRIPTION ENCOUNTER (OUTPATIENT)
Age: 70
End: 2025-04-29

## 2025-07-11 ENCOUNTER — INPATIENT (INPATIENT)
Facility: HOSPITAL | Age: 70
LOS: 4 days | Discharge: OTHER INSTITUTION | End: 2025-07-16
Attending: HOSPITALIST | Admitting: HOSPITALIST
Payer: MEDICARE

## 2025-07-11 VITALS
SYSTOLIC BLOOD PRESSURE: 100 MMHG | OXYGEN SATURATION: 97 % | WEIGHT: 119.93 LBS | RESPIRATION RATE: 18 BRPM | TEMPERATURE: 99 F | HEART RATE: 115 BPM | HEIGHT: 64 IN | DIASTOLIC BLOOD PRESSURE: 60 MMHG

## 2025-07-11 DIAGNOSIS — Z98.890 OTHER SPECIFIED POSTPROCEDURAL STATES: Chronic | ICD-10-CM

## 2025-07-11 LAB
ADD ON TEST-SPECIMEN IN LAB: SIGNIFICANT CHANGE UP
ADD ON TEST-SPECIMEN IN LAB: SIGNIFICANT CHANGE UP
ALBUMIN SERPL ELPH-MCNC: 3.7 G/DL — SIGNIFICANT CHANGE UP (ref 3.3–5)
ALP SERPL-CCNC: 63 U/L — SIGNIFICANT CHANGE UP (ref 40–120)
ALT FLD-CCNC: 25 U/L — SIGNIFICANT CHANGE UP (ref 4–33)
ANION GAP SERPL CALC-SCNC: 15 MMOL/L — HIGH (ref 7–14)
ANISOCYTOSIS BLD QL: SLIGHT — SIGNIFICANT CHANGE UP
APTT BLD: 29.5 SEC — SIGNIFICANT CHANGE UP (ref 26.1–36.8)
AST SERPL-CCNC: 36 U/L — HIGH (ref 4–32)
BASOPHILS # BLD AUTO: 0.12 K/UL — SIGNIFICANT CHANGE UP (ref 0–0.2)
BASOPHILS # BLD MANUAL: 0 K/UL — SIGNIFICANT CHANGE UP (ref 0–0.2)
BASOPHILS NFR BLD AUTO: 0.9 % — SIGNIFICANT CHANGE UP (ref 0–2)
BASOPHILS NFR BLD MANUAL: 0 % — SIGNIFICANT CHANGE UP (ref 0–2)
BILIRUB SERPL-MCNC: 0.5 MG/DL — SIGNIFICANT CHANGE UP (ref 0.2–1.2)
BUN SERPL-MCNC: 32 MG/DL — HIGH (ref 7–23)
CALCIUM SERPL-MCNC: 9.5 MG/DL — SIGNIFICANT CHANGE UP (ref 8.4–10.5)
CHLORIDE SERPL-SCNC: 105 MMOL/L — SIGNIFICANT CHANGE UP (ref 98–107)
CO2 SERPL-SCNC: 26 MMOL/L — SIGNIFICANT CHANGE UP (ref 22–31)
CREAT SERPL-MCNC: 0.43 MG/DL — LOW (ref 0.5–1.3)
EGFR: 105 ML/MIN/1.73M2 — SIGNIFICANT CHANGE UP
EGFR: 105 ML/MIN/1.73M2 — SIGNIFICANT CHANGE UP
EOSINOPHIL # BLD AUTO: 0 K/UL — SIGNIFICANT CHANGE UP (ref 0–0.5)
EOSINOPHIL # BLD MANUAL: 0 K/UL — SIGNIFICANT CHANGE UP (ref 0–0.5)
EOSINOPHIL NFR BLD AUTO: 0 % — SIGNIFICANT CHANGE UP (ref 0–6)
EOSINOPHIL NFR BLD MANUAL: 0 % — SIGNIFICANT CHANGE UP (ref 0–6)
FLUAV AG NPH QL: SIGNIFICANT CHANGE UP
FLUBV AG NPH QL: SIGNIFICANT CHANGE UP
GAS PNL BLDV: SIGNIFICANT CHANGE UP
GLUCOSE SERPL-MCNC: 109 MG/DL — HIGH (ref 70–99)
HCT VFR BLD CALC: 35.8 % — SIGNIFICANT CHANGE UP (ref 34.5–45)
HGB BLD-MCNC: 11.6 G/DL — SIGNIFICANT CHANGE UP (ref 11.5–15.5)
IMM GRANULOCYTES # BLD AUTO: 0.13 K/UL — HIGH (ref 0–0.07)
IMM GRANULOCYTES NFR BLD AUTO: 0.9 % — SIGNIFICANT CHANGE UP (ref 0–0.9)
INR BLD: 1.14 RATIO — SIGNIFICANT CHANGE UP (ref 0.85–1.16)
LYMPHOCYTES # BLD AUTO: 0.64 K/UL — LOW (ref 1–3.3)
LYMPHOCYTES # BLD MANUAL: 0.84 K/UL — LOW (ref 1–3.3)
LYMPHOCYTES NFR BLD AUTO: 4.6 % — LOW (ref 13–44)
LYMPHOCYTES NFR BLD MANUAL: 6 % — LOW (ref 13–44)
MACROCYTES BLD QL: SLIGHT — SIGNIFICANT CHANGE UP
MANUAL METAMYELOCYTE #: 0.28 K/UL — HIGH (ref 0–0)
MANUAL NEUTROPHIL BANDS #: 1.4 K/UL — HIGH (ref 0–0.84)
MANUAL SMEAR VERIFICATION: SIGNIFICANT CHANGE UP
MCHC RBC-ENTMCNC: 30 PG — SIGNIFICANT CHANGE UP (ref 27–34)
MCHC RBC-ENTMCNC: 32.4 G/DL — SIGNIFICANT CHANGE UP (ref 32–36)
MCV RBC AUTO: 92.5 FL — SIGNIFICANT CHANGE UP (ref 80–100)
METAMYELOCYTES # FLD: 2 % — HIGH (ref 0–0)
METAMYELOCYTES NFR BLD: 2 % — HIGH (ref 0–0)
MONOCYTES # BLD AUTO: 0.63 K/UL — SIGNIFICANT CHANGE UP (ref 0–0.9)
MONOCYTES # BLD MANUAL: 0 K/UL — SIGNIFICANT CHANGE UP (ref 0–0.9)
MONOCYTES NFR BLD AUTO: 4.5 % — SIGNIFICANT CHANGE UP (ref 2–14)
MONOCYTES NFR BLD MANUAL: 0 % — LOW (ref 2–14)
NEUTROPHILS # BLD AUTO: 12.48 K/UL — HIGH (ref 1.8–7.4)
NEUTROPHILS # BLD MANUAL: 11.48 K/UL — HIGH (ref 1.8–7.4)
NEUTROPHILS NFR BLD AUTO: 89.1 % — HIGH (ref 43–77)
NEUTROPHILS NFR BLD MANUAL: 82 % — HIGH (ref 43–77)
NEUTS BAND # BLD: 10 % — HIGH (ref 0–8)
NEUTS BAND NFR BLD: 10 % — HIGH (ref 0–8)
NRBC # BLD AUTO: 0 K/UL — SIGNIFICANT CHANGE UP (ref 0–0)
NRBC # FLD: 0 K/UL — SIGNIFICANT CHANGE UP (ref 0–0)
NRBC BLD AUTO-RTO: 0 /100 WBCS — SIGNIFICANT CHANGE UP (ref 0–0)
PLAT MORPH BLD: NORMAL — SIGNIFICANT CHANGE UP
PLATELET # BLD AUTO: 241 K/UL — SIGNIFICANT CHANGE UP (ref 150–400)
PLATELET COUNT - ESTIMATE: NORMAL — SIGNIFICANT CHANGE UP
PMV BLD: 12.3 FL — SIGNIFICANT CHANGE UP (ref 7–13)
POTASSIUM SERPL-MCNC: 4 MMOL/L — SIGNIFICANT CHANGE UP (ref 3.5–5.3)
POTASSIUM SERPL-SCNC: 4 MMOL/L — SIGNIFICANT CHANGE UP (ref 3.5–5.3)
PROT SERPL-MCNC: 7.4 G/DL — SIGNIFICANT CHANGE UP (ref 6–8.3)
PROTHROM AB SERPL-ACNC: 13.6 SEC — HIGH (ref 9.9–13.4)
RBC # BLD: 3.87 M/UL — SIGNIFICANT CHANGE UP (ref 3.8–5.2)
RBC # FLD: 13.7 % — SIGNIFICANT CHANGE UP (ref 10.3–14.5)
RBC BLD AUTO: ABNORMAL
RSV RNA NPH QL NAA+NON-PROBE: SIGNIFICANT CHANGE UP
SARS-COV-2 RNA SPEC QL NAA+PROBE: SIGNIFICANT CHANGE UP
SODIUM SERPL-SCNC: 146 MMOL/L — HIGH (ref 135–145)
SOURCE RESPIRATORY: SIGNIFICANT CHANGE UP
WBC # BLD: 14 K/UL — HIGH (ref 3.8–10.5)
WBC # FLD AUTO: 14 K/UL — HIGH (ref 3.8–10.5)

## 2025-07-11 PROCEDURE — 71045 X-RAY EXAM CHEST 1 VIEW: CPT | Mod: 26

## 2025-07-11 PROCEDURE — 99285 EMERGENCY DEPT VISIT HI MDM: CPT

## 2025-07-11 RX ORDER — PIPERACILLIN-TAZO-DEXTROSE,ISO 3.375G/5
3.38 IV SOLUTION, PIGGYBACK PREMIX FROZEN(ML) INTRAVENOUS ONCE
Refills: 0 | Status: COMPLETED | OUTPATIENT
Start: 2025-07-12 | End: 2025-07-12

## 2025-07-11 RX ORDER — PIPERACILLIN-TAZO-DEXTROSE,ISO 3.375G/5
3.38 IV SOLUTION, PIGGYBACK PREMIX FROZEN(ML) INTRAVENOUS ONCE
Refills: 0 | Status: COMPLETED | OUTPATIENT
Start: 2025-07-11 | End: 2025-07-11

## 2025-07-11 RX ORDER — ACETAMINOPHEN 500 MG/5ML
1000 LIQUID (ML) ORAL ONCE
Refills: 0 | Status: COMPLETED | OUTPATIENT
Start: 2025-07-11 | End: 2025-07-11

## 2025-07-11 RX ORDER — VANCOMYCIN HCL IN 5 % DEXTROSE 1.5G/250ML
1000 PLASTIC BAG, INJECTION (ML) INTRAVENOUS ONCE
Refills: 0 | Status: COMPLETED | OUTPATIENT
Start: 2025-07-11 | End: 2025-07-12

## 2025-07-11 RX ADMIN — Medication 200 GRAM(S): at 23:40

## 2025-07-11 RX ADMIN — Medication 1700 MILLILITER(S): at 21:52

## 2025-07-11 RX ADMIN — Medication 400 MILLIGRAM(S): at 21:52

## 2025-07-11 NOTE — ED ADULT NURSE NOTE - NSFALLRISKINTERV_ED_ALL_ED
Assistance OOB with selected safe patient handling equipment if applicable/Assistance with ambulation/Communicate fall risk and risk factors to all staff, patient, and family/Monitor gait and stability/Monitor for mental status changes and reorient to person, place, and time, as needed/Move patient closer to nursing station/within visual sight of ED staff/Provide patient with walking aids/Provide visual cue: yellow wristband, yellow gown, etc/Reinforce activity limits and safety measures with patient and family/Toileting schedule using arm’s reach rule for commode and bathroom/Use of alarms - bed, stretcher, chair and/or video monitoring/Call bell, personal items and telephone in reach/Instruct patient to call for assistance before getting out of bed/chair/stretcher/Non-slip footwear applied when patient is off stretcher/[place] to call system/Physically safe environment - no spills, clutter or unnecessary equipment/Purposeful Proactive Rounding/Room/bathroom lighting operational, light cord in reach

## 2025-07-11 NOTE — ED ADULT TRIAGE NOTE - CHIEF COMPLAINT QUOTE
as per ems a&ox0,  hypoxia as per ems 89 room air, placed on 6 liters n.c with improvement .. Hx dementia, CVA (dysphasia and peg tube) as per ems a&ox0,  hypoxia 89 room air, placed on 6 liters n.c with improvement .. Hx dementia, CVA (dysphasia and peg tube)/ DM

## 2025-07-11 NOTE — ED PROVIDER NOTE - CLINICAL SUMMARY MEDICAL DECISION MAKING FREE TEXT BOX
70-year-old female history of dementia baseline mental status ANO x 1, dysphagia status post PEG tube, HTN, diabetes, breast cancer in remission presents to the emergency department with hypoxia.  Patient is accompanied by daughter who provides history and collateral.  Patient's daughter is endorses increased agitation from baseline patient was also noted to be hypoxic to 85 on room air.  No fevers reported from the nursing facility.  Patient febrile to 100.4 rectally and tachycardic to 115.  DDx includes sepsis, PNA, aspiration pneumonia/pneumonitis, pulmonary embolism.  Dispo pending labs, imaging and reassessment.

## 2025-07-11 NOTE — ED PROVIDER NOTE - ATTENDING CONTRIBUTION TO CARE
Dr. Abreu:  I have personally performed a face to face bedside history and physical examination of this patient. I have discussed the history, examination, review of systems, assessment and plan of management with the resident. I have reviewed the electronic medical record and amended it to reflect my history, review of systems, physical exam, assessment and plan.    70F h/o dementia (baseline a&o x 1), dysphagia and aspiration pneumonias s/p PEG, HTN, DM, breast cancer in remission, presents to the ED with increased agitation from baseline per daughter and also found to be hypoxic to 80's on room air.  Tm 100.4 rectally.  Pt unable to provide ROS.      Exam:  - cachectic  - gurgling sounds (baseline per daughter at bedside)  - mildly tachy  - coarse breath sounds diffusely  - abd soft    A/P  - hypoxia, AMS; eval for infection, r/o PE  - sepsis labs, CXR, CTA

## 2025-07-11 NOTE — ED PROVIDER NOTE - PHYSICAL EXAMINATION
Limited physical exam due to patient's mental status.  Cardiac: Tachycardic and regular rhythm.   Resp: Audible secretions and coarse breath sounds b/l.   Abd: Non-distended, no overlying skin changes.  Soft, non-tender, no guarding, no rigidity, no rebound tenderness.  No palpable masses.  Peg tube in place, no erythema/ fluctuance.   Back: Spine midline and non-tender. No CVAT.  Skin: Normal coloration.  No rashes, abrasions or lacerations.  Neuro: Awake, alert & oriented x 3.  Moves all extremities spontaneously.  No focal deficits.

## 2025-07-11 NOTE — ED ADULT NURSE NOTE - HIV OFFER
Will forward this to Dr. Mesa for review    Patient was seen by me 6/2021 for acute bronchitis, and had expressed an interest in me taking over testosterone prescription. However,  Since that visit, he has continued to receive refill from nurse practitioner Shilpi Salcedo per PDMP.     Per chart review  12/2020- testosterone 47, prolactin < 1.0, Lh 0.5, FSH 2.2    I think at the very least  (1) if he wants our clinic to take over prescription, CSA needs to be signed and relationship needs to be established  (2) Verify who shilpi salcedo is and why he is no longer refilling through her practice. Obtain contact information  (3) At the very least, he needs to schedule telemedicine visit       Opt out

## 2025-07-11 NOTE — ED ADULT NURSE NOTE - CHIEF COMPLAINT QUOTE
as per ems a&ox0,  hypoxia 89 room air, placed on 6 liters n.c with improvement .. Hx dementia, CVA (dysphasia and peg tube)/ DM

## 2025-07-11 NOTE — ED ADULT NURSE NOTE - OBJECTIVE STATEMENT
Patient is a 71 y/o F received in room 4 c/o sepsis. Patient is A+Ox0 and nonambulatory. Patient has a Hx of dementia, CVA, and DM. , Family is present at the bedside. Patient is nonverbal and BIBEMS hypoxic. According to triage noted, patient was 89% on room air, and Improved with 6LNC. Family at bedside endorses that patient is not normally on oxygen. family endorses that patient comes from a nursing home and she was called that patient is hypoxic. No nonverbal indicators of pain present. Patient noted to be gurgling. Suction at bedside.     Upon assessment, pt is nonverbal, face is symmetrical , extremities noted to be contracted. Patient maintains an oxygen saturation above 95% on 6LNC. . Patient unable to maintain their own secretions, suction at bedside.  Cardiac rate and rhythm is regular and 2+. EKG completed. Patient is NSR on the cardiac monitor. Skin is warm, dry, consistent with race, and intact. 20G IV placed in L forearm.  Labs sent. Medicated as per MAR. Safety and comfort maintained. Awaiting further MD orders. Plan of care ongoing. Patient arrives with a peg tube in place.

## 2025-07-11 NOTE — ED ADULT TRIAGE NOTE - SPO2 (%)
D/I: Patient on unit 4A Surgical/Neuro ICU     Neuro:  Lethargic; Responds to simple commands; Can be agitated at times, used PRN dilaudid and haldol;   CV: Tmax 100.5; ST, absent ectopy; Following cuff pressures d/t labile pressures on Art-line; Dependent edema; Weak pulses; CMS intact.   Pulm: PS until, 2200, now SIMV 30%/400/15/15/5, sating well above 92%; LS coarse/dim, scant josemanuel secretions.  GI: Strict NPO, on continuous TPN; OG to LIS, clamped for OG meds; Watery blood stool throughout the night, attempted pouch but was unsuccessful;   Endo: Insulin Gtt, 1 unit(s)/hr throughout the night.   : Haley intact, UO nominal.   Skin: Midline sternotomy steristripped and approximated; [place], stapled, scant drainage; 4 CTs, (2 mediastinal and 2 pleural both Y'd); 2 LEXIE, R &L.    Pain: c/o adbominal pain, using PRN dilaudid bumps along with continuous Dilaudid.    Access: R, HF; R. TLIJ; R. TLEJ; L. DL internal jugular; R. Radial Art line;  Drains: CT x 4; LEXIE x 2; Haley.    Gtt: Levo OFF; Dex 1.5; Dilaudid 1.5; Hep 400; TKO x 2;   Labs/Replacement: Mg 1.8, replacing.   Social:  at bedside.        A: Stable    P: Will continue to monitor Pt closely and notify MD of any significant changes.     97

## 2025-07-12 DIAGNOSIS — Z29.9 ENCOUNTER FOR PROPHYLACTIC MEASURES, UNSPECIFIED: ICD-10-CM

## 2025-07-12 DIAGNOSIS — J18.9 PNEUMONIA, UNSPECIFIED ORGANISM: ICD-10-CM

## 2025-07-12 DIAGNOSIS — F32.9 MAJOR DEPRESSIVE DISORDER, SINGLE EPISODE, UNSPECIFIED: ICD-10-CM

## 2025-07-12 DIAGNOSIS — J96.01 ACUTE RESPIRATORY FAILURE WITH HYPOXIA: ICD-10-CM

## 2025-07-12 DIAGNOSIS — E87.0 HYPEROSMOLALITY AND HYPERNATREMIA: ICD-10-CM

## 2025-07-12 DIAGNOSIS — I10 ESSENTIAL (PRIMARY) HYPERTENSION: ICD-10-CM

## 2025-07-12 DIAGNOSIS — E87.3 ALKALOSIS: ICD-10-CM

## 2025-07-12 DIAGNOSIS — Z93.1 GASTROSTOMY STATUS: Chronic | ICD-10-CM

## 2025-07-12 DIAGNOSIS — R45.1 RESTLESSNESS AND AGITATION: ICD-10-CM

## 2025-07-12 PROBLEM — E11.9 TYPE 2 DIABETES MELLITUS WITHOUT COMPLICATIONS: Chronic | Status: ACTIVE | Noted: 2025-04-07

## 2025-07-12 PROBLEM — I63.9 CEREBRAL INFARCTION, UNSPECIFIED: Chronic | Status: ACTIVE | Noted: 2025-04-07

## 2025-07-12 LAB
ALBUMIN SERPL ELPH-MCNC: 3.1 G/DL — LOW (ref 3.3–5)
ALP SERPL-CCNC: 61 U/L — SIGNIFICANT CHANGE UP (ref 40–120)
ALT FLD-CCNC: 21 U/L — SIGNIFICANT CHANGE UP (ref 4–33)
ANION GAP SERPL CALC-SCNC: 10 MMOL/L — SIGNIFICANT CHANGE UP (ref 7–14)
APPEARANCE UR: CLEAR — SIGNIFICANT CHANGE UP
AST SERPL-CCNC: 30 U/L — SIGNIFICANT CHANGE UP (ref 4–32)
BACTERIA # UR AUTO: ABNORMAL /HPF
BASOPHILS # BLD AUTO: 0.09 K/UL — SIGNIFICANT CHANGE UP (ref 0–0.2)
BASOPHILS NFR BLD AUTO: 0.8 % — SIGNIFICANT CHANGE UP (ref 0–2)
BILIRUB SERPL-MCNC: 0.5 MG/DL — SIGNIFICANT CHANGE UP (ref 0.2–1.2)
BILIRUB UR-MCNC: ABNORMAL
BLOOD GAS VENOUS COMPREHENSIVE RESULT: SIGNIFICANT CHANGE UP
BUN SERPL-MCNC: 29 MG/DL — HIGH (ref 7–23)
CALCIUM SERPL-MCNC: 9 MG/DL — SIGNIFICANT CHANGE UP (ref 8.4–10.5)
CHLORIDE SERPL-SCNC: 107 MMOL/L — SIGNIFICANT CHANGE UP (ref 98–107)
CO2 SERPL-SCNC: 28 MMOL/L — SIGNIFICANT CHANGE UP (ref 22–31)
COLOR SPEC: SIGNIFICANT CHANGE UP
CREAT SERPL-MCNC: 0.43 MG/DL — LOW (ref 0.5–1.3)
DIFF PNL FLD: NEGATIVE — SIGNIFICANT CHANGE UP
EGFR: 105 ML/MIN/1.73M2 — SIGNIFICANT CHANGE UP
EGFR: 105 ML/MIN/1.73M2 — SIGNIFICANT CHANGE UP
EOSINOPHIL # BLD AUTO: 0 K/UL — SIGNIFICANT CHANGE UP (ref 0–0.5)
EOSINOPHIL NFR BLD AUTO: 0 % — SIGNIFICANT CHANGE UP (ref 0–6)
GLUCOSE BLDC GLUCOMTR-MCNC: 82 MG/DL — SIGNIFICANT CHANGE UP (ref 70–99)
GLUCOSE SERPL-MCNC: 120 MG/DL — HIGH (ref 70–99)
GLUCOSE UR QL: 100 MG/DL
HCT VFR BLD CALC: 34.5 % — SIGNIFICANT CHANGE UP (ref 34.5–45)
HGB BLD-MCNC: 10.9 G/DL — LOW (ref 11.5–15.5)
IMM GRANULOCYTES # BLD AUTO: 0.05 K/UL — SIGNIFICANT CHANGE UP (ref 0–0.07)
IMM GRANULOCYTES NFR BLD AUTO: 0.4 % — SIGNIFICANT CHANGE UP (ref 0–0.9)
KETONES UR QL: ABNORMAL MG/DL
LEUKOCYTE ESTERASE UR-ACNC: ABNORMAL
LYMPHOCYTES # BLD AUTO: 0.78 K/UL — LOW (ref 1–3.3)
LYMPHOCYTES NFR BLD AUTO: 7 % — LOW (ref 13–44)
MAGNESIUM SERPL-MCNC: 2.3 MG/DL — SIGNIFICANT CHANGE UP (ref 1.6–2.6)
MCHC RBC-ENTMCNC: 30.4 PG — SIGNIFICANT CHANGE UP (ref 27–34)
MCHC RBC-ENTMCNC: 31.6 G/DL — LOW (ref 32–36)
MCV RBC AUTO: 96.4 FL — SIGNIFICANT CHANGE UP (ref 80–100)
MONOCYTES # BLD AUTO: 0.55 K/UL — SIGNIFICANT CHANGE UP (ref 0–0.9)
MONOCYTES NFR BLD AUTO: 4.9 % — SIGNIFICANT CHANGE UP (ref 2–14)
NEUTROPHILS # BLD AUTO: 9.75 K/UL — HIGH (ref 1.8–7.4)
NEUTROPHILS NFR BLD AUTO: 86.9 % — HIGH (ref 43–77)
NITRITE UR-MCNC: NEGATIVE — SIGNIFICANT CHANGE UP
NRBC # BLD AUTO: 0 K/UL — SIGNIFICANT CHANGE UP (ref 0–0)
NRBC # FLD: 0 K/UL — SIGNIFICANT CHANGE UP (ref 0–0)
NRBC BLD AUTO-RTO: 0 /100 WBCS — SIGNIFICANT CHANGE UP (ref 0–0)
PH UR: 6 — SIGNIFICANT CHANGE UP (ref 5–8)
PHOSPHATE SERPL-MCNC: 2.1 MG/DL — LOW (ref 2.5–4.5)
PLATELET # BLD AUTO: 200 K/UL — SIGNIFICANT CHANGE UP (ref 150–400)
PMV BLD: 12.6 FL — SIGNIFICANT CHANGE UP (ref 7–13)
POTASSIUM SERPL-MCNC: 3.3 MMOL/L — LOW (ref 3.5–5.3)
POTASSIUM SERPL-SCNC: 3.3 MMOL/L — LOW (ref 3.5–5.3)
PROT SERPL-MCNC: 6.8 G/DL — SIGNIFICANT CHANGE UP (ref 6–8.3)
PROT UR-MCNC: 100 MG/DL
RBC # BLD: 3.58 M/UL — LOW (ref 3.8–5.2)
RBC # FLD: 13.9 % — SIGNIFICANT CHANGE UP (ref 10.3–14.5)
RBC CASTS # UR COMP ASSIST: SIGNIFICANT CHANGE UP /HPF (ref 0–4)
SODIUM SERPL-SCNC: 145 MMOL/L — SIGNIFICANT CHANGE UP (ref 135–145)
SP GR SPEC: 1.06 — HIGH (ref 1–1.03)
UROBILINOGEN FLD QL: 1 MG/DL — SIGNIFICANT CHANGE UP (ref 0.2–1)
WBC # BLD: 11.22 K/UL — HIGH (ref 3.8–10.5)
WBC # FLD AUTO: 11.22 K/UL — HIGH (ref 3.8–10.5)
WBC UR QL: SIGNIFICANT CHANGE UP /HPF (ref 0–5)

## 2025-07-12 PROCEDURE — 74177 CT ABD & PELVIS W/CONTRAST: CPT | Mod: 26

## 2025-07-12 PROCEDURE — 99222 1ST HOSP IP/OBS MODERATE 55: CPT

## 2025-07-12 PROCEDURE — 99497 ADVNCD CARE PLAN 30 MIN: CPT | Mod: GC,25

## 2025-07-12 PROCEDURE — 99222 1ST HOSP IP/OBS MODERATE 55: CPT | Mod: GC

## 2025-07-12 PROCEDURE — 71275 CT ANGIOGRAPHY CHEST: CPT | Mod: 26

## 2025-07-12 RX ORDER — ACETAMINOPHEN 500 MG/5ML
650 LIQUID (ML) ORAL EVERY 6 HOURS
Refills: 0 | Status: DISCONTINUED | OUTPATIENT
Start: 2025-07-12 | End: 2025-07-16

## 2025-07-12 RX ORDER — SODIUM CHLORIDE 9 G/1000ML
1000 INJECTION, SOLUTION INTRAVENOUS
Refills: 0 | Status: DISCONTINUED | OUTPATIENT
Start: 2025-07-12 | End: 2025-07-16

## 2025-07-12 RX ORDER — DEXTROSE 50 % IN WATER 50 %
12.5 SYRINGE (ML) INTRAVENOUS ONCE
Refills: 0 | Status: DISCONTINUED | OUTPATIENT
Start: 2025-07-12 | End: 2025-07-16

## 2025-07-12 RX ORDER — AZITHROMYCIN 250 MG
500 CAPSULE ORAL EVERY 24 HOURS
Refills: 0 | Status: DISCONTINUED | OUTPATIENT
Start: 2025-07-13 | End: 2025-07-15

## 2025-07-12 RX ORDER — MELATONIN 5 MG
6 TABLET ORAL AT BEDTIME
Refills: 0 | Status: DISCONTINUED | OUTPATIENT
Start: 2025-07-12 | End: 2025-07-14

## 2025-07-12 RX ORDER — MIRTAZAPINE 30 MG/1
15 TABLET, FILM COATED ORAL AT BEDTIME
Refills: 0 | Status: DISCONTINUED | OUTPATIENT
Start: 2025-07-12 | End: 2025-07-14

## 2025-07-12 RX ORDER — MEMANTINE HYDROCHLORIDE 21 MG/1
10 CAPSULE, EXTENDED RELEASE ORAL
Refills: 0 | Status: DISCONTINUED | OUTPATIENT
Start: 2025-07-12 | End: 2025-07-14

## 2025-07-12 RX ORDER — DEXTROSE 50 % IN WATER 50 %
25 SYRINGE (ML) INTRAVENOUS ONCE
Refills: 0 | Status: DISCONTINUED | OUTPATIENT
Start: 2025-07-12 | End: 2025-07-16

## 2025-07-12 RX ORDER — PIPERACILLIN-TAZO-DEXTROSE,ISO 3.375G/5
3.38 IV SOLUTION, PIGGYBACK PREMIX FROZEN(ML) INTRAVENOUS EVERY 8 HOURS
Refills: 0 | Status: DISCONTINUED | OUTPATIENT
Start: 2025-07-12 | End: 2025-07-16

## 2025-07-12 RX ORDER — LETROZOLE 2.5 MG/1
2.5 TABLET, FILM COATED ORAL DAILY
Refills: 0 | Status: DISCONTINUED | OUTPATIENT
Start: 2025-07-12 | End: 2025-07-14

## 2025-07-12 RX ORDER — SENNA 187 MG
2 TABLET ORAL AT BEDTIME
Refills: 0 | Status: DISCONTINUED | OUTPATIENT
Start: 2025-07-12 | End: 2025-07-16

## 2025-07-12 RX ORDER — MEMANTINE HYDROCHLORIDE 21 MG/1
1 CAPSULE, EXTENDED RELEASE ORAL
Refills: 0 | DISCHARGE

## 2025-07-12 RX ORDER — SCOPOLAMINE 1 MG/3D
1 PATCH, EXTENDED RELEASE TRANSDERMAL
Refills: 0 | Status: DISCONTINUED | OUTPATIENT
Start: 2025-07-12 | End: 2025-07-16

## 2025-07-12 RX ORDER — AMLODIPINE BESYLATE 10 MG/1
5 TABLET ORAL DAILY
Refills: 0 | Status: DISCONTINUED | OUTPATIENT
Start: 2025-07-12 | End: 2025-07-12

## 2025-07-12 RX ORDER — AZITHROMYCIN 250 MG
500 CAPSULE ORAL ONCE
Refills: 0 | Status: COMPLETED | OUTPATIENT
Start: 2025-07-12 | End: 2025-07-12

## 2025-07-12 RX ORDER — CEFTRIAXONE 500 MG/1
1000 INJECTION, POWDER, FOR SOLUTION INTRAMUSCULAR; INTRAVENOUS EVERY 24 HOURS
Refills: 0 | Status: DISCONTINUED | OUTPATIENT
Start: 2025-07-12 | End: 2025-07-12

## 2025-07-12 RX ORDER — DEXTROSE 50 % IN WATER 50 %
25 SYRINGE (ML) INTRAVENOUS ONCE
Refills: 0 | Status: COMPLETED | OUTPATIENT
Start: 2025-07-12 | End: 2025-07-12

## 2025-07-12 RX ORDER — ROSUVASTATIN CALCIUM 20 MG/1
5 TABLET, FILM COATED ORAL AT BEDTIME
Refills: 0 | Status: DISCONTINUED | OUTPATIENT
Start: 2025-07-12 | End: 2025-07-14

## 2025-07-12 RX ORDER — TRAZODONE HCL 100 MG
50 TABLET ORAL AT BEDTIME
Refills: 0 | Status: DISCONTINUED | OUTPATIENT
Start: 2025-07-12 | End: 2025-07-16

## 2025-07-12 RX ORDER — OLANZAPINE 10 MG/1
5 TABLET ORAL DAILY
Refills: 0 | Status: DISCONTINUED | OUTPATIENT
Start: 2025-07-12 | End: 2025-07-16

## 2025-07-12 RX ORDER — ESCITALOPRAM OXALATE 20 MG/1
10 TABLET ORAL DAILY
Refills: 0 | Status: DISCONTINUED | OUTPATIENT
Start: 2025-07-12 | End: 2025-07-16

## 2025-07-12 RX ORDER — SODIUM CHLORIDE 9 G/1000ML
1000 INJECTION, SOLUTION INTRAVENOUS
Refills: 0 | Status: DISCONTINUED | OUTPATIENT
Start: 2025-07-12 | End: 2025-07-13

## 2025-07-12 RX ORDER — POLYETHYLENE GLYCOL 3350 17 G/17G
17 POWDER, FOR SOLUTION ORAL
Refills: 0 | Status: DISCONTINUED | OUTPATIENT
Start: 2025-07-12 | End: 2025-07-16

## 2025-07-12 RX ORDER — ENOXAPARIN SODIUM 100 MG/ML
40 INJECTION SUBCUTANEOUS EVERY 24 HOURS
Refills: 0 | Status: DISCONTINUED | OUTPATIENT
Start: 2025-07-12 | End: 2025-07-14

## 2025-07-12 RX ORDER — AZITHROMYCIN 250 MG
CAPSULE ORAL
Refills: 0 | Status: DISCONTINUED | OUTPATIENT
Start: 2025-07-12 | End: 2025-07-15

## 2025-07-12 RX ORDER — SALINE 7; 19 G/118ML; G/118ML
1 ENEMA RECTAL ONCE
Refills: 0 | Status: COMPLETED | OUTPATIENT
Start: 2025-07-12 | End: 2025-07-12

## 2025-07-12 RX ORDER — GLUCAGON 3 MG/1
1 POWDER NASAL ONCE
Refills: 0 | Status: DISCONTINUED | OUTPATIENT
Start: 2025-07-12 | End: 2025-07-16

## 2025-07-12 RX ADMIN — ENOXAPARIN SODIUM 40 MILLIGRAM(S): 100 INJECTION SUBCUTANEOUS at 06:32

## 2025-07-12 RX ADMIN — AMLODIPINE BESYLATE 5 MILLIGRAM(S): 10 TABLET ORAL at 06:31

## 2025-07-12 RX ADMIN — Medication 6 MILLIGRAM(S): at 21:37

## 2025-07-12 RX ADMIN — MEMANTINE HYDROCHLORIDE 10 MILLIGRAM(S): 21 CAPSULE, EXTENDED RELEASE ORAL at 18:31

## 2025-07-12 RX ADMIN — POLYETHYLENE GLYCOL 3350 17 GRAM(S): 17 POWDER, FOR SOLUTION ORAL at 18:28

## 2025-07-12 RX ADMIN — SCOPOLAMINE 1 PATCH: 1 PATCH, EXTENDED RELEASE TRANSDERMAL at 18:58

## 2025-07-12 RX ADMIN — Medication 250 MILLIGRAM(S): at 14:27

## 2025-07-12 RX ADMIN — Medication 1 APPLICATION(S): at 18:31

## 2025-07-12 RX ADMIN — CEFTRIAXONE 100 MILLIGRAM(S): 500 INJECTION, POWDER, FOR SOLUTION INTRAMUSCULAR; INTRAVENOUS at 09:17

## 2025-07-12 RX ADMIN — Medication 250 MILLIGRAM(S): at 00:10

## 2025-07-12 RX ADMIN — Medication 25 GRAM(S): at 14:27

## 2025-07-12 RX ADMIN — SODIUM CHLORIDE 100 MILLILITER(S): 9 INJECTION, SOLUTION INTRAVENOUS at 04:28

## 2025-07-12 RX ADMIN — SALINE 1 ENEMA: 7; 19 ENEMA RECTAL at 14:27

## 2025-07-12 RX ADMIN — LETROZOLE 2.5 MILLIGRAM(S): 2.5 TABLET, FILM COATED ORAL at 13:02

## 2025-07-12 RX ADMIN — Medication 1000 UNIT(S): at 13:02

## 2025-07-12 RX ADMIN — ESCITALOPRAM OXALATE 10 MILLIGRAM(S): 20 TABLET ORAL at 13:02

## 2025-07-12 RX ADMIN — ROSUVASTATIN CALCIUM 5 MILLIGRAM(S): 20 TABLET, FILM COATED ORAL at 21:37

## 2025-07-12 RX ADMIN — Medication 25 GRAM(S): at 04:24

## 2025-07-12 RX ADMIN — OLANZAPINE 5 MILLIGRAM(S): 10 TABLET ORAL at 13:02

## 2025-07-12 RX ADMIN — Medication 25 MILLILITER(S): at 06:01

## 2025-07-12 RX ADMIN — MEMANTINE HYDROCHLORIDE 10 MILLIGRAM(S): 21 CAPSULE, EXTENDED RELEASE ORAL at 06:31

## 2025-07-12 RX ADMIN — Medication 25 GRAM(S): at 21:36

## 2025-07-12 RX ADMIN — Medication 50 MILLIGRAM(S): at 21:37

## 2025-07-12 RX ADMIN — MIRTAZAPINE 15 MILLIGRAM(S): 30 TABLET, FILM COATED ORAL at 21:37

## 2025-07-12 RX ADMIN — SCOPOLAMINE 1 PATCH: 1 PATCH, EXTENDED RELEASE TRANSDERMAL at 07:49

## 2025-07-12 NOTE — ED ADULT NURSE REASSESSMENT NOTE - NS ED NURSE REASSESS COMMENT FT1
Pt observed alert and oriented x0 and  non-verbal at baseline.  Pt noted with color good  with respirations even and non-labored on O2 at 6 liters nasal cannula.   Pt normal sinus rhythm on the bedside monitor. IVF infusing via left upper extremity, 20G saline lock. Site observed with dressing clean dry and intact.   Urine obtained via straight cath pt noted with 200 ml of urinary output.  Pt  noted incontinent a small amount of stool. Pt provided with incontinent skin care and pt turned and positioned. Pt awaiting transfer to in pt unit. No s/s of distress noted.

## 2025-07-12 NOTE — H&P ADULT - PROBLEM SELECTOR PLAN 1
- Meets SIRS criteria with white count, tachycardia, and increased RR  - Most likely source is multifocal PNA  - Given high risk for resistance as Pt is from nursing home, will continue with vanc and zosyn  - MRSA swab ordered  - BCx pending  - Deescalate as appropriate  - ID consult in AM - Meets SIRS criteria with white count, tachycardia, and increased RR  - Most likely source is multifocal PNA  - Given high risk for resistance as Pt is from nursing home, will continue with vanc and zosyn  - Starting azithromycin 500mg QD  - MRSA swab and urine strep/pneumo ordered  - BCx pending  - Deescalate as appropriate  - ID consult in AM - Meets SIRS criteria with white count, tachycardia, and increased RR  - Most likely source is multifocal PNA  - Starting ceftriaxone for presumed aspiration PNA  - Starting azithromycin 500mg QD  - Urine strep/pneumo ordered  - BCx pending  - Deescalate as appropriate

## 2025-07-12 NOTE — ED ADULT NURSE REASSESSMENT NOTE - NS ED NURSE REASSESS COMMENT FT1
Patient medicated for hypoglycemia event. Hypoglycemia note completed. Medications crushed and admin into PEG tube as per ordered. MD that was at bedside made aware that patient has not had any urine output. Patient still on primafit.

## 2025-07-12 NOTE — H&P ADULT - NSICDXPASTSURGICALHX_GEN_ALL_CORE_FT
PAST SURGICAL HISTORY:  S/P lumpectomy, right breast     S/P percutaneous endoscopic gastrostomy (PEG) tube placement

## 2025-07-12 NOTE — PROGRESS NOTE ADULT - PROBLEM SELECTOR PLAN 7
- DVT ppx: Lovenox 40mg QD  - Diet: NPO w/ tube feeds  - Dispo: TBD, most likely back to nursing home - DVT ppx: Lovenox 40mg QD  - Diet: NPO w/ tube feeds  - Dispo: TBD, most likely back to nursing home    # Constipation   large stool burden noted on CT abd  start Senna, Miralax. Fleet enema.   monitor for BM

## 2025-07-12 NOTE — H&P ADULT - NSHPLABSRESULTS_GEN_ALL_CORE
LABS:             11.6   14.00 )-----------( 241      ( 11 Jul 2025 21:53 )             35.8     07-11    146[H]  |  105  |  32[H]  ----------------------------<  109[H]  4.0   |  26  |  0.43[L]    Ca    9.5      11 Jul 2025 21:53    TPro  7.4  /  Alb  3.7  /  TBili  0.5  /  DBili  x   /  AST  36[H]  /  ALT  25  /  AlkPhos  63  07-11    PT/INR - ( 11 Jul 2025 21:53 )   PT: 13.6 sec;   INR: 1.14 ratio         PTT - ( 11 Jul 2025 21:53 )  PTT:29.5 sec    WBC 14  Na 146  Bun/SCr ratio greater than 60  pH 7.48, pO2 44 pCO2 43     CKMB 5.6  ProBNP 2887  Trop 18  RVP negative    EKG vent rate 91, ID intervnal 118ms, QRS 78ms  NSR    < from: CT Abdomen and Pelvis w/ IV Cont (07.12.25 @ 02:54) >    IMPRESSION:    Multifocal pneumonia. Recommend follow-up to resolution.    Moderate fecal load.  Gastrostomy tube in place.    < end of copied text > LABS:             11.6   14.00 )-----------( 241      ( 11 Jul 2025 21:53 )             35.8   Manual Differential (07.11.25 @ 21:53)   Manual Neutrophil %: 82.0 %  Manual Neutrophil Bands %: 10.0 %  Manual Neutrophil #: 11.48 K/uL  Manual Neutrophil Bands #: 1.40 K/uL    07-11    146[H]  |  105  |  32[H]  ----------------------------<  109[H]  4.0   |  26  |  0.43[L]    Ca    9.5      11 Jul 2025 21:53    TPro  7.4  /  Alb  3.7  /  TBili  0.5  /  DBili  x   /  AST  36[H]  /  ALT  25  /  AlkPhos  63  07-11    PT/INR - ( 11 Jul 2025 21:53 )   PT: 13.6 sec;   INR: 1.14 ratio    PTT - ( 11 Jul 2025 21:53 )  PTT:29.5 sec    WBC 14  Na 146  Bun/SCr ratio greater than 60  pH 7.48, pO2 44 pCO2 43     CKMB 5.6  ProBNP 2887  Trop 18  RVP negative    EKG - NSR at 91, QTc 430, TWI aVL (present on prior WKG), no significant ST-T wave changes    < from: CT Angio Chest PE Protocol w/ IV Cont (07.12.25 @ 02:54) >    FINDINGS:  CHEST:  LUNGS AND LARGE AIRWAYS: Patent central airways. Patchy bilateral airspace opacities predominantly involving the bilateral lower lobes compatible with multifocal pneumonia.    PLEURA: No pleural effusion.  VESSELS: Pulmonary arterial opacification is adequate. No pulmonary embolism. Atherosclerotic calcification of the thoracic aorta and coronary arteries.    HEART: Heart size is normal. No pericardial effusion.  MEDIASTINUM AND NOAH: No lymphadenopathy.  CHEST WALL AND LOWER NECK: Within normal limits.    ABDOMEN AND PELVIS:  LIVER: Within normal limits.  BILE DUCTS: Normal caliber.  GALLBLADDER: Within normal limits.  SPLEEN: Within normal limits.  PANCREAS: Within normal limits.  ADRENALS: Within normal limits.  KIDNEYS/URETERS: The kidneys demonstrate symmetric enhancement without hydronephrosis. Bilateral renal cysts.    BLADDER: Mildly distended.  REPRODUCTIVE ORGANS: Hysterectomy.    BOWEL: Gastrostomy tube in place. No bowel obstruction. Appendix is not visualized. Moderate fecal load.  PERITONEUM/RETROPERITONEUM: Within normal limits.  VESSELS: Within normal limits.  LYMPH NODES: No lymphadenopathy.  ABDOMINAL WALL: Within normal limits.  BONES: Chronic degenerative changes of the spine.    IMPRESSION:    Multifocal pneumonia. Recommend follow-up to resolution.    Moderate fecal load.  Gastrostomy tube in place.  --- End of Report ---  < end of copied text >

## 2025-07-12 NOTE — CONSULT NOTE ADULT - ASSESSMENT
70F with history of Dementia (AAO x 0, now nonverbal), R Breast Cancer s/p chemo/lumpectomy/RT (2016, in remission since), DM2 (currently off medications), HTN, and HLD, s/p PEG placement in May 2025 presenting with AHRF with multifocal PNA concerning for aspiration PNA.    #Multifocal PNA  - Obtain PCT, repeat in 48h  - Agree with Zosyn to treat for 7 days + Vancomycin pending MRSA swab  - Obtain urine strep, legionella ag  - Incentive spirometry  - Aspiration precautions   - O2 supplementation with goal >92%  70F with history of Dementia (AAO x 0, now nonverbal), R Breast Cancer s/p chemo/lumpectomy/RT (2016, in remission since), DM2 (currently off medications), HTN, and HLD, s/p PEG placement in May 2025 presenting with AHRF with multifocal PNA concerning for aspiration PNA.    #Multifocal PNA  - Obtain PCT, repeat in 48h  - Agree with Zosyn to treat for 7 days + Vancomycin pending MRSA swab  - Obtain urine strep, legionella ag  - Incentive spirometry  - Aspiration precautions with HOB elevation above 45 degrees   - O2 supplementation with goal >92%

## 2025-07-12 NOTE — H&P ADULT - PROBLEM SELECTOR PLAN 3
- Na 146 on admission  - Bun/SCr ratio greater than 60  - HyperNa most likely in the setting of dehydration  - C/w 0.45 NS 100cc/hr  - F/u morning BMP and downtitrate fluids as appropriate - Na 146 on admission  - Bun/SCr ratio greater than 60  - HyperNa most likely in the setting of dehydration  - C/w 0.45 NS 100cc/hr  - F/u morning BMP and downtitrate fluids as appropriate  - Free water flushes TID 200cc - Na 146 on admission  - Bun/SCr ratio greater than 60  - HyperNa most likely in the setting of dehydration  - C/w 0.45 NS 100cc/hr  - F/u morning BMP and downtitrate fluids as appropriate  - Free water flushes TID 400cc, Nutrition eval

## 2025-07-12 NOTE — PROVIDER CONTACT NOTE (OTHER) - SITUATION
Patient observed to be coughing during rounding. Coughing was not present during frequent rounding throughout shift. Tube feeds immediately stopped, patient repositioned in high fowlers.
BP 95/46. All other vitals WNL.

## 2025-07-12 NOTE — H&P ADULT - ATTENDING COMMENTS
Patient seen and examined on 7/12/25, case discussed with Dr. Chuy Crain. This is a 70F with history as above who presents to the hospital with agitation and hypoxia at her SNF. Patient is AAO x0, unable to give HPI. Daughter Radha Corrales at bedside given HPI. Said that she had noted the patient was agitated on Thursday and the next day noticed that the patient was placed on NC (which is new for her). Was noted to be hypoxic to the 80s on RA and was therefore sent to the hospital. Here work up shows her to have sepsis 2/2 multifocal PNA. Will place on ceftriaxone/azithromycin, chest PT, NC 6L. Downtitrate supplemental O2 as tolerated. Other management as above.

## 2025-07-12 NOTE — PATIENT PROFILE ADULT - FALL HARM RISK - HARM RISK INTERVENTIONS

## 2025-07-12 NOTE — CONSULT NOTE ADULT - ATTENDING COMMENTS
70F with history of Dementia (AAO x 0, now nonverbal), R Breast Cancer s/p chemo/lumpectomy/RT (2016, in remission since), DM2 (currently off medications), HTN, and HLD, s/p PEG placement in May 2025 presenting with AHRF with multifocal PNA concerning for aspiration PNA.    #Multifocal PNA  #Aspiration pneumonia    - Pulmonary toilet  -Cont Zosyn to treat for 7 days (Can switch to PO medication if ready to be discharged ) Untilkely to be MRSA but can continue until swab is negative  - Obtain urine strep, legionella ag  - Aspiration precautions with HOB elevation above 45 degrees   - O2 supplementation with goal >92%

## 2025-07-12 NOTE — PROVIDER CONTACT NOTE (HYPOGLYCEMIA EVENT) - NS PROVIDER CONTACT BACKGROUND-HYPO
Age: 70y    Gender: Female    POCT Blood Glucose:  143 mg/dL (07-12-25 @ 06:23)  92 mg/dL (07-12-25 @ 05:59)  82 mg/dL (07-12-25 @ 04:59)  138 mg/dL (07-11-25 @ 20:34)      eMAR:dextrose 50% Injectable   25 milliLiter(s) IV Push (07-12-25 @ 06:01)

## 2025-07-12 NOTE — ED ADULT NURSE REASSESSMENT NOTE - NS ED NURSE REASSESS COMMENT FT1
Patient is A+ox0 at baseline and nonverbal. Patient is resting. Respirations are slightly tachypneic on 6LNC. Patient maintains an oxygen saturation above 95% on 6LNC. Patient is NSR on the cardiac monitor. Patent noted to be shivering. Oral temp completed and patient is neither febrile or hypothermic, warm blankets applied. Nonverbal indicators of pain absent. Pending CT results. Safety and comfort maintained. Awaiting further MD orders. Pending urine. Plan of care ongoing.

## 2025-07-12 NOTE — H&P ADULT - NSHPPHYSICALEXAM_GEN_ALL_CORE
VITALS:   Vital Signs Last 24 Hrs  T(C): 36.7 (12 Jul 2025 05:01), Max: 38 (11 Jul 2025 21:17)  T(F): 98.1 (12 Jul 2025 05:01), Max: 100.4 (11 Jul 2025 21:17)  HR: 87 (12 Jul 2025 05:01) (87 - 115)  BP: 120/63 (12 Jul 2025 05:01) (100/60 - 120/63)  BP(mean): --  RR: 20 (12 Jul 2025 05:01) (18 - 22)  SpO2: 100% (12 Jul 2025 05:01) (96% - 100%)    Parameters below as of 12 Jul 2025 05:01  Patient On (Oxygen Delivery Method): nasal cannula  O2 Flow (L/min): 6      GENERAL: NAD, lying in bed comfortably  HEAD:  Atraumatic, normocephalic  EYES: +  ENT: Moist mucous membranes  NECK: Supple, no JVD  HEART: S1, S2, regular rate and rhythm, no murmurs, rubs, or gallops  LUNGS: Unlabored respirations.  Clear to auscultation bilaterally, no crackles, wheezing, or rhonchi  ABDOMEN: Soft, nontender, nondistended, +BS  EXTREMITIES: 2+ peripheral pulses bilaterally. No clubbing, cyanosis, or edema  NERVOUS SYSTEM:  A&Ox3, no focal deficits   SKIN: No rashes or lesions VITALS:   Vital Signs Last 24 Hrs  T(C): 36.7 (12 Jul 2025 05:01), Max: 38 (11 Jul 2025 21:17)  T(F): 98.1 (12 Jul 2025 05:01), Max: 100.4 (11 Jul 2025 21:17)  HR: 87 (12 Jul 2025 05:01) (87 - 115)  BP: 120/63 (12 Jul 2025 05:01) (100/60 - 120/63)  BP(mean): --  RR: 20 (12 Jul 2025 05:01) (18 - 22)  SpO2: 100% (12 Jul 2025 05:01) (96% - 100%)    Parameters below as of 12 Jul 2025 05:01  Patient On (Oxygen Delivery Method): nasal cannula  O2 Flow (L/min): 6      GENERAL: NAD  HEAD:  Atraumatic, normocephalic  EYES: +Cataract R eye  ENT: Dry mucous membranes  HEART: S1, S2, regular rate and rhythm, no murmurs, rubs, or gallops  LUNGS: Unlabored respirations. Clear to auscultation bilaterally, no crackles, wheezing, or rhonchi  ABDOMEN: +G tube present, non-tender abd exam  EXTREMITIES: +Thin, no edema  NERVOUS SYSTEM:  A&Ox0, opens eyes to stimuli   SKIN: No rashes or lesions, no sacral wounds VITALS:   Vital Signs Last 24 Hrs  T(C): 36.7 (12 Jul 2025 05:01), Max: 38 (11 Jul 2025 21:17)  T(F): 98.1 (12 Jul 2025 05:01), Max: 100.4 (11 Jul 2025 21:17)  HR: 87 (12 Jul 2025 05:01) (87 - 115)  BP: 120/63 (12 Jul 2025 05:01) (100/60 - 120/63)  BP(mean): --  RR: 20 (12 Jul 2025 05:01) (18 - 22)  SpO2: 100% (12 Jul 2025 05:01) (96% - 100%)    Parameters below as of 12 Jul 2025 05:01  Patient On (Oxygen Delivery Method): nasal cannula  O2 Flow (L/min): 6      GENERAL: NAD  EYES: +Cataract R eye  ENT: Dry mucous membranes  HEART: S1, S2, regular rate and rhythm, no murmurs, rubs, or gallops  LUNGS: Unlabored respirations. Clear to auscultation bilaterally, no crackles, wheezing, or rhonchi  ABDOMEN: +G tube present, non-tender abd exam  EXTREMITIES: +Thin, no edema  NERVOUS SYSTEM:  A&Ox0, opens eyes to stimuli   SKIN: No rashes or lesions, no sacral wounds

## 2025-07-12 NOTE — PROVIDER CONTACT NOTE (OTHER) - ASSESSMENT
Patient non verbal ams at baseline. Patient has been moving down in the bed throughout the day constantly. Patient observed to be coughing during rounding. Coughing was not present during frequent rounding throughout shift. Tube feeds immediately stopped, patient repositioned in high fowlers.Pt AMS. Unable to ask patient questions.

## 2025-07-12 NOTE — H&P ADULT - PROBLEM SELECTOR PLAN 2
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

## 2025-07-12 NOTE — H&P ADULT - HISTORY OF PRESENT ILLNESS
Linh Pena is a 70F with history of Dementia (AAO x 0, now nonverbal), R Breast Cancer s/p chemo/lumpectomy/RT (2016, in remission since), DM2 (currently off medications), HTN, and HLD presenting for increased O2 requirements and agitation. Per the daughter who was bedside, she visited her mother yesterday at the nursing home when she noticed that the Pt was placed on NC 2L and Pt was agitated. Her oxygen requirements kept increasing until she asked for the patient to be transferred to the hospital.    In the ED: Pt hypoxic to 80s requiring 6L NC and tachypneic to 22 with . WBC 14k. Given vanc and zosyn in the ED. 1/2 normal saline given for Na 146. CT angio and abd pelvis completed.    Chart review: April 2025 admitted for aspiration episode and given zosyn and switched to oral cefuroxime. Linh Pena is a 70F with history of Dementia (AAO x 0, now nonverbal), R Breast Cancer s/p chemo/lumpectomy/RT (2016, in remission since), DM2 (currently off medications), HTN, and HLD, s/p PEG placement in May 2025 presenting for increased O2 requirements and agitation. Per the daughter who was bedside, she visited her mother yesterday at the nursing home when she noticed that the Pt was placed on NC 2L and Pt was agitated. Her oxygen requirements kept increasing until she asked for the patient to be transferred to the hospital.    In the ED: Pt hypoxic to 80s requiring 6L NC and tachypneic to 22 with . WBC 14k. Given vanc and zosyn in the ED. 1/2 normal saline given for Na 146. CT angio and abd pelvis completed.    Chart review: April 2025 admitted for aspiration episode and given zosyn and switched to oral cefuroxime.

## 2025-07-12 NOTE — H&P ADULT - ASSESSMENT
Linh Pena is a 70F with history of Dementia (AAO x 0, now nonverbal), R Breast Cancer s/p chemo/lumpectomy/RT (2016, in remission since), DM2 (currently off medications), HTN, and HLD, s/p PEG placement in May 2025 presenting for increased O2 requirements and agitation. Found to have multifocal PNA on chest imaging

## 2025-07-12 NOTE — ED ADULT NURSE REASSESSMENT NOTE - NS ED NURSE REASSESS COMMENT FT1
MD Attending made aware that patient has had 2 episodes of soft stools and that patient has not had any urine output.

## 2025-07-12 NOTE — H&P ADULT - CONVERSATION DETAILS
Discussed with daughter bedside, she says that her mom is DNR/DNI and that a MOLST has been signed prior and will get it from the nursing home facility. Discussed with daughter bedside, she says that her mom is DNR/DNI and would not want her mother to have NIV. She also would not want her mother to have dialysis if it was indicated. Would want a feeding tube, abx, and IVF as needed. ROOSE[facility]T filled out and placed in chart.

## 2025-07-12 NOTE — CONSULT NOTE ADULT - ASSESSMENT
70F with history of Dementia (AAO x 0, now nonverbal), R Breast Cancer s/p chemo/lumpectomy/RT (2016, in remission since), DM2 (currently off medications), HTN, and HLD, s/p PEG placement in May 2025 presenting for increased O2 requirements and agitation. Found to have multifocal PNA on chest imaging     Sepsis 2/2 PNA  AHRF on NC  - tachypnea, tachycardia and leukocytosis on admission  - CT showing multifocal PNA and fecal load    Recommendations:   C/w zosyn  can stop azithromycin  F/u pending cx  F/u uPNA Ag  MRSA swab  Trend temps/WBC  O2 support  additional care per primary team    Patient evaluated with face-to-face time in addition to reviewing history, labs, microbiology, and imaging.   Antibiotic stewardship, local antibiogram, infection control strategies and potential transmission issues taken into consideration at time of treatment decision making process.   Thank you for allowing us to participate in the care of your patient.  Infectious Diseases will follow. Please call with any questions.  Brittny Denny M.D.  Available on [employer] TEAMS -- *[facility]*  [place] [facility] [phone]  For after 5 P.M. and weekends, please call [phone] 70F with history of Dementia (AAO x 0, now nonverbal), R Breast Cancer s/p chemo/lumpectomy/RT (2016, in remission since), DM2 (currently off medications), HTN, and HLD, s/p PEG placement in May 2025 presenting for increased O2 requirements and agitation. Found to have multifocal PNA on chest imaging     Sepsis 2/2 PNA  AHRF on NC  - tachypnea, tachycardia and leukocytosis on admission  - CT showing multifocal PNA and fecal load    Recommendations:   C/w zosyn  C/w azithromycin  F/u pending cx  F/u uPNA Ag  F/u MRSA swab  Trend temps/WBC  O2 support  additional care per primary team    Patient evaluated with face-to-face time in addition to reviewing history, labs, microbiology, and imaging.   Antibiotic stewardship, local antibiogram, infection control strategies and potential transmission issues taken into consideration at time of treatment decision making process.   Thank you for allowing us to participate in the care of your patient.  Infectious Diseases will follow. Please call with any questions.  Brittny Denny M.D.  Available on [employer] TEAMS -- *[facility]*  [place] [facility] [phone]  For after 5 P.M. and weekends, please call [phone]

## 2025-07-12 NOTE — H&P ADULT - PROBLEM SELECTOR PLAN 7
- DVT ppx: Lovenox 40mg QD  - GI ppx: None  - Diet: NPO w/ tube feeds  - Dispo: TBD, most likely back to nursing home

## 2025-07-13 LAB
A1C WITH ESTIMATED AVERAGE GLUCOSE RESULT: 5.3 % — SIGNIFICANT CHANGE UP (ref 4–5.6)
ANION GAP SERPL CALC-SCNC: 11 MMOL/L — SIGNIFICANT CHANGE UP (ref 7–14)
BUN SERPL-MCNC: 13 MG/DL — SIGNIFICANT CHANGE UP (ref 7–23)
CALCIUM SERPL-MCNC: 8.6 MG/DL — SIGNIFICANT CHANGE UP (ref 8.4–10.5)
CHLORIDE SERPL-SCNC: 106 MMOL/L — SIGNIFICANT CHANGE UP (ref 98–107)
CO2 SERPL-SCNC: 29 MMOL/L — SIGNIFICANT CHANGE UP (ref 22–31)
CREAT SERPL-MCNC: 0.35 MG/DL — LOW (ref 0.5–1.3)
CULTURE RESULTS: NO GROWTH — SIGNIFICANT CHANGE UP
EGFR: 110 ML/MIN/1.73M2 — SIGNIFICANT CHANGE UP
EGFR: 110 ML/MIN/1.73M2 — SIGNIFICANT CHANGE UP
ESTIMATED AVERAGE GLUCOSE: 105 — SIGNIFICANT CHANGE UP
GLUCOSE SERPL-MCNC: 81 MG/DL — SIGNIFICANT CHANGE UP (ref 70–99)
HCT VFR BLD CALC: 38.2 % — SIGNIFICANT CHANGE UP (ref 34.5–45)
HGB BLD-MCNC: 12.2 G/DL — SIGNIFICANT CHANGE UP (ref 11.5–15.5)
LEGIONELLA AG UR QL: NEGATIVE — SIGNIFICANT CHANGE UP
LEGIONELLA AG UR QL: NEGATIVE — SIGNIFICANT CHANGE UP
MAGNESIUM SERPL-MCNC: 1.9 MG/DL — SIGNIFICANT CHANGE UP (ref 1.6–2.6)
MCHC RBC-ENTMCNC: 30.9 PG — SIGNIFICANT CHANGE UP (ref 27–34)
MCHC RBC-ENTMCNC: 31.9 G/DL — LOW (ref 32–36)
MCV RBC AUTO: 96.7 FL — SIGNIFICANT CHANGE UP (ref 80–100)
MRSA PCR RESULT.: SIGNIFICANT CHANGE UP
NRBC # BLD AUTO: 0 K/UL — SIGNIFICANT CHANGE UP (ref 0–0)
NRBC # FLD: 0 K/UL — SIGNIFICANT CHANGE UP (ref 0–0)
NRBC BLD AUTO-RTO: 0 /100 WBCS — SIGNIFICANT CHANGE UP (ref 0–0)
PHOSPHATE SERPL-MCNC: 2.3 MG/DL — LOW (ref 2.5–4.5)
PLATELET # BLD AUTO: 209 K/UL — SIGNIFICANT CHANGE UP (ref 150–400)
PMV BLD: 12.6 FL — SIGNIFICANT CHANGE UP (ref 7–13)
POTASSIUM SERPL-MCNC: 3.2 MMOL/L — LOW (ref 3.5–5.3)
POTASSIUM SERPL-SCNC: 3.2 MMOL/L — LOW (ref 3.5–5.3)
RBC # BLD: 3.95 M/UL — SIGNIFICANT CHANGE UP (ref 3.8–5.2)
RBC # FLD: 13.6 % — SIGNIFICANT CHANGE UP (ref 10.3–14.5)
S AUREUS DNA NOSE QL NAA+PROBE: DETECTED
S PNEUM AG UR QL: NEGATIVE — SIGNIFICANT CHANGE UP
S PNEUM AG UR QL: NEGATIVE — SIGNIFICANT CHANGE UP
SODIUM SERPL-SCNC: 146 MMOL/L — HIGH (ref 135–145)
SPECIMEN SOURCE: SIGNIFICANT CHANGE UP
WBC # BLD: 10.43 K/UL — SIGNIFICANT CHANGE UP (ref 3.8–10.5)
WBC # FLD AUTO: 10.43 K/UL — SIGNIFICANT CHANGE UP (ref 3.8–10.5)

## 2025-07-13 PROCEDURE — 71045 X-RAY EXAM CHEST 1 VIEW: CPT | Mod: 26,77

## 2025-07-13 PROCEDURE — 74019 RADEX ABDOMEN 2 VIEWS: CPT | Mod: 26

## 2025-07-13 PROCEDURE — 71045 X-RAY EXAM CHEST 1 VIEW: CPT | Mod: 26

## 2025-07-13 RX ORDER — OLANZAPINE 10 MG/1
2.5 TABLET ORAL ONCE
Refills: 0 | Status: COMPLETED | OUTPATIENT
Start: 2025-07-13 | End: 2025-07-13

## 2025-07-13 RX ORDER — POTASSIUM PHOSPHATE, MONOBASIC POTASSIUM PHOSPHATE, DIBASIC INJECTION, 236; 224 MG/ML; MG/ML
15 SOLUTION, CONCENTRATE INTRAVENOUS ONCE
Refills: 0 | Status: COMPLETED | OUTPATIENT
Start: 2025-07-13 | End: 2025-07-13

## 2025-07-13 RX ORDER — SODIUM CHLORIDE 9 G/1000ML
1000 INJECTION, SOLUTION INTRAVENOUS
Refills: 0 | Status: DISCONTINUED | OUTPATIENT
Start: 2025-07-13 | End: 2025-07-16

## 2025-07-13 RX ORDER — SALINE 7; 19 G/118ML; G/118ML
1 ENEMA RECTAL ONCE
Refills: 0 | Status: COMPLETED | OUTPATIENT
Start: 2025-07-13 | End: 2025-07-13

## 2025-07-13 RX ORDER — SODIUM CHLORIDE 9 G/1000ML
1000 INJECTION, SOLUTION INTRAVENOUS
Refills: 0 | Status: DISCONTINUED | OUTPATIENT
Start: 2025-07-13 | End: 2025-07-13

## 2025-07-13 RX ADMIN — Medication 25 GRAM(S): at 14:02

## 2025-07-13 RX ADMIN — Medication 1 APPLICATION(S): at 14:22

## 2025-07-13 RX ADMIN — MIRTAZAPINE 15 MILLIGRAM(S): 30 TABLET, FILM COATED ORAL at 22:17

## 2025-07-13 RX ADMIN — MEMANTINE HYDROCHLORIDE 10 MILLIGRAM(S): 21 CAPSULE, EXTENDED RELEASE ORAL at 18:24

## 2025-07-13 RX ADMIN — Medication 250 MILLIGRAM(S): at 14:23

## 2025-07-13 RX ADMIN — OLANZAPINE 2.5 MILLIGRAM(S): 10 TABLET ORAL at 16:20

## 2025-07-13 RX ADMIN — POLYETHYLENE GLYCOL 3350 17 GRAM(S): 17 POWDER, FOR SOLUTION ORAL at 18:24

## 2025-07-13 RX ADMIN — ENOXAPARIN SODIUM 40 MILLIGRAM(S): 100 INJECTION SUBCUTANEOUS at 05:49

## 2025-07-13 RX ADMIN — Medication 1000 UNIT(S): at 14:02

## 2025-07-13 RX ADMIN — Medication 25 GRAM(S): at 05:48

## 2025-07-13 RX ADMIN — SALINE 1 ENEMA: 7; 19 ENEMA RECTAL at 16:20

## 2025-07-13 RX ADMIN — Medication 50 MILLIGRAM(S): at 22:17

## 2025-07-13 RX ADMIN — Medication 25 GRAM(S): at 22:17

## 2025-07-13 RX ADMIN — LETROZOLE 2.5 MILLIGRAM(S): 2.5 TABLET, FILM COATED ORAL at 14:03

## 2025-07-13 RX ADMIN — MEMANTINE HYDROCHLORIDE 10 MILLIGRAM(S): 21 CAPSULE, EXTENDED RELEASE ORAL at 05:49

## 2025-07-13 RX ADMIN — OLANZAPINE 5 MILLIGRAM(S): 10 TABLET ORAL at 14:02

## 2025-07-13 RX ADMIN — ROSUVASTATIN CALCIUM 5 MILLIGRAM(S): 20 TABLET, FILM COATED ORAL at 22:17

## 2025-07-13 RX ADMIN — SCOPOLAMINE 1 PATCH: 1 PATCH, EXTENDED RELEASE TRANSDERMAL at 06:25

## 2025-07-13 RX ADMIN — Medication 6 MILLIGRAM(S): at 22:17

## 2025-07-13 RX ADMIN — ESCITALOPRAM OXALATE 10 MILLIGRAM(S): 20 TABLET ORAL at 14:03

## 2025-07-13 RX ADMIN — POTASSIUM PHOSPHATE, MONOBASIC POTASSIUM PHOSPHATE, DIBASIC INJECTION, 62.5 MILLIMOLE(S): 236; 224 SOLUTION, CONCENTRATE INTRAVENOUS at 10:04

## 2025-07-13 RX ADMIN — SCOPOLAMINE 1 PATCH: 1 PATCH, EXTENDED RELEASE TRANSDERMAL at 18:32

## 2025-07-13 RX ADMIN — Medication 40 MILLIEQUIVALENT(S): at 14:22

## 2025-07-13 NOTE — DIETITIAN INITIAL EVALUATION ADULT - ENTERAL
Recommend adjusting TF regimen to Jevity 1.2cal starting @ 15ml/hr and increased by 15ml q4hrs to goal of 45ml/hr x 24 hrs, total volume 1080ml, provides 1296cal, 60 gm pro, 872ml free water. Flush water defers to MD discretion

## 2025-07-13 NOTE — DIETITIAN INITIAL EVALUATION ADULT - OTHER INFO
TF was held at time of visit as patient observed to be coughing during rounding. Per RN, TF will be restarted soon later. Patient is currently on TF regimen Jevity 1.2cal @ goal of 60ml/hr x 16 hrs, total volume 960ml, provides 1152cal,  53gm pro, 775ml free water. Flush water defers to MD discretion. No reported any in house nausea, vomiting, diarrhea, or constipation. Last BM noted on 7/12 per RN flowsheets. Bowel regimen is in placed. No known food allergies. Labs reviewed, A1c 5.3% indicating good control, fingersticks WDL. IV fluids noted for hydration. Recommend adjusting TF regimen to Jevity 1.2cal starting @ 15ml/hr and increased by 15ml q4hrs to goal of 45ml/hr x 24 hrs, total volume 1080ml, provides 1296cal, 60 gm pro, 872ml free water. Flush water defers to MD discretion.  RD to remain available for further nutritional interventions as indicated

## 2025-07-13 NOTE — DIETITIAN INITIAL EVALUATION ADULT - PROBLEM SELECTOR PLAN 3
- Na 146 on admission  - Bun/SCr ratio greater than 60  - HyperNa most likely in the setting of dehydration  - C/w 0.45 NS 100cc/hr  - F/u morning BMP and downtitrate fluids as appropriate  - Free water flushes TID 400cc, Nutrition eval

## 2025-07-13 NOTE — DIETITIAN INITIAL EVALUATION ADULT - PHYSCIAL ASSESSMENT
Per daughter, pt's weight was 81lbs in May. Dosing weight 54.4kg/120lbs (7/11, ? accuracy). Current body weight 37.5kg/82.7lbs (bed scaled, at time of visit on 7/13), which is more consistent with daughter's report and pt's appearance. Weight loss of 7kg/15.7% x 3 months (>7.5% x 3 months) per [facility] 37.5kg (7/13) and [facility] weight history: 54.4kg (7/11, ?accuracy); 44.5kg (4/7).

## 2025-07-13 NOTE — DIETITIAN INITIAL EVALUATION ADULT - PERTINENT MEDS FT
MEDICATIONS  (STANDING):  azithromycin  IVPB 500 milliGRAM(s) IV Intermittent every 24 hours  azithromycin  IVPB      chlorhexidine 2% Cloths 1 Application(s) Topical daily  cholecalciferol 1000 Unit(s) Oral daily  dextrose 5%. 1000 milliLiter(s) (100 mL/Hr) IV Continuous <Continuous>  dextrose 5%. 1000 milliLiter(s) (50 mL/Hr) IV Continuous <Continuous>  dextrose 50% Injectable 25 Gram(s) IV Push once  dextrose 50% Injectable 12.5 Gram(s) IV Push once  dextrose 50% Injectable 25 Gram(s) IV Push once  enoxaparin Injectable 40 milliGRAM(s) SubCutaneous every 24 hours  escitalopram 10 milliGRAM(s) Oral daily  glucagon  Injectable 1 milliGRAM(s) IntraMuscular once  letrozole 2.5 milliGRAM(s) Oral daily  melatonin 6 milliGRAM(s) Oral at bedtime  memantine 10 milliGRAM(s) Oral two times a day  mirtazapine 15 milliGRAM(s) Oral at bedtime  OLANZapine 5 milliGRAM(s) Oral daily  piperacillin/tazobactam IVPB.. 3.375 Gram(s) IV Intermittent every 8 hours  polyethylene glycol 3350 17 Gram(s) Oral two times a day  potassium chloride   Powder 40 milliEquivalent(s) Enteral Tube once  rosuvastatin 5 milliGRAM(s) Oral at bedtime  scopolamine 1 mG/72 Hr(s) Patch 1 Patch Transdermal every 72 hours  senna 2 Tablet(s) Oral at bedtime  sodium chloride 0.45%. 1000 milliLiter(s) (100 mL/Hr) IV Continuous <Continuous>  traZODone 50 milliGRAM(s) Oral at bedtime    MEDICATIONS  (PRN):  acetaminophen     Tablet .. 650 milliGRAM(s) Oral every 6 hours PRN Mild Pain (1 - 3), Moderate Pain (4 - 6)

## 2025-07-13 NOTE — DIETITIAN INITIAL EVALUATION ADULT - ADD RECOMMEND
1. Recommend adjusting TF regimen to Jevity 1.2cal starting @ 15ml/hr and increased by 15ml q4hrs to goal of 45ml/hr x 24 hrs, total volume 1080ml, provides 1296cal, 60 gm pro, 872ml free water. Flush water defers to MD discretion    2. Monitor weights, labs, BM's, skin integrity, tolerance to EN.   3. Further adjustments to rate/volume/duration/free water provision of enteral feeds dependant on long term monitoring of patient's tolerance and needs.

## 2025-07-13 NOTE — PROGRESS NOTE ADULT - NSPROGADDITIONALINFOA_GEN_ALL_CORE
Not tolerating tube feeds   with residual feeds in her mouth.   s/p suctioning.  check CXR, abd xray. frequent suctioning.   c/w IV abx.   GI eval to inspect PEG and alternative etiologies such as gastroparesis.  overall prognosis is poor.     d/w ILYA Gandhi.     - Dr. RENETTA Jolleyet ([facility])  - [phone]

## 2025-07-13 NOTE — DIETITIAN INITIAL EVALUATION ADULT - PROBLEM SELECTOR PLAN 1
- Meets SIRS criteria with white count, tachycardia, and increased RR  - Most likely source is multifocal PNA  - Starting ceftriaxone for presumed aspiration PNA  - Starting azithromycin 500mg QD  - Urine strep/pneumo ordered  - BCx pending  - Deescalate as appropriate

## 2025-07-13 NOTE — DIETITIAN INITIAL EVALUATION ADULT - NS FNS DIET ORDER
Diet, NPO with Tube Feed:   Tube Feeding Modality: Gastrostomy  Jevity 1.2 Angelo (JEVITY1.2RTH)  Total Volume for 24 Hours (mL): 960  Continuous  Starting Tube Feed Rate {mL per Hour}: 60  Until Goal Tube Feed Rate (mL per Hour): 60  Tube Feed Duration (in Hours): 16  Tube Feed Start Time: 07:30  Free Water Flush  Bolus   Total Volume per Flush (mL): 400   Frequency: Every 8 Hours (07-12-25 @ 09:01) [Active]

## 2025-07-13 NOTE — DIETITIAN INITIAL EVALUATION ADULT - PERTINENT LABORATORY DATA
07-13    146[H]  |  106  |  13  ----------------------------<  81  3.2[L]   |  29  |  0.35[L]    Ca    8.6      13 Jul 2025 07:01  Phos  2.3     07-13  Mg     1.90     07-13    TPro  6.8  /  Alb  3.1[L]  /  TBili  0.5  /  DBili  x   /  AST  30  /  ALT  21  /  AlkPhos  61  07-12  POCT Blood Glucose.: 67 mg/dL (07-13-25 @ 11:36)  A1C with Estimated Average Glucose Result: 5.3 % (07-13-25 @ 07:01)  A1C with Estimated Average Glucose Result: 5.3 % (04-07-25 @ 05:25)  A1C with Estimated Average Glucose Result: 4.9 % (08-07-24 @ 07:34)

## 2025-07-13 NOTE — DIETITIAN INITIAL EVALUATION ADULT - REASON FOR ADMISSION
Per chart review, Linh Pena is a 70-year-old female with history of Dementia (AAO x 0, now nonverbal), R Breast Cancer s/p chemo/lumpectomy/RT (2016, in remission since), DM2 (currently off medications), HTN, and HLD, s/p PEG placement in May 2025 presenting for increased O2 requirements and agitation. Found to have multifocal PNA on chest imaging

## 2025-07-13 NOTE — DIETITIAN INITIAL EVALUATION ADULT - ORAL INTAKE PTA/DIET HISTORY
Met patient at bedside. Patient is not alert during visit, nonverbal. Comprehensive chart review completed. Obtained collateral from daughter via phone and RN at bedside. Per daughter, patient started tube feeding since May. Pt dx with severe malnutrition previously on 4/7/25

## 2025-07-13 NOTE — PROGRESS NOTE ADULT - PROBLEM SELECTOR PLAN 7
- DVT ppx: Lovenox 40mg QD  - Diet: NPO    - Dispo: TBD, most likely back to nursing home    # Constipation   large stool burden noted on CT abd  start Senna, Miralax. Fleet enema.   monitor for BM  +BM

## 2025-07-14 DIAGNOSIS — E43 UNSPECIFIED SEVERE PROTEIN-CALORIE MALNUTRITION: ICD-10-CM

## 2025-07-14 DIAGNOSIS — R53.2 FUNCTIONAL QUADRIPLEGIA: ICD-10-CM

## 2025-07-14 DIAGNOSIS — R13.10 DYSPHAGIA, UNSPECIFIED: ICD-10-CM

## 2025-07-14 DIAGNOSIS — Z51.5 ENCOUNTER FOR PALLIATIVE CARE: ICD-10-CM

## 2025-07-14 DIAGNOSIS — Z71.89 OTHER SPECIFIED COUNSELING: ICD-10-CM

## 2025-07-14 DIAGNOSIS — G93.49 OTHER ENCEPHALOPATHY: ICD-10-CM

## 2025-07-14 LAB
ANION GAP SERPL CALC-SCNC: 13 MMOL/L — SIGNIFICANT CHANGE UP (ref 7–14)
BASOPHILS # BLD AUTO: 0.01 K/UL — SIGNIFICANT CHANGE UP (ref 0–0.2)
BASOPHILS NFR BLD AUTO: 0.1 % — SIGNIFICANT CHANGE UP (ref 0–2)
BUN SERPL-MCNC: 6 MG/DL — LOW (ref 7–23)
CALCIUM SERPL-MCNC: 8.9 MG/DL — SIGNIFICANT CHANGE UP (ref 8.4–10.5)
CHLORIDE SERPL-SCNC: 101 MMOL/L — SIGNIFICANT CHANGE UP (ref 98–107)
CO2 SERPL-SCNC: 28 MMOL/L — SIGNIFICANT CHANGE UP (ref 22–31)
CREAT SERPL-MCNC: 0.31 MG/DL — LOW (ref 0.5–1.3)
EGFR: 113 ML/MIN/1.73M2 — SIGNIFICANT CHANGE UP
EGFR: 113 ML/MIN/1.73M2 — SIGNIFICANT CHANGE UP
EOSINOPHIL # BLD AUTO: 0.03 K/UL — SIGNIFICANT CHANGE UP (ref 0–0.5)
EOSINOPHIL NFR BLD AUTO: 0.3 % — SIGNIFICANT CHANGE UP (ref 0–6)
GLUCOSE SERPL-MCNC: 96 MG/DL — SIGNIFICANT CHANGE UP (ref 70–99)
HCT VFR BLD CALC: 33.2 % — LOW (ref 34.5–45)
HGB BLD-MCNC: 11.1 G/DL — LOW (ref 11.5–15.5)
IMM GRANULOCYTES # BLD AUTO: 0.05 K/UL — SIGNIFICANT CHANGE UP (ref 0–0.07)
IMM GRANULOCYTES NFR BLD AUTO: 0.6 % — SIGNIFICANT CHANGE UP (ref 0–0.9)
LYMPHOCYTES # BLD AUTO: 0.94 K/UL — LOW (ref 1–3.3)
LYMPHOCYTES NFR BLD AUTO: 10.8 % — LOW (ref 13–44)
MAGNESIUM SERPL-MCNC: 1.7 MG/DL — SIGNIFICANT CHANGE UP (ref 1.6–2.6)
MCHC RBC-ENTMCNC: 30.9 PG — SIGNIFICANT CHANGE UP (ref 27–34)
MCHC RBC-ENTMCNC: 33.4 G/DL — SIGNIFICANT CHANGE UP (ref 32–36)
MCV RBC AUTO: 92.5 FL — SIGNIFICANT CHANGE UP (ref 80–100)
MONOCYTES # BLD AUTO: 0.48 K/UL — SIGNIFICANT CHANGE UP (ref 0–0.9)
MONOCYTES NFR BLD AUTO: 5.5 % — SIGNIFICANT CHANGE UP (ref 2–14)
NEUTROPHILS # BLD AUTO: 7.19 K/UL — SIGNIFICANT CHANGE UP (ref 1.8–7.4)
NEUTROPHILS NFR BLD AUTO: 82.7 % — HIGH (ref 43–77)
NRBC # BLD AUTO: 0 K/UL — SIGNIFICANT CHANGE UP (ref 0–0)
NRBC # FLD: 0 K/UL — SIGNIFICANT CHANGE UP (ref 0–0)
NRBC BLD AUTO-RTO: 0 /100 WBCS — SIGNIFICANT CHANGE UP (ref 0–0)
PHOSPHATE SERPL-MCNC: 3 MG/DL — SIGNIFICANT CHANGE UP (ref 2.5–4.5)
PLATELET # BLD AUTO: 224 K/UL — SIGNIFICANT CHANGE UP (ref 150–400)
PMV BLD: 11.9 FL — SIGNIFICANT CHANGE UP (ref 7–13)
POTASSIUM SERPL-MCNC: 3.5 MMOL/L — SIGNIFICANT CHANGE UP (ref 3.5–5.3)
POTASSIUM SERPL-SCNC: 3.5 MMOL/L — SIGNIFICANT CHANGE UP (ref 3.5–5.3)
RBC # BLD: 3.59 M/UL — LOW (ref 3.8–5.2)
RBC # FLD: 13.3 % — SIGNIFICANT CHANGE UP (ref 10.3–14.5)
SODIUM SERPL-SCNC: 142 MMOL/L — SIGNIFICANT CHANGE UP (ref 135–145)
WBC # BLD: 8.7 K/UL — SIGNIFICANT CHANGE UP (ref 3.8–10.5)
WBC # FLD AUTO: 8.7 K/UL — SIGNIFICANT CHANGE UP (ref 3.8–10.5)

## 2025-07-14 PROCEDURE — 99497 ADVNCD CARE PLAN 30 MIN: CPT | Mod: 25

## 2025-07-14 PROCEDURE — 99233 SBSQ HOSP IP/OBS HIGH 50: CPT

## 2025-07-14 PROCEDURE — 99223 1ST HOSP IP/OBS HIGH 75: CPT

## 2025-07-14 RX ORDER — GLYCOPYRROLATE 0.2 MG/ML
0.4 INJECTION INTRAMUSCULAR; INTRAVENOUS
Refills: 0 | Status: DISCONTINUED | OUTPATIENT
Start: 2025-07-14 | End: 2025-07-16

## 2025-07-14 RX ORDER — BISACODYL 5 MG
10 TABLET, DELAYED RELEASE (ENTERIC COATED) ORAL DAILY
Refills: 0 | Status: DISCONTINUED | OUTPATIENT
Start: 2025-07-14 | End: 2025-07-16

## 2025-07-14 RX ORDER — DIAZEPAM 5 MG/1
2 TABLET ORAL EVERY 6 HOURS
Refills: 0 | Status: DISCONTINUED | OUTPATIENT
Start: 2025-07-14 | End: 2025-07-16

## 2025-07-14 RX ORDER — LORAZEPAM 4 MG/ML
0.5 VIAL (ML) INJECTION EVERY 4 HOURS
Refills: 0 | Status: DISCONTINUED | OUTPATIENT
Start: 2025-07-14 | End: 2025-07-14

## 2025-07-14 RX ORDER — HYDROMORPHONE/SOD CHLOR,ISO/PF 2 MG/10 ML
0.5 SYRINGE (ML) INJECTION
Refills: 0 | Status: DISCONTINUED | OUTPATIENT
Start: 2025-07-14 | End: 2025-07-16

## 2025-07-14 RX ORDER — HYDROMORPHONE/SOD CHLOR,ISO/PF 2 MG/10 ML
0.5 SYRINGE (ML) INJECTION ONCE
Refills: 0 | Status: DISCONTINUED | OUTPATIENT
Start: 2025-07-14 | End: 2025-07-14

## 2025-07-14 RX ADMIN — MEMANTINE HYDROCHLORIDE 10 MILLIGRAM(S): 21 CAPSULE, EXTENDED RELEASE ORAL at 05:12

## 2025-07-14 RX ADMIN — Medication 0.5 MILLIGRAM(S): at 17:49

## 2025-07-14 RX ADMIN — Medication 25 GRAM(S): at 13:52

## 2025-07-14 RX ADMIN — Medication 0.5 MILLIGRAM(S): at 11:44

## 2025-07-14 RX ADMIN — ENOXAPARIN SODIUM 40 MILLIGRAM(S): 100 INJECTION SUBCUTANEOUS at 05:12

## 2025-07-14 RX ADMIN — Medication 25 GRAM(S): at 22:03

## 2025-07-14 RX ADMIN — SODIUM CHLORIDE 60 MILLILITER(S): 9 INJECTION, SOLUTION INTRAVENOUS at 10:00

## 2025-07-14 RX ADMIN — Medication 250 MILLIGRAM(S): at 12:23

## 2025-07-14 RX ADMIN — Medication 10 MILLIGRAM(S): at 11:45

## 2025-07-14 RX ADMIN — Medication 0.5 MILLIGRAM(S): at 17:58

## 2025-07-14 RX ADMIN — SCOPOLAMINE 1 PATCH: 1 PATCH, EXTENDED RELEASE TRANSDERMAL at 17:02

## 2025-07-14 RX ADMIN — Medication 0.5 MILLIGRAM(S): at 12:00

## 2025-07-14 RX ADMIN — SCOPOLAMINE 1 PATCH: 1 PATCH, EXTENDED RELEASE TRANSDERMAL at 08:42

## 2025-07-14 RX ADMIN — DIAZEPAM 2 MILLIGRAM(S): 5 TABLET ORAL at 20:57

## 2025-07-14 RX ADMIN — Medication 25 GRAM(S): at 05:11

## 2025-07-14 RX ADMIN — GLYCOPYRROLATE 0.4 MILLIGRAM(S): 0.2 INJECTION INTRAMUSCULAR; INTRAVENOUS at 17:49

## 2025-07-14 RX ADMIN — Medication 1 APPLICATION(S): at 11:46

## 2025-07-14 NOTE — CONSULT NOTE ADULT - PROBLEM SELECTOR RECOMMENDATION 10
Case reviewed with primary team   Thank you for allowing us to participate in your patient's care. Please page [phone] for any questions/concerns.

## 2025-07-14 NOTE — PROGRESS NOTE ADULT - ASSESSMENT
70F with history of Dementia (AAO x 0, now nonverbal), R Breast Cancer s/p chemo/lumpectomy/RT (2016, in remission since), DM2 (currently off medications), HTN, and HLD, s/p PEG placement in May 2025 presenting with AHRF with multifocal PNA concerning for aspiration PNA.    #Multifocal PNA  #Aspiration pneumonia  - Aggressive pulmonary toilet- marked secretions orally, appear as tube feeds  - hold further feeds at this time  - consider enema for signficant stool burden  - c/w zosyn for now  - Obtain urine strep, legionella ag  - Aspiration precautions with HOB elevation above 45 degrees   - O2 supplementation with goal >92% .   - DNR/ DNI. Prognosis guarded

## 2025-07-14 NOTE — CONSULT NOTE ADULT - NS ATTEST RISK PROBLEM GEN_ALL_CORE FT
1. Number and complexity of problems addressed for this patient:    1.1 Moderate (At least 1)  [ ] 1 or more chronic illnesses with exacerbation, progression, or side effects of treatment  [ ] 2 or more stable chronic illnesses  [ ] 1 undiagnosed new problem with uncertain prognosis  [ ] 1 acute illness with systemic symptoms  [ ] 1 acute complicated injury  1.2 High (At least 1)   [ ] 1 or more chronic illnesses with severe exacerbation, progression, or side effects of treatment  [ x] 1 acute or chronic illnesses or injuries that may pose a threat to life or bodily function    2. Amount and/or Complexity of Data that was Reviewed and Analyzed for this case:       Moderate (1 out of 3)       High (2 out of 3)  2.1. (Any combination of 3 of the following)   [ x] Prior External notes were reviewed  [ x] Each test result was reviewed (see "LABS" and "RADIOLOGY & ADDITIONAL STUDIES" above)  [ ] The following tests were ordered and/or reviewed (Only count 1 point for ordering or reviewing a unique test):  	[ ]CBC  	[ ] Chemistry   	[ ] Imaging   	[ ] Other:   [ x] Assessment requiring an independent historian   		Name of historian and relationship: Family , Primary Team  2.2  [ ] Personally review and interpretation of  image or testing   2.3  [ x] Discussion of management or test interpretation with external physician/other qualified health care professional\appropriate source (not separately reported)    3. Risk of Complications and/or Morbidity or Mortality of for this Patient’s Management:  3.1 Moderate risk of morbidity from additional diagnostic testing or treatment (At least 1):   [ ] Prescription drug management   [ ] Decision regarding minor surgery, treatment, or procedure with identified patient or procedure risk factors  [ ] Decision regarding elective major surgery, treatment, or procedure without identified patient or procedure risk factors   [ ] Diagnosis or treatment significantly limited by social determinants of health   [ ] Other:   3.2 High risk of morbidity from additional diagnostic testing or treatment (At least 1):   [ ] Drug therapy requiring intensive monitoring for toxicity   [ ] Decision regarding elective major surgery, treatment, or procedure with identified patient or procedure risk factors   [ ] Decision regarding emergency major surgery, treatment, or procedure   [ ] Decision regarding hospitalization or escalation of hospital-level of care  [ ] Decision not to resuscitate, not to intubate, or to de-escalate care because of poor prognosis   [ ] Decision to proceed or not with artificial nutrition   [ x] Parenteral controlled substance  [ ] Other:

## 2025-07-14 NOTE — CONSULT NOTE ADULT - RESPIRATORY DISTRESS OBSERVATION: HEART RATE
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 Patient is now s/p procedure, with c/o abdominal pain controlled on current pain regiment, also c/o Right shoulder pain. Pt on tele monitoring due to blood loss intra op with post op normocytic anemia. Patient with sinus tachycardia, mildly low BPs. Pt denies chest pain / palpiatations / fevers / chills / N /V.   Pain management to continue  IV Iron for normocytic anemia   advance diet as tolerated  IVF resuscitation  monitor tachycardia closely, sinus tachy on tele, possibly secondary to pain, monitor Hb closely as well transfuse PRN for hb < 7 g/dl, or if anemia symptomatic   s/p 1 U PRBC 11/17  Less than 90 beats

## 2025-07-14 NOTE — PROGRESS NOTE ADULT - ASSESSMENT
70F with history of Dementia (AAO x 0, now nonverbal), R Breast Cancer s/p chemo/lumpectomy/RT (2016, in remission since), DM2 (currently off medications), HTN, and HLD, s/p PEG placement in May 2025 presenting for increased O2 requirements and agitation. Found to have multifocal PNA on chest imaging     Sepsis 2/2 PNA  AHRF on NC  - tachypnea, tachycardia and leukocytosis on admission  - CT showing multifocal PNA and fecal burden  - MRSA PCR negative  - blood cultures NGTD  - urine legionella Ag and strep pneumoniae urine Ag negative  - urine cx- no growth    Recommendations:   C/w zosyn, day 3  - plan for 5-7 day course of antibiotics  discontinue azithromycin given above and likely aspiration PNA  Trend temps/ WBC    Garcia Kearney M.D.  [facility]  [phone]  After 5pm on weekdays and all day on weekends - please call [phone]  Available on [employer] teams    Thank you for consulting us and involving us in the management of this patients case. In addition to reviewing history, imaging, documents, labs, microbiology, took into account antibiotic stewardship, local antibiogram and infection control strategies and potential transmission issues at time of treatment decision making process.  70F with history of Dementia (AAO x 0, now nonverbal), R Breast Cancer s/p chemo/lumpectomy/RT (2016, in remission since), DM2 (currently off medications), HTN, and HLD, s/p PEG placement in May 2025 presenting for increased O2 requirements and agitation. Found to have multifocal PNA on chest imaging     Sepsis 2/2 PNA  AHRF on NC  - tachypnea, tachycardia and leukocytosis on admission  - CT showing multifocal PNA and fecal burden  - MRSA PCR negative  - blood cultures NGTD  - urine legionella Ag and strep pneumoniae urine Ag negative  - urine cx- no growth    Recommendations:   C/w zosyn, day 3  - plan for 5-7 day course of antibiotics if consistent w/ GOC  discontinue azithromycin given above and likely aspiration PNA  Trend temps/ WBC    Garcia Kearney M.D.  [facility]  [phone]  After 5pm on weekdays and all day on weekends - please call [phone]  Available on [employer] teams    Thank you for consulting us and involving us in the management of this patients case. In addition to reviewing history, imaging, documents, labs, microbiology, took into account antibiotic stewardship, local antibiogram and infection control strategies and potential transmission issues at time of treatment decision making process.

## 2025-07-14 NOTE — PROGRESS NOTE ADULT - NSPROGADDITIONALINFOA_GEN_ALL_CORE
Not tolerating tube feeds, now on hold.   per GI, PEG functioning.  Abd xray still with stool. bowel regimen and suppository ordered.   ILYA Gandhi had GOC, family making her comfort care.  hospice referral in progress.   d/w ILYA Gandhi.   d/w ID.     - Dr. RENETTA Monreal ([facility])  - [phone] Not tolerating tube feeds, now on hold.   per GI, PEG functioning.  Abd xray still with stool. bowel regimen and suppository ordered.   NP Oksana had GOC, family making her comfort care.  hospice referral in progress.   d/w NP Oksana.   d/w ID.     Addendum: d/w the daughter Radha at bedside. confirmed her wish for comfort care.   Hospice planning. DNR/DNI. all questions answered.     - Dr. RENETTA Monreal ([facility])  - [phone]

## 2025-07-14 NOTE — CONSULT NOTE ADULT - PROBLEM SELECTOR RECOMMENDATION 2
- CT 7/12 - Multifocal pneumonia.   - on zosyn and azithromycin  - family wish to continue with antibiotics

## 2025-07-14 NOTE — PROGRESS NOTE ADULT - PROBLEM SELECTOR PLAN 7
- DVT ppx: Lovenox 40mg QD  - Diet: NPO  - Dispo: TBD, most likely back to nursing home vs. hospice.     # Constipation   large stool burden noted on CT abd  start Senna, Miralax. Fleet enema.  rectal suppository   monitor for BM

## 2025-07-14 NOTE — CONSULT NOTE ADULT - PROBLEM SELECTOR RECOMMENDATION 9
Patient with advanced dementia. Fast 7C  Supportive Care - Radha states patient is DNR/DNI. She shares that at this time family wishes to focus on patient's comfort with Comfort Measures Only with no further diagnostic workup, including labs and glucose fingersticks. She wishes to continue with antibiotics for now. She is amenable to initiation of symptom medications including dilaudid for pain/dyspnea. She states family interested in hospice.   DNR/DNI. Comfort Measures Only.  see GOC note  16 minutes spent on advanced care planning/ goals of care

## 2025-07-14 NOTE — CONSULT NOTE ADULT - SUBJECTIVE AND OBJECTIVE BOX
[facility]  JOSHUA Haley S. Shah, Y. Patel, G. [facility]  [phone]    LINH STEVEN  70y, Female  [id number]    HPI--  HPI:  Linh Steven is a 70F with history of Dementia (AAO x 0, now nonverbal), R Breast Cancer s/p chemo/lumpectomy/RT (, in remission since), DM2 (currently off medications), HTN, and HLD, s/p PEG placement in May 2025 presenting for increased O2 requirements and agitation. Per the daughter who was bedside, she visited her mother yesterday at the nursing home when she noticed that the Pt was placed on NC 2L and Pt was agitated. Her oxygen requirements kept increasing until she asked for the patient to be transferred to the hospital.    In the ED: Pt hypoxic to 80s requiring 6L NC and tachypneic to 22 with . WBC 14k. Given vanc and zosyn in the ED. 1/2 normal saline given for Na 146. CT angio and abd pelvis completed.    Chart review: 2025 admitted for aspiration episode and given zosyn and switched to oral cefuroxime. (2025 05:31)    Pt seen at bedside  Hx as above    Active Medications--  acetaminophen     Tablet .. 650 milliGRAM(s) Oral every 6 hours PRN  azithromycin  IVPB      cholecalciferol 1000 Unit(s) Oral daily  dextrose 5%. 1000 milliLiter(s) IV Continuous <Continuous>  dextrose 5%. 1000 milliLiter(s) IV Continuous <Continuous>  dextrose 50% Injectable 25 Gram(s) IV Push once  dextrose 50% Injectable 12.5 Gram(s) IV Push once  dextrose 50% Injectable 25 Gram(s) IV Push once  enoxaparin Injectable 40 milliGRAM(s) SubCutaneous every 24 hours  escitalopram 10 milliGRAM(s) Oral daily  glucagon  Injectable 1 milliGRAM(s) IntraMuscular once  letrozole 2.5 milliGRAM(s) Oral daily  melatonin 6 milliGRAM(s) Oral at bedtime  memantine 10 milliGRAM(s) Oral two times a day  mirtazapine 15 milliGRAM(s) Oral at bedtime  OLANZapine 5 milliGRAM(s) Oral daily  piperacillin/tazobactam IVPB.. 3.375 Gram(s) IV Intermittent every 8 hours  polyethylene glycol 3350 17 Gram(s) Oral two times a day  rosuvastatin 5 milliGRAM(s) Oral at bedtime  scopolamine 1 mG/72 Hr(s) Patch 1 Patch Transdermal every 72 hours  senna 2 Tablet(s) Oral at bedtime  sodium chloride 0.45%. 1000 milliLiter(s) IV Continuous <Continuous>  traZODone 50 milliGRAM(s) Oral at bedtime    Antimicrobials:   azithromycin  IVPB      piperacillin/tazobactam IVPB.. 3.375 Gram(s) IV Intermittent every 8 hours    Immunologic:     ROS:  unable to obtain    Allergies: No Known Allergies    PMH -- Breast cancer    Zoster ophthalmicus    HLD (hyperlipidemia)    Prediabetes    Dementia    Diabetes type 2    CVA (cerebrovascular accident)      PSH -- No significant past surgical history    S/P lumpectomy, right breast    S/P percutaneous endoscopic gastrostomy (PEG) tube placement      FH -- FH: dementia (Mother)      Social History --  EtOH: denies   Tobacco: denies   Drug Use: denies     Travel/Environmental/Occupational History:    Physical Exam--  Vital Signs Last 24 Hrs  T(F): 98.2 (2025 13:10), Max: 100.4 (2025 21:17)  HR: 84 (2025 13:10) (72 - 115)  BP: 95/46 (2025 13:10) (90/57 - 120/63)  RR: 18 (2025 13:10) (15 - 22)  SpO2: 100% (2025 13:10) (96% - 100%)  General: NAD  HEENT: NC/AT, EOMI  Lungs: decreased breath sounds  Heart: Regular rate and rhythm.   Abdomen: Soft. Nondistended. Nontender.   Extremities: No cyanosis or clubbing. No edema.   Skin: Warm. Dry. Good turgor. No rash.     Laboratory & Imaging Data:  CBC:                       10.9   11.22 )-----------( 200      ( 2025 07:45 )             34.5     CMP: -    145  |  107  |  29[H]  ----------------------------<  120[H]  3.3[L]   |  28  |  0.43[L]    Ca    9.0      2025 07:45  Phos  2.1     07-12  Mg     2.30     07-12    TPro  6.8  /  Alb  3.1[L]  /  TBili  0.5  /  DBili  x   /  AST  30  /  ALT  21  /  AlkPhos  61  07-12    LIVER FUNCTIONS - ( 2025 07:45 )  Alb: 3.1 g/dL / Pro: 6.8 g/dL / ALK PHOS: 61 U/L / ALT: 21 U/L / AST: 30 U/L / GGT: x           Urinalysis Basic - ( 2025 08:35 )    Color: Dark Yellow / Appearance: Clear / S.061 / pH: x  Gluc: x / Ketone: x  / Bili: Small / Urobili: 1.0 mg/dL   Blood: x / Protein: 100 mg/dL / Nitrite: Negative   Leuk Esterase: Trace / RBC: 3-6 /HPF / WBC 0-3 /HPF   Sq Epi: x / Non Sq Epi: x / Bacteria: Few /HPF        Microbiology: reviewed        Radiology: reviewed    < from: CT Abdomen and Pelvis w/ IV Cont (25 @ 02:54) >    ACC: [id number] EXAM:  CT ABDOMEN AND PELVIS IC   ORDERED BY: LAKE ROWLAND     ACC: [id number] EXAM:  CT ANGIO CHEST PULM ART [facility]   ORDERED BY: LAKE ORWLAND     PROCEDURE DATE:  2025          INTERPRETATION:  CLINICAL INFORMATION: Hypoxia, fever Vomiting, sepsis,   evaluate for pneumonia    COMPARISON: CT of 2025.    CONTRAST/COMPLICATIONS:  IV Contrast: Omnipaque 350  90 cc administered   10 cc discarded  Oral Contrast: NONE.    PROCEDURE:  CT of the Chest, Abdomen and Pelvis was performed.  Sagittal and coronal reformats were performed.    FINDINGS:  CHEST:  LUNGS AND LARGE AIRWAYS: Patent central airways. Patchy bilateral   airspace opacities predominantly involving the bilateral lower lobes   compatible with multifocal pneumonia.    PLEURA: No pleural effusion.  VESSELS: Pulmonary arterial opacification is adequate. No pulmonary   embolism. Atherosclerotic calcification of the thoracic aorta and   coronary arteries.    HEART: Heart size is normal. No pericardial effusion.  MEDIASTINUM AND NOAH: No lymphadenopathy.  CHEST WALL AND LOWER NECK: Within normal limits.    ABDOMEN AND PELVIS:  LIVER: Within normal limits.  BILE DUCTS: Normal caliber.  GALLBLADDER: Within normal limits.  SPLEEN: Within normal limits.  PANCREAS: Within normal limits.  ADRENALS: Within normal limits.  KIDNEYS/URETERS: The kidneys demonstrate symmetric enhancement without   hydronephrosis. Bilateral renal cysts.    BLADDER: Mildly distended.  REPRODUCTIVE ORGANS: Hysterectomy.    BOWEL: Gastrostomy tube in place. No bowel obstruction. Appendix is not   visualized. Moderate fecal load.  PERITONEUM/RETROPERITONEUM: Within normal limits.  VESSELS: Within normal limits.  LYMPH NODES: No lymphadenopathy.  ABDOMINAL WALL: Within normal limits.  BONES: Chronic degenerative changes of the spine.    IMPRESSION:    Multifocal pneumonia. Recommend follow-up to resolution.    Moderate fecal load.  Gastrostomy tube in place.    --- End of Report ---            VINCENT OSUNA MD; Attending Radiologist  This document has been electronically signed. 2025  3:18AM    < end of copied text >  
  HPI:     70F with history of Dementia (AAO x 0, now nonverbal), R Breast Cancer s/p chemo/lumpectomy/RT (2016, in remission since), DM2 (currently off medications), HTN, and HLD, s/p PEG placement in May 2025 presenting for increased O2 requirements and agitation. Patient was at NH where she required to be placed on 2L NC with associated agitation, with progressive hypoxemia requiring 6L. In ER confirmed hypoxemic, started on Vanc and zosyn.   Labs with leucocytosis, with negative RVP, with ProBNP elevation. VBG largely unremarkable.   CT chest with multifocal consolidation in posterior/dependant zones predominantly.       PAST MEDICAL & SURGICAL HISTORY:  Breast cancer  s.p chemotherapy      Zoster ophthalmicus      HLD (hyperlipidemia)      Dementia      Diabetes type 2      CVA (cerebrovascular accident)      S/P lumpectomy, right breast      S/P percutaneous endoscopic gastrostomy (PEG) tube placement          FAMILY HISTORY:  FH: dementia (Mother)        SOCIAL HISTORY:  Smoking: [ ] Never Smoked [ ] Former Smoker (__ packs x ___ years) [ ] Current Smoker  (__ packs x ___ years)  Substance Use: [ ] Never Used [ ] Used ____  EtOH Use:  Marital Status: [ ] Single [ ]  [ ]  [ ]   Sexual History:   Occupation:  Recent Travel:  Country of Birth:  Advance Directives:    Allergies    No Known Allergies    Intolerances        HOME MEDICATIONS:  Home Medications:  acetaminophen 325 mg oral tablet: 2 tab(s) orally every 6 hours as needed for  mild pain (2025 05:20)  amLODIPine 5 mg oral tablet: 1 tab(s) orally once a day (2025 05:18)  atropine 1% ophthalmic ointment: 1 application buccal 6 times a day (2025 05:18)  Calmoseptine 0.44%-20.6% topical ointment: Apply topically to affected area 2 times a day Apply to sacrum (2025 05:18)  cholecalciferol 25 mcg (1000 intl units) oral capsule: 1 cap(s) orally once a day (2025 05:09)  Crestor 5 mg oral tablet: 1 tab(s) orally once a day (2025 05:18)  escitalopram 10 mg oral tablet: 1 tab(s) orally once a day (2025 05:18)  letrozole 2.5 mg oral tablet: 1 tab(s) orally once a day (2025 05:11)  melatonin 5 mg oral tablet, extended release: 1 tab(s) orally once a day (at bedtime) (2025 05:18)  memantine 10 mg oral tablet: 1 tab(s) orally 2 times a day (2025 08:54)  OLANZapine 5 mg oral tablet: 1 tab(s) orally once a day (2025 05:18)  Remeron 15 mg oral tablet: 1 tab(s) orally once a day (at bedtime) (2025 05:18)  scopolamine 1 mg/72 hr transdermal film, extended release: 1 patch transdermally (2025 05:08)  selenium sulfide 1% topical shampoo: Apply topically to affected area 2 times a week On Monday and Friday (2025 05:18)  traZODone 50 mg oral tablet: orally once a day (at bedtime) (2025 05:12)      REVIEW OF SYSTEMS:  All systems negative except as documented above.    OBJECTIVE:  ICU Vital Signs Last 24 Hrs  T(C): 36.6 (2025 10:21), Max: 38 (2025 21:17)  T(F): 97.8 (2025 10:21), Max: 100.4 (2025 21:17)  HR: 75 (2025 10:21) (72 - 115)  BP: 107/63 (2025 10:21) (90/57 - 120/63)  BP(mean): 69 (2025 08:21) (69 - 69)  ABP: --  ABP(mean): --  RR: 20 (2025 10:21) (15 - 22)  SpO2: 100% (2025 10:21) (96% - 100%)    O2 Parameters below as of 2025 10:21  Patient On (Oxygen Delivery Method): nasal cannula  O2 Flow (L/min): 6            CAPILLARY BLOOD GLUCOSE      POCT Blood Glucose.: 81 mg/dL (2025 10:38)      PHYSICAL EXAM:  General: NAD  HEENT: EOMI, sclera anicteric  Neck: supple  Cardiovascular: RR  Respiratory: CTAB, no wheezes, crackles, or rhonci  Abdomen: soft  Extremities: warm and well perfused, no edema, no clubbing  Skin: no rashes  Neurological: AOx3, no focal deficits    HOSPITAL MEDICATIONS:  Standing Meds:  amLODIPine   Tablet 5 milliGRAM(s) Oral daily  azithromycin  IVPB 500 milliGRAM(s) IV Intermittent once  azithromycin  IVPB      cefTRIAXone   IVPB 1000 milliGRAM(s) IV Intermittent every 24 hours  cholecalciferol 1000 Unit(s) Oral daily  dextrose 5%. 1000 milliLiter(s) IV Continuous <Continuous>  dextrose 5%. 1000 milliLiter(s) IV Continuous <Continuous>  dextrose 50% Injectable 25 Gram(s) IV Push once  dextrose 50% Injectable 12.5 Gram(s) IV Push once  dextrose 50% Injectable 25 Gram(s) IV Push once  enoxaparin Injectable 40 milliGRAM(s) SubCutaneous every 24 hours  escitalopram 10 milliGRAM(s) Oral daily  glucagon  Injectable 1 milliGRAM(s) IntraMuscular once  letrozole 2.5 milliGRAM(s) Oral daily  melatonin 6 milliGRAM(s) Oral at bedtime  memantine 10 milliGRAM(s) Oral two times a day  mirtazapine 15 milliGRAM(s) Oral at bedtime  OLANZapine 5 milliGRAM(s) Oral daily  rosuvastatin 5 milliGRAM(s) Oral at bedtime  scopolamine 1 mG/72 Hr(s) Patch 1 Patch Transdermal every 72 hours  sodium chloride 0.45%. 1000 milliLiter(s) IV Continuous <Continuous>  traZODone 50 milliGRAM(s) Oral at bedtime      PRN Meds:  acetaminophen     Tablet .. 650 milliGRAM(s) Oral every 6 hours PRN      LABS:                        10.9   11.22 )-----------( 200      ( 2025 07:45 )             34.5     Hgb Trend: 10.9<--, 11.6<--  07-12    145  |  107  |  29[H]  ----------------------------<  120[H]  3.3[L]   |  28  |  0.43[L]    Ca    9.0      2025 07:45  Phos  2.1     07-12  Mg     2.30     07-12    TPro  6.8  /  Alb  3.1[L]  /  TBili  0.5  /  DBili  x   /  AST  30  /  ALT  21  /  AlkPhos  61  07-12    Creatinine Trend: 0.43<--, 0.43<--  PT/INR - ( 2025 21:53 )   PT: 13.6 sec;   INR: 1.14 ratio         PTT - ( 2025 21:53 )  PTT:29.5 sec  Urinalysis Basic - ( 2025 08:35 )    Color: Dark Yellow / Appearance: Clear / S.061 / pH: x  Gluc: x / Ketone: x  / Bili: Small / Urobili: 1.0 mg/dL   Blood: x / Protein: 100 mg/dL / Nitrite: Negative   Leuk Esterase: Trace / RBC: 3-6 /HPF / WBC 0-3 /HPF   Sq Epi: x / Non Sq Epi: x / Bacteria: Few /HPF        Venous Blood Gas:   @ 07:45  7.36/57/31/32/43.9  VBG Lactate: 1.9  Venous Blood Gas:   @ 21:53  7.48/43/44/32/74.4  VBG Lactate: 1.9      MICROBIOLOGY:       RADIOLOGY:  [x] Reviewed and interpreted by me  
Date of Service 07-14-25 @ 22:42    HPI:  Linh Pena is a 70F with history of Dementia (AAO x 0, now nonverbal), R Breast Cancer s/p chemo/lumpectomy/RT (2016, in remission since), DM2 (currently off medications), HTN, and HLD, s/p PEG placement in May 2025 presenting for increased O2 requirements and agitation. Per the daughter who was bedside, she visited her mother yesterday at the nursing home when she noticed that the Pt was placed on NC 2L and Pt was agitated. Her oxygen requirements kept increasing until she asked for the patient to be transferred to the hospital.    In the ED: Pt hypoxic to 80s requiring 6L NC and tachypneic to 22 with . WBC 14k. Given vanc and zosyn in the ED. 1/2 normal saline given for Na 146. CT angio and abd pelvis completed.    Chart review: April 2025 admitted for aspiration episode and given zosyn and switched to oral cefuroxime. (12 Jul 2025 05:31)    PERTINENT PM/SXH:   Breast cancer  Zoster ophthalmicus  HLD (hyperlipidemia)  Prediabetes  Dementia  Diabetes type 2  CVA (cerebrovascular accident)  No significant past surgical history  S/P lumpectomy, right breast  S/P percutaneous endoscopic gastrostomy (PEG) tube placement    FAMILY HISTORY:  FH: dementia (Mother)    ITEMS NOT CHECKED ARE NOT PRESENT    SOCIAL HISTORY:   Significant other/partner[ ]  Children[x ]  Baptism/Spirituality:  Substance hx:  [ ]   Tobacco hx:  [ ]   Alcohol hx: [ ]   Home Opioid hx:  [ ] I-Stop Reference No: [id number]  Prescription Information      PDI Filter:    PDI	Current Rx	Drug Type	Rx Written	Rx Dispensed	Drug	Quantity	Days Supply	Prescriber Name	Prescriber FRANK #	Payment Method	Dispenser  A	N	B	08/27/2024	08/27/2024	lorazepam 0.5 mg tablet	28	7	Dr Leslie Echeverria R, Dnp	[id number]	C[facility]    Living Situation: [ ]Home  [x ]Long term care  [ ]Rehab [ ]Other    ADVANCE DIRECTIVES:    MOLST  [ x]  Living Will  [ ]   DECISION MAKER(s):  [ ] Health Care Proxy(s)  [ x] Surrogate(s)  [ ] Guardian           Name(s): Phone Number(s):  Daughter Radha Corrales: [phone]      BASELINE (I)ADL(s) (prior to admission):  [place]: [ ]Total  [ ] Moderate [ x]Dependent    Allergies    No Known Allergies    Intolerances    MEDICATIONS  (STANDING):  azithromycin  IVPB 500 milliGRAM(s) IV Intermittent every 24 hours  azithromycin  IVPB      bisacodyl Suppository 10 milliGRAM(s) Rectal daily  chlorhexidine 2% Cloths 1 Application(s) Topical daily  dextrose 5% + sodium chloride 0.45%. 1000 milliLiter(s) (60 mL/Hr) IV Continuous <Continuous>  dextrose 5%. 1000 milliLiter(s) (100 mL/Hr) IV Continuous <Continuous>  dextrose 5%. 1000 milliLiter(s) (50 mL/Hr) IV Continuous <Continuous>  dextrose 50% Injectable 25 Gram(s) IV Push once  dextrose 50% Injectable 12.5 Gram(s) IV Push once  dextrose 50% Injectable 25 Gram(s) IV Push once  escitalopram 10 milliGRAM(s) Oral daily  glucagon  Injectable 1 milliGRAM(s) IntraMuscular once  OLANZapine 5 milliGRAM(s) Oral daily  piperacillin/tazobactam IVPB.. 3.375 Gram(s) IV Intermittent every 8 hours  polyethylene glycol 3350 17 Gram(s) Oral two times a day  scopolamine 1 mG/72 Hr(s) Patch 1 Patch Transdermal every 72 hours  senna 2 Tablet(s) Oral at bedtime  traZODone 50 milliGRAM(s) Oral at bedtime    MEDICATIONS  (PRN):  acetaminophen     Tablet .. 650 milliGRAM(s) Oral every 6 hours PRN Mild Pain (1 - 3), Moderate Pain (4 - 6)  diazepam  Injectable 2 milliGRAM(s) IV Push every 6 hours PRN terminal restlessness/agitation  glycopyrrolate Injectable 0.4 milliGRAM(s) IV Push four times a day PRN Secretions  HYDROmorphone  Injectable 0.5 milliGRAM(s) IV Push every 2 hours PRN Moderate pain (4-6), Severe pain (7-10), Respiratory rate greater than 22        ITEMS UNCHECKED ARE NOT PRESENT     PRESENT SYMPTOMS: [x ]Unable to self-report due to altered mental status  [ ] CPOT [x ] PAINADs [ x] RDOS  Source if other than patient:  [ ]Family   [ ]Team     Pain: [ ]yes [ ]no  QOL impact -   Location -                    Aggravating factors -  Quality -  Radiation -  Timing-  Severity (0-10 scale):  Minimal acceptable level / Pain goal (0-10 scale):     CPOT:    https://www.[facility].org/getattachment/[email or web address]/Critical-Care-Pain-Observation-Tool-(CPOT)    Dyspnea:                           [ ]Mild [ ]Moderate [ ]Severe  Anxiety:                             [ ]Mild [ ]Moderate [ ]Severe  Agitation:                          [ ]Mild [ ]Moderate [ ]Severe  Fatigue:                             [ ]Mild [ ]Moderate [ ]Severe  Nausea:                             [ ]Mild [ ]Moderate [ ]Severe  Loss of appetite:              [ ]Mild [ ]Moderate [ ]Severe  Constipation:                   [ ]Mild [ ]Moderate [ ]Severe  Diarrhea:                          [ ]Mild [ ]Moderate [ ]Severe      PCSSQ[Palliative Care Spiritual Screening Question]   Severity (0-10):  Score of 4 or > indicate consideration of Chaplaincy referral.  Chaplaincy Referral: [ ] yes [ ] refused [ ] following [ x] deferred    Caregiver [place]? : [ ] yes [ ] no [ ] Declined   [x ] Deferred            Social work referral [ ] Patient & Family Centered Care Referral [ ]     Anticipatory Grief present?:  [ ] yes [ ] no  [x ] Deferred                  Social work referral [ ] Chaplaincy Referral[ ] Caregiver Support Referral [ ]     Other Symptoms:  [ ]All other review of systems negative   [ x] Unable to obtain due to poor mentation     PHYSICAL EXAM:  Vital Signs Last 24 Hrs  T(C): 36.5 (14 Jul 2025 09:55), Max: 36.5 (14 Jul 2025 09:55)  T(F): 97.7 (14 Jul 2025 09:55), Max: 97.7 (14 Jul 2025 09:55)  HR: 100 (14 Jul 2025 09:55) (60 - 100)  BP: 102/74 (14 Jul 2025 09:55) (102/74 - 142/54)  BP(mean): --  RR: 18 (14 Jul 2025 09:55) (18 - 18)  SpO2: 100% (14 Jul 2025 09:55) (100% - 100%)    Parameters below as of 14 Jul 2025 09:55  Patient On (Oxygen Delivery Method): nasal cannula         I&O's Summary    13 Jul 2025 07:01  -  14 Jul 2025 07:00  --------------------------------------------------------  IN: 1600 mL / OUT: 1100 mL / NET: 500 mL    14 Jul 2025 07:01  -  14 Jul 2025 22:42  --------------------------------------------------------  IN: 1070 mL / OUT: 900 mL / NET: 170 mL        GENERAL:  [ ]Alert  [ ]Oriented x   [x ]Lethargic  [ ]Cachexia  [ ]Unarousable  [ ]Verbal  [x ]Non-Verbal  [x ] No Distress  Behavioral:   [ ] Anxiety  [ ] Delirium [ ] Agitation [ ] Calm  [ x] Other-encephalopathy  HEENT:  [ ]Normal  [ ] Temporal Wasting  [x ]Dry mouth   [ ]ET Tube/Trach  [ ]Oral lesions  [ ] Mucositis  PULMONARY: normal respiratory effort, no accessory muscle use  [ ]Clear [ ]Tachypnea  [ ]Audible excessive secretions   [ ]Rhonchi        [ ]Right [ ]Left [ ]Bilateral  [ ]Crackles        [ ]Right [ ]Left [ ]Bilateral  [ ]Wheezing     [ ]Right [ ]Left [ ]Bilateral  [ ]Diminished breath sounds [ ]right [ ]left [ ]bilateral  CARDIOVASCULAR:    [x ]Regular [ ]Irregular [ ]Tachy  [ ]Bakari [ ]Murmur [ ]Other  GASTROINTESTINAL:  [ x]Soft  [ ]Distended   [ ]+BS  [x ]Non tender [ ]Tender  [x ]PEG [ ]OGT/ NGT  Last BM: 7/13  GENITOURINARY:  [ ]Normal [ x] Incontinent   [ ]Oliguria/Anuria   [ ]Newell  MUSCULOSKELETAL:   [ ]Normal   [ ]Weakness  [x ]Bed/Wheelchair bound [ ]Edema  [  ] amputation  [  ] contraction  NEUROLOGIC:   [ ]No focal deficits  [x ]Cognitive impairment  [ x]Dysphagia [ ]Dysarthria [ ]Paresis [ ]Other   SKIN: See Nursing Skin Assessment for further details  [ ]Normal    [ ]Rash  [ ]Pressure ulcer(s)       Present on admission [ ]y [ ]n   [  ]  Wound    [  ] hyperpigmentation    CRITICAL CARE:  [ ] Shock Present  [ ]Septic [ ]Cardiogenic [ ]Neurologic [ ]Hypovolemic  [ ]  Vasopressors [ ]  Inotropes   [ ]Respiratory failure present [ ]Mechanical ventilation [ ]Non-invasive ventilatory support [ ]High flow    [ ]Acute  [ ]Chronic [ ]Hypoxic  [ ]Hypercarbic [ ]Other  [ ]Other organ failure     LABS:  reviewed                         11.1   8.70  )-----------( 224      ( 14 Jul 2025 09:00 )             33.2   07-14    142  |  101  |  6[L]  ----------------------------<  96  3.5   |  28  |  0.31[L]    Ca    8.9      14 Jul 2025 09:00  Phos  3.0     07-14  Mg     1.70     07-14      Urinalysis Basic - ( 14 Jul 2025 09:00 )    Color: x / Appearance: x / SG: x / pH: x  Gluc: 96 mg/dL / Ketone: x  / Bili: x / Urobili: x   Blood: x / Protein: x / Nitrite: x   Leuk Esterase: x / RBC: x / WBC x   Sq Epi: x / Non Sq Epi: x / Bacteria: x      CAPILLARY BLOOD GLUCOSE      POCT Blood Glucose.: 84 mg/dL (13 Jul 2025 23:35)      RADIOLOGY & ADDITIONAL STUDIES: reviewed     PROTEIN CALORIE MALNUTRITION PRESENT: [ ]mild [ ]moderate [x ]severe [ ]underweight [ ]morbid obesity  https://www.andeal.org/vault/6190/web/files/ONC/Table_Clinical%20Characteristics%20to%20Document%20Malnutrition-White%20JV%20et%20al%202012.pdf    Height (cm): 162.6 (07-11-25 @ 20:25), 162.6 (04-07-25 @ 05:01), 160 (08-26-24 @ 11:37)  Weight (kg): 54.4 (07-11-25 @ 20:25), 44.452 (04-07-25 @ 00:16), 54.5 (08-06-24 @ 16:36)  BMI (kg/m2): 20.6 (07-11-25 @ 20:25), 16.8 (04-07-25 @ 05:01), 17.4 (04-07-25 @ 00:16)    [ ]PPSV2 < or = to 30% [ ]significant weight loss  [ ]poor nutritional intake  [ ]anasarca [ ]Artificial Nutrition      REFERRALS:   [ ]Chaplaincy  [ ]Hospice  [ ]Child Life  [x ]Social Work  [ ]Case management [ ]Holistic Therapy

## 2025-07-14 NOTE — CONSULT NOTE ADULT - CONVERSATION DETAILS
Met with daughter Radha at bedside who is familiar with geriatrics/ palliative care team. Radha shares that since our last encounter patient had PEG placed with hopes that it would improve patient's nutritional intake as patient had dysphagia with poor PO intake. However, she shares that despite PEG placement, patient's weight did not improve. Furthermore, she is aware that at this time patient is not tolerating tube feeds with regurgitation of the tube feeds that needed to be suctioned.     Radha states patient is DNR/DNI. She shares that at this time family wishes to focus on patient's comfort with Comfort Measures Only with no further diagnostic workup, including labs and glucose fingersticks. She wishes to continue with antibiotics for now. She is amenable to initiation of symptom medications including dilaudid for pain/dyspnea.    Radha shares that her other sibling who lives locally had visited earlier and other sibling who resides in [place] plans to fly in tomorrow . She shares entire family is all in agreement with comfort measures only plan given her overall poor prognosis at this time. She states family interested in hospice.     Emotional support provided

## 2025-07-14 NOTE — CONSULT NOTE ADULT - ASSESSMENT
70F with history of Dementia (AAO x 0, now nonverbal), R Breast Cancer s/p chemo/lumpectomy/RT (2016, in remission since), DM2 (currently off medications), HTN, and HLD, s/p PEG placement in May 2025 presenting for increased O2 requirements and agitation. Found to have multifocal PNA on chest imaging   Palliative Care consulted for complex decision making  related to goals of care discussions in the setting of advanced illness/ evaluation and management of pain/ symptoms

## 2025-07-15 PROCEDURE — 99233 SBSQ HOSP IP/OBS HIGH 50: CPT

## 2025-07-15 PROCEDURE — G0316 PROLONG INPT EVAL ADD15 M: CPT

## 2025-07-15 RX ORDER — MUPIROCIN CALCIUM 20 MG/G
1 CREAM TOPICAL
Refills: 0 | Status: DISCONTINUED | OUTPATIENT
Start: 2025-07-15 | End: 2025-07-16

## 2025-07-15 RX ADMIN — ESCITALOPRAM OXALATE 10 MILLIGRAM(S): 20 TABLET ORAL at 11:37

## 2025-07-15 RX ADMIN — Medication 10 MILLIGRAM(S): at 11:38

## 2025-07-15 RX ADMIN — DIAZEPAM 2 MILLIGRAM(S): 5 TABLET ORAL at 12:44

## 2025-07-15 RX ADMIN — Medication 0.5 MILLIGRAM(S): at 02:26

## 2025-07-15 RX ADMIN — DIAZEPAM 2 MILLIGRAM(S): 5 TABLET ORAL at 06:32

## 2025-07-15 RX ADMIN — Medication 1 APPLICATION(S): at 11:38

## 2025-07-15 RX ADMIN — Medication 0.5 MILLIGRAM(S): at 00:00

## 2025-07-15 RX ADMIN — OLANZAPINE 5 MILLIGRAM(S): 10 TABLET ORAL at 11:37

## 2025-07-15 RX ADMIN — Medication 25 GRAM(S): at 05:01

## 2025-07-15 RX ADMIN — SCOPOLAMINE 1 PATCH: 1 PATCH, EXTENDED RELEASE TRANSDERMAL at 10:52

## 2025-07-15 RX ADMIN — POLYETHYLENE GLYCOL 3350 17 GRAM(S): 17 POWDER, FOR SOLUTION ORAL at 19:09

## 2025-07-15 RX ADMIN — Medication 0.5 MILLIGRAM(S): at 19:38

## 2025-07-15 RX ADMIN — Medication 25 GRAM(S): at 14:19

## 2025-07-15 RX ADMIN — Medication 25 GRAM(S): at 22:03

## 2025-07-15 RX ADMIN — SCOPOLAMINE 1 PATCH: 1 PATCH, EXTENDED RELEASE TRANSDERMAL at 10:51

## 2025-07-15 RX ADMIN — Medication 0.5 MILLIGRAM(S): at 19:08

## 2025-07-15 RX ADMIN — Medication 0.5 MILLIGRAM(S): at 11:37

## 2025-07-15 RX ADMIN — Medication 0.5 MILLIGRAM(S): at 01:56

## 2025-07-15 RX ADMIN — SCOPOLAMINE 1 PATCH: 1 PATCH, EXTENDED RELEASE TRANSDERMAL at 21:16

## 2025-07-15 NOTE — PROGRESS NOTE ADULT - NSPROGADDITIONALINFOA_GEN_ALL_CORE
Hospice planning in progress.    Under comfort care. Do not need blood work.     - Dr. RENETTA Monreal ([facility])  - [phone]

## 2025-07-15 NOTE — PROGRESS NOTE ADULT - CONVERSATION DETAILS
Spoke with daughter Radha at bedside with [facility] Sanches about inpatient hospice referral which she is amenable to. Counseled that IV fluids and IV antibiotics would not be continued when transferred to inpatient hospice setting which Radha understands as priority is patient's comfort. Also counseled that can continue with IV fluids and antibiotics while hospitalized but if patient becomes dyspneic or has excessive gurgling breath sounds may need to discontinue to prevent worsening of symptom burden, which she understands and agrees as she recognizes that IV fluids and antibiotics are unlikely to improve patient's overall prognosis.

## 2025-07-15 NOTE — PROGRESS NOTE ADULT - ASSESSMENT
70F with history of Dementia (AAO x 0, now nonverbal), R Breast Cancer s/p chemo/lumpectomy/RT (2016, in remission since), DM2 (currently off medications), HTN, and HLD, s/p PEG placement in May 2025 presenting with AHRF with multifocal PNA concerning for aspiration PNA.    #Multifocal PNA  #Aspiration pneumonia  - Aggressive pulmonary toilet- marked secretions orally, appear as tube feeds  - hold further feeds at this time  - consider enema for signficant stool burden  - c/w zosyn for now if consistent with GOC  - Aspiration precautions with HOB elevation above 45 degrees   - O2 supplementation with goal >92% .   - DNR/ DNI. Prognosis guarded  - GOC noted- moving towards comfort care

## 2025-07-15 NOTE — PROGRESS NOTE ADULT - ASSESSMENT
70F with history of Dementia (AAO x 0, now nonverbal), R Breast Cancer s/p chemo/lumpectomy/RT (2016, in remission since), DM2 (currently off medications), HTN, and HLD, s/p PEG placement in May 2025 presenting for increased O2 requirements and agitation. Found to have multifocal PNA on chest imaging     Sepsis 2/2 PNA  AHRF on NC  - tachypnea, tachycardia and leukocytosis on admission  - CT showing multifocal PNA and fecal burden  - MRSA PCR negative  - blood cultures NGTD  - urine legionella Ag and strep pneumoniae urine Ag negative  - urine cx- no growth    patient transitioned to comfort care, noted antibiotics in line with GOC   Recommendations:   C/w zosyn, day 4  - complete 5 day course of antibiotics if consistent w/ GOC  rest of care per primary team, palliative care    Garcia Kearney M.D.  [facility]  [phone]  After 5pm on weekdays and all day on weekends - please call [phone]  Available on [employer] teams    Thank you for consulting us and involving us in the management of this patients case. In addition to reviewing history, imaging, documents, labs, microbiology, took into account antibiotic stewardship, local antibiogram and infection control strategies and potential transmission issues at time of treatment decision making process.

## 2025-07-15 NOTE — PROGRESS NOTE ADULT - NUTRITIONAL ASSESSMENT
This patient has been assessed with a concern for Malnutrition and has been determined to have a diagnosis/diagnoses of Severe protein-calorie malnutrition and Underweight (BMI < 19).    This patient is being managed with:   Diet NPO-  Entered: Jul 14 2025 11:33AM  
This patient has been assessed with a concern for Malnutrition and has been determined to have a diagnosis/diagnoses of Severe protein-calorie malnutrition and Underweight (BMI < 19).    This patient is being managed with:   Diet NPO with Tube Feed-  Tube Feeding Modality: Gastrostomy  Jevity 1.2 Angelo (JEVITY1.2RTH)  Total Volume for 24 Hours (mL): 1080  Continuous  Starting Tube Feed Rate {mL per Hour}: 15  Increase Tube Feed Rate by (mL): 15     Every 4 hours  Until Goal Tube Feed Rate (mL per Hour): 45  Tube Feed Duration (in Hours): 24  Tube Feed Start Time: 07:30    Stop Time: 00:00  Entered: Jul 13 2025  2:33PM  
This patient has been assessed with a concern for Malnutrition and has been determined to have a diagnosis/diagnoses of Severe protein-calorie malnutrition and Underweight (BMI < 19).    This patient is being managed with:   Diet NPO-  Entered: Jul 14 2025 11:33AM

## 2025-07-15 NOTE — PROGRESS NOTE ADULT - PROBLEM SELECTOR PLAN 7
- DVT ppx: Lovenox 40mg QD  - Diet: NPO  - Dispo: Not tolerating tube feeds, now on hold. per GI, PEG functioning.    Abd xray still with stool burden. bowel regimen and suppository ordered.   d/w the daughter Radha at bedside. confirmed her wish for comfort care.   Hospice planning in progress. DNR/DNI. all questions answered.

## 2025-07-16 ENCOUNTER — TRANSCRIPTION ENCOUNTER (OUTPATIENT)
Age: 70
End: 2025-07-16

## 2025-07-16 VITALS
HEART RATE: 100 BPM | TEMPERATURE: 98 F | OXYGEN SATURATION: 95 % | SYSTOLIC BLOOD PRESSURE: 138 MMHG | RESPIRATION RATE: 18 BRPM | DIASTOLIC BLOOD PRESSURE: 64 MMHG | WEIGHT: 85.1 LBS

## 2025-07-16 PROCEDURE — 99232 SBSQ HOSP IP/OBS MODERATE 35: CPT | Mod: FS

## 2025-07-16 PROCEDURE — 99233 SBSQ HOSP IP/OBS HIGH 50: CPT

## 2025-07-16 RX ORDER — DIAZEPAM 5 MG/1
2 TABLET ORAL
Qty: 0 | Refills: 0 | DISCHARGE
Start: 2025-07-16

## 2025-07-16 RX ORDER — NALOXONE HYDROCHLORIDE 0.4 MG/ML
1 INJECTION, SOLUTION INTRAMUSCULAR; INTRAVENOUS; SUBCUTANEOUS
Qty: 1 | Refills: 0
Start: 2025-07-16 | End: 2025-08-14

## 2025-07-16 RX ORDER — ATROPINE SULFATE 1 %
1 OINTMENT (GRAM) OPHTHALMIC (EYE)
Refills: 0 | DISCHARGE

## 2025-07-16 RX ORDER — MIRTAZAPINE 30 MG/1
1 TABLET, FILM COATED ORAL
Refills: 0 | DISCHARGE

## 2025-07-16 RX ORDER — ESCITALOPRAM OXALATE 20 MG/1
1 TABLET ORAL
Refills: 0 | DISCHARGE

## 2025-07-16 RX ORDER — GLYCOPYRROLATE 0.2 MG/ML
0.4 INJECTION INTRAMUSCULAR; INTRAVENOUS
Qty: 0 | Refills: 0 | DISCHARGE
Start: 2025-07-16

## 2025-07-16 RX ORDER — OLANZAPINE 10 MG/1
1 TABLET ORAL
Refills: 0 | DISCHARGE

## 2025-07-16 RX ORDER — ACETAMINOPHEN 500 MG/5ML
2 LIQUID (ML) ORAL
Qty: 0 | Refills: 0 | DISCHARGE
Start: 2025-07-16

## 2025-07-16 RX ORDER — TRAZODONE HCL 100 MG
0 TABLET ORAL
Refills: 0 | DISCHARGE

## 2025-07-16 RX ORDER — ROSUVASTATIN CALCIUM 20 MG/1
1 TABLET, FILM COATED ORAL
Refills: 0 | DISCHARGE

## 2025-07-16 RX ORDER — ACETAMINOPHEN 500 MG/5ML
2 LIQUID (ML) ORAL
Refills: 0 | DISCHARGE

## 2025-07-16 RX ORDER — TRAZODONE HCL 100 MG
1 TABLET ORAL
Qty: 0 | Refills: 0 | DISCHARGE
Start: 2025-07-16

## 2025-07-16 RX ORDER — SENNA 187 MG
2 TABLET ORAL
Qty: 0 | Refills: 0 | DISCHARGE
Start: 2025-07-16

## 2025-07-16 RX ORDER — HYDROMORPHONE/SOD CHLOR,ISO/PF 2 MG/10 ML
0.5 SYRINGE (ML) INJECTION
Qty: 0 | Refills: 0 | DISCHARGE
Start: 2025-07-16

## 2025-07-16 RX ORDER — MELATONIN 5 MG
1 TABLET ORAL
Refills: 0 | DISCHARGE

## 2025-07-16 RX ORDER — SELENIUM SULFIDE 2.5 %
1 SHAMPOO TOPICAL
Refills: 0 | DISCHARGE

## 2025-07-16 RX ORDER — BISACODYL 5 MG
1 TABLET, DELAYED RELEASE (ENTERIC COATED) ORAL
Qty: 0 | Refills: 0 | DISCHARGE
Start: 2025-07-16

## 2025-07-16 RX ORDER — OLANZAPINE 10 MG/1
1 TABLET ORAL
Qty: 0 | Refills: 0 | DISCHARGE
Start: 2025-07-16

## 2025-07-16 RX ORDER — ZINC OXIDE, MENTHOL .44; 20.6 G/100G; G/100G
1 OINTMENT TOPICAL
Refills: 0 | DISCHARGE

## 2025-07-16 RX ORDER — SCOPOLAMINE 1 MG/3D
1 PATCH, EXTENDED RELEASE TRANSDERMAL
Qty: 0 | Refills: 0 | DISCHARGE

## 2025-07-16 RX ORDER — ESCITALOPRAM OXALATE 20 MG/1
1 TABLET ORAL
Qty: 0 | Refills: 0 | DISCHARGE
Start: 2025-07-16

## 2025-07-16 RX ORDER — AMLODIPINE BESYLATE 10 MG/1
1 TABLET ORAL
Refills: 0 | DISCHARGE

## 2025-07-16 RX ORDER — POLYETHYLENE GLYCOL 3350 17 G/17G
17 POWDER, FOR SOLUTION ORAL
Qty: 0 | Refills: 0 | DISCHARGE
Start: 2025-07-16

## 2025-07-16 RX ADMIN — Medication 0.5 MILLIGRAM(S): at 00:00

## 2025-07-16 RX ADMIN — Medication 10 MILLIGRAM(S): at 11:01

## 2025-07-16 RX ADMIN — Medication 25 GRAM(S): at 05:01

## 2025-07-16 RX ADMIN — SCOPOLAMINE 1 PATCH: 1 PATCH, EXTENDED RELEASE TRANSDERMAL at 06:53

## 2025-07-16 RX ADMIN — Medication 1 APPLICATION(S): at 11:08

## 2025-07-16 RX ADMIN — MUPIROCIN CALCIUM 1 APPLICATION(S): 20 CREAM TOPICAL at 05:01

## 2025-07-16 RX ADMIN — Medication 0.5 MILLIGRAM(S): at 10:59

## 2025-07-16 RX ADMIN — DIAZEPAM 2 MILLIGRAM(S): 5 TABLET ORAL at 05:58

## 2025-07-16 NOTE — PROGRESS NOTE ADULT - PROBLEM SELECTOR PROBLEM 3
Hypernatremia
Acute hypoxic respiratory failure
Hypernatremia
Acute hypoxic respiratory failure

## 2025-07-16 NOTE — PROGRESS NOTE ADULT - PROBLEM SELECTOR PROBLEM 1
Yes
Sepsis due to pneumonia
Dementia
Dementia

## 2025-07-16 NOTE — DISCHARGE NOTE PROVIDER - PROVIDER TOKENS
FREE:[LAST:[Patient admitted to inpatient],FIRST:[hospice],PHONE:[(   )    -],FAX:[(   )    -],ADDRESS:[to follow up with medical doctor]]

## 2025-07-16 NOTE — DISCHARGE NOTE NURSING/CASE MANAGEMENT/SOCIAL WORK - PATIENT PORTAL LINK FT
You can access the FollowMyHealth Patient Portal offered by [facility] by registering at the following website: [email or web address]. By joining [facility]’s FollowMyHealth portal, you will also be able to view your health information using other applications (apps) compatible with our system.

## 2025-07-16 NOTE — PROGRESS NOTE ADULT - PROBLEM SELECTOR PLAN 2
- Desatting in 80s on RA now on 6L NC  - Most likely i/s/o PNA   - Treatment as above  - Wean O2 as tolerated  - Suction q8hr  - Pt's daughter Radha works for outpatient pulm doctors who she has consulted.  - d/w the daughter at bedside.
- Desatting in 80s on RA now on 6L NC  - Most likely i/s/o PNA   - Treatment as above  - Wean O2 as tolerated  - frequent Suctioning.
- CT 7/12 - Multifocal pneumonia.   - on zosyn and azithromycin  - family wish to continue with antibiotics while hospitalized but understand that it would not be continued in inpatient hospice setting
- CT 7/12 - Multifocal pneumonia.   - on zosyn and azithromycin  - family wish to continue with antibiotics while hospitalized but understand that it would not be continued in inpatient hospice setting

## 2025-07-16 NOTE — DISCHARGE NOTE PROVIDER - NSDCCPCAREPLAN_GEN_ALL_CORE_FT
PRINCIPAL DISCHARGE DIAGNOSIS  Diagnosis: Pneumonia  Assessment and Plan of Treatment: You were admitted for pneumonia but after finishing antibiotics your family signed DNR/DNI. and decided to provide Comfort Measures and Hospice and Palliative care on Board   You are leaving to inpatient hospice where priority will be your comfort tombe free from anxietpain and difficulty breathing., Your care will be concentrate on comfort not on treatment and marcellus will be under care of hospice team

## 2025-07-16 NOTE — DISCHARGE NOTE PROVIDER - CARE PROVIDER_API CALL
Patient admitted to inpatient, hospice  to follow up with medical doctor  Phone: (   )    -  Fax: (   )    -  Follow Up Time:

## 2025-07-16 NOTE — DISCHARGE NOTE PROVIDER - HOSPITAL COURSE
70F with history of Dementia (AAO x 0, now nonverbal), R Breast Cancer s/p chemo/lumpectomy/RT (2016, in remission since), DM2 (currently off medications), HTN, and HLD, s/p PEG placement in May 2025 presenting for increased O2 requirements and agitation. Found to have multifocal PNA on chest imaging   Palliative Care consulted for complex decision making  related to goals of care discussions in the setting of advanced illness/ evaluation and management of pain/ symptoms      Problem/Plan - 1:  ·  Problem: Dementia.   ·  Plan: Patient with advanced dementia. Fast 7C  Supportive Care.     Problem/Plan - 2:  ·  Problem: Sepsis due to pneumonia.   ·  Plan: - CT 7/12 - Multifocal pneumonia.   - on zosyn and azithromycin  - family wish to continue with antibiotics while hospitalized but understand that it would not be continued in inpatient hospice setting.     Problem/Plan - 3:  ·  Problem: Acute hypoxic respiratory failure.   ·  Plan: - due to pneumonia  - on supplemental oxygen via nasal canula  - dyspnea management as below.     Problem/Plan - 4:  ·  Problem: Dyspnea.   ·  Plan: - IV Dilaudid 0.5mg q2 PRN dyspnea/ pain  - IV Robinul 0.4mg q6 PRN secretions.     Problem/Plan - 5:  ·  Problem: Other encephalopathy.   ·  Plan: - due to dementia  - please coordinate care with family.     Problem/Plan - 6:  ·  Problem: Functional quadriplegia.   ·  Plan: Patient requires total support for all ADL's.   Please assist with ADLs  Skin care as per protocol.     Problem/Plan - 7:  ·  Problem: Dysphagia.   ·  Plan: - s/p PEG   - Tube feeds on hold as patient not tolerating tube feeds  - Aspiration precautions.     Problem/Plan - 8:  ·  Problem: Severe protein-calorie malnutrition.   ·  Plan: Nutrition recommendations appreciated.     Problem/Plan - 9:  ·  Problem: Counseling regarding advance directives and goals of care.   ·  Plan: - DNR/DNI. Comfort Measures Only.  - 7/15- Spoke with daughter Radha at bedside with [facility] Sanches about inpatient hospice referral which she is amenable to. Counseled that IV fluids and IV antibiotics would not be continued when transferred to inpatient hospice setting which Radha understands as priority is patient's comfort. Also counseled that can continue with IV fluids and antibiotics while hospitalized but if patient becomes dyspneic or excessive gurgling breath sounds may need to discontinue to prevent worsening of symptom burden, which she understands.        On 7/16/2025 case was discussed with Dr. Monreal patient is in guarded condition and was admitted to inpatient hospice for care.. All medications were reviewed with attending and reconciled 70F with history of Dementia (AAO x 0, now nonverbal), R Breast Cancer s/p chemo/lumpectomy/RT (2016, in remission since), DM2 (currently off medications), HTN, and HLD, s/p PEG placement in May 2025 presenting for increased O2 requirements and agitation. Found to have multifocal PNA on chest imaging   Palliative Care consulted for complex decision making  related to goals of care discussions in the setting of advanced illness/ evaluation and management of pain/ symptoms      Problem/Plan - 1:  ·  Problem: Dementia.   ·  Plan: Patient with advanced dementia. Fast 7C  Supportive Care.     Problem/Plan - 2:  ·  Problem: Sepsis due to pneumonia.   ·  Plan: - CT 7/12 - Multifocal pneumonia.   - on zosyn and azithromycin  - family wish to continue with antibiotics while hospitalized but understand that it would not be continued in inpatient hospice setting.     Problem/Plan - 3:  ·  Problem: Acute hypoxic respiratory failure.   ·  Plan: - due to pneumonia  - on supplemental oxygen via nasal canula  - dyspnea management as below.     Problem/Plan - 4:  ·  Problem: Dyspnea.   ·  Plan: - IV Dilaudid 0.5mg q2 PRN dyspnea/ pain  - IV Robinul 0.4mg q6 PRN secretions.     Problem/Plan - 5:  ·  Problem: Other encephalopathy.   ·  Plan: - due to dementia  - please coordinate care with family.     Problem/Plan - 6:  ·  Problem: Functional quadriplegia.   ·  Plan: Patient requires total support for all ADL's.   Please assist with ADLs  Skin care as per protocol.     Problem/Plan - 7:  ·  Problem: Dysphagia.   ·  Plan: - s/p PEG   - Tube feeds on hold as patient not tolerating tube feeds  - Aspiration precautions.     Problem/Plan - 8:  ·  Problem: Severe protein-calorie malnutrition.   ·  Plan: Nutrition recommendations appreciated.     Problem/Plan - 9:  ·  Problem: Counseling regarding advance directives and goals of care.   ·  Plan: - DNR/DNI. Comfort Measures Only.  - 7/15- Spoke with daughter Radha at bedside with [facility] Sanches about inpatient hospice referral which she is amenable to. Counseled that IV fluids and IV antibiotics would not be continued when transferred to inpatient hospice setting which Radha understands as priority is patient's comfort. Also counseled that can continue with IV fluids and antibiotics while hospitalized but if patient becomes dyspneic or excessive gurgling breath sounds may need to discontinue to prevent worsening of symptom burden, which she understands.        On 7/16/2025 case was discussed with Dr. Monreal patient is in guarded condition and was admitted to inpatient hospice for care.. All medications were reviewed with attending and reconciled       Attending Addendum:  Patient seen and examined by me on the discharge day. Pt planned for inpatient hospice program.   appears comfortable. poor historian. Medications reviewed.   d/w pt's daughters and family at bedside.   All questions answered in details. d/w NP/PA.  More than 30 mins were spent evaluating patient and coordinating care for discharge.  Discharge summary sent to pt's primary care physician at [facility].

## 2025-07-16 NOTE — PROGRESS NOTE ADULT - SUBJECTIVE AND OBJECTIVE BOX
CHIEF COMPLAINT: sob    Interval Events: Patient seen and examined at bedside. No acute events overnight. Patient with thick secretions     REVIEW OF SYSTEMS:  Constitutional: [ ] negative [ ] fevers [ ] chills [ ] weight loss [ ] weight gain  HEENT: [ ] negative [ ] dry eyes [ ] eye irritation [ ] postnasal drip [ ] nasal congestion  CV: [ ] negative  [ ] chest pain [ ] orthopnea [ ] palpitations [ ] murmur  Resp: [ ] negative [ ] cough [ ] shortness of breath [ ] dyspnea [ ] wheezing [ ] sputum [ ] hemoptysis  GI: [ ] negative [ ] nausea [ ] vomiting [ ] diarrhea [ ] constipation [ ] abd pain [ ] dysphagia   : [ ] negative [ ] dysuria [ ] nocturia [ ] hematuria [ ] increased urinary frequency  Musculoskeletal: [ ] negative [ ] back pain [ ] myalgias [ ] arthralgias [ ] fracture  Skin: [ ] negative [ ] rash [ ] itch  Neurological: [ ] negative [ ] headache [ ] dizziness [ ] syncope [ ] weakness [ ] numbness  Psychiatric: [ ] negative [ ] anxiety [ ] depression  Endocrine: [ ] negative [ ] diabetes [ ] thyroid problem  Hematologic/Lymphatic: [ ] negative [ ] anemia [ ] bleeding problem  Allergic/Immunologic: [ ] negative [ ] itchy eyes [ ] nasal discharge [ ] hives [ ] angioedema  [ ] All other systems negative  [X ] Unable to assess ROS because dementia    OBJECTIVE:  ICU Vital Signs Last 24 Hrs  T(C): 36.5 (14 Jul 2025 09:55), Max: 36.7 (13 Jul 2025 15:20)  T(F): 97.7 (14 Jul 2025 09:55), Max: 98 (13 Jul 2025 15:20)  HR: 100 (14 Jul 2025 09:55) (60 - 100)  BP: 102/74 (14 Jul 2025 09:55) (102/74 - 147/92)  BP(mean): --  ABP: --  ABP(mean): --  RR: 18 (14 Jul 2025 09:55) (18 - 20)  SpO2: 100% (14 Jul 2025 09:55) (97% - 100%)    O2 Parameters below as of 14 Jul 2025 09:55  Patient On (Oxygen Delivery Method): nasal cannula              07-13 @ 07:01  -  07-14 @ 07:00  --------------------------------------------------------  IN: 1600 mL / OUT: 1100 mL / NET: 500 mL    07-14 @ 07:01 - 07-14 @ 12:43  --------------------------------------------------------  IN: 690 mL / OUT: 500 mL / NET: 190 mL      CAPILLARY BLOOD GLUCOSE      POCT Blood Glucose.: 84 mg/dL (13 Jul 2025 23:35)      PHYSICAL EXAM:  General: ill appearing, laying, in bed, choking on secretions  HEENT: no icterus  Neck: supple  Respiratory: rhonchi bilaterally  Cardiovascular: s1/s2,  Abdomen: soft, nontender  Extremities: no LE edema  Skin: no rashes  Neurological: axox0  Psychiatry: cannot assess     HOSPITAL MEDICATIONS:    azithromycin  IVPB 500 milliGRAM(s) IV Intermittent every 24 hours  azithromycin  IVPB      piperacillin/tazobactam IVPB.. 3.375 Gram(s) IV Intermittent every 8 hours      dextrose 50% Injectable 25 Gram(s) IV Push once  dextrose 50% Injectable 12.5 Gram(s) IV Push once  dextrose 50% Injectable 25 Gram(s) IV Push once  glucagon  Injectable 1 milliGRAM(s) IntraMuscular once      acetaminophen     Tablet .. 650 milliGRAM(s) Oral every 6 hours PRN  escitalopram 10 milliGRAM(s) Oral daily  HYDROmorphone  Injectable 0.5 milliGRAM(s) IV Push every 2 hours PRN  OLANZapine 5 milliGRAM(s) Oral daily  scopolamine 1 mG/72 Hr(s) Patch 1 Patch Transdermal every 72 hours  traZODone 50 milliGRAM(s) Oral at bedtime    bisacodyl Suppository 10 milliGRAM(s) Rectal daily  glycopyrrolate Injectable 0.4 milliGRAM(s) IV Push four times a day PRN  polyethylene glycol 3350 17 Gram(s) Oral two times a day  senna 2 Tablet(s) Oral at bedtime        dextrose 5% + sodium chloride 0.45%. 1000 milliLiter(s) IV Continuous <Continuous>  dextrose 5%. 1000 milliLiter(s) IV Continuous <Continuous>  dextrose 5%. 1000 milliLiter(s) IV Continuous <Continuous>      chlorhexidine 2% Cloths 1 Application(s) Topical daily        LABS:                        11.1   8.70  )-----------( 224      ( 14 Jul 2025 09:00 )             33.2     Hgb Trend: 11.1<--, 12.2<--, 10.9<--, 11.6<--  07-14    142  |  101  |  6[L]  ----------------------------<  96  3.5   |  28  |  0.31[L]    Ca    8.9      14 Jul 2025 09:00  Phos  3.0     07-14  Mg     1.70     07-14      Creatinine Trend: 0.31<--, 0.35<--, 0.43<--, 0.43<--    Urinalysis Basic - ( 14 Jul 2025 09:00 )    Color: x / Appearance: x / SG: x / pH: x  Gluc: 96 mg/dL / Ketone: x  / Bili: x / Urobili: x   Blood: x / Protein: x / Nitrite: x   Leuk Esterase: x / RBC: x / WBC x   Sq Epi: x / Non Sq Epi: x / Bacteria: x            MICROBIOLOGY:     RADIOLOGY:  [X ] Reviewed and interpreted by me    
Date of Service  : 7/15/2025  Indication for Geriatrics and Palliative Care Services: symptom management for end of life care/ goals of care     SUBJECTIVE AND OBJECTIVE:  Patient seen and examined at bedside. Patient awake with eyes open, moaning persistently; asked bedside RN to provide prn dose of IV Dilaudid     INTERVAL HPI/OVERNIGHT EVENTS:  In past 24 hours, received IV Dilaudid 0.5mg x3  ,  IV Robinul 0.4mg x1 , IV Valium 2mg x2      Allergies    No Known Allergies    Intolerances    MEDICATIONS  (STANDING):  bisacodyl Suppository 10 milliGRAM(s) Rectal daily  chlorhexidine 2% Cloths 1 Application(s) Topical daily  dextrose 5% + sodium chloride 0.45%. 1000 milliLiter(s) (60 mL/Hr) IV Continuous <Continuous>  dextrose 5%. 1000 milliLiter(s) (50 mL/Hr) IV Continuous <Continuous>  dextrose 5%. 1000 milliLiter(s) (100 mL/Hr) IV Continuous <Continuous>  dextrose 50% Injectable 25 Gram(s) IV Push once  dextrose 50% Injectable 12.5 Gram(s) IV Push once  dextrose 50% Injectable 25 Gram(s) IV Push once  escitalopram 10 milliGRAM(s) Oral daily  glucagon  Injectable 1 milliGRAM(s) IntraMuscular once  mupirocin 2% Ointment 1 Application(s) Both Nostrils two times a day  OLANZapine 5 milliGRAM(s) Oral daily  piperacillin/tazobactam IVPB.. 3.375 Gram(s) IV Intermittent every 8 hours  polyethylene glycol 3350 17 Gram(s) Oral two times a day  scopolamine 1 mG/72 Hr(s) Patch 1 Patch Transdermal every 72 hours  senna 2 Tablet(s) Oral at bedtime  traZODone 50 milliGRAM(s) Oral at bedtime    MEDICATIONS  (PRN):  acetaminophen     Tablet .. 650 milliGRAM(s) Oral every 6 hours PRN Mild Pain (1 - 3), Moderate Pain (4 - 6)  diazepam  Injectable 2 milliGRAM(s) IV Push every 6 hours PRN terminal restlessness/agitation  glycopyrrolate Injectable 0.4 milliGRAM(s) IV Push four times a day PRN Secretions  HYDROmorphone  Injectable 0.5 milliGRAM(s) IV Push every 2 hours PRN Moderate pain (4-6), Severe pain (7-10), Respiratory rate greater than 22      ITEMS UNCHECKED ARE NOT PRESENT    PRESENT SYMPTOMS: [x ]Unable to self-report due to altered mental status- see [ ] CPOT [ x] PAINADS [ x] RDOS  Source if other than patient:  [ ]Family   [ ]Team     Pain:  [ ]yes [ ]no  QOL impact -   Location -                    Aggravating factors -  Quality -  Radiation -  Timing-  Severity (0-10 scale):  Minimal acceptable level / Pain Goal (0-10 scale):     Dyspnea:                           [ ]Mild [ ]Moderate [ ]Severe  Anxiety:                             [ ]Mild [ ]Moderate [ ]Severe  Agitation:                          [ ]Mild [ ]Moderate [ ]Severe  Fatigue:                             [ ]Mild [ ]Moderate [ ]Severe  Nausea:                             [ ]Mild [ ]Moderate [ ]Severe  Loss of appetite:              [ ]Mild [ ]Moderate [ ]Severe  Constipation:                   [ ]Mild [ ]Moderate [ ]Severe  Diarrhea:                          [ ]Mild [ ]Moderate [ ]Severe    CPOT:    https://www.[facility].org/getattachment/[email or web address]/Critical-Care-Pain-Observation-Tool-(CPOT)    PCSSQ[Palliative Care Spiritual Screening Question]   Severity (0-10):  Score of 4 or > indicate consideration of Chaplaincy referral.  Chaplaincy Referral: [ ] yes [ ] refused [ ] following [x ] deferred    Caregiver Fabius? : [ ] yes [ ] no [ ] Declined [x ] Deferred              Social work referral [ ] Patient & Family Centered Care Referral [ ]     Anticipatory Grief present?:  [ ] yes [ ] no  [ x] Deferred                  Social work referral [ ] Chaplaincy Referral[ ] Caregiver Support Referral [ ]    Other Symptoms:  [ ]All other review of systems negative   [x ] Unable to obtain due to poor mentation     PHYSICAL EXAM:  Vital Signs Last 24 Hrs  T(C): 36.7 (15 Jul 2025 18:00), Max: 37.2 (15 Jul 2025 09:00)  T(F): 98.1 (15 Jul 2025 18:00), Max: 99 (15 Jul 2025 09:00)  HR: 85 (15 Jul 2025 18:00) (85 - 96)  BP: 127/64 (15 Jul 2025 18:00) (127/64 - 151/75)  BP(mean): --  RR: 18 (15 Jul 2025 18:00) (18 - 19)  SpO2: 96% (15 Jul 2025 18:00) (96% - 99%)    Parameters below as of 15 Jul 2025 18:00  Patient On (Oxygen Delivery Method): nasal cannula         I&O's Summary    14 Jul 2025 07:01  -  15 Jul 2025 07:00  --------------------------------------------------------  IN: 1610 mL / OUT: 1500 mL / NET: 110 mL    15 Jul 2025 07:01  -  15 Jul 2025 19:37  --------------------------------------------------------  IN: 240 mL / OUT: 300 mL / NET: -60 mL       GENERAL: Awake with eyes open, does not track , moaning   [ ]Alert  [ ]Oriented x   [ ]Lethargic  [ ]Cachexia  [ ]Unarousable  [ ]Verbal  [x ]Non-Verbal    Behavioral:   [ ] Anxiety  [ ] Delirium [ ] Agitation [ ] Calm  [ x] Other-encephalopathy  HEENT:  [ ]Normal  [ ] Temporal Wasting  [x ]Dry mouth   [ ]ET Tube/Trach  [ ]Oral lesions  [ ] Mucositis  PULMONARY: normal respiratory effort, no accessory muscle use  [ ]Clear [ ]Tachypnea  [ ]Audible excessive secretions   [ ]Rhonchi        [ ]Right [ ]Left [ ]Bilateral  [ ]Crackles        [ ]Right [ ]Left [ ]Bilateral  [ ]Wheezing     [ ]Right [ ]Left [ ]Bilateral  [ ]Diminished breath sounds [ ]right [ ]left [ ]bilateral  CARDIOVASCULAR:    [x ]Regular [ ]Irregular [ ]Tachy  [ ]Bakari [ ]Murmur [ ]Other  GASTROINTESTINAL:  [ x]Soft  [ ]Distended   [ ]+BS  [x ]Non tender [ ]Tender  [x ]PEG [ ]OGT/ NGT  Last BM: 7/13  GENITOURINARY:  [ ]Normal [ x] Incontinent   [ ]Oliguria/Anuria   [ ]Newell  MUSCULOSKELETAL:   [ ]Normal   [ ]Weakness  [x ]Bed/Wheelchair bound [ ]Edema  [  ] amputation  [  ] contraction  NEUROLOGIC:   [ ]No focal deficits  [x ]Cognitive impairment  [ x]Dysphagia [ ]Dysarthria [ ]Paresis [ ]Other   SKIN: See Nursing Skin Assessment for further details  [ ]Normal    [ ]Rash  [ ]Pressure ulcer(s)       Present on admission [ ]y [ ]n   [  ]  Wound    [  ] hyperpigmentation    CRITICAL CARE:  [ ]Shock Present  [ ]Septic [ ]Cardiogenic [ ]Neurologic [ ]Hypovolemic  [ ]Vasopressors [ ]Inotropes  [ ]Respiratory failure present [ ]Mechanical Ventilation [ ]Non-invasive ventilatory support [ ]High-Flow   [ ]Acute  [ ]Chronic [ ]Hypoxic  [ ]Hypercarbic [ ]Other  [ ]Other organ failure     LABS:  reviewed                         11.1 8.70  )-----------( 224      ( 14 Jul 2025 09:00 )             33.2   07-14    142  |  101  |  6[L]  ----------------------------<  96  3.5   |  28  |  0.31[L]    Ca    8.9      14 Jul 2025 09:00  Phos  3.0     07-14  Mg     1.70     07-14      Urinalysis Basic - ( 14 Jul 2025 09:00 )    Color: x / Appearance: x / SG: x / pH: x  Gluc: 96 mg/dL / Ketone: x  / Bili: x / Urobili: x   Blood: x / Protein: x / Nitrite: x   Leuk Esterase: x / RBC: x / WBC x   Sq Epi: x / Non Sq Epi: x / Bacteria: x      CAPILLARY BLOOD GLUCOSE              RADIOLOGY & ADDITIONAL STUDIES: reviewed     Protein Calorie Malnutrition Present: [ ]mild [ ]moderate [ ]severe [ ]underweight [ ]morbid obesity  https://www.ande[employer].org/vault/2440/web/files/ONC/Table_Clinical%20Characteristics%20to%20Document%20Malnutrition-White%20JV%20et%20al%202012.pdf    Height (cm): 162.6 (07-11-25 @ 20:25), 162.6 (04-07-25 @ 05:01), 160 (08-26-24 @ 11:37)  Weight (kg): 54.4 (07-11-25 @ 20:25), 44.452 (04-07-25 @ 00:16), 54.5 (08-06-24 @ 16:36)  BMI (kg/m2): 20.6 (07-11-25 @ 20:25), 16.8 (04-07-25 @ 05:01), 17.4 (04-07-25 @ 00:16)    [x ]PPSV2 < or = 30%  [ ]significant weight loss [ ]poor nutritional intake [ ]anasarca   [ ]Artificial Nutrition    REFERRALS:   [ ]Chaplaincy  [ x]Hospice  [ ]Child Life  [ x]Social Work  [ ]Case management [ ]Holistic Therapy       
[facility]  JOSHUA Haley Y. Patel, S. Shah, G. Casimir  [phone]  ([phone] - weekdays after 5pm and weekends)    Name: DANNY STEVEN  Age/Gender: 70y Female  MRN: [id number]    Interval History:  Notes reviewed.   Afebrile.     Allergies: No Known Allergies      Objective:  Vitals:   T(F): 97.6 (07-16-25 @ 02:09), Max: 98.1 (07-15-25 @ 18:00)  HR: 100 (07-16-25 @ 02:09) (85 - 100)  BP: 138/64 (07-16-25 @ 02:09) (127/64 - 138/64)  RR: 18 (07-16-25 @ 02:09) (18 - 18)  SpO2: 95% (07-16-25 @ 02:09) (95% - 96%)  Physical Examination:  General: frail appearing  HEENT: anicteric  Cardio: S1, S2, normal rate  Resp: decreased breath sounds  Abd: soft, ND  Ext: contracted  Skin: warm, dry    Laboratory Studies:  CBC:   WBC Trend:  8.70 07-14-25 @ 09:00  10.43 07-13-25 @ 07:01  11.22 07-12-25 @ 07:45  14.00 07-11-25 @ 21:53    CMP:               Microbiology: reviewed     Culture - Urine (collected 07-12-25 @ 11:33)  Source: Clean Catch Clean Catch (Midstream)  Final Report (07-13-25 @ 15:13):    No growth    Culture - Blood (collected 07-11-25 @ 21:54)  Source: Blood Blood-Peripheral  Preliminary Report (07-16-25 @ 01:01):    No growth at 4 days    Culture - Blood (collected 07-11-25 @ 21:44)  Source: Blood Blood-Peripheral  Preliminary Report (07-16-25 @ 01:01):    No growth at 4 days        Radiology: reviewed     Medications:  acetaminophen     Tablet .. 650 milliGRAM(s) Oral every 6 hours PRN  bisacodyl Suppository 10 milliGRAM(s) Rectal daily  chlorhexidine 2% Cloths 1 Application(s) Topical daily  dextrose 5% + sodium chloride 0.45%. 1000 milliLiter(s) IV Continuous <Continuous>  dextrose 5%. 1000 milliLiter(s) IV Continuous <Continuous>  dextrose 5%. 1000 milliLiter(s) IV Continuous <Continuous>  dextrose 50% Injectable 25 Gram(s) IV Push once  dextrose 50% Injectable 12.5 Gram(s) IV Push once  dextrose 50% Injectable 25 Gram(s) IV Push once  diazepam  Injectable 2 milliGRAM(s) IV Push every 6 hours PRN  escitalopram 10 milliGRAM(s) Oral daily  glucagon  Injectable 1 milliGRAM(s) IntraMuscular once  glycopyrrolate Injectable 0.4 milliGRAM(s) IV Push four times a day PRN  HYDROmorphone  Injectable 0.5 milliGRAM(s) IV Push every 2 hours PRN  mupirocin 2% Ointment 1 Application(s) Both Nostrils two times a day  OLANZapine 5 milliGRAM(s) Oral daily  piperacillin/tazobactam IVPB.. 3.375 Gram(s) IV Intermittent every 8 hours  polyethylene glycol 3350 17 Gram(s) Oral two times a day  scopolamine 1 mG/72 Hr(s) Patch 1 Patch Transdermal every 72 hours  senna 2 Tablet(s) Oral at bedtime  traZODone 50 milliGRAM(s) Oral at bedtime    Antimicrobials:  piperacillin/tazobactam IVPB.. 3.375 Gram(s) IV Intermittent every 8 hours  
[facility]  JOSHUA Haley Y. Patel, S. Shah, G. Casimir  [phone]  ([phone] - weekdays after 5pm and weekends)    Name: DANNY STEVEN  Age/Gender: 70y Female  MRN: [id number]    Interval History:  Notes reviewed.   Afebrile.     Allergies: No Known Allergies      Objective:  Vitals:   T(F): 99 (07-15-25 @ 09:00), Max: 99 (07-15-25 @ 09:00)  HR: 96 (07-15-25 @ 09:00) (96 - 96)  BP: 151/75 (07-15-25 @ 09:00) (151/75 - 151/75)  RR: 19 (07-15-25 @ 09:00) (19 - 19)  SpO2: 99% (07-15-25 @ 09:00) (99% - 99%)  Physical Examination:  General: frail appearing  HEENT: anicteric, NC  Cardio: S1, S2, normal rate  Resp: decreased breath sounds  Abd: soft, ND  Ext: contracted  Skin: warm, dry    Laboratory Studies:  CBC:                       11.1   8.70  )-----------( 224      ( 14 Jul 2025 09:00 )             33.2     WBC Trend:  8.70 07-14-25 @ 09:00  10.43 07-13-25 @ 07:01  11.22 07-12-25 @ 07:45  14.00 07-11-25 @ 21:53    CMP: 07-14    142  |  101  |  6[L]  ----------------------------<  96  3.5   |  28  |  0.31[L]    Ca    8.9      14 Jul 2025 09:00  Phos  3.0     07-14  Mg     1.70     07-14            Urinalysis Basic - ( 14 Jul 2025 09:00 )    Color: x / Appearance: x / SG: x / pH: x  Gluc: 96 mg/dL / Ketone: x  / Bili: x / Urobili: x   Blood: x / Protein: x / Nitrite: x   Leuk Esterase: x / RBC: x / WBC x   Sq Epi: x / Non Sq Epi: x / Bacteria: x      Microbiology: reviewed     Culture - Urine (collected 07-12-25 @ 11:33)  Source: Clean Catch Clean Catch (Midstream)  Final Report (07-13-25 @ 15:13):    No growth    Culture - Blood (collected 07-11-25 @ 21:54)  Source: Blood Blood-Peripheral  Preliminary Report (07-15-25 @ 01:02):    No growth at 72 Hours    Culture - Blood (collected 07-11-25 @ 21:44)  Source: Blood Blood-Peripheral  Preliminary Report (07-15-25 @ 01:02):    No growth at 72 Hours        Radiology: reviewed     Medications:  acetaminophen     Tablet .. 650 milliGRAM(s) Oral every 6 hours PRN  bisacodyl Suppository 10 milliGRAM(s) Rectal daily  chlorhexidine 2% Cloths 1 Application(s) Topical daily  dextrose 5% + sodium chloride 0.45%. 1000 milliLiter(s) IV Continuous <Continuous>  dextrose 5%. 1000 milliLiter(s) IV Continuous <Continuous>  dextrose 5%. 1000 milliLiter(s) IV Continuous <Continuous>  dextrose 50% Injectable 25 Gram(s) IV Push once  dextrose 50% Injectable 12.5 Gram(s) IV Push once  dextrose 50% Injectable 25 Gram(s) IV Push once  diazepam  Injectable 2 milliGRAM(s) IV Push every 6 hours PRN  escitalopram 10 milliGRAM(s) Oral daily  glucagon  Injectable 1 milliGRAM(s) IntraMuscular once  glycopyrrolate Injectable 0.4 milliGRAM(s) IV Push four times a day PRN  HYDROmorphone  Injectable 0.5 milliGRAM(s) IV Push every 2 hours PRN  mupirocin 2% Ointment 1 Application(s) Both Nostrils two times a day  OLANZapine 5 milliGRAM(s) Oral daily  piperacillin/tazobactam IVPB.. 3.375 Gram(s) IV Intermittent every 8 hours  polyethylene glycol 3350 17 Gram(s) Oral two times a day  scopolamine 1 mG/72 Hr(s) Patch 1 Patch Transdermal every 72 hours  senna 2 Tablet(s) Oral at bedtime  traZODone 50 milliGRAM(s) Oral at bedtime    Antimicrobials:  piperacillin/tazobactam IVPB.. 3.375 Gram(s) IV Intermittent every 8 hours  
CHIEF COMPLAINT: sob    Interval Events: Patient seen and examined at bedside. No acute events overnight.  Patient appears comfortable today    REVIEW OF SYSTEMS:  Constitutional: [ ] negative [ ] fevers [ ] chills [ ] weight loss [ ] weight gain  HEENT: [ ] negative [ ] dry eyes [ ] eye irritation [ ] postnasal drip [ ] nasal congestion  CV: [ ] negative  [ ] chest pain [ ] orthopnea [ ] palpitations [ ] murmur  Resp: [ ] negative [ ] cough [ ] shortness of breath [ ] dyspnea [ ] wheezing [ ] sputum [ ] hemoptysis  GI: [ ] negative [ ] nausea [ ] vomiting [ ] diarrhea [ ] constipation [ ] abd pain [ ] dysphagia   : [ ] negative [ ] dysuria [ ] nocturia [ ] hematuria [ ] increased urinary frequency  Musculoskeletal: [ ] negative [ ] back pain [ ] myalgias [ ] arthralgias [ ] fracture  Skin: [ ] negative [ ] rash [ ] itch  Neurological: [ ] negative [ ] headache [ ] dizziness [ ] syncope [ ] weakness [ ] numbness  Psychiatric: [ ] negative [ ] anxiety [ ] depression  Endocrine: [ ] negative [ ] diabetes [ ] thyroid problem  Hematologic/Lymphatic: [ ] negative [ ] anemia [ ] bleeding problem  Allergic/Immunologic: [ ] negative [ ] itchy eyes [ ] nasal discharge [ ] hives [ ] angioedema  [ ] All other systems negative  [ X] Unable to assess ROS because of dementia    OBJECTIVE:  ICU Vital Signs Last 24 Hrs  T(C): 36.4 (16 Jul 2025 02:09), Max: 36.7 (15 Jul 2025 18:00)  T(F): 97.6 (16 Jul 2025 02:09), Max: 98.1 (15 Jul 2025 18:00)  HR: 100 (16 Jul 2025 02:09) (85 - 100)  BP: 138/64 (16 Jul 2025 02:09) (127/64 - 138/64)  BP(mean): --  ABP: --  ABP(mean): --  RR: 18 (16 Jul 2025 02:09) (18 - 18)  SpO2: 95% (16 Jul 2025 02:09) (95% - 96%)    O2 Parameters below as of 16 Jul 2025 02:09  Patient On (Oxygen Delivery Method): room air              07-15 @ 07:01  -  07-16 @ 07:00  --------------------------------------------------------  IN: 480 mL / OUT: 600 mL / NET: -120 mL      CAPILLARY BLOOD GLUCOSE          PHYSICAL EXAM:  General: ill appearing, laying, in bed, moaning  HEENT: no icterus  Neck: supple  Respiratory: rhonchi bilaterally  Cardiovascular: s1/s2,  Abdomen: soft, nontender  Extremities: no LE edema  Skin: no rashes  Neurological: axox0  Psychiatry: cannot assess     HOSPITAL MEDICATIONS:    piperacillin/tazobactam IVPB.. 3.375 Gram(s) IV Intermittent every 8 hours      dextrose 50% Injectable 25 Gram(s) IV Push once  dextrose 50% Injectable 12.5 Gram(s) IV Push once  dextrose 50% Injectable 25 Gram(s) IV Push once  glucagon  Injectable 1 milliGRAM(s) IntraMuscular once      acetaminophen     Tablet .. 650 milliGRAM(s) Oral every 6 hours PRN  diazepam  Injectable 2 milliGRAM(s) IV Push every 6 hours PRN  escitalopram 10 milliGRAM(s) Oral daily  HYDROmorphone  Injectable 0.5 milliGRAM(s) IV Push every 2 hours PRN  OLANZapine 5 milliGRAM(s) Oral daily  scopolamine 1 mG/72 Hr(s) Patch 1 Patch Transdermal every 72 hours  traZODone 50 milliGRAM(s) Oral at bedtime    bisacodyl Suppository 10 milliGRAM(s) Rectal daily  glycopyrrolate Injectable 0.4 milliGRAM(s) IV Push four times a day PRN  polyethylene glycol 3350 17 Gram(s) Oral two times a day  senna 2 Tablet(s) Oral at bedtime        dextrose 5% + sodium chloride 0.45%. 1000 milliLiter(s) IV Continuous <Continuous>  dextrose 5%. 1000 milliLiter(s) IV Continuous <Continuous>  dextrose 5%. 1000 milliLiter(s) IV Continuous <Continuous>      chlorhexidine 2% Cloths 1 Application(s) Topical daily  mupirocin 2% Ointment 1 Application(s) Both Nostrils two times a day        LABS:    Hgb Trend: 11.1<--, 12.2<--, 10.9<--, 11.6<--        Creatinine Trend: 0.31<--, 0.35<--, 0.43<--, 0.43<--            MICROBIOLOGY:     RADIOLOGY:  [ ] Reviewed and interpreted by me    PULMONARY FUNCTION TESTS:    EKG:
[facility]  JOSHUA Haley Y. Patel, S. Shah, G. Casimir  [phone]  ([phone] - weekdays after 5pm and weekends)    Name: DANNY STEVEN  Age/Gender: 70y Female  MRN: [id number]    Interval History:  Notes reviewed.   Afebrile.     Allergies: No Known Allergies      Objective:  Vitals:   T(F): 97.5 (07-14-25 @ 06:00), Max: 98 (07-13-25 @ 15:20)  HR: 60 (07-14-25 @ 06:00) (60 - 84)  BP: 142/54 (07-14-25 @ 06:00) (108/64 - 147/92)  RR: 18 (07-14-25 @ 06:00) (18 - 20)  SpO2: 100% (07-14-25 @ 06:00) (97% - 100%)  Physical Examination:  General: no acute distress  HEENT: anicteric, NC  Cardio: S1, S2, normal rate  Resp: decreased breath sounds  Abd: soft, ND  Ext: no LE edema  Skin: warm, dry    Laboratory Studies:  CBC:                       12.2   10.43 )-----------( 209      ( 13 Jul 2025 07:01 )             38.2     WBC Trend:  10.43 07-13-25 @ 07:01  11.22 07-12-25 @ 07:45  14.00 07-11-25 @ 21:53    CMP: 07-13    146[H]  |  106  |  13  ----------------------------<  81  3.2[L]   |  29  |  0.35[L]    Ca    8.6      13 Jul 2025 07:01  Phos  2.3     07-13  Mg     1.90     07-13            Urinalysis Basic - ( 13 Jul 2025 07:01 )    Color: x / Appearance: x / SG: x / pH: x  Gluc: 81 mg/dL / Ketone: x  / Bili: x / Urobili: x   Blood: x / Protein: x / Nitrite: x   Leuk Esterase: x / RBC: x / WBC x   Sq Epi: x / Non Sq Epi: x / Bacteria: x      Microbiology: reviewed     Culture - Urine (collected 07-12-25 @ 11:33)  Source: Clean Catch Clean Catch (Midstream)  Final Report (07-13-25 @ 15:13):    No growth    Culture - Blood (collected 07-11-25 @ 21:54)  Source: Blood Blood-Peripheral  Preliminary Report (07-14-25 @ 01:02):    No growth at 48 Hours    Culture - Blood (collected 07-11-25 @ 21:44)  Source: Blood Blood-Peripheral  Preliminary Report (07-14-25 @ 01:02):    No growth at 48 Hours        Radiology: reviewed     Medications:  acetaminophen     Tablet .. 650 milliGRAM(s) Oral every 6 hours PRN  azithromycin  IVPB 500 milliGRAM(s) IV Intermittent every 24 hours  azithromycin  IVPB      bisacodyl Suppository 10 milliGRAM(s) Rectal daily  chlorhexidine 2% Cloths 1 Application(s) Topical daily  cholecalciferol 1000 Unit(s) Oral daily  dextrose 5% + sodium chloride 0.45%. 1000 milliLiter(s) IV Continuous <Continuous>  dextrose 5%. 1000 milliLiter(s) IV Continuous <Continuous>  dextrose 5%. 1000 milliLiter(s) IV Continuous <Continuous>  dextrose 50% Injectable 25 Gram(s) IV Push once  dextrose 50% Injectable 12.5 Gram(s) IV Push once  dextrose 50% Injectable 25 Gram(s) IV Push once  enoxaparin Injectable 40 milliGRAM(s) SubCutaneous every 24 hours  escitalopram 10 milliGRAM(s) Oral daily  glucagon  Injectable 1 milliGRAM(s) IntraMuscular once  letrozole 2.5 milliGRAM(s) Oral daily  melatonin 6 milliGRAM(s) Oral at bedtime  memantine 10 milliGRAM(s) Oral two times a day  mirtazapine 15 milliGRAM(s) Oral at bedtime  OLANZapine 5 milliGRAM(s) Oral daily  piperacillin/tazobactam IVPB.. 3.375 Gram(s) IV Intermittent every 8 hours  polyethylene glycol 3350 17 Gram(s) Oral two times a day  rosuvastatin 5 milliGRAM(s) Oral at bedtime  scopolamine 1 mG/72 Hr(s) Patch 1 Patch Transdermal every 72 hours  senna 2 Tablet(s) Oral at bedtime  traZODone 50 milliGRAM(s) Oral at bedtime    Antimicrobials:  azithromycin  IVPB 500 milliGRAM(s) IV Intermittent every 24 hours  azithromycin  IVPB      piperacillin/tazobactam IVPB.. 3.375 Gram(s) IV Intermittent every 8 hours  
CHIEF COMPLAINT: sob    Interval Events: Patient seen and examined at bedside. No acute events overnight. Patient in bed groaning. NAD    REVIEW OF SYSTEMS:  Constitutional: [ ] negative [ ] fevers [ ] chills [ ] weight loss [ ] weight gain  HEENT: [ ] negative [ ] dry eyes [ ] eye irritation [ ] postnasal drip [ ] nasal congestion  CV: [ ] negative  [ ] chest pain [ ] orthopnea [ ] palpitations [ ] murmur  Resp: [ ] negative [ ] cough [ ] shortness of breath [ ] dyspnea [ ] wheezing [ ] sputum [ ] hemoptysis  GI: [ ] negative [ ] nausea [ ] vomiting [ ] diarrhea [ ] constipation [ ] abd pain [ ] dysphagia   : [ ] negative [ ] dysuria [ ] nocturia [ ] hematuria [ ] increased urinary frequency  Musculoskeletal: [ ] negative [ ] back pain [ ] myalgias [ ] arthralgias [ ] fracture  Skin: [ ] negative [ ] rash [ ] itch  Neurological: [ ] negative [ ] headache [ ] dizziness [ ] syncope [ ] weakness [ ] numbness  Psychiatric: [ ] negative [ ] anxiety [ ] depression  Endocrine: [ ] negative [ ] diabetes [ ] thyroid problem  Hematologic/Lymphatic: [ ] negative [ ] anemia [ ] bleeding problem  Allergic/Immunologic: [ ] negative [ ] itchy eyes [ ] nasal discharge [ ] hives [ ] angioedema  [ ] All other systems negative  [ X] Unable to assess ROS because ams    OBJECTIVE:  ICU Vital Signs Last 24 Hrs  T(C): 37.2 (15 Jul 2025 09:00), Max: 37.2 (15 Jul 2025 09:00)  T(F): 99 (15 Jul 2025 09:00), Max: 99 (15 Jul 2025 09:00)  HR: 96 (15 Jul 2025 09:00) (96 - 96)  BP: 151/75 (15 Jul 2025 09:00) (151/75 - 151/75)  BP(mean): --  ABP: --  ABP(mean): --  RR: 19 (15 Jul 2025 09:00) (19 - 19)  SpO2: 99% (15 Jul 2025 09:00) (99% - 99%)    O2 Parameters below as of 15 Jul 2025 09:00  Patient On (Oxygen Delivery Method): nasal cannula              07-14 @ 07:01  -  07-15 @ 07:00  --------------------------------------------------------  IN: 1610 mL / OUT: 1500 mL / NET: 110 mL    07-15 @ 07:01  -  07-15 @ 12:30  --------------------------------------------------------  IN: 0 mL / OUT: 300 mL / NET: -300 mL      CAPILLARY BLOOD GLUCOSE      POCT Blood Glucose.: 84 mg/dL (13 Jul 2025 23:35)      PHYSICAL EXAM:  General: ill appearing, laying, in bed, moaning  HEENT: no icterus  Neck: supple  Respiratory: rhonchi bilaterally  Cardiovascular: s1/s2,  Abdomen: soft, nontender  Extremities: no LE edema  Skin: no rashes  Neurological: axox0  Psychiatry: cannot assess   HOSPITAL MEDICATIONS:    piperacillin/tazobactam IVPB.. 3.375 Gram(s) IV Intermittent every 8 hours      dextrose 50% Injectable 25 Gram(s) IV Push once  dextrose 50% Injectable 12.5 Gram(s) IV Push once  dextrose 50% Injectable 25 Gram(s) IV Push once  glucagon  Injectable 1 milliGRAM(s) IntraMuscular once      acetaminophen     Tablet .. 650 milliGRAM(s) Oral every 6 hours PRN  diazepam  Injectable 2 milliGRAM(s) IV Push every 6 hours PRN  escitalopram 10 milliGRAM(s) Oral daily  HYDROmorphone  Injectable 0.5 milliGRAM(s) IV Push every 2 hours PRN  OLANZapine 5 milliGRAM(s) Oral daily  scopolamine 1 mG/72 Hr(s) Patch 1 Patch Transdermal every 72 hours  traZODone 50 milliGRAM(s) Oral at bedtime    bisacodyl Suppository 10 milliGRAM(s) Rectal daily  glycopyrrolate Injectable 0.4 milliGRAM(s) IV Push four times a day PRN  polyethylene glycol 3350 17 Gram(s) Oral two times a day  senna 2 Tablet(s) Oral at bedtime        dextrose 5% + sodium chloride 0.45%. 1000 milliLiter(s) IV Continuous <Continuous>  dextrose 5%. 1000 milliLiter(s) IV Continuous <Continuous>  dextrose 5%. 1000 milliLiter(s) IV Continuous <Continuous>      chlorhexidine 2% Cloths 1 Application(s) Topical daily  mupirocin 2% Ointment 1 Application(s) Both Nostrils two times a day        LABS:                        11.1   8.70  )-----------( 224      ( 14 Jul 2025 09:00 )             33.2     Hgb Trend: 11.1<--, 12.2<--, 10.9<--, 11.6<--  07-14    142  |  101  |  6[L]  ----------------------------<  96  3.5   |  28  |  0.31[L]    Ca    8.9      14 Jul 2025 09:00  Phos  3.0     07-14  Mg     1.70     07-14      Creatinine Trend: 0.31<--, 0.35<--, 0.43<--, 0.43<--    Urinalysis Basic - ( 14 Jul 2025 09:00 )    Color: x / Appearance: x / SG: x / pH: x  Gluc: 96 mg/dL / Ketone: x  / Bili: x / Urobili: x   Blood: x / Protein: x / Nitrite: x   Leuk Esterase: x / RBC: x / WBC x   Sq Epi: x / Non Sq Epi: x / Bacteria: x            MICROBIOLOGY:     RADIOLOGY:  [ ] Reviewed and interpreted by me    PULMONARY FUNCTION TESTS:    EKG:
Date of Service  : 7/16/2025  Indication for Geriatrics and Palliative Care Services: symptom management for end of life care/ goals of care     SUBJECTIVE AND OBJECTIVE:  Patient seen and examined with 3 daughters and grandchildren at bedside. Patient lethargic with eyes closed; appeared comfortable     INTERVAL HPI/OVERNIGHT EVENTS:  In past 24 hours, received IV Dilaudid 0.5mg x2 , IV Valium 2mg x2      Allergies    No Known Allergies    Intolerances    MEDICATIONS  (STANDING):  bisacodyl Suppository 10 milliGRAM(s) Rectal daily  chlorhexidine 2% Cloths 1 Application(s) Topical daily  dextrose 5% + sodium chloride 0.45%. 1000 milliLiter(s) (60 mL/Hr) IV Continuous <Continuous>  dextrose 5%. 1000 milliLiter(s) (50 mL/Hr) IV Continuous <Continuous>  dextrose 5%. 1000 milliLiter(s) (100 mL/Hr) IV Continuous <Continuous>  dextrose 50% Injectable 25 Gram(s) IV Push once  dextrose 50% Injectable 12.5 Gram(s) IV Push once  dextrose 50% Injectable 25 Gram(s) IV Push once  escitalopram 10 milliGRAM(s) Oral daily  glucagon  Injectable 1 milliGRAM(s) IntraMuscular once  mupirocin 2% Ointment 1 Application(s) Both Nostrils two times a day  OLANZapine 5 milliGRAM(s) Oral daily  piperacillin/tazobactam IVPB.. 3.375 Gram(s) IV Intermittent every 8 hours  polyethylene glycol 3350 17 Gram(s) Oral two times a day  scopolamine 1 mG/72 Hr(s) Patch 1 Patch Transdermal every 72 hours  senna 2 Tablet(s) Oral at bedtime  traZODone 50 milliGRAM(s) Oral at bedtime    MEDICATIONS  (PRN):  acetaminophen     Tablet .. 650 milliGRAM(s) Oral every 6 hours PRN Mild Pain (1 - 3), Moderate Pain (4 - 6)  diazepam  Injectable 2 milliGRAM(s) IV Push every 6 hours PRN terminal restlessness/agitation  glycopyrrolate Injectable 0.4 milliGRAM(s) IV Push four times a day PRN Secretions  HYDROmorphone  Injectable 0.5 milliGRAM(s) IV Push every 2 hours PRN Moderate pain (4-6), Severe pain (7-10), Respiratory rate greater than 22      ITEMS UNCHECKED ARE NOT PRESENT    PRESENT SYMPTOMS: [x ]Unable to self-report due to altered mental status- see [ ] CPOT [ x] PAINADS [ x] RDOS  Source if other than patient:  [ ]Family   [ ]Team     Pain:  [ ]yes [ ]no  QOL impact -   Location -                    Aggravating factors -  Quality -  Radiation -  Timing-  Severity (0-10 scale):  Minimal acceptable level / Pain Goal (0-10 scale):     Dyspnea:                           [ ]Mild [ ]Moderate [ ]Severe  Anxiety:                             [ ]Mild [ ]Moderate [ ]Severe  Agitation:                          [ ]Mild [ ]Moderate [ ]Severe  Fatigue:                             [ ]Mild [ ]Moderate [ ]Severe  Nausea:                             [ ]Mild [ ]Moderate [ ]Severe  Loss of appetite:              [ ]Mild [ ]Moderate [ ]Severe  Constipation:                   [ ]Mild [ ]Moderate [ ]Severe  Diarrhea:                          [ ]Mild [ ]Moderate [ ]Severe    CPOT:    https://www.[facility].org/getattachment/[email or web address]/Critical-Care-Pain-Observation-Tool-(CPOT)    PCSSQ[Palliative Care Spiritual Screening Question]   Severity (0-10):  Score of 4 or > indicate consideration of Chaplaincy referral.  Chaplaincy Referral: [ ] yes [ ] refused [ ] following [x ] deferred    Caregiver Davy? : [ ] yes [ ] no [ ] Declined [x ] Deferred              Social work referral [ ] Patient & Family Centered Care Referral [ ]     Anticipatory Grief present?:  [ ] yes [ ] no  [ x] Deferred                  Social work referral [ ] Chaplaincy Referral[ ] Caregiver Support Referral [ ]    Other Symptoms:  [ ]All other review of systems negative   [x ] Unable to obtain due to poor mentation     PHYSICAL EXAM:  Vital Signs Last 24 Hrs  T(C): 36.4 (16 Jul 2025 02:09), Max: 36.4 (16 Jul 2025 02:09)  T(F): 97.6 (16 Jul 2025 02:09), Max: 97.6 (16 Jul 2025 02:09)  HR: 100 (16 Jul 2025 02:09) (100 - 100)  BP: 138/64 (16 Jul 2025 02:09) (138/64 - 138/64)  BP(mean): --  RR: 18 (16 Jul 2025 02:09) (18 - 18)  SpO2: 95% (16 Jul 2025 02:09) (95% - 95%)    Parameters below as of 16 Jul 2025 02:09  Patient On (Oxygen Delivery Method): room air         GENERAL:   [ ]Alert  [ ]Oriented x   [x ]Lethargic  [ ]Cachexia  [ ]Unarousable  [ ]Verbal  [x ]Non-Verbal    Behavioral:   [ ] Anxiety  [ ] Delirium [ ] Agitation [ ] Calm  [ x] Other-encephalopathy  HEENT:  [ ]Normal  [ ] Temporal Wasting  [x ]Dry mouth   [ ]ET Tube/Trach  [ ]Oral lesions  [ ] Mucositis  PULMONARY: normal respiratory effort, no accessory muscle use  [ ]Clear [ ]Tachypnea  [ ]Audible excessive secretions   [ ]Rhonchi        [ ]Right [ ]Left [ ]Bilateral  [ ]Crackles        [ ]Right [ ]Left [ ]Bilateral  [ ]Wheezing     [ ]Right [ ]Left [ ]Bilateral  [ ]Diminished breath sounds [ ]right [ ]left [ ]bilateral  CARDIOVASCULAR:    [x ]Regular [ ]Irregular [ ]Tachy  [ ]Bakari [ ]Murmur [ ]Other  GASTROINTESTINAL:  [ x]Soft  [ ]Distended   [ ]+BS  [x ]Non tender [ ]Tender  [x ]PEG [ ]OGT/ NGT  Last BM: 7/13  GENITOURINARY:  [ ]Normal [ x] Incontinent   [ ]Oliguria/Anuria   [ ]Newell  MUSCULOSKELETAL:   [ ]Normal   [ ]Weakness  [x ]Bed/Wheelchair bound [ ]Edema  [  ] amputation  [  ] contraction  NEUROLOGIC:   [ ]No focal deficits  [x ]Cognitive impairment  [ x]Dysphagia [ ]Dysarthria [ ]Paresis [ ]Other   SKIN: See Nursing Skin Assessment for further details  [ ]Normal    [ ]Rash  [ ]Pressure ulcer(s)       Present on admission [ ]y [ ]n   [  ]  Wound    [  ] hyperpigmentation    CRITICAL CARE:  [ ]Shock Present  [ ]Septic [ ]Cardiogenic [ ]Neurologic [ ]Hypovolemic  [ ]Vasopressors [ ]Inotropes  [ ]Respiratory failure present [ ]Mechanical Ventilation [ ]Non-invasive ventilatory support [ ]High-Flow   [ ]Acute  [ ]Chronic [ ]Hypoxic  [ ]Hypercarbic [ ]Other  [ ]Other organ failure     LABS:  reviewed                         11.1   8.70  )-----------( 224      ( 14 Jul 2025 09:00 )             33.2   07-14    142  |  101  |  6[L]  ----------------------------<  96  3.5   |  28  |  0.31[L]    Ca    8.9      14 Jul 2025 09:00  Phos  3.0     07-14  Mg     1.70     07-14      Urinalysis Basic - ( 14 Jul 2025 09:00 )    Color: x / Appearance: x / SG: x / pH: x  Gluc: 96 mg/dL / Ketone: x  / Bili: x / Urobili: x   Blood: x / Protein: x / Nitrite: x   Leuk Esterase: x / RBC: x / WBC x   Sq Epi: x / Non Sq Epi: x / Bacteria: x      CAPILLARY BLOOD GLUCOSE              RADIOLOGY & ADDITIONAL STUDIES: reviewed     Protein Calorie Malnutrition Present: [ ]mild [ ]moderate [ ]severe [ ]underweight [ ]morbid obesity  https://www.andeal.org/vault/2440/web/files/ONC/Table_Clinical%20Characteristics%20to%20Document%20Malnutrition-White%20JV%20et%20al%202012.pdf    Height (cm): 162.6 (07-11-25 @ 20:25), 162.6 (04-07-25 @ 05:01), 160 (08-26-24 @ 11:37)  Weight (kg): 54.4 (07-11-25 @ 20:25), 44.452 (04-07-25 @ 00:16), 54.5 (08-06-24 @ 16:36)  BMI (kg/m2): 20.6 (07-11-25 @ 20:25), 16.8 (04-07-25 @ 05:01), 17.4 (04-07-25 @ 00:16)    [x ]PPSV2 < or = 30%  [ ]significant weight loss [ ]poor nutritional intake [ ]anasarca   [ ]Artificial Nutrition    REFERRALS:   [ ]Chaplaincy  [ x]Hospice  [ ]Child Life  [ x]Social Work  [ ]Case management [ ]Holistic Therapy       
SUBJECTIVE/ OVERNIGHT EVENTS:  comfortable  awake however, noncommunicative  mumble words  eyes close  poor historian.  under comfort care  Pt moved to private room.  PCA at bedside.       --------------------------------------------------------------------------------------------  LABS:                        11.1   8.70  )-----------( 224      ( 14 Jul 2025 09:00 )             33.2     07-14    142  |  101  |  6[L]  ----------------------------<  96  3.5   |  28  |  0.31[L]    Ca    8.9      14 Jul 2025 09:00  Phos  3.0     07-14  Mg     1.70     07-14        CAPILLARY BLOOD GLUCOSE            Urinalysis Basic - ( 14 Jul 2025 09:00 )    Color: x / Appearance: x / SG: x / pH: x  Gluc: 96 mg/dL / Ketone: x  / Bili: x / Urobili: x   Blood: x / Protein: x / Nitrite: x   Leuk Esterase: x / RBC: x / WBC x   Sq Epi: x / Non Sq Epi: x / Bacteria: x        RADIOLOGY & ADDITIONAL TESTS:    Imaging Personally Reviewed:  [x] YES  [ ] NO    Consultant(s) Notes Reviewed:  [x] YES  [ ] NO    MEDICATIONS  (STANDING):  bisacodyl Suppository 10 milliGRAM(s) Rectal daily  chlorhexidine 2% Cloths 1 Application(s) Topical daily  dextrose 5% + sodium chloride 0.45%. 1000 milliLiter(s) (60 mL/Hr) IV Continuous <Continuous>  dextrose 5%. 1000 milliLiter(s) (50 mL/Hr) IV Continuous <Continuous>  dextrose 5%. 1000 milliLiter(s) (100 mL/Hr) IV Continuous <Continuous>  dextrose 50% Injectable 25 Gram(s) IV Push once  dextrose 50% Injectable 12.5 Gram(s) IV Push once  dextrose 50% Injectable 25 Gram(s) IV Push once  escitalopram 10 milliGRAM(s) Oral daily  glucagon  Injectable 1 milliGRAM(s) IntraMuscular once  mupirocin 2% Ointment 1 Application(s) Both Nostrils two times a day  OLANZapine 5 milliGRAM(s) Oral daily  piperacillin/tazobactam IVPB.. 3.375 Gram(s) IV Intermittent every 8 hours  polyethylene glycol 3350 17 Gram(s) Oral two times a day  scopolamine 1 mG/72 Hr(s) Patch 1 Patch Transdermal every 72 hours  senna 2 Tablet(s) Oral at bedtime  traZODone 50 milliGRAM(s) Oral at bedtime    MEDICATIONS  (PRN):  acetaminophen     Tablet .. 650 milliGRAM(s) Oral every 6 hours PRN Mild Pain (1 - 3), Moderate Pain (4 - 6)  diazepam  Injectable 2 milliGRAM(s) IV Push every 6 hours PRN terminal restlessness/agitation  glycopyrrolate Injectable 0.4 milliGRAM(s) IV Push four times a day PRN Secretions  HYDROmorphone  Injectable 0.5 milliGRAM(s) IV Push every 2 hours PRN Moderate pain (4-6), Severe pain (7-10), Respiratory rate greater than 22      Care Discussed with Consultants/Other Providers [x] YES  [ ] NO    Vital Signs Last 24 Hrs  T(C): 37.2 (15 Jul 2025 09:00), Max: 37.2 (15 Jul 2025 09:00)  T(F): 99 (15 Jul 2025 09:00), Max: 99 (15 Jul 2025 09:00)  HR: 96 (15 Jul 2025 09:00) (96 - 96)  BP: 151/75 (15 Jul 2025 09:00) (151/75 - 151/75)  BP(mean): --  RR: 19 (15 Jul 2025 09:00) (19 - 19)  SpO2: 99% (15 Jul 2025 09:00) (99% - 99%)    Parameters below as of 15 Jul 2025 09:00  Patient On (Oxygen Delivery Method): nasal cannula      I&O's Summary    14 Jul 2025 07:01  -  15 Jul 2025 07:00  --------------------------------------------------------  IN: 1610 mL / OUT: 1500 mL / NET: 110 mL    15 Jul 2025 07:01  -  15 Jul 2025 17:40  --------------------------------------------------------  IN: 240 mL / OUT: 300 mL / NET: -60 mL            PHYSICAL EXAM:  GENERAL: NAD, thin-elderly, comfortable on nasal canula, confused.   HEAD:  Atraumatic, Normocephalic  EYES: EOMI, PERRLA, conjunctiva and sclera clear  NECK: Supple, No JVD  CHEST/LUNG: mild decrease breath sounds bilaterally; No wheeze   HEART: Regular rate and rhythm; No murmurs, rubs, or gallops  ABDOMEN: Soft, Nontender, Nondistended; Bowel sounds present, +PEG in place.   Neuro: awake, forgetful, AAOx 0-1, poor historian, confused  EXTREMITIES:  2+ Peripheral Pulses, No clubbing, cyanosis, or edema  SKIN: No rashes or lesions   
SUBJECTIVE/ OVERNIGHT EVENTS:  Pt seen and examined earlier today.   confused, baseline dementia  poor historian.  Per NP Oksana, pt not tolerating tube feeds with high residuals  +BM  per RN, no events overnight.   --------------------------------------------------------------------------------------------  LABS:                        12.2   10.43 )-----------( 209      ( 13 Jul 2025 07:01 )             38.2     07-13    146[H]  |  106  |  13  ----------------------------<  81  3.2[L]   |  29  |  0.35[L]    Ca    8.6      13 Jul 2025 07:01  Phos  2.3     07-13  Mg     1.90     07-13    TPro  6.8  /  Alb  3.1[L]  /  TBili  0.5  /  DBili  x   /  AST  30  /  ALT  21  /  AlkPhos  61  07-12    PT/INR - ( 11 Jul 2025 21:53 )   PT: 13.6 sec;   INR: 1.14 ratio         PTT - ( 11 Jul 2025 21:53 )  PTT:29.5 sec  CAPILLARY BLOOD GLUCOSE      POCT Blood Glucose.: 67 mg/dL (13 Jul 2025 11:36)  POCT Blood Glucose.: 86 mg/dL (13 Jul 2025 05:26)  POCT Blood Glucose.: 77 mg/dL (12 Jul 2025 23:40)  POCT Blood Glucose.: 105 mg/dL (12 Jul 2025 18:32)    CARDIAC MARKERS ( 11 Jul 2025 21:53 )  x     / x     / x     / x     / 5.6 ng/mL      Urinalysis Basic - ( 13 Jul 2025 07:01 )    Color: x / Appearance: x / SG: x / pH: x  Gluc: 81 mg/dL / Ketone: x  / Bili: x / Urobili: x   Blood: x / Protein: x / Nitrite: x   Leuk Esterase: x / RBC: x / WBC x   Sq Epi: x / Non Sq Epi: x / Bacteria: x        RADIOLOGY & ADDITIONAL TESTS:    Imaging Personally Reviewed:  [x] YES  [ ] NO    Consultant(s) Notes Reviewed:  [x] YES  [ ] NO    MEDICATIONS  (STANDING):  azithromycin  IVPB 500 milliGRAM(s) IV Intermittent every 24 hours  azithromycin  IVPB      chlorhexidine 2% Cloths 1 Application(s) Topical daily  cholecalciferol 1000 Unit(s) Oral daily  dextrose 5% + sodium chloride 0.45%. 1000 milliLiter(s) (60 mL/Hr) IV Continuous <Continuous>  dextrose 5%. 1000 milliLiter(s) (100 mL/Hr) IV Continuous <Continuous>  dextrose 5%. 1000 milliLiter(s) (50 mL/Hr) IV Continuous <Continuous>  dextrose 50% Injectable 25 Gram(s) IV Push once  dextrose 50% Injectable 12.5 Gram(s) IV Push once  dextrose 50% Injectable 25 Gram(s) IV Push once  enoxaparin Injectable 40 milliGRAM(s) SubCutaneous every 24 hours  escitalopram 10 milliGRAM(s) Oral daily  glucagon  Injectable 1 milliGRAM(s) IntraMuscular once  letrozole 2.5 milliGRAM(s) Oral daily  melatonin 6 milliGRAM(s) Oral at bedtime  memantine 10 milliGRAM(s) Oral two times a day  mirtazapine 15 milliGRAM(s) Oral at bedtime  OLANZapine 5 milliGRAM(s) Oral daily  OLANZapine Injectable 2.5 milliGRAM(s) IntraMuscular once  piperacillin/tazobactam IVPB.. 3.375 Gram(s) IV Intermittent every 8 hours  polyethylene glycol 3350 17 Gram(s) Oral two times a day  rosuvastatin 5 milliGRAM(s) Oral at bedtime  saline laxative (FLEET) Rectal Enema 1 Enema Rectal once  scopolamine 1 mG/72 Hr(s) Patch 1 Patch Transdermal every 72 hours  senna 2 Tablet(s) Oral at bedtime  traZODone 50 milliGRAM(s) Oral at bedtime    MEDICATIONS  (PRN):  acetaminophen     Tablet .. 650 milliGRAM(s) Oral every 6 hours PRN Mild Pain (1 - 3), Moderate Pain (4 - 6)      Care Discussed with Consultants/Other Providers [x] YES  [ ] NO    Vital Signs Last 24 Hrs  T(C): 36.4 (13 Jul 2025 08:41), Max: 36.8 (12 Jul 2025 17:40)  T(F): 97.6 (13 Jul 2025 08:41), Max: 98.2 (12 Jul 2025 17:40)  HR: 56 (13 Jul 2025 08:41) (56 - 79)  BP: 130/70 (13 Jul 2025 08:41) (99/56 - 130/70)  BP(mean): --  RR: 18 (13 Jul 2025 08:41) (18 - 20)  SpO2: 99% (13 Jul 2025 08:41) (99% - 100%)    Parameters below as of 13 Jul 2025 08:41  Patient On (Oxygen Delivery Method): nasal cannula  O2 Flow (L/min): 2    I&O's Summary    12 Jul 2025 07:01  -  13 Jul 2025 07:00  --------------------------------------------------------  IN: 1900 mL / OUT: 1000 mL / NET: 900 mL      PHYSICAL EXAM:  GENERAL: NAD, thin-elderly, comfortable on nasal canula  HEAD:  Atraumatic, Normocephalic  EYES: EOMI, PERRLA, conjunctiva and sclera clear  NECK: Supple, No JVD  CHEST/LUNG: mild decrease breath sounds bilaterally; No wheeze   HEART: Regular rate and rhythm; No murmurs, rubs, or gallops  ABDOMEN: Soft, Nontender, Nondistended; Bowel sounds present, +PEG in place.   Neuro: awake, forgetful, AAOx 0-1, poor historian, confused  EXTREMITIES:  2+ Peripheral Pulses, No clubbing, cyanosis, or edema  SKIN: No rashes or lesions   
SUBJECTIVE/ OVERNIGHT EVENTS:  confused  agitated  frequent suctioning per RN, however, with recurrent salivary secretions.   per GI, PEG functioning.  doesn't prevent aspiration.   BM yesterday. none today.  abd xray still with stool  bowel regimen and suppository ordered.   NP Oksana had GOC, family making her comfort care.  hospice referral in progress.     --------------------------------------------------------------------------------------------  LABS:                        11.1   8.70  )-----------( 224      ( 14 Jul 2025 09:00 )             33.2     07-14    142  |  101  |  6[L]  ----------------------------<  96  3.5   |  28  |  0.31[L]    Ca    8.9      14 Jul 2025 09:00  Phos  3.0     07-14  Mg     1.70     07-14        CAPILLARY BLOOD GLUCOSE      POCT Blood Glucose.: 84 mg/dL (13 Jul 2025 23:35)  POCT Blood Glucose.: 73 mg/dL (13 Jul 2025 18:01)        Urinalysis Basic - ( 14 Jul 2025 09:00 )    Color: x / Appearance: x / SG: x / pH: x  Gluc: 96 mg/dL / Ketone: x  / Bili: x / Urobili: x   Blood: x / Protein: x / Nitrite: x   Leuk Esterase: x / RBC: x / WBC x   Sq Epi: x / Non Sq Epi: x / Bacteria: x        RADIOLOGY & ADDITIONAL TESTS:    Imaging Personally Reviewed:  [x] YES  [ ] NO    Consultant(s) Notes Reviewed:  [x] YES  [ ] NO    MEDICATIONS  (STANDING):  azithromycin  IVPB 500 milliGRAM(s) IV Intermittent every 24 hours  azithromycin  IVPB      bisacodyl Suppository 10 milliGRAM(s) Rectal daily  chlorhexidine 2% Cloths 1 Application(s) Topical daily  dextrose 5% + sodium chloride 0.45%. 1000 milliLiter(s) (60 mL/Hr) IV Continuous <Continuous>  dextrose 5%. 1000 milliLiter(s) (100 mL/Hr) IV Continuous <Continuous>  dextrose 5%. 1000 milliLiter(s) (50 mL/Hr) IV Continuous <Continuous>  dextrose 50% Injectable 25 Gram(s) IV Push once  dextrose 50% Injectable 12.5 Gram(s) IV Push once  dextrose 50% Injectable 25 Gram(s) IV Push once  escitalopram 10 milliGRAM(s) Oral daily  glucagon  Injectable 1 milliGRAM(s) IntraMuscular once  OLANZapine 5 milliGRAM(s) Oral daily  piperacillin/tazobactam IVPB.. 3.375 Gram(s) IV Intermittent every 8 hours  polyethylene glycol 3350 17 Gram(s) Oral two times a day  scopolamine 1 mG/72 Hr(s) Patch 1 Patch Transdermal every 72 hours  senna 2 Tablet(s) Oral at bedtime  traZODone 50 milliGRAM(s) Oral at bedtime    MEDICATIONS  (PRN):  acetaminophen     Tablet .. 650 milliGRAM(s) Oral every 6 hours PRN Mild Pain (1 - 3), Moderate Pain (4 - 6)  glycopyrrolate Injectable 0.4 milliGRAM(s) IV Push four times a day PRN Secretions  HYDROmorphone  Injectable 0.5 milliGRAM(s) IV Push every 2 hours PRN Moderate pain (4-6), Severe pain (7-10), Respiratory rate greater than 22      Care Discussed with Consultants/Other Providers [x] YES  [ ] NO    Vital Signs Last 24 Hrs  T(C): 36.5 (14 Jul 2025 09:55), Max: 36.7 (13 Jul 2025 15:20)  T(F): 97.7 (14 Jul 2025 09:55), Max: 98 (13 Jul 2025 15:20)  HR: 100 (14 Jul 2025 09:55) (60 - 100)  BP: 102/74 (14 Jul 2025 09:55) (102/74 - 147/92)  BP(mean): --  RR: 18 (14 Jul 2025 09:55) (18 - 20)  SpO2: 100% (14 Jul 2025 09:55) (97% - 100%)    Parameters below as of 14 Jul 2025 09:55  Patient On (Oxygen Delivery Method): nasal cannula      I&O's Summary    13 Jul 2025 07:01  -  14 Jul 2025 07:00  --------------------------------------------------------  IN: 1600 mL / OUT: 1100 mL / NET: 500 mL    14 Jul 2025 07:01  -  14 Jul 2025 12:50  --------------------------------------------------------  IN: 690 mL / OUT: 500 mL / NET: 190 mL      PHYSICAL EXAM:  GENERAL: NAD, thin-elderly, comfortable on nasal canula, confused.   HEAD:  Atraumatic, Normocephalic  EYES: EOMI, PERRLA, conjunctiva and sclera clear  NECK: Supple, No JVD  CHEST/LUNG: mild decrease breath sounds bilaterally; No wheeze   HEART: Regular rate and rhythm; No murmurs, rubs, or gallops  ABDOMEN: Soft, Nontender, Nondistended; Bowel sounds present, +PEG in place.   Neuro: awake, forgetful, AAOx 0-1, poor historian, confused  EXTREMITIES:  2+ Peripheral Pulses, No clubbing, cyanosis, or edema  SKIN: No rashes or lesions   
SUBJECTIVE/ OVERNIGHT EVENTS:  awake alert  back to baseline mental status  poor historian due to dementia  the daughter Radha at bedside.    --------------------------------------------------------------------------------------------  LABS:                        10.9   11.22 )-----------( 200      ( 2025 07:45 )             34.5     07-12    145  |  107  |  29[H]  ----------------------------<  120[H]  3.3[L]   |  28  |  0.43[L]    Ca    9.0      2025 07:45  Phos  2.1     07-12  Mg     2.30     -12    TPro  6.8  /  Alb  3.1[L]  /  TBili  0.5  /  DBili  x   /  AST  30  /  ALT  21  /  AlkPhos  61  07-12    PT/INR - ( 2025 21:53 )   PT: 13.6 sec;   INR: 1.14 ratio         PTT - ( 2025 21:53 )  PTT:29.5 sec  CAPILLARY BLOOD GLUCOSE      POCT Blood Glucose.: 81 mg/dL (2025 10:38)  POCT Blood Glucose.: 81 mg/dL (2025 10:09)  POCT Blood Glucose.: 77 mg/dL (2025 09:10)  POCT Blood Glucose.: 143 mg/dL (2025 06:23)  POCT Blood Glucose.: 92 mg/dL (2025 05:59)  POCT Blood Glucose.: 82 mg/dL (2025 04:59)  POCT Blood Glucose.: 138 mg/dL (2025 20:34)    CARDIAC MARKERS ( 2025 21:53 )  x     / x     / x     / x     / 5.6 ng/mL      Urinalysis Basic - ( 2025 08:35 )    Color: Dark Yellow / Appearance: Clear / S.061 / pH: x  Gluc: x / Ketone: x  / Bili: Small / Urobili: 1.0 mg/dL   Blood: x / Protein: 100 mg/dL / Nitrite: Negative   Leuk Esterase: Trace / RBC: 3-6 /HPF / WBC 0-3 /HPF   Sq Epi: x / Non Sq Epi: x / Bacteria: Few /HPF        RADIOLOGY & ADDITIONAL TESTS:    Imaging Personally Reviewed:  [x] YES  [ ] NO    Consultant(s) Notes Reviewed:  [x] YES  [ ] NO    MEDICATIONS  (STANDING):  azithromycin  IVPB 500 milliGRAM(s) IV Intermittent once  azithromycin  IVPB      cholecalciferol 1000 Unit(s) Oral daily  dextrose 5%. 1000 milliLiter(s) (100 mL/Hr) IV Continuous <Continuous>  dextrose 5%. 1000 milliLiter(s) (50 mL/Hr) IV Continuous <Continuous>  dextrose 50% Injectable 25 Gram(s) IV Push once  dextrose 50% Injectable 12.5 Gram(s) IV Push once  dextrose 50% Injectable 25 Gram(s) IV Push once  enoxaparin Injectable 40 milliGRAM(s) SubCutaneous every 24 hours  escitalopram 10 milliGRAM(s) Oral daily  glucagon  Injectable 1 milliGRAM(s) IntraMuscular once  letrozole 2.5 milliGRAM(s) Oral daily  melatonin 6 milliGRAM(s) Oral at bedtime  memantine 10 milliGRAM(s) Oral two times a day  mirtazapine 15 milliGRAM(s) Oral at bedtime  OLANZapine 5 milliGRAM(s) Oral daily  piperacillin/tazobactam IVPB.. 3.375 Gram(s) IV Intermittent every 8 hours  rosuvastatin 5 milliGRAM(s) Oral at bedtime  scopolamine 1 mG/72 Hr(s) Patch 1 Patch Transdermal every 72 hours  sodium chloride 0.45%. 1000 milliLiter(s) (100 mL/Hr) IV Continuous <Continuous>  traZODone 50 milliGRAM(s) Oral at bedtime    MEDICATIONS  (PRN):  acetaminophen     Tablet .. 650 milliGRAM(s) Oral every 6 hours PRN Mild Pain (1 - 3), Moderate Pain (4 - 6)      Care Discussed with Consultants/Other Providers [x] YES  [ ] NO    Vital Signs Last 24 Hrs  T(C): 36.6 (2025 10:21), Max: 38 (2025 21:17)  T(F): 97.8 (2025 10:21), Max: 100.4 (2025 21:17)  HR: 75 (2025 10:) (72 - 115)  BP: 107/63 (2025 10:21) (90/57 - 120/63)  BP(mean): 69 (2025 08:21) (69 - 69)  RR: 20 (2025 10:) (15 - 22)  SpO2: 100% (2025 10:) (96% - 100%)    Parameters below as of 2025 10:21  Patient On (Oxygen Delivery Method): nasal cannula  O2 Flow (L/min): 6    I&O's Summary    PHYSICAL EXAM:  GENERAL: NAD, thin-elderly, comfortable on nasal canula  HEAD:  Atraumatic, Normocephalic  EYES: EOMI, PERRLA, conjunctiva and sclera clear  NECK: Supple, No JVD  CHEST/LUNG: mild decrease breath sounds bilaterally; No wheeze   HEART: Regular rate and rhythm; No murmurs, rubs, or gallops  ABDOMEN: Soft, Nontender, Nondistended; Bowel sounds present, +PEG in place.   Neuro: awake, forgetful, AAOx 0-1, poor historian  EXTREMITIES:  2+ Peripheral Pulses, No clubbing, cyanosis, or edema  SKIN: No rashes or lesions

## 2025-07-16 NOTE — PROGRESS NOTE ADULT - PROBLEM SELECTOR PLAN 10
Case reviewed with primary team , , and Hospice Team   Thank you for allowing us to participate in your patient's care. Please page [phone] for any questions/concerns.
Case reviewed with primary team , , and Hospice Team   Thank you for allowing us to participate in your patient's care. Please page [phone] for any questions/concerns.

## 2025-07-16 NOTE — PROGRESS NOTE ADULT - ASSESSMENT
70F with history of Dementia (AAO x 0, now nonverbal), R Breast Cancer s/p chemo/lumpectomy/RT (2016, in remission since), DM2 (currently off medications), HTN, and HLD, s/p PEG placement in May 2025 presenting for increased O2 requirements and agitation. Found to have multifocal PNA on chest imaging     Sepsis 2/2 PNA  AHRF on NC  - tachypnea, tachycardia and leukocytosis on admission  - CT showing multifocal PNA and fecal burden  - MRSA PCR negative  - blood cultures NGTD  - urine legionella Ag and strep pneumoniae urine Ag negative  - urine cx- no growth    patient transitioned to comfort care  Recommendations:   C/w zosyn x 5 days w/ last day today if consistent w/ GOC  rest of care per primary team, palliative care    Garcia Kearney M.D.  [facility]  [phone]  After 5pm on weekdays and all day on weekends - please call [phone]  Available on [employer] teams    Thank you for consulting us and involving us in the management of this patients case. In addition to reviewing history, imaging, documents, labs, microbiology, took into account antibiotic stewardship, local antibiogram and infection control strategies and potential transmission issues at time of treatment decision making process.

## 2025-07-16 NOTE — PROGRESS NOTE ADULT - PROBLEM SELECTOR PLAN 9
- DNR/DNI. Comfort Measures Only.  - 7/15- Family amenable to inpatient hospice referral. Counseled that IV fluids and IV antibiotics would not be continued when transferred to inpatient hospice setting which Radha understands as priority is patient's comfort.
- DNR/DNI. Comfort Measures Only.  - 7/15- Spoke with daughter Radha at bedside with MEHDI Sanches about inpatient hospice referral which she is amenable to. Counseled that IV fluids and IV antibiotics would not be continued when transferred to inpatient hospice setting which Radha understands as priority is patient's comfort. Also counseled that can continue with IV fluids and antibiotics while hospitalized but if patient becomes dyspneic or excessive gurgling breath sounds may need to discontinue to prevent worsening of symptom burden, which she understands.

## 2025-07-16 NOTE — PROGRESS NOTE ADULT - ASSESSMENT
70F with history of Dementia (AAO x 0, now nonverbal), R Breast Cancer s/p chemo/lumpectomy/RT (2016, in remission since), DM2 (currently off medications), HTN, and HLD, s/p PEG placement in May 2025 presenting with AHRF with multifocal PNA concerning for aspiration PNA.    #Multifocal PNA  #Aspiration pneumonia  - Aggressive pulmonary toilet- marked secretions orally, appear as tube feeds  - hold further feeds at this time  - consider enema for signficant stool burden  - c/w zosyn for now if consistent with GOC  - Aspiration precautions with HOB elevation above 45 degrees   - O2 supplementation with goal >92% .   - DNR/ DNI. Prognosis guarded  - GOC noted- moving towards comfort care  - patient noted to appear more comfortable today

## 2025-07-16 NOTE — DISCHARGE NOTE NURSING/CASE MANAGEMENT/SOCIAL WORK - FINANCIAL ASSISTANCE
[facility] provides services at a reduced cost to those who are determined to be eligible through [facility]’s financial assistance program. Information regarding [facility]’s financial assistance program can be found by going to [email or web address] or by calling [phone].

## 2025-07-16 NOTE — PROGRESS NOTE ADULT - TIME BILLING
Medical management as above, reviewing chart and coordinating care with primary team/staff, as well as reviewing vitals, radiology, medication list, recent labs, and prior records.    Does not include teaching time.
Time spent for patient assessment and extensive review of the physical chart, electronic medical record, and documentation to obtain collateral information including but not limited to:  [x] Inpatient records (ED, H&P, primary team, and consultants if applicable, care coordination)  [x] Inpatient values/results (biomarkers, immunoassays, imaging, and microbiology results)  [x] Current or proposed treatment plans  [x] Pharmacotherapy review  [x] Discussion and coordinating care and collaborating with primary team and interdisciplinary staff and floor staff  [x] Discussion including counseling/ education with the patient, surrogate decision maker, or family
Time spent for patient assessment and extensive review of the physical chart, electronic medical record, and documentation to obtain collateral information including but not limited to:  [x] Inpatient records (ED, H&P, primary team, and consultants if applicable, care coordination)  [x] Inpatient values/results (biomarkers, immunoassays, imaging, and microbiology results)  [x] Current or proposed treatment plans  [x] Pharmacotherapy review  [x] Discussion and coordinating care and collaborating with primary team and interdisciplinary staff and floor staff  [x] Discussion including counseling/ education with the patient, surrogate decision maker, or family

## 2025-07-16 NOTE — PROGRESS NOTE ADULT - PROVIDER SPECIALTY LIST ADULT
Infectious Disease
Internal Medicine
Infectious Disease
Internal Medicine
Infectious Disease
Pulmonology
Internal Medicine
Palliative Care
Internal Medicine
Palliative Care

## 2025-07-16 NOTE — PROGRESS NOTE ADULT - REASON FOR ADMISSION
Increasing O2 req

## 2025-07-16 NOTE — PROGRESS NOTE ADULT - PROBLEM SELECTOR PLAN 3
- Na 146 on admission  - Bun/SCr ratio greater than 60  - HyperNa most likely in the setting of dehydration  - C/w D5/0.45 NS   - Free water flushes 250 cc TID, Nutrition eval  - not tolerating tube feeds
- Na 146 on admission  - Bun/SCr ratio greater than 60  - HyperNa most likely in the setting of dehydration  - C/w 0.45 NS 100cc/hr  - Free water flushes TID 400cc, Nutrition eval  - BMP improving.
- Na 146 on admission  - Bun/SCr ratio greater than 60  - HyperNa most likely in the setting of dehydration  - C/w D5/0.45 NS   - Free water flushes 250 cc TID, Nutrition eval  - not tolerating tube feeds
- Na 146 on admission  - Bun/SCr ratio greater than 60  - HyperNa most likely in the setting of dehydration  - C/w D5/0.45 NS   - Free water flushes 250 cc TID, Nutrition eval  - not tolerating tube feeds
- due to pneumonia  - on supplemental oxygen via nasal canula  - dyspnea management as below.
- due to pneumonia  - on supplemental oxygen via nasal canula  - dyspnea management as below.

## 2025-07-16 NOTE — PROGRESS NOTE ADULT - PROBLEM SELECTOR PLAN 1
- Meets SIRS criteria with white count, tachycardia, and increased RR  - Most likely source is multifocal PNA  - Pt is NH resident for 1+ years, with high risk of aspiration   - s/p IV Zosyn and Vanco.  will continue IV Zosyn, hold vanco.   - MRSA/MSSA nasal swap.   - Urine strep/pneumo ordered  - BCx negative.   - ID/pulm eval appreciated, abx per ID.
- Meets SIRS criteria with white count, tachycardia, and increased RR  - Most likely source is multifocal PNA  - Pt is NH resident for 1+ years, with high risk of aspiration   - s/p IV Zosyn and Vanco.  will continue IV Zosyn, hold vanco.   - MRSA/MSSA nasal swap.   - Urine strep/pneumo ordered  - BCx pending  - ID/pulm eval appreciated
- Meets SIRS criteria with white count, tachycardia, and increased RR  - Most likely source is multifocal PNA  - Pt is NH resident for 1+ years, with high risk of aspiration   - s/p IV Zosyn and Vanco.  will continue IV Zosyn, hold vanco.   - check MRSA/MSSA nasal swap.   - Urine strep/pneumo ordered  - BCx pending  - ID/pulm eval appreciated
- Meets SIRS criteria with white count, tachycardia, and increased RR  - Most likely source is multifocal PNA  - Pt is NH resident for 1+ years, with high risk of aspiration   - s/p IV Zosyn and Vanco.  will continue IV Zosyn  - check MRSA/MSSA nasal swap.   - Urine strep/pneumo ordered  - BCx pending  - ID consulted.
Patient with advanced dementia. Fast 7C  Supportive Care.
Patient with advanced dementia. Fast 7C  Supportive Care.

## 2025-07-16 NOTE — PROGRESS NOTE ADULT - PROBLEM SELECTOR PLAN 6
Patient requires total support for all ADL's.   Please assist with ADLs  Skin care as per protocol.
- on amlodipine 5mg  - will hold BP meds in the setting of sepsis.
- on amlodipine 5mg  - will hold BP meds in the setting of sepsis.    # Constipation   large stool burden noted on CT abd  start Senna, Miralax. Fleet enema.  rectal suppository   monitor for BM
- on amlodipine 5mg  - will hold BP meds in the setting of sepsis.
- on amlodipine 5mg  - will hold BP meds in the setting of sepsis.
Patient requires total support for all ADL's.   Please assist with ADLs  Skin care as per protocol.

## 2025-07-16 NOTE — PROGRESS NOTE ADULT - PROBLEM SELECTOR PROBLEM 6
HTN (hypertension)
Functional quadriplegia
Functional quadriplegia

## 2025-07-16 NOTE — PROGRESS NOTE ADULT - PROBLEM SELECTOR PROBLEM 2
Acute hypoxic respiratory failure
Sepsis due to pneumonia
Sepsis due to pneumonia

## 2025-07-16 NOTE — PROGRESS NOTE ADULT - PROBLEM SELECTOR PROBLEM 7
Prophylactic measure
Dysphagia
Dysphagia

## 2025-07-16 NOTE — DISCHARGE NOTE PROVIDER - NSDCMRMEDTOKEN_GEN_ALL_CORE_FT
acetaminophen 325 mg oral tablet: 2 tab(s) by PEG tube every 6 hours as needed for Mild Pain (1 - 3), Moderate Pain (4 - 6)  bisacodyl 10 mg rectal suppository: 1 suppository(ies) rectal once a day  diazePAM 5 mg/mL injectable solution: 2 milligram(s) injectable every 6 hours as needed for terminal agitation /restlesness  escitalopram 10 mg oral tablet: 1 tab(s) by PEG tube once a day  glycopyrrolate 0.2 mg/mL injectable solution: 0.4 milligram(s) injectable 4 times a day as needed for  excessive secretions  HYDROmorphone: 0.5 milligram(s) injectable every 2 hours as needed for  severe and moderate pain give for respiratory rate greater than 22 breaths per minute  OLANZapine 5 mg oral tablet: 1 tab(s) by PEG tube once a day  polyethylene glycol 3350 oral powder for reconstitution: 17 gram(s) 2 times a day  scopolamine 1 mg/72 hr transdermal film, extended release: 1 patch transdermally every 72 hours behind the ear  senna leaf extract oral tablet: 2 tab(s) by PEG tube once a day (at bedtime)  traZODone 50 mg oral tablet: 1 tab(s) by PEG tube once a day (at bedtime)   acetaminophen 325 mg oral tablet: 2 tab(s) by PEG tube every 6 hours as needed for Mild Pain (1 - 3), Moderate Pain (4 - 6)  bisacodyl 10 mg rectal suppository: 1 suppository(ies) rectal once a day  diazePAM 5 mg/mL injectable solution: 2 milligram(s) injectable every 6 hours as needed for terminal agitation /restlesness  escitalopram 10 mg oral tablet: 1 tab(s) by PEG tube once a day  glycopyrrolate 0.2 mg/mL injectable solution: 0.4 milligram(s) injectable 4 times a day as needed for  excessive secretions  HYDROmorphone: 0.5 milligram(s) injectable every 2 hours as needed for  severe and moderate pain give for respiratory rate greater than 22 breaths per minute  Narcan 4 mg/0.1 mL nasal spray: 1 spray(s) in the left nostril use when pt unresponsive , may repeat after 2 minutes if no effect  OLANZapine 5 mg oral tablet: 1 tab(s) by PEG tube once a day  polyethylene glycol 3350 oral powder for reconstitution: 17 gram(s) 2 times a day  scopolamine 1 mg/72 hr transdermal film, extended release: 1 patch transdermally every 72 hours behind the ear  senna leaf extract oral tablet: 2 tab(s) by PEG tube once a day (at bedtime)  traZODone 50 mg oral tablet: 1 tab(s) by PEG tube once a day (at bedtime)

## 2025-07-16 NOTE — PROGRESS NOTE ADULT - PROBLEM/PLAN-1
DISPLAY PLAN FREE TEXT
152.4
DISPLAY PLAN FREE TEXT
DISPLAY PLAN FREE TEXT

## 2025-07-16 NOTE — PROGRESS NOTE ADULT - PROBLEM SELECTOR PLAN 5
- due to dementia  - please coordinate care with family.
- C/w trazodone, Remeron, escitalopram, olanzapine
- due to dementia  - please coordinate care with family.

## 2025-07-16 NOTE — PROGRESS NOTE ADULT - PROBLEM SELECTOR PLAN 4
- VBG 7.48 with normal bicarb and CO2  - Possibly i/s/o hypernatremia
- VBG 7.48 with normal bicarb and CO2  - Possibly i/s/o hypernatremia
- IV Dilaudid 0.5mg q2 PRN dyspnea/ pain  - IV Robinul 0.4mg q6 PRN secretions.
- VBG 7.48 with normal bicarb and CO2  - Possibly i/s/o hypernatremia  - VBG in the AM for trending
- VBG 7.48 with normal bicarb and CO2  - Possibly i/s/o hypernatremia  - VBG in the AM for trending
- IV Dilaudid 0.5mg q2 PRN dyspnea/ pain  - IV Robinul 0.4mg q6 PRN secretions.
